# Patient Record
Sex: FEMALE | Race: WHITE | NOT HISPANIC OR LATINO | Employment: OTHER | URBAN - METROPOLITAN AREA
[De-identification: names, ages, dates, MRNs, and addresses within clinical notes are randomized per-mention and may not be internally consistent; named-entity substitution may affect disease eponyms.]

---

## 2017-01-03 ENCOUNTER — ALLSCRIPTS OFFICE VISIT (OUTPATIENT)
Dept: OTHER | Facility: OTHER | Age: 62
End: 2017-01-03

## 2017-01-27 ENCOUNTER — ALLSCRIPTS OFFICE VISIT (OUTPATIENT)
Dept: OTHER | Facility: OTHER | Age: 62
End: 2017-01-27

## 2017-02-01 ENCOUNTER — ALLSCRIPTS OFFICE VISIT (OUTPATIENT)
Dept: OTHER | Facility: OTHER | Age: 62
End: 2017-02-01

## 2017-02-01 DIAGNOSIS — Z12.4 ENCOUNTER FOR SCREENING FOR MALIGNANT NEOPLASM OF CERVIX: ICD-10-CM

## 2017-02-01 LAB
BILIRUB UR QL STRIP: NORMAL
CLARITY UR: NORMAL
COLOR UR: YELLOW
GLUCOSE (HISTORICAL): NORMAL
HGB UR QL STRIP.AUTO: NORMAL
KETONES UR STRIP-MCNC: NORMAL MG/DL
LEUKOCYTE ESTERASE UR QL STRIP: NORMAL
NITRITE UR QL STRIP: NORMAL
PH UR STRIP.AUTO: 7 [PH]
PROT UR STRIP-MCNC: NORMAL MG/DL
SP GR UR STRIP.AUTO: 1
UROBILINOGEN UR QL STRIP.AUTO: NORMAL

## 2017-02-01 PROCEDURE — G0145 SCR C/V CYTO,THINLAYER,RESCR: HCPCS | Performed by: FAMILY MEDICINE

## 2017-02-03 ENCOUNTER — LAB REQUISITION (OUTPATIENT)
Dept: LAB | Facility: HOSPITAL | Age: 62
End: 2017-02-03
Payer: MEDICARE

## 2017-02-03 DIAGNOSIS — Z12.4 ENCOUNTER FOR SCREENING FOR MALIGNANT NEOPLASM OF CERVIX: ICD-10-CM

## 2017-02-08 LAB
LAB AP GYN PRIMARY INTERPRETATION: NORMAL
Lab: NORMAL

## 2017-02-09 ENCOUNTER — GENERIC CONVERSION - ENCOUNTER (OUTPATIENT)
Dept: OTHER | Facility: OTHER | Age: 62
End: 2017-02-09

## 2017-04-03 ENCOUNTER — ALLSCRIPTS OFFICE VISIT (OUTPATIENT)
Dept: OTHER | Facility: OTHER | Age: 62
End: 2017-04-03

## 2017-05-12 ENCOUNTER — ALLSCRIPTS OFFICE VISIT (OUTPATIENT)
Dept: OTHER | Facility: OTHER | Age: 62
End: 2017-05-12

## 2017-05-12 ENCOUNTER — TRANSCRIBE ORDERS (OUTPATIENT)
Dept: ADMINISTRATIVE | Facility: HOSPITAL | Age: 62
End: 2017-05-12

## 2017-05-12 DIAGNOSIS — M25.561 PAIN IN RIGHT KNEE: ICD-10-CM

## 2017-05-12 DIAGNOSIS — S83.241A OTHER TEAR OF MEDIAL MENISCUS, CURRENT INJURY, RIGHT KNEE, INITIAL ENCOUNTER: ICD-10-CM

## 2017-05-12 DIAGNOSIS — M25.561 RIGHT KNEE PAIN, UNSPECIFIED CHRONICITY: Primary | ICD-10-CM

## 2017-05-24 ENCOUNTER — ALLSCRIPTS OFFICE VISIT (OUTPATIENT)
Dept: OTHER | Facility: OTHER | Age: 62
End: 2017-05-24

## 2017-05-25 ENCOUNTER — HOSPITAL ENCOUNTER (OUTPATIENT)
Dept: RADIOLOGY | Facility: HOSPITAL | Age: 62
Discharge: HOME | End: 2017-05-25
Attending: ORTHOPAEDIC SURGERY
Payer: MEDICARE

## 2017-05-25 DIAGNOSIS — M25.561 PAIN IN RIGHT KNEE: ICD-10-CM

## 2017-05-25 PROCEDURE — 73721 MRI JNT OF LWR EXTRE W/O DYE: CPT

## 2017-05-26 ENCOUNTER — ALLSCRIPTS OFFICE VISIT (OUTPATIENT)
Dept: OTHER | Facility: OTHER | Age: 62
End: 2017-05-26

## 2017-06-01 ENCOUNTER — APPOINTMENT (OUTPATIENT)
Dept: LAB | Facility: CLINIC | Age: 62
End: 2017-06-01
Payer: MEDICARE

## 2017-06-01 ENCOUNTER — TRANSCRIBE ORDERS (OUTPATIENT)
Dept: LAB | Facility: CLINIC | Age: 62
End: 2017-06-01

## 2017-06-01 DIAGNOSIS — S83.241A ACUTE MEDIAL MENISCUS TEAR OF RIGHT KNEE, INITIAL ENCOUNTER: Primary | ICD-10-CM

## 2017-06-01 DIAGNOSIS — S83.241A ACUTE MEDIAL MENISCUS TEAR OF RIGHT KNEE, INITIAL ENCOUNTER: ICD-10-CM

## 2017-06-01 LAB
ANION GAP SERPL CALCULATED.3IONS-SCNC: 5 MMOL/L (ref 4–13)
APTT PPP: 29 SECONDS (ref 23–35)
BASOPHILS # BLD AUTO: 0.05 THOUSANDS/ΜL (ref 0–0.1)
BASOPHILS NFR BLD AUTO: 1 % (ref 0–1)
BUN SERPL-MCNC: 17 MG/DL (ref 5–25)
CALCIUM SERPL-MCNC: 9 MG/DL (ref 8.3–10.1)
CHLORIDE SERPL-SCNC: 104 MMOL/L (ref 100–108)
CO2 SERPL-SCNC: 30 MMOL/L (ref 21–32)
CREAT SERPL-MCNC: 0.88 MG/DL (ref 0.6–1.3)
EOSINOPHIL # BLD AUTO: 0.6 THOUSAND/ΜL (ref 0–0.61)
EOSINOPHIL NFR BLD AUTO: 7 % (ref 0–6)
ERYTHROCYTE [DISTWIDTH] IN BLOOD BY AUTOMATED COUNT: 15.3 % (ref 11.6–15.1)
GFR SERPL CREATININE-BSD FRML MDRD: >60 ML/MIN/1.73SQ M
GLUCOSE SERPL-MCNC: 71 MG/DL (ref 65–140)
HCT VFR BLD AUTO: 37 % (ref 34.8–46.1)
HGB BLD-MCNC: 12 G/DL (ref 11.5–15.4)
INR PPP: 0.93 (ref 0.86–1.16)
LYMPHOCYTES # BLD AUTO: 3.57 THOUSANDS/ΜL (ref 0.6–4.47)
LYMPHOCYTES NFR BLD AUTO: 40 % (ref 14–44)
MCH RBC QN AUTO: 29 PG (ref 26.8–34.3)
MCHC RBC AUTO-ENTMCNC: 32.4 G/DL (ref 31.4–37.4)
MCV RBC AUTO: 89 FL (ref 82–98)
MONOCYTES # BLD AUTO: 0.78 THOUSAND/ΜL (ref 0.17–1.22)
MONOCYTES NFR BLD AUTO: 9 % (ref 4–12)
NEUTROPHILS # BLD AUTO: 3.89 THOUSANDS/ΜL (ref 1.85–7.62)
NEUTS SEG NFR BLD AUTO: 43 % (ref 43–75)
NRBC BLD AUTO-RTO: 0 /100 WBCS
PLATELET # BLD AUTO: 378 THOUSANDS/UL (ref 149–390)
PMV BLD AUTO: 10.2 FL (ref 8.9–12.7)
POTASSIUM SERPL-SCNC: 4.5 MMOL/L (ref 3.5–5.3)
PROTHROMBIN TIME: 12.5 SECONDS (ref 12.1–14.4)
RBC # BLD AUTO: 4.14 MILLION/UL (ref 3.81–5.12)
SODIUM SERPL-SCNC: 139 MMOL/L (ref 136–145)
WBC # BLD AUTO: 8.91 THOUSAND/UL (ref 4.31–10.16)

## 2017-06-01 PROCEDURE — 85610 PROTHROMBIN TIME: CPT | Performed by: ORTHOPAEDIC SURGERY

## 2017-06-01 PROCEDURE — 85025 COMPLETE CBC W/AUTO DIFF WBC: CPT | Performed by: ORTHOPAEDIC SURGERY

## 2017-06-01 PROCEDURE — 80048 BASIC METABOLIC PNL TOTAL CA: CPT | Performed by: ORTHOPAEDIC SURGERY

## 2017-06-01 PROCEDURE — 85730 THROMBOPLASTIN TIME PARTIAL: CPT

## 2017-06-01 PROCEDURE — 36415 COLL VENOUS BLD VENIPUNCTURE: CPT | Performed by: ORTHOPAEDIC SURGERY

## 2017-06-05 RX ORDER — FAMOTIDINE 20 MG
TABLET ORAL
COMMUNITY
End: 2018-01-24

## 2017-06-05 RX ORDER — SUMATRIPTAN 100 MG/1
100 TABLET, FILM COATED ORAL ONCE AS NEEDED
COMMUNITY
End: 2021-08-16 | Stop reason: SDUPTHER

## 2017-06-05 RX ORDER — BUPRENORPHINE HYDROCHLORIDE 8 MG/1
8 TABLET SUBLINGUAL 3 TIMES DAILY
COMMUNITY

## 2017-06-05 RX ORDER — TURMERIC ROOT EXTRACT 500 MG
TABLET ORAL
COMMUNITY
End: 2021-04-28

## 2017-06-05 RX ORDER — DIPHENOXYLATE HYDROCHLORIDE AND ATROPINE SULFATE 2.5; .025 MG/1; MG/1
1 TABLET ORAL
COMMUNITY

## 2017-06-05 RX ORDER — CHLORAL HYDRATE 500 MG
1000 CAPSULE ORAL
COMMUNITY
End: 2019-06-11

## 2017-06-05 RX ORDER — ALBUTEROL SULFATE 90 UG/1
2 AEROSOL, METERED RESPIRATORY (INHALATION) EVERY 6 HOURS PRN
COMMUNITY
End: 2018-12-31 | Stop reason: SDUPTHER

## 2017-06-05 RX ORDER — DULOXETIN HYDROCHLORIDE 30 MG/1
30 CAPSULE, DELAYED RELEASE ORAL
COMMUNITY
End: 2018-03-06 | Stop reason: SDUPTHER

## 2017-06-06 ENCOUNTER — GENERIC CONVERSION - ENCOUNTER (OUTPATIENT)
Dept: OTHER | Facility: OTHER | Age: 62
End: 2017-06-06

## 2017-06-06 ENCOUNTER — ALLSCRIPTS OFFICE VISIT (OUTPATIENT)
Dept: OTHER | Facility: OTHER | Age: 62
End: 2017-06-06

## 2017-06-07 ENCOUNTER — ANESTHESIA EVENT (OUTPATIENT)
Dept: PERIOP | Facility: AMBULARY SURGERY CENTER | Age: 62
End: 2017-06-07
Payer: MEDICARE

## 2017-06-08 ENCOUNTER — HOSPITAL ENCOUNTER (OUTPATIENT)
Facility: AMBULARY SURGERY CENTER | Age: 62
Setting detail: OUTPATIENT SURGERY
Discharge: HOME | End: 2017-06-08
Attending: ORTHOPAEDIC SURGERY | Admitting: ORTHOPAEDIC SURGERY
Payer: MEDICARE

## 2017-06-08 ENCOUNTER — ANESTHESIA (OUTPATIENT)
Dept: PERIOP | Facility: AMBULARY SURGERY CENTER | Age: 62
End: 2017-06-08
Payer: MEDICARE

## 2017-06-08 ENCOUNTER — GENERIC CONVERSION - ENCOUNTER (OUTPATIENT)
Dept: PERIOP | Facility: HOSPITAL | Age: 62
End: 2017-06-08

## 2017-06-08 VITALS
DIASTOLIC BLOOD PRESSURE: 66 MMHG | RESPIRATION RATE: 16 BRPM | SYSTOLIC BLOOD PRESSURE: 152 MMHG | HEART RATE: 57 BPM | TEMPERATURE: 98.2 F | OXYGEN SATURATION: 99 % | WEIGHT: 143 LBS | HEIGHT: 66 IN | BODY MASS INDEX: 22.98 KG/M2

## 2017-06-08 RX ORDER — FENTANYL CITRATE/PF 50 MCG/ML
25 SYRINGE (ML) INJECTION
Status: DISCONTINUED | OUTPATIENT
Start: 2017-06-08 | End: 2017-06-08 | Stop reason: HOSPADM

## 2017-06-08 RX ORDER — PROPOFOL 10 MG/ML
INJECTION, EMULSION INTRAVENOUS AS NEEDED
Status: DISCONTINUED | OUTPATIENT
Start: 2017-06-08 | End: 2017-06-08 | Stop reason: SURG

## 2017-06-08 RX ORDER — OXYCODONE HYDROCHLORIDE AND ACETAMINOPHEN 5; 325 MG/1; MG/1
1 TABLET ORAL EVERY 4 HOURS PRN
Status: DISCONTINUED | OUTPATIENT
Start: 2017-06-08 | End: 2017-06-08 | Stop reason: HOSPADM

## 2017-06-08 RX ORDER — BUPIVACAINE HYDROCHLORIDE AND EPINEPHRINE 2.5; 5 MG/ML; UG/ML
INJECTION, SOLUTION INFILTRATION; PERINEURAL AS NEEDED
Status: DISCONTINUED | OUTPATIENT
Start: 2017-06-08 | End: 2017-06-08 | Stop reason: HOSPADM

## 2017-06-08 RX ORDER — SODIUM CHLORIDE, SODIUM LACTATE, POTASSIUM CHLORIDE, CALCIUM CHLORIDE 600; 310; 30; 20 MG/100ML; MG/100ML; MG/100ML; MG/100ML
100 INJECTION, SOLUTION INTRAVENOUS CONTINUOUS
Status: DISCONTINUED | OUTPATIENT
Start: 2017-06-08 | End: 2017-06-08 | Stop reason: HOSPADM

## 2017-06-08 RX ORDER — MAGNESIUM HYDROXIDE 1200 MG/15ML
LIQUID ORAL AS NEEDED
Status: DISCONTINUED | OUTPATIENT
Start: 2017-06-08 | End: 2017-06-08 | Stop reason: HOSPADM

## 2017-06-08 RX ORDER — ONDANSETRON 2 MG/ML
INJECTION INTRAMUSCULAR; INTRAVENOUS AS NEEDED
Status: DISCONTINUED | OUTPATIENT
Start: 2017-06-08 | End: 2017-06-08 | Stop reason: SURG

## 2017-06-08 RX ORDER — MIDAZOLAM HYDROCHLORIDE 1 MG/ML
INJECTION INTRAMUSCULAR; INTRAVENOUS AS NEEDED
Status: DISCONTINUED | OUTPATIENT
Start: 2017-06-08 | End: 2017-06-08 | Stop reason: SURG

## 2017-06-08 RX ORDER — FENTANYL CITRATE 50 UG/ML
INJECTION, SOLUTION INTRAMUSCULAR; INTRAVENOUS AS NEEDED
Status: DISCONTINUED | OUTPATIENT
Start: 2017-06-08 | End: 2017-06-08 | Stop reason: SURG

## 2017-06-08 RX ORDER — LIDOCAINE HYDROCHLORIDE 10 MG/ML
INJECTION, SOLUTION INFILTRATION; PERINEURAL AS NEEDED
Status: DISCONTINUED | OUTPATIENT
Start: 2017-06-08 | End: 2017-06-08 | Stop reason: SURG

## 2017-06-08 RX ADMIN — DEXAMETHASONE SODIUM PHOSPHATE 8 MG: 10 INJECTION INTRAMUSCULAR; INTRAVENOUS at 09:42

## 2017-06-08 RX ADMIN — FENTANYL CITRATE 100 MCG: 50 INJECTION, SOLUTION INTRAMUSCULAR; INTRAVENOUS at 09:33

## 2017-06-08 RX ADMIN — PROPOFOL 200 MG: 10 INJECTION, EMULSION INTRAVENOUS at 09:33

## 2017-06-08 RX ADMIN — CEFAZOLIN SODIUM 2000 MG: 2 SOLUTION INTRAVENOUS at 09:30

## 2017-06-08 RX ADMIN — MIDAZOLAM HYDROCHLORIDE 2 MG: 1 INJECTION, SOLUTION INTRAMUSCULAR; INTRAVENOUS at 09:30

## 2017-06-08 RX ADMIN — ONDANSETRON 4 MG: 2 INJECTION INTRAMUSCULAR; INTRAVENOUS at 09:42

## 2017-06-08 RX ADMIN — SODIUM CHLORIDE, SODIUM LACTATE, POTASSIUM CHLORIDE, AND CALCIUM CHLORIDE: .6; .31; .03; .02 INJECTION, SOLUTION INTRAVENOUS at 09:25

## 2017-06-08 RX ADMIN — LIDOCAINE HYDROCHLORIDE 50 MG: 10 INJECTION, SOLUTION INFILTRATION; PERINEURAL at 09:33

## 2017-06-08 RX ADMIN — SODIUM CHLORIDE, SODIUM LACTATE, POTASSIUM CHLORIDE, AND CALCIUM CHLORIDE 100 ML/HR: .6; .31; .03; .02 INJECTION, SOLUTION INTRAVENOUS at 08:33

## 2017-06-08 RX ADMIN — OXYCODONE HYDROCHLORIDE AND ACETAMINOPHEN 1 TABLET: 5; 325 TABLET ORAL at 10:43

## 2017-06-09 ENCOUNTER — APPOINTMENT (OUTPATIENT)
Dept: PHYSICAL THERAPY | Facility: CLINIC | Age: 62
End: 2017-06-09
Payer: MEDICARE

## 2017-06-09 ENCOUNTER — GENERIC CONVERSION - ENCOUNTER (OUTPATIENT)
Dept: OTHER | Facility: OTHER | Age: 62
End: 2017-06-09

## 2017-06-09 DIAGNOSIS — S83.241A OTHER TEAR OF MEDIAL MENISCUS, CURRENT INJURY, RIGHT KNEE, INITIAL ENCOUNTER: ICD-10-CM

## 2017-06-09 PROCEDURE — 97110 THERAPEUTIC EXERCISES: CPT

## 2017-06-09 PROCEDURE — 97116 GAIT TRAINING THERAPY: CPT

## 2017-06-09 PROCEDURE — 97161 PT EVAL LOW COMPLEX 20 MIN: CPT

## 2017-06-09 PROCEDURE — G8979 MOBILITY GOAL STATUS: HCPCS

## 2017-06-09 PROCEDURE — G8978 MOBILITY CURRENT STATUS: HCPCS

## 2017-06-12 ENCOUNTER — APPOINTMENT (OUTPATIENT)
Dept: PHYSICAL THERAPY | Facility: CLINIC | Age: 62
End: 2017-06-12
Payer: MEDICARE

## 2017-06-12 PROCEDURE — 97116 GAIT TRAINING THERAPY: CPT

## 2017-06-12 PROCEDURE — 97110 THERAPEUTIC EXERCISES: CPT

## 2017-06-14 ENCOUNTER — APPOINTMENT (OUTPATIENT)
Dept: PHYSICAL THERAPY | Facility: CLINIC | Age: 62
End: 2017-06-14
Payer: MEDICARE

## 2017-06-14 PROCEDURE — 97110 THERAPEUTIC EXERCISES: CPT

## 2017-06-16 ENCOUNTER — APPOINTMENT (OUTPATIENT)
Dept: PHYSICAL THERAPY | Facility: CLINIC | Age: 62
End: 2017-06-16
Payer: MEDICARE

## 2017-06-16 ENCOUNTER — ALLSCRIPTS OFFICE VISIT (OUTPATIENT)
Dept: OTHER | Facility: OTHER | Age: 62
End: 2017-06-16

## 2017-06-16 PROCEDURE — 97110 THERAPEUTIC EXERCISES: CPT

## 2017-06-19 ENCOUNTER — APPOINTMENT (OUTPATIENT)
Dept: PHYSICAL THERAPY | Facility: CLINIC | Age: 62
End: 2017-06-19
Payer: MEDICARE

## 2017-06-19 PROCEDURE — 97112 NEUROMUSCULAR REEDUCATION: CPT

## 2017-06-19 PROCEDURE — 97110 THERAPEUTIC EXERCISES: CPT

## 2017-06-21 ENCOUNTER — APPOINTMENT (OUTPATIENT)
Dept: PHYSICAL THERAPY | Facility: CLINIC | Age: 62
End: 2017-06-21
Payer: MEDICARE

## 2017-06-21 PROCEDURE — 97110 THERAPEUTIC EXERCISES: CPT

## 2017-06-21 PROCEDURE — 97112 NEUROMUSCULAR REEDUCATION: CPT

## 2017-06-22 ENCOUNTER — ALLSCRIPTS OFFICE VISIT (OUTPATIENT)
Dept: OTHER | Facility: OTHER | Age: 62
End: 2017-06-22

## 2017-06-23 ENCOUNTER — APPOINTMENT (OUTPATIENT)
Dept: PHYSICAL THERAPY | Facility: CLINIC | Age: 62
End: 2017-06-23
Payer: MEDICARE

## 2017-06-23 PROCEDURE — 97110 THERAPEUTIC EXERCISES: CPT

## 2017-06-26 ENCOUNTER — TRANSCRIBE ORDERS (OUTPATIENT)
Dept: ADMINISTRATIVE | Facility: HOSPITAL | Age: 62
End: 2017-06-26

## 2017-06-26 ENCOUNTER — APPOINTMENT (OUTPATIENT)
Dept: PHYSICAL THERAPY | Facility: CLINIC | Age: 62
End: 2017-06-26
Payer: MEDICARE

## 2017-06-26 ENCOUNTER — GENERIC CONVERSION - ENCOUNTER (OUTPATIENT)
Dept: OTHER | Facility: OTHER | Age: 62
End: 2017-06-26

## 2017-06-26 DIAGNOSIS — R52 PAIN: Primary | ICD-10-CM

## 2017-06-26 DIAGNOSIS — R20.0 NUMBNESS AND TINGLING: ICD-10-CM

## 2017-06-26 DIAGNOSIS — R20.2 NUMBNESS AND TINGLING: ICD-10-CM

## 2017-06-26 PROCEDURE — 97110 THERAPEUTIC EXERCISES: CPT

## 2017-06-28 ENCOUNTER — APPOINTMENT (OUTPATIENT)
Dept: PHYSICAL THERAPY | Facility: CLINIC | Age: 62
End: 2017-06-28
Payer: MEDICARE

## 2017-06-30 ENCOUNTER — APPOINTMENT (OUTPATIENT)
Dept: PHYSICAL THERAPY | Facility: CLINIC | Age: 62
End: 2017-06-30
Payer: MEDICARE

## 2017-07-13 ENCOUNTER — HOSPITAL ENCOUNTER (OUTPATIENT)
Dept: RADIOLOGY | Facility: HOSPITAL | Age: 62
Discharge: HOME | End: 2017-07-13
Attending: ANESTHESIOLOGY
Payer: MEDICARE

## 2017-07-13 DIAGNOSIS — R52 PAIN: ICD-10-CM

## 2017-07-13 DIAGNOSIS — R20.0 NUMBNESS AND TINGLING: ICD-10-CM

## 2017-07-13 DIAGNOSIS — R20.2 NUMBNESS AND TINGLING: ICD-10-CM

## 2017-07-13 PROCEDURE — 72141 MRI NECK SPINE W/O DYE: CPT

## 2017-07-13 PROCEDURE — 72148 MRI LUMBAR SPINE W/O DYE: CPT

## 2017-07-15 ENCOUNTER — ALLSCRIPTS OFFICE VISIT (OUTPATIENT)
Dept: OTHER | Facility: OTHER | Age: 62
End: 2017-07-15

## 2017-07-28 ENCOUNTER — ALLSCRIPTS OFFICE VISIT (OUTPATIENT)
Dept: OTHER | Facility: OTHER | Age: 62
End: 2017-07-28

## 2017-08-09 ENCOUNTER — GENERIC CONVERSION - ENCOUNTER (OUTPATIENT)
Dept: OTHER | Facility: OTHER | Age: 62
End: 2017-08-09

## 2017-09-13 ENCOUNTER — GENERIC CONVERSION - ENCOUNTER (OUTPATIENT)
Dept: OTHER | Facility: OTHER | Age: 62
End: 2017-09-13

## 2017-10-02 ENCOUNTER — GENERIC CONVERSION - ENCOUNTER (OUTPATIENT)
Dept: OTHER | Facility: OTHER | Age: 62
End: 2017-10-02

## 2017-10-16 ENCOUNTER — GENERIC CONVERSION - ENCOUNTER (OUTPATIENT)
Dept: OTHER | Facility: OTHER | Age: 62
End: 2017-10-16

## 2017-10-27 ENCOUNTER — TRANSCRIBE ORDERS (OUTPATIENT)
Dept: ADMINISTRATIVE | Facility: HOSPITAL | Age: 62
End: 2017-10-27

## 2017-10-27 DIAGNOSIS — Z12.39 SCREENING BREAST EXAMINATION: Primary | ICD-10-CM

## 2017-11-06 ENCOUNTER — HOSPITAL ENCOUNTER (OUTPATIENT)
Dept: RADIOLOGY | Facility: CLINIC | Age: 62
Discharge: HOME | End: 2017-11-06
Payer: MEDICARE

## 2017-11-06 DIAGNOSIS — Z12.39 SCREENING BREAST EXAMINATION: ICD-10-CM

## 2017-11-06 DIAGNOSIS — Z00.00 ENCOUNTER FOR GENERAL ADULT MEDICAL EXAMINATION WITHOUT ABNORMAL FINDINGS: ICD-10-CM

## 2017-11-06 PROCEDURE — G0202 SCR MAMMO BI INCL CAD: HCPCS

## 2017-11-17 ENCOUNTER — HOSPITAL ENCOUNTER (OUTPATIENT)
Dept: RADIOLOGY | Facility: HOSPITAL | Age: 62
Discharge: HOME | End: 2017-11-17
Attending: FAMILY MEDICINE
Payer: MEDICARE

## 2017-11-17 DIAGNOSIS — R92.8 ABNORMAL SCREENING MAMMOGRAM: ICD-10-CM

## 2017-11-29 ENCOUNTER — ALLSCRIPTS OFFICE VISIT (OUTPATIENT)
Dept: OTHER | Facility: OTHER | Age: 62
End: 2017-11-29

## 2017-11-29 ENCOUNTER — GENERIC CONVERSION - ENCOUNTER (OUTPATIENT)
Dept: OTHER | Facility: OTHER | Age: 62
End: 2017-11-29

## 2017-11-30 NOTE — PROGRESS NOTES
Assessment    1  Knee osteoarthritis (015 58) (M17 10)    Plan  Minesh continues to struggle with pain about her knee which is likely due to her arthritis  She has not tried joint lubricant injections and we discussed this at length today  She would like to try these  We will order Euflexxa for the right knee and will start these in about a week when sterile thrust to her insurance  We also advised ice and over-the-counter medications as needed for pain  She may require knee replacement in the future, however we will exhaust conservative treatment first    Discussion/Summary  The patient was counseled regarding instructions for management,-- prognosis,-- patient and family education,-- impressions,-- importance of compliance with treatment  Chief Complaint    1  Knee Pain    History of Present Illness    Minesh presents today for follow-up of her right knee  She had a knee arthroscopy with medial meniscectomy back in June for this  At that point grade 3 arthritic changes were noted about the medial and patellofemoral compartments of the knee  She states she has continued to struggle with anterior medial knee pain which can radiate down the leg some at times  This seems to be getting progressively worse  It can be sharp and severe at times  This seems to be worse with more activity, especially stairs  Rest does help some  She has had steroid injections in the past though these have not provided her significant relief  Review of Systems   Constitutional: feeling poorly-- and-- feeling tired, but-- no fever,-- no recent weight gain,-- no chills-- and-- no recent weight loss  Eyes: eyesight problems, but-- eyes not red  ENT: no loss of hearing, no nosebleeds, no sore throat  Cardiovascular: No complaints of chest pain, no palpitations, no leg claudication or lower extremity edema  Respiratory: wheezing, but-- no shortness of breath-- and-- no cough    Gastrointestinal: constipation, but-- no abdominal pain,-- no nausea,-- no vomiting,-- no diarrhea-- and-- no blood in stools  Genitourinary: no complaints of dysuria, no incontinence  Musculoskeletal: as noted in HPI  Integumentary: no complaints of skin rash or lesion, no itching or dry skin, no skin wounds  Neurological: no complaints of headache, no confusion, no numbness or tingling, no dizziness  Endocrine: No complaints of muscle weakness, no feelings of weakness, no frequent urination, no excessive thirst   Psychiatric: No suicidal thoughts, no anxiety, no feelings of depression  ROS reviewed  Active Problems    1  Acute exacerbation of COPD with asthma (493 22) (J44 1,J45 901)   2  Basal cell carcinoma of skin (173 91) (C44 91)   3  BMI 24 0-24 9, adult (V85 1) (Z68 24)   4  Cervical radiculopathy (723 4) (M54 12)   5  Chronic obstructive pulmonary disease (496) (J44 9)   6  Chronic pain syndrome (338 4) (G89 4)   7  Cigarette smoker (305 1) (F17 210)   8  Classic migraine with aura (346 00) (G43 109)   9  Complex tear of medial meniscus, current injury, right knee, subsequent encounter (V58 89) (S83 231D)   10  Constipation, chronic (564 00) (K59 09)   11  Degeneration of intervertebral disc of mid-cervical region (722 4) (M50 320)   12  Degenerative arthritis of knee (715 36) (M17 10)   13  Depression with anxiety (300 4) (F41 8)   14  Diverticulosis (562 10) (K57 90)   15  Esophagitis, reflux (530 11) (K21 0)   16  Fatigue (780 79) (R53 83)   17  Knee osteoarthritis (715 36) (M17 10)   18  Localized osteoarthritis of knees, bilateral (715 36) (M17 0)   19  Lumbar disc herniation with radiculopathy (722 10) (M51 16)   20  Lumbar radiculopathy (724 4) (M54 16)   21  Medial meniscus tear (836 0) (S83 249A)   22  Migraine headache (346 90) (G43 909)   23  Murmur (785 2) (R01 1)   24  Primary osteoarthritis of first carpometacarpal joint of left hand (715 14) (M18 12)   25   Primary osteoarthritis of first carpometacarpal joint of right hand (715 14) (M18 11)   26  Right knee pain (719 46) (M25 561)   27  Sciatica (724 3) (M54 30)   28  Screening for breast cancer (V76 10) (Z12 31)   29  Spinal stenosis (724 00) (M48 00)   30  Status post medial meniscectomy of right knee (V45 89) (Z98 890)   31  Tear of medial meniscus of right knee, current, unspecified tear type, initial encounter  (836 0) (S83 241A)   32  Vitamin D insufficiency (268 9) (E55 9)    Past Medical History     · History of Acute bacterial bronchitis (466 0,041 9) (J20 8,B96 89)   · History of Acute bacterial bronchitis (466 0,041 9) (J20 8,B96 89)   · History of Amenorrhea (626 0) (N91 2)   · History of Arthritis (V13 4)   · History of Atypical chest pain (786 59) (R07 89)   · History of Cellulitis of hand, left (682 4) (L03 114)   · History of Dandruff (690 18) (L21 0)   · History of acute bronchitis (V12 69) (Z87 09)   · History of acute pharyngitis (V12 69) (Z87 09)   · History of acute sinusitis (V12 69) (Z87 09)   · History of acute sinusitis (V12 69) (Z87 09)   · History of alcoholism (V11 3) (F10 21)   · History of backache (V13 59) (Z87 39)   · History of bronchitis (V12 69) (Z87 09)   · History of conjunctivitis (V12 49) (Z86 69)   · History of eczema (V13 3) (Z87 2)   · History of malignant melanoma of skin (V10 82) (Z85 820)   · History of methicillin resistant Staphylococcus aureus infection (V12 04) (Z86 14)   · History of Lumbar radiculopathy (724 4) (M54 16)   · History of Sorethroat (462) (J02 9)   · History of Spinal stenosis (724 00) (M48 00)   · History of Superficial incisional infection of surgical site, initial encounter (998 59)(T81 4XXA)   · History of Superficial incisional infection of surgical site, subsequent encounter(V58 89,998 59) (T81 4XXD)   · History of URTI (acute upper respiratory infection) (465 9) (J06 9)    The active problems and past medical history were reviewed and updated today        Surgical History   · History of  Section   · History of Hand Surgery   · History of Hernia Repair    The surgical history was reviewed and updated today  Family History  Mother    · Family history of Atherosclerosis (V17 49)   · Family history of alcoholism (V17 0) (Z81 1)   · Family history of cerebrovascular accident (CVA) (V17 1) (Z82 3)   · Family history of Transient Ischemic Attack  Father    · Family history of cerebrovascular accident (CVA) (V17 1) (Z82 3)   · Family history of Reported Family History Of Heart Disease  Sister    · Family history of alcoholism (V17 0) (Z81 1)    The family history was reviewed and updated today  Social History     · Active smoker (305 1) (Z72 0)   · Alcoholism in recovery (303 90) (F10 20)   · Cigarette smoker (305 1) (F17 210)   · Daily caffeinated coffee consumption   · Drinks caffeinated tea   · No alcohol use  The social history was reviewed and updated today  Current Meds   1  Advair Diskus 250-50 MCG/DOSE Inhalation Aerosol Powder Breath Activated; INHALE 1 PUFF EVERY 12 HOURS; Therapy: 12CYP7735 to (Last Rx:02Oct2017)  Requested for: 21NHU4804 Ordered   2  DULoxetine HCl - 30 MG Oral Capsule Delayed Release Particles; take 1 capsule by mouth twice a day; Therapy: 21OKY1687 to (Evaluate:20Onr6288)  Requested for: 59Wlv5626; Last QN:20EEA4583 Ordered   3  Krill Oil 1000 MG Oral Capsule; Therapy: (Recorded:66Ied7502) to Recorded   4  Multi-Vitamin TABS; Therapy: (Recorded:17Xet2654) to Recorded   5  ProAir  (90 Base) MCG/ACT Inhalation Aerosol Solution; INHALE 1 TO 2 PUFFS EVERY 6 HOURS AS NEEDED; Therapy: 04CAF7003 to (Last Rx:40Ckb2835)  Requested for: 18OJA3771 Ordered   6  Subutex 8 MG SUBL; Therapy: (Recorded:26Lsh6623) to Recorded   7  SUMAtriptan Succinate 100 MG Oral Tablet; take one for migraine; repeat in 2 hours if needed; not to take more than 2 in 24 hours;  Therapy: 41Tfm7139 to (Last Rx:20Cug3772)  Requested for: 84Neq1624 Ordered    The medication list was reviewed and updated today  Allergies    1  NSAIDs   2  Antihistamine TABS    Physical Exam    Right Knee: Appearance: an effusion grade Trace  Tenderness: medial joint line-- and-- undersurface of the patella  Palpatory findings include crepitus  ROM: Full  Flexion: painful  Motor: Normal  Special Test: no laxity on Valgus Stress-- and-- no laxity on Varus Stress  Constitutional - General appearance: Normal   Musculoskeletal - Gait and station: Normal -- Gait and station: Abnormal  Gait evaluation demonstrated antalgia on the right  -- Digits and nails: Normal -- Muscle strength/tone: Normal -- Lower extremity compartments: Normal   Cardiovascular - Pulses: Normal -- Examination of extremities for edema and/or varicosities: Normal   Lymphatic - Palpation of lymph nodes in other areas: Normal  no right femoral node enlargement  Skin - Skin and subcutaneous tissue: Normal   Neurologic - Sensation: Normal -- Lower extremity peripheral neuro exam: Normal   Psychiatric - Orientation to person, place, and time: Normal -- Mood and affect: Normal   Eyes  Conjunctiva and lids: Normal    Pupils and irises: Normal        Attending Note  Collaborating Physician Note: Collaborating Note: I interviewed and examined the patient,-- I supervised the Advanced Practitioner-- and-- I agree with the Advanced Practitioner note  I discussed the case with the Advanced Practitioner and reviewed the AP note      Future Appointments    Date/Time Provider Specialty Site   12/20/2017 09:40 AM RAISA Granados  Gastroenterology Adult [facility] [address] [place] [address]   Electronically signed by :  Karl Faustin, [facility]; Nov 29 2017 10:20AM EST                       (Author)    Electronically signed by : RAISA Mazariegos ; Nov 29 2017 11:19AM EST                       (Author)

## 2017-12-08 ENCOUNTER — HOSPITAL ENCOUNTER (OUTPATIENT)
Facility: AMBULARY SURGERY CENTER | Age: 62
Setting detail: OUTPATIENT SURGERY
Discharge: HOME | End: 2017-12-08
Attending: ANESTHESIOLOGY | Admitting: ANESTHESIOLOGY
Payer: MEDICARE

## 2017-12-08 ENCOUNTER — GENERIC CONVERSION - ENCOUNTER (OUTPATIENT)
Dept: OTHER | Facility: OTHER | Age: 62
End: 2017-12-08

## 2017-12-08 ENCOUNTER — HOSPITAL ENCOUNTER (OUTPATIENT)
Dept: RADIOLOGY | Facility: HOSPITAL | Age: 62
Setting detail: OUTPATIENT SURGERY
Discharge: HOME | End: 2017-12-08
Payer: MEDICARE

## 2017-12-08 VITALS
OXYGEN SATURATION: 99 % | RESPIRATION RATE: 18 BRPM | TEMPERATURE: 97.5 F | SYSTOLIC BLOOD PRESSURE: 130 MMHG | HEART RATE: 75 BPM | DIASTOLIC BLOOD PRESSURE: 67 MMHG

## 2017-12-08 PROBLEM — M54.2 CERVICALGIA: Status: ACTIVE | Noted: 2017-12-08

## 2017-12-08 PROBLEM — M50.320 OTHER CERVICAL DISC DEGENERATION, MID-CERVICAL REGION, UNSPECIFIED LEVEL: Status: ACTIVE | Noted: 2017-12-08

## 2017-12-08 PROBLEM — G89.4 CHRONIC PAIN SYNDROME: Status: ACTIVE | Noted: 2017-12-08

## 2017-12-08 PROBLEM — M54.12 RADICULOPATHY OF CERVICAL REGION: Status: ACTIVE | Noted: 2017-12-08

## 2017-12-08 PROCEDURE — 72020 X-RAY EXAM OF SPINE 1 VIEW: CPT

## 2017-12-08 RX ORDER — METHYLPREDNISOLONE ACETATE 80 MG/ML
INJECTION, SUSPENSION INTRA-ARTICULAR; INTRALESIONAL; INTRAMUSCULAR; SOFT TISSUE AS NEEDED
Status: DISCONTINUED | OUTPATIENT
Start: 2017-12-08 | End: 2017-12-08 | Stop reason: HOSPADM

## 2017-12-08 RX ORDER — LIDOCAINE HYDROCHLORIDE 10 MG/ML
INJECTION, SOLUTION EPIDURAL; INFILTRATION; INTRACAUDAL; PERINEURAL AS NEEDED
Status: DISCONTINUED | OUTPATIENT
Start: 2017-12-08 | End: 2017-12-08 | Stop reason: HOSPADM

## 2017-12-08 NOTE — OP NOTE
ATTENDING PHYSICIAN:  Yazmin Munoz MD     PROCEDURE:  Cervical epidural steroid injection with steroid and local anesthetic under fluoroscopy at the C6-C7 level  PREPROCEDURE DIAGNOSIS:  Neck pain and upper extremity radicular symptoms  POSTPROCEDURE DIAGNOSIS:  Neck pain and upper extremity radicular symptoms  ANESTHESIA:  Local     ESTIMATED BLOOD LOSS:  Minimal     COMPLICATIONS:  None  LOCATION:  [address]  CONSENT:  Today's procedure, its potential benefits as well as its risks and potential side effects were reviewed  Discussed risks of the procedure including bleeding, infection, nerve irritation or damage, reactions to the medications, headache, failure of the pain to improve, and potential worsening of the pain were explained to the patient who verbalized understanding and who wished to proceed  Written informed consent is thereby obtained  DESCRIPTION OF THE PROCEDURE:  After written informed consent was obtained, the patient was taken to the fluoroscopy suite and placed in the prone position  Anatomical landmarks were identified by way of fluoroscopy in multiple views  The skin of the cervical region was prepped and draped in the usual sterile fashion  Strict aseptic technique was utilized  The skin and subcutaneous tissues at the needle entry site were infiltrated with 3 mL of 1% preservative-free lidocaine using a 25-gauge 1-1/2-inch needle  A 20-gauge Tuohy needle was then incrementally advanced under fluoroscopy using a loss of resistance technique  Upon entering into the epidural space, a positive loss of resistance to air was noted and a characteristic "pop" was felt  Proper placement into the epidural space was confirmed with a hanging column technique where preservative-free normal saline was noted to flow freely to gravity in to the epidural space as well as by the administration of contrast to delineate the epidural space   There were no paresthesias reported  After negative aspiration for CSF or heme, a 6 mL injectate consisting of 1 mL of Depo-Medrol 80 mg/mL and 1 mL of Depo-Medrol 40 mg/mL mixed with 4 mL of preservative-free normal saline was slowly injected  The patient tolerated the procedure well and all needles were removed with the tips intact  Hemostasis was maintained  There were no apparent paresthesias or complications  The skin was wiped clean and a Band-Aid was placed as appropriate  The patient was monitored for an appropriate period of time following the procedure and remained hemodynamically stable and neurovascularly intact following the procedure  The patient was ultimately discharged to home with supervision in good condition and instructed to call the office in a few days for an update or sooner as warranted  I was present for and participated in all key and critical portions of this procedure      Skye Alegre MD  12/8/2017  10:03 AM

## 2017-12-09 ENCOUNTER — GENERIC CONVERSION - ENCOUNTER (OUTPATIENT)
Dept: OTHER | Facility: OTHER | Age: 62
End: 2017-12-09

## 2017-12-09 ENCOUNTER — APPOINTMENT (OUTPATIENT)
Dept: RADIOLOGY | Facility: CLINIC | Age: 62
End: 2017-12-09
Attending: FAMILY MEDICINE
Payer: MEDICARE

## 2017-12-09 ENCOUNTER — TRANSCRIBE ORDERS (OUTPATIENT)
Dept: URGENT CARE | Facility: CLINIC | Age: 62
End: 2017-12-09

## 2017-12-09 DIAGNOSIS — R09.89 CHEST CONGESTION: Primary | ICD-10-CM

## 2017-12-09 PROCEDURE — 71020 HB CHEST X-RAY 2VW FRONTAL&LATL: CPT

## 2017-12-11 ENCOUNTER — GENERIC CONVERSION - ENCOUNTER (OUTPATIENT)
Dept: OTHER | Facility: OTHER | Age: 62
End: 2017-12-11

## 2017-12-11 DIAGNOSIS — K21.00 GASTRO-ESOPHAGEAL REFLUX DISEASE WITH ESOPHAGITIS: ICD-10-CM

## 2017-12-11 DIAGNOSIS — K59.09 OTHER CONSTIPATION: ICD-10-CM

## 2017-12-11 DIAGNOSIS — R91.1 SOLITARY PULMONARY NODULE: ICD-10-CM

## 2017-12-13 ENCOUNTER — TRANSCRIBE ORDERS (OUTPATIENT)
Dept: ADMINISTRATIVE | Facility: HOSPITAL | Age: 62
End: 2017-12-13

## 2017-12-13 DIAGNOSIS — R91.1 COIN LESION: Primary | ICD-10-CM

## 2017-12-15 ENCOUNTER — HOSPITAL ENCOUNTER (OUTPATIENT)
Dept: RADIOLOGY | Facility: HOSPITAL | Age: 62
Discharge: HOME | End: 2017-12-15
Attending: FAMILY MEDICINE
Payer: MEDICARE

## 2017-12-15 ENCOUNTER — GENERIC CONVERSION - ENCOUNTER (OUTPATIENT)
Dept: OTHER | Facility: OTHER | Age: 62
End: 2017-12-15

## 2017-12-15 ENCOUNTER — GENERIC CONVERSION - ENCOUNTER (OUTPATIENT)
Dept: FAMILY MEDICINE CLINIC | Facility: CLINIC | Age: 62
End: 2017-12-15

## 2017-12-15 DIAGNOSIS — R91.1 COIN LESION: ICD-10-CM

## 2017-12-15 PROCEDURE — 71250 CT THORAX DX C-: CPT

## 2017-12-20 ENCOUNTER — GENERIC CONVERSION - ENCOUNTER (OUTPATIENT)
Dept: OTHER | Facility: OTHER | Age: 62
End: 2017-12-20

## 2017-12-20 ENCOUNTER — ALLSCRIPTS OFFICE VISIT (OUTPATIENT)
Dept: OTHER | Facility: OTHER | Age: 62
End: 2017-12-20

## 2017-12-21 ENCOUNTER — GENERIC CONVERSION - ENCOUNTER (OUTPATIENT)
Dept: OTHER | Facility: OTHER | Age: 62
End: 2017-12-21

## 2017-12-21 NOTE — CONSULTS
Assessment   1  Abdominal pain (789 00) (R10 9)   2  Constipation, chronic (564 00) (K59 09)   3  Esophagitis, reflux (530 11) (K21 0)    Plan   Abdominal pain, Constipation, chronic    · Suprep Bowel Prep Kit 17 5-3 13-1 6 GM/180ML Oral Solution; USE AS DIRECTED   Rx By: Brii Sanabria; Dispense: 0 Days ; #:1 X 177 ML Bottle (2 Bottles); Refill: 0;For: Abdominal pain, Constipation, chronic; PREMA = N; Verified Transmission to [address] [place] [address]; Last Updated By: SystemJobyal; 12/20/2017 10:17:29 AM   · COLONOSCOPY (GI, SURG); Status:Hold For - Scheduling; Requested for:19Dqk3770;    Perform:[facility]; Due:01Nym3679; Ordered; For:Abdominal pain, Constipation, chronic; Ordered By:Russ Vasquez;  Constipation, chronic, Esophagitis, reflux    · (1) TSH; Status:Active; Requested for:31Snp4953;    Perform:[facility]; Due:57Yfw8928; Ordered; For:Constipation, chronic, Esophagitis, reflux; Ordered By:Russ Vasquez;  Esophagitis, reflux    · EGD; Status:Hold For - Scheduling; Requested for:87Aji7392;    Perform:[facility]; Due:75Vkm5994;Ordered;For:Esophagitis, reflux; Ordered By:Russ Vasquez;    Discussion/Summary   Discussion Summary:    Very pleasant 12year-old female with new onset constipation over the past 6 months to one year with abdominal distention and bloating as well as some reflux symptoms  To constipation with abdominal bloating and distention colon differential including medication side effects secondary to Subutex, IBS-C, more significant intestinal pathology that we start her on Dara lax 2-3 times daily to see if this helps  fluids and fiber in diet schedule her for colonoscopy to exclude significant underlying pathology will check a TSH given her constipation or weight gain    abdominal bloating and distention with reflux:  Will proceed with upper endoscopy the same time as her colonoscopy to evaluate for peptic ulcer disease and reflux esophagitis   with her the risks of the procedures including bleeding, surgery, perforation, missed polyp detection rates  Chief Complaint   Chief Complaint Free Text Note Form: Abdominal pain constipation      History of Present Illness   HPI: As you know this is a pleasant 45-year-old female with a history of asthma, depression, fibromyalgia, chronic pain, history of alcohol abuse is been sober for 2 years, previously on opioids has been on Subutex for the last year and a half who notes that over the past 6 months to one year she's had increasing constipation  She has a bowel movement once every 3 or 4 days, she reports her stools are very hard with significant straining  Associated with this she has increased abdominal bloating and distention, oftentimes worse postprandially with some occasional retrosternal burning and belching over the past several months  She's gained about 20 weight pounds over this 6 months to one year  She fecally has to sensation of incomplete evacuation  She has tried probiotics, she does take a detox T which sometimes will lead to incontinence  a history of internal hemorrhoids, last colonoscopy was about 4 years ago at [facility], noted to have diverticulosis at that time  has no significant abdominal surgical history  She's had 2 C-sections and a hernia repair  She smokes about a half pack per day  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  Review of Systems   Complete-Female GI Adult: Other Symptoms: The remainder of the ten ROS was negative  Active Problems   1  Acute bronchitis with COPD (491 22) (J44 0,J20 9)   2  Basal cell carcinoma of skin (173 91) (C44 91)   3  BMI 24 0-24 9, adult (V85 1) (Z68 24)   4  Cervical radiculopathy (723 4) (M54 12)   5  Chronic obstructive pulmonary disease (496) (J44 9)   6  Chronic pain syndrome (338 4) (G89 4)   7  Cigarette smoker (305 1) (F17 210)   8  Classic migraine with aura (346 00) (G43 109)   9   Complex tear of medial meniscus, current injury, right knee, subsequent encounter     (V58 89) (S83 231D)   10  Constipation, chronic (564 00) (K59 09)   11  Cough (786 2) (R05)   12  Degeneration of intervertebral disc of mid-cervical region (722 4) (M50 320)   13  Degenerative arthritis of knee (715 36) (M17 10)   14  Depression with anxiety (300 4) (F41 8)   15  Diverticulosis (562 10) (K57 90)   16  Esophagitis, reflux (530 11) (K21 0)   17  Fatigue (780 79) (R53 83)   18  Knee osteoarthritis (715 36) (M17 10)   19  Localized osteoarthritis of knees, bilateral (715 36) (M17 0)   20  Lumbar disc herniation with radiculopathy (722 10) (M51 16)   21  Lumbar radiculopathy (724 4) (M54 16)   22  Medial meniscus tear (836 0) (S83 249A)   23  Migraine headache (346 90) (G43 909)   24  Murmur (785 2) (R01 1)   25  Nodule of right lung (793 11) (R91 1)   26  Primary osteoarthritis of first carpometacarpal joint of left hand (715 14) (M18 12)   27  Primary osteoarthritis of first carpometacarpal joint of right hand (715 14) (M18 11)   28  Right knee pain (719 46) (M25 561)   29  Sciatica (724 3) (M54 30)   30  Screening for breast cancer (V76 10) (Z12 31)   31  Spinal stenosis (724 00) (M48 00)   32  Status post medial meniscectomy of right knee (V45 89) (Z98 890)   33  Tear of medial meniscus of right knee, current, unspecified tear type, initial encounter      (836 0) (S83 241A)   34  Vitamin D insufficiency (268 9) (E55 9)    Past Medical History   1  History of Acute bacterial bronchitis (466 0,041 9) (J20 8,B96 89)   2  History of Acute bacterial bronchitis (466 0,041 9) (J20 8,B96 89)   3  History of Amenorrhea (626 0) (N91 2)   4  History of Arthritis (V13 4)   5  History of Atypical chest pain (786 59) (R07 89)   6  History of Cellulitis of hand, left (682 4) (L03 114)   7  History of Dandruff (690 18) (L21 0)   8  History of acute bronchitis (V12 69) (Z87 09)   9  History of acute pharyngitis (V12 69) (Z87 09)   10   History of acute sinusitis (V12 69) (Z87 09)   11  History of acute sinusitis (V12 69) (Z87 09)   12  History of alcoholism (V11 3) (F10 21)   13  History of backache (V13 59) (Z87 39)   14  History of bronchitis (V12 69) (Z87 09)   15  History of conjunctivitis (V12 49) (Z86 69)   16  History of eczema (V13 3) (Z87 2)   17  History of malignant melanoma of skin (V10 82) (Z85 820)   18  History of methicillin resistant Staphylococcus aureus infection (V12 04) (Z86 14)   19  History of Lumbar radiculopathy (724 4) (M54 16)   20  History of Sorethroat (462) (J02 9)   21  History of Spinal stenosis (724 00) (M48 00)   22  History of Superficial incisional infection of surgical site, initial encounter (998 59)      (T81 4XXA)   23  History of Superficial incisional infection of surgical site, subsequent encounter      (V58 89,998 59) (T81 4XXD)   24  History of URTI (acute upper respiratory infection) (465 9) (J06 9)  Active Problems And Past Medical History Reviewed: The active problems and past medical history were reviewed and updated today  Surgical History   1  History of  Section   2  History of Hand Surgery   3  History of Hernia Repair  Surgical History Reviewed: The surgical history was reviewed and updated today  Family History   Mother    1  Family history of Atherosclerosis (V17 49)   2  Family history of alcoholism (V17 0) (Z81 1)   3  Family history of cerebrovascular accident (CVA) (V17 1) (Z82 3)   4  Family history of Transient Ischemic Attack  Father    5  Family history of cerebrovascular accident (CVA) (V17 1) (Z82 3)   6  Family history of Reported Family History Of Heart Disease  Sister    7  Family history of alcoholism (V17 0) (Z81 1)  Family History Reviewed: The family history was reviewed and updated today         Social History    · Active smoker (305 1) (Z72 0)   · Alcoholism in recovery (303 90) (F10 20)   · Cigarette smoker (305 1) (F17 210)   · Daily caffeinated coffee consumption   · Drinks caffeinated tea   · No alcohol use  Social History Reviewed: The social history was reviewed and updated today  Current Meds    1  Advair Diskus 250-50 MCG/DOSE Inhalation Aerosol Powder Breath Activated; INHALE 1     PUFF EVERY 12 HOURS; Therapy: 75DWC5113 to (Last Rx:02Oct2017)  Requested for: 75APG8199 Ordered   2  Amoxicillin-Pot Clavulanate 875-125 MG Oral Tablet; take 1 tab PO BID x 10 days; Therapy: 17QHW5026 to (Last Rx:72Ajb6996)  Requested for: 00YAB3167 Ordered   3  Benzonatate 200 MG Oral Capsule; TAKE 1 CAPSULE 3 TIMES DAILY AS NEEDED FOR     COUGH  NOT TO BE TAKEN PRIOR TO DRIVING OR OPERATING MACHINERY AS IT     CAN CAUSE DROWSINESS; Therapy: 59ZBI5329 to (Evaluate:80Cgk4057)  Requested for: 16UPM3477; Last     Rx:49Kdv3756 Ordered   4  Cymbalta 30 MG Oral Capsule Delayed Release Particles; Therapy: (Recorded:49Hax0022) to Recorded   5  Krill Oil 1000 MG Oral Capsule; Therapy: (Recorded:80Lmp4239) to Recorded   6  MethylPREDNISolone Sodium Succ 125 MG Injection Solution Reconstituted; 125mg IM  given L buttock; To Be Done: 57MML4081; Status: HOLD FOR - Administration     Ordered   7  Multi-Vitamin TABS; Therapy: (Green Crape) to Recorded   8  PredniSONE 10 MG Oral Tablet; 6 tabs po starting tomorrow, then 5 tabs next day, 4 tabs     next day, 3 tabs next day, 2 tabs next day, 1 tab last day; Therapy: 89EDD1351 to (Last Rx:95Mvw2010)  Requested for: 77KBJ5033 Ordered   9  ProAir  (90 Base) MCG/ACT Inhalation Aerosol Solution; INHALE 1 TO 2 PUFFS     EVERY 6 HOURS AS NEEDED; Therapy: 11IWA9339 to (Last Rx:30Nov2017)  Requested for: 02TXQ9892 Ordered   10  Subutex 8 MG SUBL; Therapy: (Recorded:06Jun2017) to Recorded   11  SUMAtriptan Succinate 100 MG Oral Tablet; take one for migraine; repeat in 2 hours if      needed; not to take more than 2 in 24 hours;       Therapy: 60Lcd4622 to (Last Rx:56Pul4376)  Requested for: 61RRW4176; Status: ACTIVE      - Renewal Voided Ordered  Medication List Reviewed: The medication list was reviewed and updated today  Allergies   1  NSAIDs   2  Antihistamine TABS    Vitals   Vital Signs    Recorded: 31Vgo5029 09:57AM   Temperature 96 7 F   Heart Rate 78   Systolic 722   Diastolic 76   Weight 949 lb    BMI Calculated 25 13   BSA Calculated 1 76   O2 Saturation 96     Physical Exam   Gen  : Well-nourished well-developed, no acute distress     HEENT: Anicteric, moist mucous membranes, no cervical or supraclavicular lymphadenopathy     Cardiovascular: Regular rate and rhythm, no murmurs rubs or gallops     Pulmonary: Clear to auscultation bilaterally     Abdomen: Soft, nontender, nondistended  No hepatosplenomegaly  Bowel sounds present and regular  Extremities: No cyanosis, clubbing, or edema,     Skin: No rashes     Neuro: Alert, awake, oriented x3             Future Appointments      Date/Time Provider Specialty Site   12/20/2017 11:40 AM RAISA Noland   Orthopedic Surgery [address]   12/27/2017 09:45 AM Susannah Fisher DO Orthopedic Surgery [facility]     Signatures    Electronically signed by : RAISA Shah ; Dec 20 2017 10:18AM EST                       (Author)

## 2017-12-22 ENCOUNTER — TRANSCRIBE ORDERS (OUTPATIENT)
Dept: LAB | Facility: CLINIC | Age: 62
End: 2017-12-22

## 2017-12-22 ENCOUNTER — GENERIC CONVERSION - ENCOUNTER (OUTPATIENT)
Dept: OTHER | Facility: OTHER | Age: 62
End: 2017-12-22

## 2017-12-22 ENCOUNTER — APPOINTMENT (OUTPATIENT)
Dept: LAB | Facility: CLINIC | Age: 62
End: 2017-12-22
Payer: MEDICARE

## 2017-12-22 DIAGNOSIS — K59.09 OTHER CONSTIPATION: ICD-10-CM

## 2017-12-22 DIAGNOSIS — K21.00 GASTRO-ESOPHAGEAL REFLUX DISEASE WITH ESOPHAGITIS: ICD-10-CM

## 2017-12-22 LAB — TSH SERPL DL<=0.05 MIU/L-ACNC: 1.13 UIU/ML (ref 0.36–3.74)

## 2017-12-22 PROCEDURE — 84443 ASSAY THYROID STIM HORMONE: CPT

## 2017-12-22 PROCEDURE — 36415 COLL VENOUS BLD VENIPUNCTURE: CPT

## 2017-12-26 ENCOUNTER — GENERIC CONVERSION - ENCOUNTER (OUTPATIENT)
Dept: OTHER | Facility: OTHER | Age: 62
End: 2017-12-26

## 2017-12-27 ENCOUNTER — ALLSCRIPTS OFFICE VISIT (OUTPATIENT)
Dept: OTHER | Facility: OTHER | Age: 62
End: 2017-12-27

## 2017-12-28 NOTE — PROGRESS NOTES
Assessment   1  Localized osteoarthritis of right knee (506 25) (M17 11)    Plan   Localized osteoarthritis of right knee    · Administered: Euflexxa 20 MG/2ML Intra-articular Solution Prefilled Syringe    Discussion/Summary      Patient is here for her 2nd Euflexxa injection for her right knee  She has been tolerating the series of injections well  She tolerated today's injection well  Post-injection instructions were provided  She will follow up with Dr Cecil Barcenas after his return from vacation next week for her 3rd injection in the series  The patient may call the office at anytime if any questions or concerns should arise  The patient has the current Goals: Decrease knee pain  Possible side effects of new medications were reviewed with the patient/guardian today  The treatment plan was reviewed with the patient/guardian  The patient/guardian understands and agrees with the treatment plan      Chief Complaint   Right knee Euflexxa injection 2  History of Present Illness   HPI: Patient is here for Euflexxa injection 2  For her right knee  She has been tolerating the series of injections well  Review of Systems        Constitutional:1  No fever, no chills, feels well, no tiredness, no recent weight gain or loss1   Eyes: eyesight problems1 , but-- eyes not red1   ENT:1  no loss of hearing, no nosebleeds, no sore throat1   Cardiovascular: 1  No complaints of chest pain, no palpitations, no leg claudication or lower extremity edema1   Respiratory:1  no compliants of shortness of breath, no wheezing, no cough1   Gastrointestinal:1  no complaints of abdominal pain, no constipation, no nausea or diarrhea, no vomiting, no bloody stools1   Genitourinary:1  no complaints of dysuria, no incontinence1   Musculoskeletal:1  as noted in [facility] Pimentel   Integumentary:1  no complaints of skin rash or lesion, no itching or dry skin, no skin wounds1         Neurological:1  no complaints of headache, no confusion, no numbness or tingling, no dizziness1   Endocrine: muscle weakness1 , but-- no frequent urination1 -- and-- no excessive thirst1   no feelings of weakness 1       Psychiatric: anxiety1 -- and-- depression1 , but-- not suicidal1   ROS reviewed  1 Amended By: Laila Krishnan; Dec 27 2017 10:11 AM EST      Active Problems   1  Abdominal pain (789 00) (R10 9)  2  Acute bronchitis with COPD (491 22) (J44 0,J20 9)  3  Basal cell carcinoma of skin (173 91) (C44 91)  4  BMI 24 0-24 9, adult (V85 1) (Z68 24)  5  Cervical radiculopathy (723 4) (M54 12)  6  Chronic obstructive pulmonary disease (496) (J44 9)  7  Chronic pain syndrome (338 4) (G89 4)  8  Cigarette smoker (305 1) (F17 210)  9  Classic migraine with aura (346 00) (G43 109)  10  Complex tear of medial meniscus, current injury, right knee, subsequent encounter      (V58 89) (S83 231D)  11  Constipation, chronic (564 00) (K59 09)  12  Cough (786 2) (R05)  13  Degeneration of intervertebral disc of mid-cervical region (722 4) (M50 320)  14  Degenerative arthritis of knee (715 36) (M17 10)  15  Depression with anxiety (300 4) (F41 8)  16  Diverticulosis (562 10) (K57 90)  17  Esophagitis, reflux (530 11) (K21 0)  18  Fatigue (780 79) (R53 83)  19  Knee osteoarthritis (715 36) (M17 10)  20  Localized osteoarthritis of knees, bilateral (715 36) (M17 0)  21  Localized osteoarthritis of right knee (715 36) (M17 11)  22  Lumbar disc herniation with radiculopathy (722 10) (M51 16)  23  Lumbar radiculopathy (724 4) (M54 16)  24  Medial meniscus tear (836 0) (S83 249A)  25  Migraine headache (346 90) (G43 909)  26  Murmur (785 2) (R01 1)  27  Nodule of right lung (793 11) (R91 1)  28  Primary osteoarthritis of first carpometacarpal joint of left hand (715 14) (M18 12)  29  Primary osteoarthritis of first carpometacarpal joint of right hand (715 14) (M18 11)  30  Right knee pain (719 46) (M25 561)  31   Sciatica (724 3) (M54 30)  32  Screening for breast cancer (V76 10) (Z12 31)  33  Spinal stenosis (724 00) (M48 00)  34  Status post medial meniscectomy of right knee (V45 89) (Z98 890)  35  Tear of medial meniscus of right knee, current, unspecified tear type, initial encounter      (836 0) (S83 241A)  36  Vitamin D insufficiency (268 9) (E55 9)    Past Medical History    · History of Acute bacterial bronchitis (466 0,041 9) (J20 8,B96 89)   · History of Acute bacterial bronchitis (466 0,041 9) (J20 8,B96 89)   · History of Amenorrhea (626 0) (N91 2)   · History of Arthritis (V13 4)   · History of Atypical chest pain (786 59) (R07 89)   · History of Cellulitis of hand, left (682 4) (L03 114)   · History of Dandruff (690 18) (L21 0)   · History of acute bronchitis (V12 69) (Z87 09)   · History of acute pharyngitis (V12 69) (Z87 09)   · History of acute sinusitis (V12 69) (Z87 09)   · History of acute sinusitis (V12 69) (Z87 09)   · History of alcoholism (V11 3) (F10 21)   · History of backache (V13 59) (Z87 39)   · History of bronchitis (V12 69) (Z87 09)   · History of conjunctivitis (V12 49) (Z86 69)   · History of eczema (V13 3) (Z87 2)   · History of malignant melanoma of skin (V10 82) (Z85 820)   · History of methicillin resistant Staphylococcus aureus infection (V12 04) (Z86 14)   · History of Lumbar radiculopathy (724 4) (M54 16)   · History of Sorethroat (462) (J02 9)   · History of Spinal stenosis (724 00) (M48 00)   · History of Superficial incisional infection of surgical site, initial encounter (998 59)    (T81 4XXA)   · History of Superficial incisional infection of surgical site, subsequent encounter    (V58 89,998 59) (T81 4XXD)   · History of URTI (acute upper respiratory infection) (465 9) (J06 9)     The active problems and past medical history were reviewed and updated today        Surgical History    · History of  Section   · History of Hand Surgery   · History of Hernia Repair     The surgical history was reviewed and updated today  Family History   Mother    · Family history of Atherosclerosis (V17 49)   · Family history of alcoholism (V17 0) (Z81 1)   · Family history of cerebrovascular accident (CVA) (V17 1) (Z82 3)   · Denied: Family history of colon cancer   · Denied: Family history of liver disease   · Family history of Transient Ischemic Attack  Father    · Family history of cerebrovascular accident (CVA) (V17 1) (Z82 3)   · Denied: Family history of colon cancer   · Denied: Family history of liver disease   · Family history of Reported Family History Of Heart Disease  Sister    · Family history of alcoholism (V17 0) (Z81 1)     The family history was reviewed and updated today  Social History    · Active smoker (305 1) (Z72 0)   · Alcoholism in recovery (303 90) (F10 20)   · Cigarette smoker (305 1) (F17 210)   · Daily caffeinated coffee consumption   · Drinks caffeinated tea   · No alcohol use  The social history was reviewed and updated today  Current Meds   1  Advair Diskus 250-50 MCG/DOSE Inhalation Aerosol Powder Breath Activated; INHALE     1 PUFF EVERY 12 HOURS; Therapy: 01VJV5025 to (Last Rx:02Oct2017)  Requested for: 09OMX4803 Ordered  2  Amoxicillin-Pot Clavulanate 875-125 MG Oral Tablet (Augmentin); take 1 tab PO BID x     10 days; Therapy: 05YDJ0458 to (Last Rx:09Dec2017)  Requested for: 81UCP2938 Ordered  3  Benzonatate 200 MG Oral Capsule; TAKE 1 CAPSULE 3 TIMES DAILY AS NEEDED     FOR COUGH  NOT TO BE TAKEN PRIOR TO DRIVING OR OPERATING MACHINERY     AS IT CAN CAUSE DROWSINESS; Therapy: 65ACB4182 to (Evaluate:22Uqf1227)  Requested for: 46OFH4955; Last     Rx:09Dec2017 Ordered  4  Cymbalta 30 MG Oral Capsule Delayed Release Particles (DULoxetine HCl); Therapy: (Recorded:37Puc7195) to Recorded  5  Krill Oil 1000 MG Oral Capsule; Therapy: (Recorded:84Aoj9317) to Recorded  6   MethylPREDNISolone Sodium Succ 125 MG Injection Solution Reconstituted (SOLU-medrol); 125mg IM  given L buttock; To Be Done:     35KGR6735; Status: HOLD FOR - Administration Ordered  7  Multi-Vitamin TABS; Therapy: (Lai Boyd) to Recorded  8  PredniSONE 10 MG Oral Tablet; 6 tabs po starting tomorrow, then 5 tabs next day, 4 tabs     next day, 3 tabs next day, 2 tabs next day, 1 tab last day; Therapy: 52IEF8589 to (Last Rx:15Aey7506)  Requested for: 95ULL7745 Ordered  9  ProAir  (90 Base) MCG/ACT Inhalation Aerosol Solution; INHALE 1 TO 2 PUFFS     EVERY 6 HOURS AS NEEDED; Therapy: 18VOD2927 to (Last Rx:36Dhb6956)  Requested for: 33ULH0935 Ordered  10  Subutex 8 MG SUBL (Buprenorphine HCl); Therapy: (Recorded:06Jun2017) to Recorded  11  SUMAtriptan Succinate 100 MG Oral Tablet (Imitrex); take one for migraine; repeat in 2      hours if needed; not to take more than 2 in 24 hours; Therapy: 30Mqz8338 to (Last Rx:37Nct8423)  Requested for: 54Oft1501; Status:      ACTIVE - Renewal Voided Ordered  12  Suprep Bowel Prep Kit 17 5-3 13-1 6 GM/180ML Oral Solution; USE AS DIRECTED; Therapy: 47Umj1339 to (Last Rx:85Xkw5387)  Requested for: 01Jkw2013 Ordered     The medication list was reviewed and updated today  Allergies   1  NSAIDs  2  Antihistamine TABS    Physical Exam      Right Knee: no deformity, erythema, ecchymosis, edema, or tenderness-- and-- strength within normal limits in all planes  Constitutional - General appearance: Normal       Musculoskeletal - Gait and station: Normal -- Muscle strength/tone: Normal -- Lower extremity compartments: Normal       Cardiovascular - Pulses: Normal -- Examination of extremities for edema and/or varicosities: Normal       Skin - Skin and subcutaneous tissue: Normal       Neurologic - Sensation: Normal -- Lower extremity peripheral neuro exam: Normal       Eyes      Conjunctiva and lids: Normal        Pupils and irises: Normal        Procedure      Procedure: Injection of the right knee joint  Indication:  Joint pain-- and-- Osteoarthritis  Potential complications include bleeding,-- infection-- and-- allergic reaction  Risk, benefits and alternatives were discussed with the patient  Verbal consent was obtained prior to the procedure  Alcohol and Betadine was used to prep the area  ethyl chloride spray was used as a topical anesthetic  Using sterile technique, the aspiration/injection needle was then directed from a Anterolateral aspect  A 20-gauge was used to inject 2mL Euflexxa   A bandage was applied  the patient tolerated the procedure well  Complications: none  Patient instructed to avoid strenuous activity for 2 day(s)  Future Appointments      Date/Time Provider Specialty Site   01/03/2018 08:30 AM Thornell Brunner, M D  Orthopedic Surgery [address]   01/25/2018 09:15 AM RAISA Velasquez  Gastroenterology Adult [facility]     Signatures    Electronically signed by :  Jairo Bronson DO; Dec 27 2017 10:21AM EST                       (Author)

## 2018-01-11 NOTE — MISCELLANEOUS
Message   Recorded as Task   Date: 10/02/2017 11:02 AM, Created By: Elmer Cleary   Task Name: Care Coordination   Assigned To: [address]   Regarding Patient: Yonatan Ribeiro, Status: Active   Comment:    Violeta Quiñonez - 02 Oct 2017 11:02 AM     TASK CREATED  received answering service message: Reason: ROUTINE/OFFICE   Pt's Dr: Aishwarya Lara       For: OFFICE     2nd Call: NO        From: Paulino Luis     Phone: [phone]   Ext:     Pt Name: Paulino Fairchild    Pt : 1955     Message: HAVE APPT FOR WED OCT 4TH AT 1245PM -AND HAVING AND            EPIDURAL SHOT IS IT OK TO TAKE PREDNISONE NOT FEELING GOOD  -----------------------------------------------------------------     CALLER ID: [phone]      CSN: [id number]      Taken By: PAS 10/02/2017 10:02 AM  Delivered By: Mohamud Young 10/02/2017 10:30 AM   Osman Parson - 02 Oct 2017 11:37 AM     TASK EDITED  S/W pt  Pt stated that "I have asthma, have problems breathing, I was seen by my PCP this morning and on inhaler Pro Air, Benzonatate for coughing, and Predisone  Pt stated, I do not have a fever and the doctor did not put me on antibiotics  Pt is concerned being on Prednisone whether AS would still want to do the injection on  at 1245 pm  Advised pt will c/b with AS recommendations  Please advise  Thanks  Natasha Love - 02 Oct 2017 11:39 AM     TASK REPLIED TO: Previously Assigned To Natasha Love  If she is being prescribed prednisone for her asthma exacerbation, I would not do the epidural  I would re-schedule for when she is better off of the prednisone  Osman Parson - 58 Oct 2017 12:30 PM     TASK EDITED  S/W pt  Advised of the same  Pt verbalized understanding  Pt stated will see her PCP on 10/16 and will reschedule appt with AS after then  Cancelled appt on 10/4 with AS  Active Problems    1  Acute exacerbation of COPD with asthma (493 22) (J44 1,J64 457)   2   Basal cell carcinoma of skin (173 91) (C44 91)   3  Blurred vision (368 8) (H53 8)   4  BMI 24 0-24 9, adult (V85 1) (Z68 24)   5  Cervical radiculopathy (723 4) (M54 12)   6  Chronic obstructive pulmonary disease (496) (J44 9)   7  Chronic pain syndrome (338 4) (G89 4)   8  Cigarette smoker (305 1) (F17 210)   9  Classic migraine with aura (346 00) (G43 109)   10  Complex tear of medial meniscus, current injury, right knee, subsequent encounter    (V58 89) (S83 231D)   11  Degeneration of intervertebral disc of mid-cervical region (722 4) (M50 320)   12  Degenerative arthritis of knee (715 36) (M17 10)   13  Depression with anxiety (300 4) (F41 8)   14  Diverticulosis (562 10) (K57 90)   15  Esophagitis, reflux (530 11) (K21 0)   16  Fatigue (780 79) (R53 83)   17  Knee osteoarthritis (715 36) (M17 10)   18  Localized osteoarthritis of knees, bilateral (715 36) (M17 0)   19  Lumbar disc herniation with radiculopathy (722 10) (M51 16)   20  Lumbar radiculopathy (724 4) (M54 16)   21  Medial meniscus tear (836 0) (S83 249A)   22  Migraine headache (346 90) (G43 909)   23  Murmur (785 2) (R01 1)   24  Primary osteoarthritis of first carpometacarpal joint of left hand (715 14) (M18 12)   25  Primary osteoarthritis of first carpometacarpal joint of right hand (715 14) (M18 11)   26  Right knee pain (719 46) (M25 561)   27  Sciatica (724 3) (M54 30)   28  Screening for breast cancer (V76 10) (Z12 31)   29  Spinal stenosis (724 00) (M48 00)   30  Status post medial meniscectomy of right knee (V45 89) (Z98 890)   31  Tear of medial meniscus of right knee, current, unspecified tear type, initial encounter    (836 0) (S83 241A)   32  Vitamin D insufficiency (268 9) (E55 9)    Current Meds   1  Advair Diskus 250-50 MCG/DOSE Inhalation Aerosol Powder Breath Activated; INHALE   1 PUFF EVERY 12 HOURS; Therapy: 73CYR0620 to (Last Rx:02Oct2017)  Requested for: 08FRD7002 Ordered   2  Benzonatate 200 MG Oral Capsule; TAKE 1 CAPSULE 3 TIMES DAILY AS NEEDED;    Therapy: 13SNS4897 to (Evaluate:16Oct2017)  Requested for: 44SFW1517; Last   Rx:02Oct2017 Ordered   3  DULoxetine HCl - 30 MG Oral Capsule Delayed Release Particles; take 1 capsule by   mouth twice a day; Therapy: 21RJZ5503 to (Evaluate:31Jst5450)  Requested for: 79Bgy5444; Last   LH:76FHZ5422 Ordered   4  Krill Oil 1000 MG Oral Capsule; Therapy: (Recorded:02Znt8055) to Recorded   5  Multi-Vitamin TABS; Therapy: (Recorded:15Rxf3083) to Recorded   6  PredniSONE 20 MG Oral Tablet; take 4 tab x 3 days, then 3 tabs x 3 days, then 2 tabs   x 3 days, then 1 tab x 3 days, then 1/2 tab x 3 days then stop; Therapy: 95BEY0211 to (Evaluate:17Oct2017)  Requested for: 68OYF6038; Last   Rx:02Oct2017 Ordered   7  ProAir  (90 Base) MCG/ACT Inhalation Aerosol Solution; INHALE 1 TO 2 PUFFS   EVERY 6 HOURS AS NEEDED; Therapy: 64PSW6936 to (Last Rx:02Oct2017)  Requested for: 23DJV3826 Ordered   8  Subutex 8 MG SUBL (Buprenorphine HCl); Therapy: (Recorded:06Jun2017) to Recorded   9  SUMAtriptan Succinate 100 MG Oral Tablet (Imitrex); take one for migraine; repeat in 2   hours if needed; not to take more than 2 in 24 hours; Therapy: 05Jwf3442 to (Last Rx:20Mno8477)  Requested for: 25Mae7862 Ordered    Allergies    1  NSAIDs   2   Antihistamine TABS    Signatures   Electronically signed by : Katherine Medina RN; Oct  2 2017 12:30PM EST                       (Author)

## 2018-01-11 NOTE — MISCELLANEOUS
Message   Recorded as Task   Date: 11/29/2017 10:31 AM, Created By: Oddis Romberg   Task Name: Miscellaneous   Assigned To: [facility] surgery sched,Team   Regarding Patient: Moris Reaves, Status: Active   Comment:    Oddis Romberg - 29 Nov 2017 10:31 AM     TASK CREATED  Patient states is no taking antibiotics and would like to re schedule procedure C6-C7 DINH  Please cb at   Mel Hernandez - 29 Nov 2017 11:24 AM     TASK EDITED   [facility] - 29 Nov 2017 12:07 PM     TASK EDITED  Called pt, rescheduled C6-C7 DINH with at [place] on 12/8/17  Pt has cancelled/rescheduled three times- twice for transportation issues- asked pt if she was sure she could get a ride for 12/8 and she said yes  Went over pre procedure instructions, NPO 1 hr prior, if sick or on abx needs to call to rs, wear loose, comf clothing, needs   Pt verbalized understanding  Active Problems    1  Acute exacerbation of COPD with asthma (493 22) (J44 1,J45 901)   2  Basal cell carcinoma of skin (173 91) (C44 91)   3  BMI 24 0-24 9, adult (V85 1) (Z68 24)   4  Cervical radiculopathy (723 4) (M54 12)   5  Chronic obstructive pulmonary disease (496) (J44 9)   6  Chronic pain syndrome (338 4) (G89 4)   7  Cigarette smoker (305 1) (F17 210)   8  Classic migraine with aura (346 00) (G43 109)   9  Complex tear of medial meniscus, current injury, right knee, subsequent encounter   (V58 89) (S83 231D)   10  Constipation, chronic (564 00) (K59 09)   11  Degeneration of intervertebral disc of mid-cervical region (722 4) (M50 320)   12  Degenerative arthritis of knee (715 36) (M17 10)   13  Depression with anxiety (300 4) (F41 8)   14  Diverticulosis (562 10) (K57 90)   15  Esophagitis, reflux (530 11) (K21 0)   16  Fatigue (780 79) (R53 83)   17  Knee osteoarthritis (715 36) (M17 10)   18  Localized osteoarthritis of knees, bilateral (715 36) (M17 0)   19   Lumbar disc herniation with radiculopathy (722 10) (M51 16) 20  Lumbar radiculopathy (724 4) (M54 16)   21  Medial meniscus tear (836 0) (S83 249A)   22  Migraine headache (346 90) (G43 909)   23  Murmur (785 2) (R01 1)   24  Primary osteoarthritis of first carpometacarpal joint of left hand (715 14) (M18 12)   25  Primary osteoarthritis of first carpometacarpal joint of right hand (715 14) (M18 11)   26  Right knee pain (719 46) (M25 561)   27  Sciatica (724 3) (M54 30)   28  Screening for breast cancer (V76 10) (Z12 31)   29  Spinal stenosis (724 00) (M48 00)   30  Status post medial meniscectomy of right knee (V45 89) (Z98 890)   31  Tear of medial meniscus of right knee, current, unspecified tear type, initial encounter    (836 0) (S83 241A)   32  Vitamin D insufficiency (268 9) (E55 9)    Current Meds   1  Advair Diskus 250-50 MCG/DOSE Inhalation Aerosol Powder Breath Activated; INHALE   1 PUFF EVERY 12 HOURS; Therapy: 90FNG1703 to (Last Rx:02Oct2017)  Requested for: 70NKU3525 Ordered   2  DULoxetine HCl - 30 MG Oral Capsule Delayed Release Particles; take 1 capsule by   mouth twice a day; Therapy: 57QEO0667 to (Evaluate:61Pgg7832)  Requested for: 66Doq9188; Last   CZ:42YNV6574 Ordered   3  Krill Oil 1000 MG Oral Capsule; Therapy: (Recorded:20Qlr3502) to Recorded   4  Multi-Vitamin TABS; Therapy: (Recorded:78Tci1540) to Recorded   5  ProAir  (90 Base) MCG/ACT Inhalation Aerosol Solution; INHALE 1 TO 2 PUFFS   EVERY 6 HOURS AS NEEDED; Therapy: 86EEK0644 to (Last Rx:02Oct2017)  Requested for: 49MAO4306 Ordered   6  Subutex 8 MG SUBL (Buprenorphine HCl); Therapy: (Recorded:80Lxn4760) to Recorded   7  SUMAtriptan Succinate 100 MG Oral Tablet (Imitrex); take one for migraine; repeat in 2   hours if needed; not to take more than 2 in 24 hours; Therapy: 17Efo8646 to (Last Rx:52Asr1517)  Requested for: 21Ach3727 Ordered    Allergies    1  NSAIDs   2   Antihistamine TABS    Signatures   Electronically signed by : May Alexander, ; Nov 29 2017 12:08PM EST (Author)

## 2018-01-13 VITALS
TEMPERATURE: 97.7 F | DIASTOLIC BLOOD PRESSURE: 70 MMHG | OXYGEN SATURATION: 98 % | HEIGHT: 65 IN | HEART RATE: 82 BPM | RESPIRATION RATE: 16 BRPM | BODY MASS INDEX: 24.26 KG/M2 | SYSTOLIC BLOOD PRESSURE: 142 MMHG | WEIGHT: 145.6 LBS

## 2018-01-13 VITALS
HEIGHT: 65 IN | RESPIRATION RATE: 16 BRPM | TEMPERATURE: 98.1 F | DIASTOLIC BLOOD PRESSURE: 88 MMHG | HEART RATE: 80 BPM | OXYGEN SATURATION: 96 % | BODY MASS INDEX: 23.99 KG/M2 | WEIGHT: 144 LBS | SYSTOLIC BLOOD PRESSURE: 142 MMHG

## 2018-01-13 VITALS
HEART RATE: 70 BPM | HEIGHT: 65 IN | SYSTOLIC BLOOD PRESSURE: 142 MMHG | BODY MASS INDEX: 24.99 KG/M2 | RESPIRATION RATE: 16 BRPM | DIASTOLIC BLOOD PRESSURE: 82 MMHG | TEMPERATURE: 98.3 F | WEIGHT: 150 LBS

## 2018-01-13 VITALS
HEIGHT: 65 IN | TEMPERATURE: 98.2 F | HEART RATE: 76 BPM | WEIGHT: 146 LBS | DIASTOLIC BLOOD PRESSURE: 70 MMHG | SYSTOLIC BLOOD PRESSURE: 110 MMHG | BODY MASS INDEX: 24.32 KG/M2

## 2018-01-13 VITALS
SYSTOLIC BLOOD PRESSURE: 119 MMHG | HEART RATE: 69 BPM | DIASTOLIC BLOOD PRESSURE: 76 MMHG | WEIGHT: 145.38 LBS | BODY MASS INDEX: 24.22 KG/M2 | HEIGHT: 65 IN

## 2018-01-13 NOTE — PROGRESS NOTES
Assessment    1  Encounter for preventive health examination (V70 0) (Z00 00)   2  Encounter for Papanicolaou smear for cervical cancer screening (V76 2) (Z12 4)   3  Altered bowel function (787 99) (R19 4)   4  Atypical chest pain (786 59) (R07 89)   5  Depression with anxiety (300 4) (F41 8)    Plan  Altered bowel function, Diverticulosis    · 3 - Angelica HERRERA, Isak LANGLEY (Gastroenterology) Physician Referral  Consult Only: the  expectation is that the referring provider will communicate back to the patient on  treatment options  Evaluation and Treatment: the expectation is that the referred to  provider will communicate back to the patient on treatment options  Status: Hold For  - Scheduling  Requested for: 65CQT7630  : pt request  are Referring to a non-SL Preferred Provider : Insurance Coverage  Care Summary provided  : Yes  Atypical chest pain    · 1 - Tiffany Yu DO (Cardiology) Physician Referral  Consult Only: the expectation is that  the referring provider will communicate back to the patient on treatment options  Evaluation and Treatment: the expectation is that the referred to provider will  communicate back to the patient on treatment options  Status: Active  Requested for:  80GAG9587  Care Summary provided  : Yes  Blurred vision    · Lorena Casillas MD, Felicitas Baker (Ophthalmology) Physician Referral  Consult Only: the expectation  is that the referring provider will communicate back to the patient on treatment options  Evaluation and Treatment: the expectation is that the referred to provider will  communicate back to the patient on treatment options    Status: Hold For - Scheduling   Requested for: 47AXE7057  Care Summary provided  : Yes  Degenerative arthritis of knee, Depression with anxiety, Lumbar radiculopathy    · DULoxetine HCl - 30 MG Oral Capsule Delayed Release Particles; take 1 capsule  by mouth twice a day  Encounter for Papanicolaou smear for cervical cancer screening    · (1) THIN PREP PAP WITH IMAGING; Status:Active; Requested for:23Elj7284; Maturation index required? : No  HPV? : if ASCUS  Health Maintenance    · Urine Dip Non-Automated- POC; Status:Complete;   Done: 21KVX9222 04:13PM    Discussion/Summary  health maintenance visit Currently, she eats an adequate diet and has an adequate exercise regimen  Pap test with reflex HPV testing was done today Breast cancer screening: the risks and benefits of breast cancer screening were discussed  Colorectal cancer screening: the risks and benefits of colorectal cancer screening were discussed and colonoscopy has been ordered  Osteoporosis screening: the risks and benefits of osteoporosis screening were discussed  Screening lab work includes hemoglobin, glucose, lipid profile, thyroid function testing, 25-hydroxyvitamin D and urinalysis  The risks and benefits of immunizations were discussed  Advice and education were given regarding nutrition, weight bearing exercise, calcium supplements, vitamin D supplements, cardiovascular risk reduction, tobacco cessation, alcohol use, self skin examination, seat belt use and advanced directive planning  Patient discussion: discussed with the patient  Chief Complaint  pt here for PE, pt experiencing pressure in her chest that radiates down her arms  pt not having good bowel movements for awhile  tc/cma      History of Present Illness  HM, Adult Female: The patient is being seen for a health maintenance evaluation  General Health: The patient's health since the last visit is described as fair  She does not have regular dental visits  She complains of vision problems  She denies hearing loss  Immunizations status: up to date  Lifestyle:  She does not have a healthy diet  She does not have any weight concerns  She does not exercise regularly  She uses tobacco  She consumes alcohol  She reports being in recovery from alcohol abuse  She denies drug use  Reproductive health:  pregnancy history:     Screening: cancer screening reviewed and updated  metabolic screening reviewed and updated  risk screening reviewed and updated  Review of Systems    Constitutional: feeling tired  Eyes: eyesight problems  ENT: no complaints of earache, no loss of hearing, no nose bleeds, no nasal discharge, no sore throat, no hoarseness  Cardiovascular: chest pain  Respiratory: no shortness of breath during exertion  Gastrointestinal: gerd  Musculoskeletal: arthralgias and myalgias  Integumentary: skin lesion  Neurological: No complaints of headache, no confusion, no convulsions, no numbness, no dizziness or fainting, no tingling, no limb weakness, no difficulty walking  Psychiatric: anxiety, sleep disturbances and depression, but not suicidal       Active Problems    1  Altered bowel function (787 99) (R19 4)   2  Atypical chest pain (786 59) (R07 89)   3  Basal cell carcinoma of skin (173 91) (C44 91)   4  Blurred vision (368 8) (H53 8)   5  Cervical pain (723 1) (M54 2)   6  Chronic obstructive pulmonary disease (496) (J44 9)   7  Cigarette smoker (305 1) (F17 210)   8  Classic migraine with aura (346 00) (G43 109)   9  Conjunctivitis (372 30) (H10 9)   10  Dandruff (690 18) (L21 0)   11  Degenerative arthritis of knee (715 36) (M17 9)   12  Depression with anxiety (300 4) (F41 8)   13  Diverticulosis (562 10) (K57 90)   14  Eczema (692 9) (L30 9)   15  Encounter for medication review (V58 69) (Z79 899)   16  Encounter for Papanicolaou smear for cervical cancer screening (V76 2) (Z12 4)   17  Encounter for screening mammogram for malignant neoplasm of breast (V76 12)    (Z12 31)   18  Esophagitis, reflux (530 11) (K21 0)   19  Fatigue (780 79) (R53 83)   20  Leg swelling (729 81) (M79 89)   21  Localized osteoarthritis of knees, bilateral (715 36) (M17 0)   22  Lumbar radiculopathy (724 4) (M54 16)   23  Lumbar radiculopathy (724 4) (M54 16)   24  Migraine headache (346 90) (G43 909)   25   Need for prophylactic vaccination and inoculation against influenza (V04 81) (Z23)   26  Need for prophylactic vaccination and inoculation against influenza (V04 81) (Z23)   27  Obstructive chronic bronchitis with acute exacerbation (491 21) (J44 1)   28  Primary osteoarthritis of first carpometacarpal joint of left hand (715 14) (M18 12)   29  Primary osteoarthritis of first carpometacarpal joint of right hand (715 14) (M18 11)   30  Sciatica (724 3) (M54 30)   31  Screening for breast cancer (V76 10) (Z12 39)   32  Sorethroat (462) (J02 9)   33  Spinal stenosis (724 00) (M48 00)   34  Superficial incisional infection of surgical site, initial encounter (998 59) (T81 4XXA)   35  Superficial incisional infection of surgical site, subsequent encounter (V58 89,998 59)    (T81 4XXD)   36  URTI (acute upper respiratory infection) (465 9) (J06 9)   37   Vitamin D insufficiency (268 9) (E55 9)    Past Medical History    · History of Acute bacterial bronchitis (466 0,041 9) (J20 8,B96 89)   · History of Amenorrhea (626 0) (N91 2)   · History of Arthritis (V13 4)   · History of Cellulitis of hand, left (682 4) (L03 114)   · History of acute bronchitis (V12 69) (Z87 09)   · History of acute pharyngitis (V12 69) (Z87 09)   · History of acute sinusitis (V12 69) (Z87 09)   · History of alcoholism (V11 3) (F10 21)   · History of backache (V13 59) (Z87 39)   · History of bronchitis (V12 69) (Z87 09)   · History of malignant melanoma of skin (V10 82) (Z85 820)   · History of methicillin resistant Staphylococcus aureus infection (V12 04) (Z86 14)   · Need for prophylactic vaccination and inoculation against influenza (V04 81) (Z23)   · Need for prophylactic vaccination and inoculation against influenza (V04 81) (Z23)   · History of Spinal stenosis (724 00) (M48 00)    Surgical History    · History of  Section    Family History  Mother    · Family history of Atherosclerosis (V17 49)   · Family history of alcoholism (V17 0) (Z81 1)   · Family history of cerebrovascular accident (CVA) (V17 1) (Z82 3)   · Family history of Transient Ischemic Attack  Father    · Family history of cerebrovascular accident (CVA) (V17 1) (Z82 3)   · Family history of Reported Family History Of Heart Disease  Sister    · Family history of alcoholism (V17 0) (Z81 1)    Social History    · Active smoker (305 1) (Z72 0)   · Alcoholism in recovery (303 90) (F10 20)   · Cigarette smoker (305 1) (F17 210)   · Daily caffeinated coffee consumption   · Drinks caffeinated tea   · No alcohol use    Current Meds   1  Advair Diskus 250-50 MCG/DOSE Inhalation Aerosol Powder Breath Activated; INHALE   1 PUFF EVERY 12 HOURS; Therapy: 74FAU5565 to Recorded   2  Krill Oil 1000 MG Oral Capsule; Therapy: (Recorded:49Izg0392) to Recorded   3  Lyrica 75 MG Oral Capsule; TAKE 1 CAPSULE  one in a m  and 1 in p m; Therapy: (Recorded:79Zeo9878) to Recorded   4  Multi-Vitamin TABS; Therapy: (Recorded:35Wdo9328) to Recorded   5  Omeprazole 40 MG Oral Capsule Delayed Release; take 1 capsule by mouth once daily; Therapy: 61NWP1712 to (Evaluate:26Jun2017)  Requested for: 59EAV5526; Last   Rx:28Nov2016 Ordered   6  ProAir  (90 Base) MCG/ACT Inhalation Aerosol Solution; INHALE 1 TO 2 PUFFS   EVERY 6 HOURS AS NEEDED; Therapy: 94UEH6255 to Recorded   7  Suboxone 8-2 MG SUBL; PLACE 1 TABLET UNDER THE TONGUE TWICE DAILY; Therapy: (Recorded:07Apr2016) to Recorded   8  SUMAtriptan Succinate 100 MG Oral Tablet; take one for migraine; repeat in 2 hours if   needed; not to take more than 2 in 24 hours; Therapy: 22Apr2015 to (Last Rx:42Jez0043)  Requested for: 08Sep2016 Ordered    Allergies    1  NSAIDs   2   Antihistamine TABS    Immunizations   1 2 3    Influenza  04-Dec-2013  (58y) 08-Oct-2014  (59y) 01-Feb-2017  (61y)    PPSV  08-Oct-2014  (59y)       Vitals   Recorded: 45BLH9725 03:58PM   Temperature 98 29 F, Temporal   Heart Rate 70, R Radial   Pulse Quality Normal, R Radial   Respiration Quality Normal   Respiration 16   Systolic 570, RUE, Sitting   Diastolic 82, RUE, Sitting   Height 5 ft 5 in   Weight 150 lb    BMI Calculated 24 96   BSA Calculated 1 75     Physical Exam    Constitutional   General appearance: No acute distress, well appearing and well nourished  chronically ill  Eyes   Conjunctiva and lids: No swelling, erythema or discharge  Pupils and irises: Equal, round, reactive to light  Ears, Nose, Mouth, and Throat   External inspection of ears and nose: Normal     Otoscopic examination: Tympanic membranes translucent with normal light reflex  Canals patent without erythema  Hearing: Normal     Nasal mucosa, septum, and turbinates: Normal without edema or erythema  Lips, teeth, and gums: Normal, good dentition  Oropharynx: Normal with no erythema, edema, exudate or lesions  Neck   Neck: Supple, symmetric, trachea midline, no masses  Thyroid: Normal, no thyromegaly  Pulmonary   Respiratory effort: No increased work of breathing or signs of respiratory distress  Auscultation of lungs: Clear to auscultation  Cardiovascular   Auscultation of heart: Normal rate and rhythm, normal S1 and S2, no murmurs  Pedal pulses: 2+ bilaterally  Examination of extremities for edema and/or varicosities: Normal     Chest   Breasts: Normal, no dimpling or skin changes appreciated  Palpation of breasts and axillae: Normal, no masses palpated  Abdomen   Abdomen: Non-tender, no masses  Liver and spleen: No hepatomegaly or splenomegaly  Examination for hernias: No hernia appreciated  Genitourinary   External genitalia and vagina: Normal, no lesions appreciated  Urethra: Normal, no discharge  Bladder: Not distended, no tenderness  Cervix: Normal, no lesions  Uterus: Normal size, no tenderness, no masses  Adnexa/Parametria: Normal, no masses or tenderness  Lymphatic   Palpation of lymph nodes in neck: No lymphadenopathy      Palpation of lymph nodes in axillae: No lymphadenopathy  Palpation of lymph nodes in groin: No lymphadenopathy  Musculoskeletal   Gait and station: Normal     Digits and nails: Normal without clubbing or cyanosis  Joints, bones, and muscles: Normal     Range of motion: Normal     Stability: Normal     Muscle strength/tone: Normal     Skin   Skin and subcutaneous tissue: Normal without rashes or lesions  Neurologic   Cranial nerves: Cranial nerves II-XII intact  Reflexes: 2+ and symmetric  Sensation: No sensory loss  Psychiatric   Orientation to person, place, and time: Normal     Mood and affect: Normal        Results/Data  Urine Dip Non-Automated- POC [id number] 04:13PM Sarah Chu     Test Name Result Flag Reference   Color Yellow     Clarity Transparent     Leukocytes neg     Nitrite neg     Blood neg     Bilirubin ng     Urobilinogen norm     Protein neg     Ph 7     Specific Gravity 1 000     Ketone neg     Glucose norm         Procedure    Procedure: Visual Acuity Test    Indication: routine screening  Inforrmation supplied by a Snellen chart     Results: 20/40 in both eyes without corrective device, 20/40 in the right eye without corrective device, 20/50 in the left eye without corrective device      Signatures   Electronically signed by : RAISA Castro ; Feb 9 2017  2:45PM EST                       (Author)

## 2018-01-13 NOTE — RESULT NOTES
Verified Results  (1) CBC/ PLT (NO DIFF) 08Apr2016 08:39AM Leticia Pollard     Test Name Result Flag Reference   HEMATOCRIT 35 5 %  34 8-46 1   HEMOGLOBIN 11 8 g/dL  11 5-15 4   MCHC 33 2 g/dL  31 4-37 4   MCH 29 8 pg  26 8-34 3   MCV 90 fL  82-98   PLATELET COUNT 328 Thousands/uL  149-390   RBC COUNT 3 96 Million/uL  3 81-5 12   RDW 13 6 %  11 6-15 1   WBC COUNT 5 17 Thousand/uL  4 31-10 16   MPV 10 2 fL  8 9-12 7     (1) VITAMIN B12 08Apr2016 08:39AM Leticia Pollard     Test Name Result Flag Reference   VITAMIN B12 316 pg/mL  100-900     (1) COMPREHENSIVE METABOLIC PANEL [id number] 44:74HN Leticia Pollard   National Kidney Disease Education Program recommendations are as follows:  GFR calculation is accurate only with a steady state creatinine  Chronic Kidney disease less than 60 ml/min/1 73 sq  meters  Kidney failure less than 15 ml/min/1 73 sq  meters  Test Name Result Flag Reference   GLUCOSE,RANDM 85 mg/dL     If the patient is fasting, the ADA then defines impaired fasting glucose as > 100 mg/dL and diabetes as > or equal to 123 mg/dL     SODIUM 139 mmol/L  136-145   POTASSIUM 4 5 mmol/L  3 5-5 3   CHLORIDE 104 mmol/L  100-108   CARBON DIOXIDE 29 mmol/L  21-32   ANION GAP (CALC) 6 mmol/L  4-13   BLOOD UREA NITROGEN 16 mg/dL  5-25   CREATININE 0 94 mg/dL  0 60-1 30   Standardized to IDMS reference method   CALCIUM 8 4 mg/dL  8 3-10 1   BILI, TOTAL 0 46 mg/dL  0 20-1 00   ALK PHOSPHATAS 59 U/L     ALT (SGPT) 26 U/L  12-78   AST(SGOT) 25 U/L  5-45   ALBUMIN 3 7 g/dL  3 5-5 0   TOTAL PROTEIN 6 7 g/dL  6 4-8 2   eGFR Non-African American      >60 0 ml/min/1 73sq m     (1) LIPID PANEL, FASTING 08Apr2016 08:39AM Leticia Pollard   Triglyceride:         Normal              <150 mg/dl       Borderline High    150-199 mg/dl       High               200-499 mg/dl       Very High          >499 mg/dl  Cholesterol:         Desirable        <200 mg/dl      Borderline High  200-239 mg/dl      High             >239 mg/dl  HDL Cholesterol:        High    >59 mg/dL      Low     <41 mg/dL     Test Name Result Flag Reference   CHOLESTEROL 164 mg/dL     HDL,DIRECT 75 mg/dL H 40-60   LDL CHOLESTEROL CALCULATED 79 mg/dL  0-100   TRIGLYCERIDES 52 mg/dL  <=150   Specimen collection should occur prior to N-Acetylcysteine or Metamizole administration due to the potential for falsely depressed results  (1) TSH 08Apr2016 08:39AM Ancelmo No   Patients undergoing fluorescein dye angiography may retain small amounts of fluorescein in the body for 48-72 hours post procedure  Samples containing fluorescein can produce falsely depressed TSH values  If the patient had this procedure,a specimen should be resubmitted post fluorescein clearance          The recommended reference ranges for TSH during pregnancy are as follows:  First trimester 0 1 to 2 5 uIU/mL  Second trimester  0 2 to 3 0 uIU/mL  Third trimester 0 3 to 3 0 uIU/m     Test Name Result Flag Reference   TSH 1 820 uIU/mL  0 358-3 740     (1) VITAMIN D 25-HYDROXY 08Apr2016 08:39AM Nacelmo No     Test Name Result Flag Reference   VIT D 25-HYDROX 34 6 ng/mL  30 0-100 0

## 2018-01-14 VITALS
HEART RATE: 72 BPM | BODY MASS INDEX: 23.49 KG/M2 | SYSTOLIC BLOOD PRESSURE: 120 MMHG | DIASTOLIC BLOOD PRESSURE: 80 MMHG | WEIGHT: 141 LBS | TEMPERATURE: 98 F | RESPIRATION RATE: 16 BRPM | HEIGHT: 65 IN

## 2018-01-14 VITALS
OXYGEN SATURATION: 92 % | BODY MASS INDEX: 23.32 KG/M2 | SYSTOLIC BLOOD PRESSURE: 100 MMHG | HEIGHT: 65 IN | DIASTOLIC BLOOD PRESSURE: 60 MMHG | WEIGHT: 140 LBS | HEART RATE: 74 BPM | RESPIRATION RATE: 20 BRPM | TEMPERATURE: 97.9 F

## 2018-01-14 VITALS
HEART RATE: 76 BPM | HEIGHT: 65 IN | BODY MASS INDEX: 24.68 KG/M2 | WEIGHT: 148.13 LBS | DIASTOLIC BLOOD PRESSURE: 73 MMHG | SYSTOLIC BLOOD PRESSURE: 117 MMHG

## 2018-01-15 NOTE — RESULT NOTES
Message   please inform pap normal-ty     Verified Results  (1) THIN PREP PAP WITH IMAGING [id number] 12:00AM Umair Elder     Test Name Result Flag Reference   LAB AP CASE REPORT (Report)     Gynecologic Cytology Report            Case: [id number]                  Authorizing Provider: Tim Calabrese MD    Collected:      02/01/2017           First Screen:     SARAH Armendariz    Received:      02/06/2017 3769        Specimen:  LIQUID-BASED PAP, SCREENING, Cervix   LAB AP GYN PRIMARY INTERPRETATION      Negative for intraepithelial lesion or malignancy  Electronically signed by SARAH Armendariz on 2/8/2017 at 2:33 PM   LAB AP GYN SPECIMEN ADEQUACY      Satisfactory for evaluation  Endocervical/transformation zone component present  LAB AP GYN ADDITIONAL INFORMATION (Report)     [employer]'s FDA approved ,  and ThinPrep Imaging System are   utilized with strict adherence to the 's instruction manual to   prepare gynecologic and non-gynecologic cytology specimens for the   production of ThinPrep slides as well as for gynecologic ThinPrep imaging  These processes have been validated by our laboratory and/or by the     The Pap test is not a diagnostic procedure and should not be used as the   sole means to detect cervical cancer  It is only a screening procedure to   aid in the detection of cervical cancer and its precursors  Both   false-negative and false-positive results have been experienced  Your   patient's test result should be interpreted in this context together with   the history and clinical findings  LAB AP LMP          Plan  Encounter for Papanicolaou smear for cervical cancer screening    · (1) THIN PREP PAP WITH IMAGING ; every 1 year;  Last 32RUE3231; Status:Active

## 2018-01-18 NOTE — MISCELLANEOUS
Message   Recorded as Task   Date: 08/09/2017 12:42 PM, Created By: Madi Dumont   Task Name: Miscellaneous   Assigned To: [facility] surgery sched,Team   Regarding Patient: Laz Diaz, Status: In Progress   CommentAdilene Black - 09 Aug 2017 12:42 PM     TASK CREATED  Pt LM w/service to cx inj appt for tomorrow  Please c/b to r/s   Oliveros Radhames - 09 Aug 2017 1:36 PM     TASK IN PROGRESS   Oliveros Radhames - 09 Aug 2017 1:36 PM     TASK EDITED  Called pt- has no one to work for her tomorrow so had to cancel her procedure  She is rescheduled to Wed 9/[facility] at [facility]  Greg Pettit - 09 Aug 2017 2:40 PM     TASK REPLIED TO: Previously Assigned To [place]  Thanks  Active Problems    1  Acute bacterial bronchitis (466 0,041 9) (J20 8,B96 89)   2  Altered bowel function (787 99) (R19 4)   3  Basal cell carcinoma of skin (173 91) (C44 91)   4  Blurred vision (368 8) (H53 8)   5  Cervical pain (723 1) (M54 2)   6  Cervical radiculopathy (723 4) (M54 12)   7  Chronic obstructive pulmonary disease (496) (J44 9)   8  Chronic pain syndrome (338 4) (G89 4)   9  Cigarette smoker (305 1) (F17 210)   10  Classic migraine with aura (346 00) (G43 109)   11  Complex tear of medial meniscus, current injury, right knee, subsequent encounter    (V58 89) (S83 231D)   12  Degeneration of intervertebral disc of mid-cervical region (722 4) (M50 320)   13  Degenerative arthritis of knee (715 36) (M17 10)   14  Depression with anxiety (300 4) (F41 8)   15  Diverticulosis (562 10) (K57 90)   16  Eczema (692 9) (L30 9)   17  Encounter for medication review (V58 69) (Z79 899)   18  Encounter for Papanicolaou smear for cervical cancer screening (V76 2) (Z12 4)   19  Encounter for screening mammogram for malignant neoplasm of breast (V76 12)    (Z12 31)   20  Esophagitis, reflux (530 11) (K21 0)   21  Fatigue (780 79) (R53 83)   22  Knee osteoarthritis (715 36) (M17 10)   23  Leg swelling (119 81) (M79 89)   24   Localized osteoarthritis of knees, bilateral (715 36) (M17 0)   25  Low back pain (724 2) (M54 5)   26  Lumbar disc herniation with radiculopathy (722 10) (M51 16)   27  Lumbar radiculopathy (724 4) (M54 16)   28  Medial meniscus tear (836 0) (S83 249A)   29  Migraine headache (346 90) (G43 909)   30  Murmur (785 2) (R01 1)   31  Obstructive chronic bronchitis with acute exacerbation (491 21) (J44 1)   32  Preoperative examination (V72 84) (Z01 818)   33  Primary osteoarthritis of first carpometacarpal joint of left hand (715 14) (M18 12)   34  Primary osteoarthritis of first carpometacarpal joint of right hand (715 14) (M18 11)   35  Right knee pain (719 46) (M25 561)   36  Sciatica (724 3) (M54 30)   37  Screening for breast cancer (V76 10) (Z12 31)   38  Spinal stenosis (724 00) (M48 00)   39  Status post medial meniscectomy of right knee (V45 89) (Z98 890)   40  Tear of medial meniscus of right knee, current, unspecified tear type, initial encounter    (836 0) (S83 241A)   41  Vitamin D insufficiency (268 9) (E55 9)    Current Meds   1  Advair Diskus 250-50 MCG/DOSE Inhalation Aerosol Powder Breath Activated; INHALE   1 PUFF EVERY 12 HOURS; Therapy: 73PDE8343 to Recorded   2  Doxycycline Hyclate 100 MG Oral Capsule; TAKE 1 CAPSULE TWICE DAILY; Therapy: 04EGW5633 to (Ronnie Escamilla)  Requested for: 33QQV7491; Last   Rx:91Gfa4696 Ordered   3  Krill Oil 1000 MG Oral Capsule; Therapy: (Recorded:73Jax4611) to Recorded   4  Multi-Vitamin TABS; Therapy: (Recorded:27Eia9884) to Recorded   5  PredniSONE 10 MG Oral Tablet; 3 tablet for 2 days, then 2 tablets for 2 days, then 1   tablet for 2 days; Therapy: 07MKL7457 to (Last Rx:33Yzf4775)  Requested for: 82FOV7141 Ordered   6  ProAir  (90 Base) MCG/ACT Inhalation Aerosol Solution; INHALE 1 TO 2 PUFFS   EVERY 6 HOURS AS NEEDED; Therapy: 15DJA2226 to Recorded   7  Subutex 8 MG SUBL (Buprenorphine HCl); Therapy: (Recorded:06Jun2017) to Recorded   8  SUMAtriptan Succinate 100 MG Oral Tablet (Imitrex); take one for migraine; repeat in 2   hours if needed; not to take more than 2 in 24 hours; Therapy: 22Apr2015 to (Last Rx:08Sep2016)  Requested for: 08Sep2016 Ordered    Allergies    1  NSAIDs   2   Antihistamine TABS    Signatures   Electronically signed by : Hai Keenan, ; Aug  9 2017  3:32PM EST                       (Author)

## 2018-01-18 NOTE — MISCELLANEOUS
Message   Recorded as Task   Date: 09/13/2017 11:38 AM, Created By: Kimberly Rivera   Task Name: Miscellaneous   Assigned To: Jaya Goodman   Regarding Patient: Savi De Leon, Status: Active   CommentJematthew Cage - 13 Sep 2017 11:38 AM     TASK CREATED  Received message dated yesterday from answering service- pt needs to cancel this afternoons procedure because her ride cancelled on her  She is rescheduled for 10/04 (she didn't have availability until then)  Procedure is C6-C7 DINH  Esaw Yuliya - 13 Sep 2017 12:13 PM     TASK REPLIED TO: Previously Assigned To [place]  Thanks  Active Problems    1  Acute bacterial bronchitis (466 0,041 9) (J20 8,B96 89)   2  Altered bowel function (787 99) (R19 4)   3  Basal cell carcinoma of skin (173 91) (C44 91)   4  Blurred vision (368 8) (H53 8)   5  Cervical pain (723 1) (M54 2)   6  Cervical radiculopathy (723 4) (M54 12)   7  Chronic obstructive pulmonary disease (496) (J44 9)   8  Chronic pain syndrome (338 4) (G89 4)   9  Cigarette smoker (305 1) (F17 210)   10  Classic migraine with aura (346 00) (G43 109)   11  Complex tear of medial meniscus, current injury, right knee, subsequent encounter    (V58 89) (S83 231D)   12  Degeneration of intervertebral disc of mid-cervical region (722 4) (M50 320)   13  Degenerative arthritis of knee (715 36) (M17 10)   14  Depression with anxiety (300 4) (F41 8)   15  Diverticulosis (562 10) (K57 90)   16  Eczema (692 9) (L30 9)   17  Encounter for medication review (V58 69) (Z79 899)   18  Encounter for Papanicolaou smear for cervical cancer screening (V76 2) (Z12 4)   19  Encounter for screening mammogram for malignant neoplasm of breast (V76 12)    (Z12 31)   20  Esophagitis, reflux (530 11) (K21 0)   21  Fatigue (780 79) (R53 83)   22  Knee osteoarthritis (715 36) (M17 10)   23  Leg swelling (729 81) (M79 89)   24  Localized osteoarthritis of knees, bilateral (715 36) (M17 0)   25   Low back pain (724 2) (M54 5)   26  Lumbar disc herniation with radiculopathy (722 10) (M51 16)   27  Lumbar radiculopathy (724 4) (M54 16)   28  Medial meniscus tear (836 0) (S83 249A)   29  Migraine headache (346 90) (G43 909)   30  Murmur (785 2) (R01 1)   31  Obstructive chronic bronchitis with acute exacerbation (491 21) (J44 1)   32  Preoperative examination (V72 84) (Z01 818)   33  Primary osteoarthritis of first carpometacarpal joint of left hand (715 14) (M18 12)   34  Primary osteoarthritis of first carpometacarpal joint of right hand (715 14) (M18 11)   35  Right knee pain (719 46) (M25 561)   36  Sciatica (724 3) (M54 30)   37  Screening for breast cancer (V76 10) (Z12 31)   38  Spinal stenosis (724 00) (M48 00)   39  Status post medial meniscectomy of right knee (V45 89) (Z98 890)   40  Tear of medial meniscus of right knee, current, unspecified tear type, initial encounter    (836 0) (S83 241A)   41  Vitamin D insufficiency (268 9) (E55 9)    Current Meds   1  Advair Diskus 250-50 MCG/DOSE Inhalation Aerosol Powder Breath Activated; INHALE   1 PUFF EVERY 12 HOURS; Therapy: 03KLI5170 to Recorded   2  Doxycycline Hyclate 100 MG Oral Capsule; TAKE 1 CAPSULE TWICE DAILY; Therapy: 31LAZ1210 to (Jr Terry)  Requested for: 18RTT5410; Last   Rx:18Evp5610 Ordered   3  DULoxetine HCl - 30 MG Oral Capsule Delayed Release Particles; take 1 capsule by   mouth twice a day; Therapy: 96KLM7320 to (Evaluate:59Sjs2450)  Requested for: 19Vpz6370; Last   IN:64VYD0199 Ordered   4  Krill Oil 1000 MG Oral Capsule; Therapy: (Recorded:26Lec3830) to Recorded   5  Multi-Vitamin TABS; Therapy: (Recorded:11Ccl5756) to Recorded   6  PredniSONE 10 MG Oral Tablet; 3 tablet for 2 days, then 2 tablets for 2 days, then 1   tablet for 2 days; Therapy: 13XUC5235 to (Last Rx:19Nbh2910)  Requested for: 38GVF6773 Ordered   7   ProAir  (90 Base) MCG/ACT Inhalation Aerosol Solution; INHALE 1 TO 2 PUFFS   EVERY 6 HOURS AS NEEDED; Therapy: 15QHS3487 to Recorded   8  Subutex 8 MG SUBL (Buprenorphine HCl); Therapy: (Recorded:06Jun2017) to Recorded   9  SUMAtriptan Succinate 100 MG Oral Tablet (Imitrex); take one for migraine; repeat in 2   hours if needed; not to take more than 2 in 24 hours; Therapy: 66Aml7504 to (Last Rx:36Ylu4426)  Requested for: 16Gwd1819 Ordered    Allergies    1  NSAIDs   2   Antihistamine TABS    Signatures   Electronically signed by : Charline Penn, ; Sep 13 2017  1:22PM EST                       (Author)

## 2018-01-22 VITALS
SYSTOLIC BLOOD PRESSURE: 120 MMHG | HEART RATE: 72 BPM | BODY MASS INDEX: 24.99 KG/M2 | WEIGHT: 150 LBS | HEIGHT: 65 IN | DIASTOLIC BLOOD PRESSURE: 78 MMHG | RESPIRATION RATE: 16 BRPM | TEMPERATURE: 97.8 F

## 2018-01-22 VITALS
DIASTOLIC BLOOD PRESSURE: 70 MMHG | HEIGHT: 65 IN | OXYGEN SATURATION: 96 % | RESPIRATION RATE: 20 BRPM | BODY MASS INDEX: 24.16 KG/M2 | WEIGHT: 145 LBS | SYSTOLIC BLOOD PRESSURE: 110 MMHG | HEART RATE: 72 BPM | TEMPERATURE: 98.3 F

## 2018-01-22 VITALS
HEART RATE: 78 BPM | TEMPERATURE: 96.7 F | WEIGHT: 151 LBS | OXYGEN SATURATION: 96 % | SYSTOLIC BLOOD PRESSURE: 118 MMHG | DIASTOLIC BLOOD PRESSURE: 76 MMHG | BODY MASS INDEX: 25.13 KG/M2

## 2018-01-23 NOTE — CONSULTS
Chief Complaint  Abdominal pain constipation      History of Present Illness  As you know this is a pleasant 29-year-old female with a history of asthma, depression, fibromyalgia, chronic pain, history of alcohol abuse is been sober for 2 years, previously on opioids has been on Subutex for the last year and a half who notes that over the past 6 months to one year she's had increasing constipation  She has a bowel movement once every 3 or 4 days, she reports her stools are very hard with significant straining  Associated with this she has increased abdominal bloating and distention, oftentimes worse postprandially with some occasional retrosternal burning and belching over the past several months  She's gained about 20 weight pounds over this 6 months to one year  She fecally has to sensation of incomplete evacuation  She has tried probiotics, she does take a detox T which sometimes will lead to incontinence  She's a history of internal hemorrhoids, last colonoscopy was about 4 years ago at [facility], noted to have diverticulosis at that time  She has no significant abdominal surgical history  She's had 2 C-sections and a hernia repair  She smokes about a half pack per day  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  Review of Systems    Other Symptoms: The remainder of the ten ROS was negative  Active Problems    1  Acute bronchitis with COPD (491 22) (J44 0,J20 9)   2  Basal cell carcinoma of skin (173 91) (C44 91)   3  BMI 24 0-24 9, adult (V85 1) (Z68 24)   4  Cervical radiculopathy (723 4) (M54 12)   5  Chronic obstructive pulmonary disease (496) (J44 9)   6  Chronic pain syndrome (338 4) (G89 4)   7  Cigarette smoker (305 1) (F17 210)   8  Classic migraine with aura (346 00) (G43 109)   9  Complex tear of medial meniscus, current injury, right knee, subsequent encounter   (V58 89) (S83 231D)   10  Constipation, chronic (564 00) (K59 09)   11  Cough (786 2) (R05)   12  Degeneration of intervertebral disc of mid-cervical region (722 4) (M50 320)   13  Degenerative arthritis of knee (715 36) (M17 10)   14  Depression with anxiety (300 4) (F41 8)   15  Diverticulosis (562 10) (K57 90)   16  Esophagitis, reflux (530 11) (K21 0)   17  Fatigue (780 79) (R53 83)   18  Knee osteoarthritis (715 36) (M17 10)   19  Localized osteoarthritis of knees, bilateral (715 36) (M17 0)   20  Lumbar disc herniation with radiculopathy (722 10) (M51 16)   21  Lumbar radiculopathy (724 4) (M54 16)   22  Medial meniscus tear (836 0) (S83 249A)   23  Migraine headache (346 90) (G43 909)   24  Murmur (785 2) (R01 1)   25  Nodule of right lung (793 11) (R91 1)   26  Primary osteoarthritis of first carpometacarpal joint of left hand (715 14) (M18 12)   27  Primary osteoarthritis of first carpometacarpal joint of right hand (715 14) (M18 11)   28  Right knee pain (719 46) (M25 561)   29  Sciatica (724 3) (M54 30)   30  Screening for breast cancer (V76 10) (Z12 31)   31  Spinal stenosis (724 00) (M48 00)   32  Status post medial meniscectomy of right knee (V45 89) (Z98 890)   33  Tear of medial meniscus of right knee, current, unspecified tear type, initial encounter    (836 0) (S83 241A)   34   Vitamin D insufficiency (268 9) (E55 9)    Past Medical History    · History of Acute bacterial bronchitis (466 0,041 9) (J20 8,B96 89)   · History of Acute bacterial bronchitis (466 0,041 9) (J20 8,B96 89)   · History of Amenorrhea (626 0) (N91 2)   · History of Arthritis (V13 4)   · History of Atypical chest pain (786 59) (R07 89)   · History of Cellulitis of hand, left (682 4) (L03 114)   · History of Dandruff (690 18) (L21 0)   · History of acute bronchitis (V12 69) (Z87 09)   · History of acute pharyngitis (V12 69) (Z87 09)   · History of acute sinusitis (V12 69) (Z87 09)   · History of acute sinusitis (V12 69) (Z87 09)   · History of alcoholism (V11 3) (F10 21)   · History of backache (V13 59) (Z87 39)   · History of bronchitis (V12 69) (Z87 09)   · History of conjunctivitis (V12 49) (Z86 69)   · History of eczema (V13 3) (Z87 2)   · History of malignant melanoma of skin (V10 82) (Z85 820)   · History of methicillin resistant Staphylococcus aureus infection (V12 04) (Z86 14)   · History of Lumbar radiculopathy (724 4) (M54 16)   · History of Sorethroat (462) (J02 9)   · History of Spinal stenosis (724 00) (M48 00)   · History of Superficial incisional infection of surgical site, initial encounter (998 59)  (T81 4XXA)   · History of Superficial incisional infection of surgical site, subsequent encounter  (V58 89,998 59) (T81 4XXD)   · History of URTI (acute upper respiratory infection) (465 9) (J06 9)    The active problems and past medical history were reviewed and updated today  Surgical History    · History of  Section   · History of Hand Surgery   · History of Hernia Repair    The surgical history was reviewed and updated today  Family History    · Family history of Atherosclerosis (V17 49)   · Family history of alcoholism (V17 0) (Z81 1)   · Family history of cerebrovascular accident (CVA) (V17 1) (Z82 3)   · Family history of Transient Ischemic Attack    · Family history of cerebrovascular accident (CVA) (V17 1) (Z82 3)   · Family history of Reported Family History Of Heart Disease    · Family history of alcoholism (V17 0) (Z81 1)    The family history was reviewed and updated today  Social History    · Active smoker (305 1) (Z72 0)   · Alcoholism in recovery (303 90) (F10 20)   · Cigarette smoker (305 1) (F17 210)   · Daily caffeinated coffee consumption   · Drinks caffeinated tea   · No alcohol use  The social history was reviewed and updated today  Current Meds   1  Advair Diskus 250-50 MCG/DOSE Inhalation Aerosol Powder Breath Activated; INHALE 1   PUFF EVERY 12 HOURS; Therapy: 78PCV2289 to (Last Rx:2017)  Requested for: 85UIM1368 Ordered   2   Amoxicillin-Pot Clavulanate 870-125 MG Oral Tablet; take 1 tab PO BID x 10 days; Therapy: 78RTH7211 to (Last Rx:39Hyl8155)  Requested for: 24OBK4950 Ordered   3  Benzonatate 200 MG Oral Capsule; TAKE 1 CAPSULE 3 TIMES DAILY AS NEEDED FOR   COUGH  NOT TO BE TAKEN PRIOR TO DRIVING OR OPERATING MACHINERY AS IT   CAN CAUSE DROWSINESS; Therapy: 27DKR6362 to (Evaluate:79Knp4779)  Requested for: 69ZRX5326; Last   Rx:18Akf3490 Ordered   4  Cymbalta 30 MG Oral Capsule Delayed Release Particles; Therapy: (Recorded:96Rox8115) to Recorded   5  Krill Oil 1000 MG Oral Capsule; Therapy: (Recorded:34Ozf4679) to Recorded   6  MethylPREDNISolone Sodium Succ 125 MG Injection Solution Reconstituted; 125mg IM  given L buttock; To Be Done: 93FJY9324; Status: HOLD FOR - Administration   Ordered   7  Multi-Vitamin TABS; Therapy: (Aden Rodarte) to Recorded   8  PredniSONE 10 MG Oral Tablet; 6 tabs po starting tomorrow, then 5 tabs next day, 4 tabs   next day, 3 tabs next day, 2 tabs next day, 1 tab last day; Therapy: 12JCK9619 to (Last Rx:26Xsj9492)  Requested for: 23YBP3894 Ordered   9  ProAir  (90 Base) MCG/ACT Inhalation Aerosol Solution; INHALE 1 TO 2 PUFFS   EVERY 6 HOURS AS NEEDED; Therapy: 20QQK5106 to (Last Rx:30Nov2017)  Requested for: 97FZA3033 Ordered   10  Subutex 8 MG SUBL; Therapy: (Recorded:06Jun2017) to Recorded   11  SUMAtriptan Succinate 100 MG Oral Tablet; take one for migraine; repeat in 2 hours if    needed; not to take more than 2 in 24 hours; Therapy: 81Lga9255 to (Last Rx:55Ilo0326)  Requested for: 32Ngt9399; Status: ACTIVE    - Renewal Voided Ordered    The medication list was reviewed and updated today  Allergies    1  NSAIDs   2  Antihistamine TABS    Vitals   Recorded: 20Dec2017 09:57AM   Temperature 96 7 F   Heart Rate 78   Systolic 672   Diastolic 76   Weight 021 lb    BMI Calculated 25 13   BSA Calculated 1 76   O2 Saturation 96     Physical Exam  Gen  : Well-nourished well-developed, no acute distress  HEENT: Anicteric, moist mucous membranes, no cervical or supraclavicular lymphadenopathy  Cardiovascular: Regular rate and rhythm, no murmurs rubs or gallops  Pulmonary: Clear to auscultation bilaterally  Abdomen: Soft, nontender, nondistended  No hepatosplenomegaly  Bowel sounds present and regular  Extremities: No cyanosis, clubbing, or edema,  Skin: No rashes  Neuro: Alert, awake, oriented x3         Assessment    1  Abdominal pain (789 00) (R10 9)   2  Constipation, chronic (564 00) (K59 09)   3  Esophagitis, reflux (530 11) (K21 0)    Plan  Abdominal pain, Constipation, chronic    · Suprep Bowel Prep Kit 17 5-3 13-1 6 GM/180ML Oral Solution; USE AS DIRECTED   Rx By: Alexandra Sanchez; Dispense: 0 Days ; #:1 X 177 ML Bottle (2 Bottles); Refill: 0; For: Abdominal pain, Constipation, chronic; PREMA = N; Verified Transmission to [address] [place] [address] [facility]; Last Updated By: SystemUshahidi; 12/20/2017 10:17:29 AM   · COLONOSCOPY (GI, SURG); Status:Hold For - Scheduling; Requested for:91Mjm4117;    Perform:[facility]; Due:90Wdu7637; Ordered; For:Abdominal pain, Constipation, chronic; Ordered By:Russ Vasquez;  Constipation, chronic, Esophagitis, reflux    · (1) TSH; Status:Active; Requested for:05Wyw5650;    Perform:[facility]; Due:52Iar2831; Ordered; For:Constipation, chronic, Esophagitis, reflux; Ordered By:Russ Vasquez;  Esophagitis, reflux    · EGD; Status:Hold For - Scheduling; Requested for:48Aaf1402;    Perform:[facility]; Due:27Xjy1935;Ordered;  For:Esophagitis, reflux; Ordered By:Russ Vasquez;    Discussion/Summary    Very pleasant 12year-old female with new onset constipation over the past 6 months to one year with abdominal distention and bloating as well as some reflux symptoms      #1 To constipation with abdominal bloating and distention colon differential including medication side effects secondary to Subutex, IBS-C, more significant intestinal pathology  -Recommend that we start her on Dara lax 2-3 times daily to see if this helps   -increase fluids and fiber in diet  -We'll schedule her for colonoscopy to exclude significant underlying pathology  -We will check a TSH given her constipation or weight gain     #2 abdominal bloating and distention with reflux: Will proceed with upper endoscopy the same time as her colonoscopy to evaluate for peptic ulcer disease and reflux esophagitis    Discussed with her the risks of the procedures including bleeding, surgery, perforation, missed polyp detection rates        Future Appointments    Signatures   Electronically signed by : RAISA Cantor ; Dec 20 2017 10:18AM EST                       (Author)

## 2018-01-23 NOTE — RESULT NOTES
Verified Results  MAMMO FOLLOW UP (NO CHARGE) 71BUI5039 11:15AM Jono Golden     Test Name Result Flag Reference   MAMMO FOLLOW UP (NO CHARGE) (Report)     Patient History:   Patient is postmenopausal and has history of other cancer at age    63  Family history of prostate cancer at age 66 in father  Took hormonal contraceptives beginning at age 41  Patient is an every day smoker  Patient's BMI is 23 9  Reason for exam: addl eval requested from prior study  Mammo Follow Up (No Charge): Left Breast - November 17, 2017 -    Check In #: [de-identified]   CC view(s) were taken of the left breast      Prior study comparison: November 6, 2017, mammo screening    bilateral W CAD, performed at [address] [place] [address]  July 6, 2016, mammo screening bilateral W CAD performed    at [place] [address]  The breast tissue is heterogeneously dense, potentially limiting    the sensitivity of mammography  Patient risk, included in this    report, assists in determining the appropriate screening regimen    (such as 3-D mammography or the inclusion of automated breast    ultrasound or MRI)  3-D mammography may also remain indicated as    screening  No suspicious mass lesions, areas of architectural    torsion, or pleomorphic aspiration seen throughout the breast    No evidence for malignancy  ACR BI-RADSï¾® Assessments: BiRad:1 - Negative     Recommendation:   Routine screening mammogram of the left breast in 1 year  The patient is scheduled in a reminder system for screening    mammography  8-10% of cancers will be missed on mammography  Management of a    palpable abnormality must be based on clinical grounds  Patients   will be notified of their results via letter from our facility  Accredited by [employer] of [employer] and FDA       Transcription Location: [facility]: [id number]     Risk Value(s):   Jenn 10 Year: 4 200%, Jenn Lifetime: 10 400%,    Myriad Table: 1 5%, BEBO 5 Year: 2 0%, NCI Lifetime: 10 0%   Signed by:   Missael Richter MD   11/17/17     * MAMMO SCREENING BILATERAL W CAD 18IWQ7823 09:57AM Isidro Harrison     Test Name Result Flag Reference   MAMMO SCREENING BILATERAL W CAD (Report)     Patient History:   Patient is postmenopausal and has history of other cancer at age    63  Family history of prostate cancer at age 66 in father  Took hormonal contraceptives beginning at age 41  Patient is an every day smoker  Patient's BMI is 23 9  Reason for exam: screening, asymptomatic  Screening     Mammo Screening Bilateral W CAD: November 6, 2017 - Check In #:    [de-identified]   Bilateral MLO and CC view(s) were taken  XCCL view(s) were taken   of the right breast      Technologist: RT Erendira(R)(M)   Prior study comparison: July 6, 2016, mammo screening bilateral W   CAD, performed at [place] [address]  June 29, 2015, bilateral screening mammogram, performed at [place] [address]  The breast tissue is heterogeneously dense, potentially limiting    the sensitivity of mammography  Patient risk, included in this    report, assists in determining the appropriate screening regimen    (such as 3-D mammography or the inclusion of automated breast    ultrasound or MRI)  3-D mammography may also remain indicated as    screening  The left CC view is degraded by motion artifact and    must be repeated  No dominant soft tissue mass, architectural    distortion or suspicious calcifications are noted in either    breast  The skin and nipple contours are within normal limits  Technical recall-- left CC view       ACR BI-RADSï¾® Assessments: BiRad:0 - Incomplete: needs additional    imaging evaluation     Recommendation:   Repeat procedure of the left breast    A breast health care nurse from our facility will be contacting    the patient regarding the need for additional imaging  Analyzed by CAD     The patient is scheduled in a reminder system for screening    mammography  8-10% of cancers will be missed on mammography  Management of a    palpable abnormality must be based on clinical grounds  Patients   will be notified of their results via letter from our facility  Accredited by [employer] of [employer] and FDA       Transcription Location: [address] [place] [address]: [id number]     Risk Value(s):   Tyrer-Cuzick 10 Year: 4 200%, Tyrer-Cuzick Lifetime: 10 400%,    Myriad Table: 1 5%, BEBO 5 Year: 2 0%, NCI Lifetime: 10 0%   Signed by:   Shana Lopez MD   11/7/17

## 2018-01-23 NOTE — RESULT NOTES
Message   Recorded as Task   Date: 12/14/2017 02:47 PM, Created By: Daniela Morgan   Task Name: Follow Up   Ass[facility] To: [facility] [address] [facility]   Regarding Patient: Jimmy Herndon, Status: Active   Comment:    Winsome Brown - 14 Dec 2017 2:47 PM     TASK CREATED  S/P C6-C7 DINH ON 12/08 W/AS  NO F/U SCHEDULED   Winsome Brown - 15 Dec 2017 9:40 AM     TASK EDITED  spoke with pt, 75% relief and current pain  level is 3/10 following her procedure  No f/u scheduled  [facility] - 15 Dec 2017 9:53 AM     TASK REPLIED TO: Previously Assigned To [place]  Thanks          Signatures   Electronically signed by : Pedro Dominique, ; Dec 15 2017  9:54AM EST                       (Author)

## 2018-01-23 NOTE — RESULT NOTES
Discussion/Summary   Please inform patient that thyroid function is normal   Please find out if symptoms have improved with MiraLax, we will see the patient at upcoming colonoscopy  Call with any questions or concerns  Verified Results  (1) TSH 27Mns2389 09:49AM Chichi VARGAS Order Number: [id number]     Test Name Result Flag Reference   TSH 1 130 uIU/mL  0 358-3 740   Patients undergoing fluorescein dye angiography may retain small amounts of fluorescein in the body for 48-72 hours post procedure  Samples containing fluorescein can produce falsely depressed TSH values  If the patient had this procedure,a specimen should be resubmitted post fluorescein clearance            The recommended reference ranges for TSH during pregnancy are as follows:  First trimester 0 1 to 2 5 uIU/mL  Second trimester  0 2 to 3 0 uIU/mL  Third trimester 0 3 to 3 0 uIU/m

## 2018-01-23 NOTE — RESULT NOTES
Message   Recorded as Task   Date: 12/19/2017 07:51 AM, Created By: Jeanine Jackson   Task Name: Follow Up   Assigned To: [address] [facility] [address] Procedure,Team   Regarding Patient: Yudelka Rodriguez, Status: Active   Comment:    Mere Walls - 19 Dec 2017 7:51 AM     TASK CREATED  pt had s/p C6-C7 DINH on 12/8 by Dr Judie Cowden   no f/u scheduled   Gupta,Brijesh - 19 Dec 2017 9:45 AM     TASK REASSIGNED: Previously Assigned To Hugh Edwards - 21 Dec 4705 5:41 PM     TASK EDITED  1st Attempt     Lm on VM to Erendira Wong - 21 Dec 2017 4:30 PM     TASK EDITED  Pt returned call stating she did have some relief -- about 60%  Pt can be reached at [phone]  [facility] 3133 09:82 AM     TASK EDITED   [address] - 22 Dec 2017 10:49 AM     TASK REPLIED TO: Previously Assigned To [place]  Thanks          Signatures   Electronically signed by : Stephanie Rodriguez, ; Dec 22 2017 10:55AM EST                       (Author)

## 2018-01-23 NOTE — RESULT NOTES
Discussion/Summary   Your CT scan is stable  Follow up as needed  Happy Holidays!!!   Dr Mandeep Guillen      Verified Results  Ivana Heredia [id number] 11:07AM Jeff Rodas     Test Name Result Flag Reference   CT LUNG NODULE FOLLOW-UP (Report)     CT CHEST WITHOUT IV CONTRAST     INDICATION: Pulmonary nodule  Patient is a current smoker  COMPARISON: Chest CT 11/3/2016, 12/13/2015  TECHNIQUE: Unenhanced CT examination of the chest was performed utilizing a low radiation dose protocol  Reformatted images were created in axial, sagittal, and coronal planes  Radiation dose length product (DLP) for this visit: 94 7 mGy-cm   This examination, like all CT scans performed in the [facility], was performed utilizing techniques to minimize radiation dose exposure, including the use of iterative    reconstruction and automated exposure control  FINDINGS:     LUNGS: Biapical pleural parenchymal scarring stable  A 5 mm groundglass nodule in the right middle lobe is stable retrospectively since 2015  Based on current Fleischner Society 2017 Guidelines on incidental pulmonary nodule, no further workup is    necessary  No further surveillance required  No suspicious tracheal or endobronchial lesions  PLEURA: No pleural effusions or pneumothorax  HEART/GREAT VESSELS: Unremarkable for patient's age  MEDIASTINUM AND HARINI: No pathologic intrathoracic lymphadenopathy  CHEST WALL AND LOWER NECK:    Normal      VISUALIZED STRUCTURES IN THE UPPER ABDOMEN: Unremarkable  OSSEOUS STRUCTURES: No acute fracture  No destructive osseous lesion  IMPRESSION:     Stable 5 mm right middle lobe groundglass nodule  With no change since 2015, no further surveillance required  Stable biapical pleural-parenchymal scarring         Workstation performed: [id number]     Signed by:   Greta Hernandes MD   12/20/17

## 2018-01-24 ENCOUNTER — ANESTHESIA EVENT (OUTPATIENT)
Dept: GASTROENTEROLOGY | Facility: AMBULARY SURGERY CENTER | Age: 63
End: 2018-01-24
Payer: MEDICARE

## 2018-01-24 VITALS
WEIGHT: 151.4 LBS | DIASTOLIC BLOOD PRESSURE: 76 MMHG | HEART RATE: 81 BPM | RESPIRATION RATE: 20 BRPM | SYSTOLIC BLOOD PRESSURE: 142 MMHG | BODY MASS INDEX: 25.19 KG/M2 | TEMPERATURE: 98.3 F | OXYGEN SATURATION: 95 %

## 2018-01-24 VITALS
SYSTOLIC BLOOD PRESSURE: 122 MMHG | DIASTOLIC BLOOD PRESSURE: 80 MMHG | HEIGHT: 65 IN | TEMPERATURE: 97.1 F | HEART RATE: 80 BPM | WEIGHT: 152.8 LBS | RESPIRATION RATE: 16 BRPM | BODY MASS INDEX: 25.46 KG/M2

## 2018-01-24 VITALS — OXYGEN SATURATION: 96 %

## 2018-01-24 NOTE — PRE-PROCEDURE INSTRUCTIONS
Pre-Surgery Instructions:   Medication Instructions    albuterol (PROVENTIL HFA,VENTOLIN HFA) 90 mcg/act inhaler Patient was instructed by Physician and understands   buprenorphine (SUBUTEX) 8 mg Patient was instructed by Physician and understands   Calcium Carbonate-Vit D-Min (CALCIUM 1200 PO) Patient was instructed by Physician and understands   COD LIVER OIL PO Patient was instructed by Physician and understands   DULoxetine (CYMBALTA) 30 mg delayed release capsule Patient was instructed by Physician and understands   fluticasone-salmeterol (ADVAIR) 250-50 mcg/dose inhaler Patient was instructed by Physician and understands   multivitamin [facility]) TABS Patient was instructed by Physician and understands   Omega-3 Fatty Acids (FISH OIL) 1,000 mg Patient was instructed by Physician and understands   SUMAtriptan (IMITREX) 100 mg tablet Patient was instructed by Physician and understands   Turmeric 500 MG TABS Patient was instructed by Physician and understands  Pt to follow Dr Kana Malin instructions    alexi Rodas Rodney [phone]

## 2018-01-24 NOTE — ANESTHESIA PREPROCEDURE EVALUATION
Review of Systems/Medical History  Patient summary reviewed  Chart reviewed  No history of anesthetic complications     Cardiovascular   Pulmonary  COPD moderate- medication dependent ,        GI/Hepatic            Endo/Other     GYN       Hematology   Musculoskeletal  Back pain , cervical pain,   Comment: fibromyalgia Arthritis     Neurology    Headaches,    Psychology   Anxiety, Depression ,              Physical Exam    Airway    Mallampati score: II  TM Distance: >3 FB  Neck ROM: full     Dental   No notable dental hx     Cardiovascular  Cardiovascular exam normal    Pulmonary  Pulmonary exam normal     Other Findings        Anesthesia Plan  ASA Score- 3     Anesthesia Type- IV sedation with anesthesia with ASA Monitors  Additional Monitors:   Airway Plan:         Plan Factors-    Induction-     Postoperative Plan-     Informed Consent- Anesthetic plan and risks discussed with patient

## 2018-01-25 ENCOUNTER — HOSPITAL ENCOUNTER (OUTPATIENT)
Facility: AMBULARY SURGERY CENTER | Age: 63
Setting detail: OUTPATIENT SURGERY
Discharge: HOME | End: 2018-01-25
Attending: INTERNAL MEDICINE | Admitting: INTERNAL MEDICINE
Payer: MEDICARE

## 2018-01-25 ENCOUNTER — ANESTHESIA (OUTPATIENT)
Dept: GASTROENTEROLOGY | Facility: AMBULARY SURGERY CENTER | Age: 63
End: 2018-01-25
Payer: MEDICARE

## 2018-01-25 VITALS
TEMPERATURE: 97.5 F | HEART RATE: 66 BPM | RESPIRATION RATE: 18 BRPM | OXYGEN SATURATION: 100 % | SYSTOLIC BLOOD PRESSURE: 139 MMHG | DIASTOLIC BLOOD PRESSURE: 68 MMHG

## 2018-01-25 DIAGNOSIS — K59.09 OTHER CONSTIPATION: ICD-10-CM

## 2018-01-25 DIAGNOSIS — R10.9 ABDOMINAL PAIN: ICD-10-CM

## 2018-01-25 DIAGNOSIS — K21.00 GASTRO-ESOPHAGEAL REFLUX DISEASE WITH ESOPHAGITIS: ICD-10-CM

## 2018-01-25 PROCEDURE — 88342 IMHCHEM/IMCYTCHM 1ST ANTB: CPT | Performed by: PATHOLOGY

## 2018-01-25 PROCEDURE — 43239 EGD BIOPSY SINGLE/MULTIPLE: CPT | Performed by: INTERNAL MEDICINE

## 2018-01-25 PROCEDURE — 45380 COLONOSCOPY AND BIOPSY: CPT | Performed by: INTERNAL MEDICINE

## 2018-01-25 PROCEDURE — 88313 SPECIAL STAINS GROUP 2: CPT | Performed by: PATHOLOGY

## 2018-01-25 PROCEDURE — 88341 IMHCHEM/IMCYTCHM EA ADD ANTB: CPT | Performed by: INTERNAL MEDICINE

## 2018-01-25 PROCEDURE — 88313 SPECIAL STAINS GROUP 2: CPT | Performed by: INTERNAL MEDICINE

## 2018-01-25 PROCEDURE — 88305 TISSUE EXAM BY PATHOLOGIST: CPT | Performed by: PATHOLOGY

## 2018-01-25 PROCEDURE — 88305 TISSUE EXAM BY PATHOLOGIST: CPT | Performed by: INTERNAL MEDICINE

## 2018-01-25 PROCEDURE — 88341 IMHCHEM/IMCYTCHM EA ADD ANTB: CPT | Performed by: PATHOLOGY

## 2018-01-25 PROCEDURE — 88342 IMHCHEM/IMCYTCHM 1ST ANTB: CPT | Performed by: INTERNAL MEDICINE

## 2018-01-25 RX ORDER — PROPOFOL 10 MG/ML
INJECTION, EMULSION INTRAVENOUS AS NEEDED
Status: DISCONTINUED | OUTPATIENT
Start: 2018-01-25 | End: 2018-01-25 | Stop reason: SURG

## 2018-01-25 RX ORDER — PANTOPRAZOLE SODIUM 40 MG/1
40 TABLET, DELAYED RELEASE ORAL 2 TIMES DAILY
Qty: 60 TABLET | Refills: 3 | Status: SHIPPED | OUTPATIENT
Start: 2018-01-25 | End: 2021-05-28 | Stop reason: SDUPTHER

## 2018-01-25 RX ORDER — SODIUM CHLORIDE, SODIUM LACTATE, POTASSIUM CHLORIDE, CALCIUM CHLORIDE 600; 310; 30; 20 MG/100ML; MG/100ML; MG/100ML; MG/100ML
INJECTION, SOLUTION INTRAVENOUS CONTINUOUS PRN
Status: DISCONTINUED | OUTPATIENT
Start: 2018-01-25 | End: 2018-01-25 | Stop reason: SURG

## 2018-01-25 RX ORDER — FENTANYL CITRATE 50 UG/ML
INJECTION, SOLUTION INTRAMUSCULAR; INTRAVENOUS AS NEEDED
Status: DISCONTINUED | OUTPATIENT
Start: 2018-01-25 | End: 2018-01-25

## 2018-01-25 RX ORDER — PROPOFOL 10 MG/ML
INJECTION, EMULSION INTRAVENOUS CONTINUOUS PRN
Status: DISCONTINUED | OUTPATIENT
Start: 2018-01-25 | End: 2018-01-25 | Stop reason: SURG

## 2018-01-25 RX ADMIN — PROPOFOL 20 MG: 10 INJECTION, EMULSION INTRAVENOUS at 08:38

## 2018-01-25 RX ADMIN — PROPOFOL 100 MG: 10 INJECTION, EMULSION INTRAVENOUS at 08:36

## 2018-01-25 RX ADMIN — SODIUM CHLORIDE, SODIUM LACTATE, POTASSIUM CHLORIDE, AND CALCIUM CHLORIDE: .6; .31; .03; .02 INJECTION, SOLUTION INTRAVENOUS at 08:20

## 2018-01-25 RX ADMIN — PROPOFOL 20 MG: 10 INJECTION, EMULSION INTRAVENOUS at 08:46

## 2018-01-25 RX ADMIN — PROPOFOL 20 MG: 10 INJECTION, EMULSION INTRAVENOUS at 08:42

## 2018-01-25 RX ADMIN — PROPOFOL 20 MG: 10 INJECTION, EMULSION INTRAVENOUS at 08:44

## 2018-01-25 RX ADMIN — PROPOFOL 100 MCG/KG/MIN: 10 INJECTION, EMULSION INTRAVENOUS at 08:41

## 2018-01-25 RX ADMIN — PROPOFOL 20 MG: 10 INJECTION, EMULSION INTRAVENOUS at 08:40

## 2018-01-25 NOTE — OP NOTE
ESOPHAGOGASTRODUODENOSCOPY    PROCEDURE: EGD/ Biopsy    INDICATIONS: Abdominal pain, Epigastric    POST-OP DIAGNOSIS: See the impression below    SEDATION: Monitored anesthesia care, check anesthesia records    PHYSICAL EXAM:    Vitals:    01/25/18 0759   BP: 121/50   Pulse: 65   Resp: 18   Temp: 97 5 °F (36 4 °C)   SpO2: 97%    There is no height or weight on file to calculate BMI  General: NAD  Heart: S1 & S2 normal, RRR  Lungs: CTA, No rales or rhonchi  Abdomen: Soft, nontender, nondistended, good bowel sounds    CONSENT:  Informed consent was obtained for the procedure, including sedation after explaining the risks and benefits of the procedure  Risks including but not limited to bleeding, perforation, infection, aspiration were discussed in detail  Also explained about less than 100% sensitivity with the exam and other alternatives  PREPARATION:   EKG tracing, pulse oximetry, blood pressure were monitored throughout the procedure  Patient was identified by myself both verbally and by visual inspection of ID band  DESCRIPTION:   Patient was placed in the left lateral decubitus position and was sedated with the above medication  The gastroscope was introduced in to the oropharynx and the esophagus was intubated under direct visualization  Scope was passed down the esophagus up to 2nd part of the duodenum  A careful inspection was made as the gastroscope was withdrawn, including a retroflexed view of the stomach; findings and interventions are described below  FINDINGS:    #1  Esophagus and GEJ-  grade B reflux esophagitis    #2  Stomach- gastritis with several ulcers, largest was a linear gastric ulceration in the greater curvature  Biopsies taken for H pylori  Normal retroflexion    #3   Duodenum- normal duodenum         IMPRESSIONS:      Gastritis with ulcerations, biopsies taken for H pylori  Grade B esophagitis    RECOMMENDATIONS:     Discharge home  Resume regular diet  Resume home medications  Pantoprazole 40 mg twice daily  Follow up biopsy results  Call with any abdominal pain, bleeding, fevers    COMPLICATIONS:  None; patient tolerated the procedure well            DISPOSITION: PACU           CONDITION: Stable

## 2018-01-25 NOTE — DISCHARGE INSTRUCTIONS
Discharge home  Resume regular diet  Start pantoprazole 40 mg twice daily, prescription sent to pharmacy  Follow up biopsy results, call the office in 3 weeks  Repeat colonoscopy in 2 to 3 years due to poor prep  Call with any abdominal pain, bleeding, fevers  Continue MiraLax daily

## 2018-01-25 NOTE — OP NOTE
COLONOSCOPY    PROCEDURE: Colonoscopy/ Polypectomny (Cold Biopsy)    INDICATIONS: Abdominal Pain, Chronic, Constipation    POST-OP DIAGNOSIS: See the impression below    SEDATION: Monitored anesthesia care, check anesthesia records    PHYSICAL EXAM:    /50   Pulse 65   Temp 97 5 °F (36 4 °C) (Tympanic)   Resp 18   SpO2 97%   There is no height or weight on file to calculate BMI  General: NAD  Heart: S1 & S2 normal, RRR  Lungs: CTA, No rales or rhonchi  Abdomen: Soft, nontender, nondistended, good bowel sounds    CONSENT:  Informed consent was obtained for the procedure, including sedation after explaining the risks and benefits of the procedure  Risks including but not limited to bleeding, perforation, infection, aspiration were discussed in detail  Also explained about less than 100%$ sensitivity with the exam and other alternatives  PREPARATION:   EKG tracing, pulse oximetry, blood pressure were monitored throughout the procedure  Patient was identified by myself both verbally and by visual inspection of ID band  DESCRIPTION:   Patient was placed in the left lateral decubitus position and was sedated with the above medication  Digital rectal examination was performed  The colonoscope was introduced in to the anal canal and advanced up to cecum, which was identified by the appendiceal orifice and IC valve  A careful inspection was made as the colonoscope was withdrawn, including a retroflexed view of the rectum; findings and interventions are described below  Appropriate photodocumentation was obtained  The quality of the colonic preparation was poor      FINDINGS:    Decreased visibility throughout the colon due to poor prep  4 mm descending colon polyp removed by cold biopsy forceps  Sigmoid diverticulosis  Mild internal hemorrhoids on retroflexion  No large masses or lesions, small and flat polyps could have been missed         IMPRESSIONS:      For descending colon polyp removed by cold biopsy forceps  Sigmoid diverticulosis  Internal hemorrhoids  Poor prep limiting visualization    RECOMMENDATIONS:    Discharge home  Resume regular diet  Resume home medications  Continue MiraLax  Follow up biopsy results, call the office in 3 weeks for results  Repeat colonoscopy in 2 to 3 years due to poor prep  Call with any abdominal pain, bleeding, fevers    COMPLICATIONS:  None; patient tolerated the procedure well      DISPOSITION: PACU           CONDITION: Stable

## 2018-01-25 NOTE — H&P
History and Physical -  [facility]  Anthony Galo 63 y o  female MRN: [id number]    HPI: Anthony Galo is a 63y o  year old female who presents with abdominal pain and constipation         Review of Systems    Historical Information   Past Medical History:   Diagnosis Date    Anesthesia complication     hx of cocaine use-none in 5 yrs    Anxiety     Arthritis     bilateral knees-DJD, arthritis neck and back-cervical injection 2017    Bronchitis, chronic (HCC)     Cancer (HCC)     squamous-removed from the right foot    Chronic pain disorder     neck and back    COPD [facility]chronic obstructive pulmonary disease) ([facility] [address] )     Depression     Fibromyalgia, primary     H/O ETOH abuse     None in the past 10 months since 8/1/17    Migraine     Raynauds syndrome      Past Surgical History:   Procedure Laterality Date    ARTHROSCOPY KNEE Right 6/8/2017    Procedure: RIGHT KNEE ARTHROSCOPY MEDIAL MENISCECTOMY;  Surgeon: Jacoby Caro MD;  Location: [facility] MAIN OR;  Service:    [address] [place] [address]    COLONOSCOPY      DILATION AND CURETTAGE OF UTERUS      x 2 miscarriage    EPIDURAL BLOCK INJECTION N/A 12/8/2017    Procedure: INJECTION EPIDURAL STEROID CERVICAL C6-C7;  Surgeon: Ml Ramos MD;  Location: [facility] MAIN OR;  Service: Pain Management     FOOT SURGERY      squamous removed from the right    HAND SURGERY Bilateral     arthroplasty -thumb joint    HERNIA REPAIR      inguinal    WISDOM TOOTH EXTRACTION       Social History   History   Alcohol Use No     Comment: hx of ETOH abuse-2016     History   Drug Use No     Comment: quit 2007     History   Smoking Status    Current Every Day Smoker    Packs/day: 0 50    Years: 10 00   Smokeless Tobacco    Never Used     Comment: on and off for 40 yrs     Family History   Problem Relation Age of Onset    Peripheral vascular disease Mother     Arthritis Mother      osteo arthritis    Heart disease Father    Surekha Smith Peripheral vascular disease Father     Stroke Father     Arthritis Sister      rheumatoid       Meds/Allergies     Prescriptions Prior to Admission   Medication    albuterol (PROVENTIL HFA,VENTOLIN HFA) 90 mcg/act inhaler    buprenorphine (SUBUTEX) 8 mg    Calcium Carbonate-Vit D-Min (CALCIUM 1200 PO)    COD LIVER OIL PO    DULoxetine (CYMBALTA) 30 mg delayed release capsule    fluticasone-salmeterol (ADVAIR) 250-50 mcg/dose inhaler    multivitamin (THERAGRAN) TABS    Omega-3 Fatty Acids (FISH OIL) 1,000 mg    Turmeric 500 MG TABS    SUMAtriptan (IMITREX) 100 mg tablet       Allergies   Allergen Reactions    Bee Venom Anaphylaxis    Ibuprofen Swelling     And any anti-inflammatories, NSAIDS-causes severe diarrhea  Facial swelling    Antihistamines, Chlorpheniramine-Type      "jumpy"       Objective     Blood pressure 121/50, pulse 65, temperature 97 5 °F (36 4 °C), temperature source Tympanic, resp  rate 18, SpO2 97 %  PHYSICAL EXAM    Gen: NAD  CV: RRR  CHEST: Clear  ABD: soft, NT/ND  EXT: no edema  Neuro: AAO      ASSESSMENT/PLAN:  This is a 63y o  year old female here for  abdominal pain and constipation         PLAN:   Procedure: egd/colonoscopy

## 2018-02-02 ENCOUNTER — TELEPHONE (OUTPATIENT)
Dept: GASTROENTEROLOGY | Facility: MEDICAL CENTER | Age: 63
End: 2018-02-02

## 2018-03-06 DIAGNOSIS — F34.1 DYSTHYMIA: Primary | ICD-10-CM

## 2018-03-06 RX ORDER — DULOXETIN HYDROCHLORIDE 30 MG/1
CAPSULE, DELAYED RELEASE ORAL
Qty: 60 CAPSULE | Refills: 2 | Status: SHIPPED | OUTPATIENT
Start: 2018-03-06 | End: 2018-07-06 | Stop reason: SDUPTHER

## 2018-03-13 ENCOUNTER — OFFICE VISIT (OUTPATIENT)
Dept: OBGYN CLINIC | Facility: CLINIC | Age: 63
End: 2018-03-13
Payer: MEDICARE

## 2018-03-13 ENCOUNTER — TELEPHONE (OUTPATIENT)
Dept: GASTROENTEROLOGY | Facility: AMBULARY SURGERY CENTER | Age: 63
End: 2018-03-13

## 2018-03-13 VITALS — WEIGHT: 153 LBS | HEIGHT: 66 IN | BODY MASS INDEX: 24.59 KG/M2

## 2018-03-13 DIAGNOSIS — M17.0 BILATERAL PRIMARY OSTEOARTHRITIS OF KNEE: Primary | ICD-10-CM

## 2018-03-13 PROCEDURE — 99214 OFFICE O/P EST MOD 30 MIN: CPT | Performed by: ORTHOPAEDIC SURGERY

## 2018-03-13 NOTE — PROGRESS NOTES
H&P Exam - Orthopedics   Calderon Tate 63 y o  female MRN: [id number]  Unit/Bed#:  Encounter: [id number]    Assessment/Plan     Assessment:  Bilateral knee osteoarthritis  Plan:  Ewelina Woodruff  has bilateral knee osteoarthritis, the right more so than the left  She has been treated conservatively and unfortunately has failed to find relief  I did perform a arthroscopic meniscectomy on the right knee in 2017 and my intraoperative evaluation found grade 3 arthritic changes in the patellofemoral and medial compartments  I have referred her to see my colleague ,Dr Aden Dang, for evaluation questioning a total joint replacement for the right knee as we have exhausted all other treatment options  Scribe Attestation    I,:   Anthony Parson MA am acting as a scribe while in the presence of the attending physician :        I,:   Breana Soto MD personally performed the services described in this documentation    as scribed in my presence :            History of Present Illness   HPI:  Calderon Tate is a 63 y o  female who presents with bilateral knee pain right more so than the left  Minesh has a history of a meniscectomy performed by myself in June of 2017 as well as a history of osteoarthritis in both the left and right knees  She has attended physical therapy, received cortisone injection and also hyaluronic acid injections for both her knees in unfortunately she has found little to no relief  Minesh's right knee complaints are of the intermittent severe and sharp pain about the medial aspect of the right knee  She also experiences painful crepitus  Bearing weight on the lower extremity will increase her pain  Prolonged walking, standing, stair climbing, stair descending all increase her pain  She is having difficulty bending to  her grandchildren  At rest she will have the persistent moderate ache that is global about the right knee     The left knee is not as painful as the right but they have similar symptoms  She complains of the intermittent moderate sharp pain that is also in the medial aspect and will radiate anteriorly  Bearing weight on the right lower extremity will increase her pain  Prolonged walking, standing and squatting will increase her pain is well  She denies radiating pain from the right knee and feels better at rest       Review of Systems   Constitutional: Negative  HENT: Negative  Eyes: Negative  Respiratory: Negative  Cardiovascular: Negative  Gastrointestinal: Negative  Endocrine: Negative  Genitourinary: Negative  Musculoskeletal:        As noted in HPI   Skin: Negative  Allergic/Immunologic: Negative  Neurological: Negative  Psychiatric/Behavioral: Positive for decreased concentration  Historical Information   Past Medical History:   Diagnosis Date    Anesthesia complication     hx of cocaine use-none in 5 yrs    Anxiety     Arthritis     bilateral knees-DJD, arthritis neck and back-cervical injection 2017    Bronchitis, chronic (HCC)     Cancer (HCC)     squamous-removed from the right foot    Chronic pain disorder     neck and back    COPD (chronic obstructive pulmonary disease) (HCC)     Depression     Fibromyalgia, primary     H/O ETOH abuse     None in the past 10 months since 8/1/17    Migraine     Raynauds syndrome      Past Surgical History:   Procedure Laterality Date    ARTHROSCOPY KNEE Right 6/8/2017    Procedure: RIGHT KNEE ARTHROSCOPY MEDIAL MENISCECTOMY;  Surgeon: Lilli Spaulding MD;  Location: [facility] MAIN OR;  Service:    [address] [place] [address]    COLONOSCOPY      COLONOSCOPY N/A 1/25/2018    Procedure: COLONOSCOPY;  Surgeon: Ema Andersen MD;  Location: [facility] GI LAB;   Service: Gastroenterology    DILATION AND CURETTAGE OF UTERUS      x 2 miscarriage    EPIDURAL BLOCK INJECTION N/A 12/8/2017    Procedure: INJECTION EPIDURAL STEROID CERVICAL C6-C7;  Surgeon: Ava Renae MD; Location: [place] [address] MAIN OR;  Service: Pain Management     ESOPHAGOGASTRODUODENOSCOPY N/A 1/25/2018    Procedure: ESOPHAGOGASTRODUODENOSCOPY (EGD); Surgeon: Joshua Benavides MD;  Location: [facility] GI LAB; Service: Gastroenterology    FOOT SURGERY      squamous removed from the right    HAND SURGERY Bilateral     arthroplasty -thumb joint    HERNIA REPAIR      inguinal    WISDOM TOOTH EXTRACTION       Social History   History   Alcohol Use No     Comment: hx of ETOH abuse-2016     History   Drug Use No     Comment: quit 2007     History   Smoking Status    Current Every Day Smoker    Packs/day: 0 50    Years: 10 00   Smokeless Tobacco    Never Used     Comment: on and off for 40 yrs     Family History:   Family History   Problem Relation Age of Onset    Peripheral vascular disease Mother     Arthritis Mother      osteo arthritis    Heart disease Father     Peripheral vascular disease Father     Stroke Father     Arthritis Sister      rheumatoid       Meds/Allergies   all medications and allergies reviewed  Allergies   Allergen Reactions    Bee Venom Anaphylaxis    Ibuprofen Swelling     And any anti-inflammatories, NSAIDS-causes severe diarrhea  Facial swelling    Antihistamines, Chlorpheniramine-Type      "jumpy"       Objective   Vitals: Height 5' 6" (1 676 m), weight 69 4 kg (153 lb)  ,Body mass index is 24 69 kg/m²  [unfilled]    [unfilled]    Invasive Devices     Airway            Supraglottic Airway LMA 4 278 days                Physical Exam   Constitutional: She is oriented to person, place, and time  She appears well-developed and well-nourished  HENT:   Head: Normocephalic and atraumatic  Eyes: Conjunctivae and EOM are normal    Neck: Normal range of motion  Cardiovascular: Normal rate  Pulmonary/Chest: Effort normal    Musculoskeletal:   As noted in HPI   Neurological: She is alert and oriented to person, place, and time  Skin: Skin is warm and dry     Psychiatric: She has a normal mood and affect  Her behavior is normal  Judgment and thought content normal      Right Knee Exam     Tenderness   The patient is experiencing tenderness in the medial joint line  Range of Motion   Extension: -5   Flexion: 120     Tests   Drawer:       Anterior - negative      Varus: negative  Valgus: negative    Other   Sensation: normal  Pulse: present  Swelling: mild      Left Knee Exam     Tenderness   The patient is experiencing tenderness in the medial joint line      Range of Motion   Extension: 0   Flexion: 130     Tests   Varus: negative  Valgus: negative    Other   Erythema: absent  Scars: absent  Sensation: normal  Pulse: present  Swelling: mild            Lab Results:  None  Imaging: No images reviewed today  EKG, Pathology, and Other Studies: None    Code Status: [unfilled]  Advance Directive and Living Will:      Power of :    POLST:

## 2018-03-13 NOTE — TELEPHONE ENCOUNTER
----- Message from Ventura Davenport MA sent at 3/13/2018 10:01 AM EDT -----  Regarding: FW: 3yr colon recall      ----- Message -----  From: Jamarcus Price  Sent: 1/30/2018   3:56 PM  To: Gastroenterology Surgery Coordinator  Subject: 3yr colon recall                                 Pt needs repeat colon wit Jorge Sanabria

## 2018-03-15 ENCOUNTER — APPOINTMENT (OUTPATIENT)
Dept: RADIOLOGY | Facility: CLINIC | Age: 63
End: 2018-03-15
Payer: MEDICARE

## 2018-03-15 ENCOUNTER — OFFICE VISIT (OUTPATIENT)
Dept: OBGYN CLINIC | Facility: CLINIC | Age: 63
End: 2018-03-15
Payer: MEDICARE

## 2018-03-15 VITALS
WEIGHT: 155.8 LBS | DIASTOLIC BLOOD PRESSURE: 66 MMHG | BODY MASS INDEX: 25.04 KG/M2 | HEIGHT: 66 IN | SYSTOLIC BLOOD PRESSURE: 125 MMHG | HEART RATE: 80 BPM

## 2018-03-15 DIAGNOSIS — M25.561 RIGHT KNEE PAIN, UNSPECIFIED CHRONICITY: ICD-10-CM

## 2018-03-15 DIAGNOSIS — M17.0 BILATERAL PRIMARY OSTEOARTHRITIS OF KNEE: Primary | ICD-10-CM

## 2018-03-15 DIAGNOSIS — Z72.0 TOBACCO USE: ICD-10-CM

## 2018-03-15 DIAGNOSIS — M17.0 BILATERAL PRIMARY OSTEOARTHRITIS OF KNEE: ICD-10-CM

## 2018-03-15 DIAGNOSIS — G89.4 CHRONIC PAIN SYNDROME: ICD-10-CM

## 2018-03-15 PROBLEM — M17.11 LOCALIZED OSTEOARTHRITIS OF RIGHT KNEE: Status: ACTIVE | Noted: 2017-12-20

## 2018-03-15 PROCEDURE — 73562 X-RAY EXAM OF KNEE 3: CPT

## 2018-03-15 PROCEDURE — 99214 OFFICE O/P EST MOD 30 MIN: CPT | Performed by: ORTHOPAEDIC SURGERY

## 2018-03-15 NOTE — PROGRESS NOTES
Assessment/Plan:  1  Bilateral primary osteoarthritis of knee  Ambulatory referral to Orthopedic Surgery    Case request operating room: ARTHROPLASTY KNEE TOTAL    Type and screen    Comprehensive metabolic panel    CBC and differential    APTT    Protime-INR    HEMOGLOBIN A1C W/ EAG ESTIMATION    MRSA culture    EKG 12 lead    XR chest pa & lateral    Case request operating room: ARTHROPLASTY KNEE TOTAL    UA w Reflex to Microscopic w Reflex to Culture   2  Right knee pain, unspecified chronicity  XR knee 3 vw right non injury    Case request operating room: ARTHROPLASTY KNEE TOTAL    Type and screen    Comprehensive metabolic panel    CBC and differential    APTT    Protime-INR    HEMOGLOBIN A1C W/ EAG ESTIMATION    MRSA culture    EKG 12 lead    XR chest pa & lateral    Case request operating room: ARTHROPLASTY KNEE TOTAL    UA w Reflex to Microscopic w Reflex to Culture   3  Chronic pain syndrome     4  Tobacco use       The patient is a very pleasant 63-year-old female that presents today as a consultation request my partner Dr Sarmad Ovalles for my subspecialty of joint replacement surgery  She is here for evaluation of ongoing activity-related right knee pain secondary to her end-stage osteoarthritis  She has failed all forms of conservative measures of treatment that included analgesia, physical therapy, brace wear, maintaining appropriate weight, steroid and viscosupplementation injections  The patient cannot take anti-inflammatories due to history of GI bleeding  She is on chronic pain management for multiple pain complaints that include her neck her lower back and is currently using Subutex    She does have a history of treatment with a pain management specialist   She has a history of recent is squamous cell carcinoma removed from her hand within the last year, she has a history of a GI bleed peptic ulcer disease, she also has a history of MRSA infection at the site of biopsy and excision of the skin cancers  She is a current tobacco smoker smoking approximately half a pack a day however she does deny a history of DVT/PE  She has failed conservative measures of treatment at this point I recommend her for a right total knee arthroplasty procedure  She is a candidate for the subvastus right total knee arthroplasty procedure  We discussed this briefly here in the office today and it is associated benefits  The risks and benefits of undergoing knee replacement surgery were discussed at length here in the office today  She is aware that due to her chronic pain condition and chronic pain medication use her pain postoperatively would be more difficult to control and she is aware of this  She is also a current smoker and I have stressed to her that I will not operate on her until she decreases her tobacco use on a daily basis  I have given her approximately 6-8 weeks to work on and she may seek her PCPs help for smoking cessation aids  She is aware that continued tobacco use increases the chance for wound healing problems and periprosthetic joint infection  She knows that she is already at an increased risk for this also due to her history of MRSA  She also has a history of MRSA skin infection therefore she will need vancomycin preoperatively  She will require postoperative DVT prophylaxis with Lovenox  I have asked her to be cleared by her PCP and her pulmonologist due to her medical comorbidities  She does not need a cardiology consult unless her PCP deems it necessary  The risks and benefits of undergoing right total knee arthroplasty were discussed in the office today and consents were placed into the chart  See risk discussion below  Pre he and postoperative expectations were discussed   She will need repeat x-rays of her right knee with magnification marker in preparation for surgery that will be tentatively scheduled for 2 months from today's date as we await her smoking cessation efforts and her pre-admission testing  All of her questions were addressed here in the office today  She was introduced or surgical scheduler  Subjective: Activity-related right knee pain    Patient ID: Rin Speaker is a 63 y o  female  HPI  Patient is a 81-year-old female here for a consultation request my partner Dr Luis Gardner for my subspecialty of joint replacement surgery  She has been suffering from activity-related right knee pain for many years and months and has attempted multiple forms of conservative measures of treatment  Her pain is 8-10/10 on the pain scale on most days of the week  Pain is not relieved with rest and it is exacerbated with activity, walking, weight-bearing  The pain is starting medially on her knee and progressing diffusely throughout the entire knee  Pain is sharp and achy in nature  She denies any mechanical symptoms  She has attempted analgesia, physical therapy, maintaining appropriate weight, wearing a brace, use of assistive devices, steroid and viscosupplementation injections  She cannot take any anti-inflammatories as she has had a history of a GI bleed and peptic ulcer disease  She states her activity related right knee pain has not been having any pain relief from conservative measures of treatment and her pain continues to keep her from doing age-appropriate ADLs  She can no longer walk more than 5-10 blocks  She denies a history of DVT/PE, but she does have a history of a GI bleed and peptic ulcer disease  She recently had a squamous cell skin carcinoma removed from her right hand which ultimately had what she describes as a staph infection in that hand and thinks was MRSA  She smokes approximately half a pack a day  Review of Systems   Constitutional: Positive for activity change  HENT: Negative  Eyes: Negative  Respiratory: Negative  Cardiovascular: Negative  Gastrointestinal: Negative  Endocrine: Negative      Musculoskeletal: Positive for arthralgias and gait problem  Skin: Negative  Psychiatric/Behavioral: Negative  Past Medical History:   Diagnosis Date    Amenorrhea     Anesthesia complication     hx of cocaine use-none in 5 yrs    Anxiety     Arthritis     bilateral knees-DJD, arthritis neck and back-cervical injection 2017    Bronchitis, chronic (HCC)     Cancer (HCC)     squamous-removed from the right foot    Chronic pain disorder     neck and back    COPD [facility]chronic obstructive pulmonary disease) ([facility] [address] )     Depression     Eczema     last assessed 6/27/16, resolved 10/2/17    Fibromyalgia, primary     H/O ETOH abuse     None in the past 10 months since 8/1/17    Lumbar radiculopathy     resolved 6/6/17    Malignant melanoma of skin (HCC)     Migraine     MRSA (methicillin resistant Staphylococcus aureus) infection     last assessed 4/29/16    Raynauds syndrome     Spinal stenosis        Past Surgical History:   Procedure Laterality Date    ARTHROSCOPY KNEE Right 6/8/2017    Procedure: RIGHT KNEE ARTHROSCOPY MEDIAL MENISCECTOMY;  Surgeon: Leonela Thakkar MD;  Location: [facility] MAIN OR;  Service:    [address] [place] [address]    COLONOSCOPY      COLONOSCOPY N/A 1/25/2018    Procedure: COLONOSCOPY;  Surgeon: Demetrio Del Angel MD;  Location: [facility] GI LAB; Service: Gastroenterology    DILATION AND CURETTAGE OF UTERUS      x 2 miscarriage    EPIDURAL BLOCK INJECTION N/A 12/8/2017    Procedure: INJECTION EPIDURAL STEROID CERVICAL C6-C7;  Surgeon: Vincent Klinefelter, MD;  Location: [facility] MAIN OR;  Service: Pain Management     ESOPHAGOGASTRODUODENOSCOPY N/A 1/25/2018    Procedure: ESOPHAGOGASTRODUODENOSCOPY (EGD); Surgeon: Demetrio Del Angel MD;  Location: [facility] GI LAB;   Service: Gastroenterology    FOOT SURGERY      squamous removed from the right    HAND SURGERY Bilateral     arthroplasty -thumb joint    last assessed 6/6/17    HERNIA REPAIR      inguinal    WISDOM TOOTH EXTRACTION         Family History   Problem Relation Age of Onset    Peripheral vascular disease Mother     Arthritis Mother      osteo arthritis    Coronary artery disease Mother      Atherosclerosis    Alcohol abuse Mother     Stroke Mother      CVA    Transient ischemic attack Mother     Heart disease Father     Peripheral vascular disease Father     Stroke Father      CVA    Arthritis Sister      rheumatoid    Alcohol abuse Sister        Social History     Occupational History    Not on file       Social History Main Topics    Smoking status: Current Every Day Smoker     Packs/day: 0 50     Years: 10 00     Types: Cigarettes    Smokeless tobacco: Never Used      Comment: on and off for 40 yrs    Alcohol use No      Comment: hx of ETOH abuse-2016 / Alcoholism in recovery     Drug use: No      Comment: quit 2007    Sexual activity: Not on file         Current Outpatient Prescriptions:     albuterol (PROVENTIL HFA,VENTOLIN HFA) 90 mcg/act inhaler, Inhale 2 puffs every 6 (six) hours as needed for wheezing, Disp: , Rfl:     buprenorphine (SUBUTEX) 8 mg, 8 mg 2 (two) times a day Oral , Disp: , Rfl:     Calcium Carbonate-Vit D-Min (CALCIUM 1200 PO), Take 2 tablets by mouth daily at bedtime, Disp: , Rfl:     COD LIVER OIL PO, Take by mouth daily at bedtime, Disp: , Rfl:     DULoxetine (CYMBALTA) 30 mg delayed release capsule, TAKE 1 CAPSULE BY MOUTH TWICE A DAY, Disp: 60 capsule, Rfl: 2    fluticasone-salmeterol (ADVAIR) 250-50 mcg/dose inhaler, Inhale 1 puff daily at bedtime  , Disp: , Rfl:     multivitamin (THERAGRAN) TABS, Take 1 tablet by mouth daily at bedtime, Disp: , Rfl:     Omega-3 Fatty Acids (FISH OIL) 1,000 mg, Take 1,000 mg by mouth daily at bedtime, Disp: , Rfl:     pantoprazole (PROTONIX) 40 mg tablet, Take 1 tablet by mouth 2 (two) times a day, Disp: 60 tablet, Rfl: 3    SUMAtriptan (IMITREX) 100 mg tablet, Take 100 mg by mouth once as needed for migraine, Disp: , Rfl:     Turmeric 500 MG TABS, Take by mouth daily at bedtime, Disp: , Rfl:     Allergies   Allergen Reactions    Bee Venom Anaphylaxis    Ibuprofen Swelling     And any anti-inflammatories, NSAIDS-causes severe diarrhea  Facial swelling    Antihistamines, Chlorpheniramine-Type      "jumpy"       Objective:  Vitals:    03/15/18 1009   BP: 125/66   Pulse: 80       Body mass index is 25 15 kg/m²  Right Knee Exam     Tenderness   The patient is experiencing tenderness in the lateral joint line, medial joint line and patella  Range of Motion   Right knee extension: Two  Flexion: 120     Tests   Radu:  Medial - negative Lateral - negative  Drawer:       Anterior - negative    Posterior - negative  Varus: negative  Valgus: negative  Patellar Apprehension: negative    Other   Erythema: absent  Scars: present  Sensation: normal  Pulse: present  Swelling: none  Other tests: no effusion present    Comments:  Crepitance on AROM and PROM-parapatellar  +PFG  No increased warmth of knee  Knee is stable at 0, 30, 90 degrees  Prior arthroscopy scars well healed          Observations     Right Knee   Negative for effusion  Physical Exam   Constitutional: She is oriented to person, place, and time  She appears well-developed  HENT:   Head: Atraumatic  Eyes: EOM are normal    Neck: Neck supple  Cardiovascular: Normal rate  Pulmonary/Chest: Effort normal    Musculoskeletal:        Right knee: She exhibits no effusion  See orthopedic exam   Neurological: She is alert and oriented to person, place, and time  Skin: Skin is warm and dry  Psychiatric: She has a normal mood and affect  Nursing note and vitals reviewed  I have personally reviewed pertinent films in PACS  X-rays with a magnification marker demonstrate severe end-stage osteoarthritis of the right knee and varus pattern  With near bone-on-bone contact and osteophyte formation, sclerosis, and early subchondral cyst formation  No lytic or blastic lesions appreciated      The patient was counseled in detail regarding the diagnosis, the treatment options available, the prognosis of each treatment option, the potential risks and complications  These are, but are not limited to; deep vein thrombosis, pulmonary embolism, neurologic and vascular injury, infection, instability, leg length discrepancy, dislocation, hematoma, reflex sympathetic dystrophy, loss of range of motion, ankylosis of the knee, fracture, screw or prosthetic perforation, chronic pain, acute pain, chronic leg pain and edema, loosening, death, heart attack, and stroke  The patient's questions were answered in detail  The patient demonstrates understanding of these risks and wishes to proceed with surgery  Gagandeep LOVE    Division of Adult Reconstruction  Department of [facility]

## 2018-03-15 NOTE — PROGRESS NOTES
Assessment/Plan:  1  Right knee pain, unspecified chronicity  XR knee 3 vw right non injury   2  Bilateral primary osteoarthritis of knee  Ambulatory referral to Orthopedic Surgery     ***    Subjective: Bilateral knee pain right>left  Patient ID: Migdalia Salazar is a 63 y o  female  Nae Bar is a pleasant 63year old female referred to our practice for her right knee from our colleague, Dr Cesar Shook, for Dr Nick Herron subspecialty of joint replacement and reconstruction  She has a long history of progressively worsening knee pain  She has had a cortisone injection, subsequent arthroscopic partial menisectomy last year, then physical therapy, and most recently a series a viscosupplementation that concluded in January  Unfortunately, she has failed to see lasting relief from any of these treatments  She is ineligible for NSAIDs due to a history of gastric bleed  She has not tried any braces or had cortisone since her arthroscopy  She understands that she will likely need a total knee replacement and would like to pursue this as soon as possible  She has been taking Subutex and opioids for her chronic neck and back pain for years  These have failed to provide relief of her activity related knee pain, which bothers her with prolonged walking, standing from a seated position, and stairs  She has been under the care of Dr Miguel Ángel Bennett for her back, including recent epidural steroid injections  She denies parasthesias in the lower extremities  Review of Systems   Constitutional: Positive for unexpected weight change  HENT: Negative  Eyes: Negative  Respiratory: Negative  Cardiovascular: Positive for leg swelling  Gastrointestinal: Negative  Endocrine: Negative  Genitourinary: Negative  Musculoskeletal: Positive for arthralgias, joint swelling and myalgias  Skin: Negative  Allergic/Immunologic: Negative  Neurological: Positive for numbness  Hematological: Negative  Psychiatric/Behavioral: Positive for decreased concentration  The patient is nervous/anxious  Past Medical History:   Diagnosis Date    Amenorrhea     Anesthesia complication     hx of cocaine use-none in 5 yrs    Anxiety     Arthritis     bilateral knees-DJD, arthritis neck and back-cervical injection 2017    Bronchitis, chronic (HCC)     Cancer (HCC)     squamous-removed from the right foot    Chronic pain disorder     neck and back    COPD [facility]chronic obstructive [facility] disease) ([facility] [address] [facility]     Depression     Eczema     last assessed 6/27/16, resolved 10/2/17    Fibromyalgia, primary     H/O ETOH abuse     None in the past 10 months since 8/1/17    Lumbar radiculopathy     resolved 6/6/17    Malignant melanoma of skin (HCC)     Migraine     MRSA (methicillin resistant Staphylococcus aureus) infection     last assessed 4/29/16    Raynauds syndrome     Spinal stenosis        Past Surgical History:   Procedure Laterality Date    ARTHROSCOPY KNEE Right 6/8/2017    Procedure: RIGHT KNEE ARTHROSCOPY MEDIAL MENISCECTOMY;  Surgeon: Radha Choi MD;  Location: [facility] MAIN OR;  Service:    [address] [place] [address]    COLONOSCOPY      COLONOSCOPY N/A 1/25/2018    Procedure: COLONOSCOPY;  Surgeon: Crystal Grover MD;  Location: [facility] GI LAB; Service: Gastroenterology    DILATION AND CURETTAGE OF UTERUS      x 2 miscarriage    EPIDURAL BLOCK INJECTION N/A 12/8/2017    Procedure: INJECTION EPIDURAL STEROID CERVICAL C6-C7;  Surgeon: Graham Almaguer MD;  Location: [facility] MAIN OR;  Service: Pain Management     ESOPHAGOGASTRODUODENOSCOPY N/A 1/25/2018    Procedure: ESOPHAGOGASTRODUODENOSCOPY (EGD); Surgeon: Crystal Grover MD;  Location: [facility] GI LAB;   Service: Gastroenterology    FOOT SURGERY      squamous removed from the right    HAND SURGERY Bilateral     arthroplasty -thumb joint    last assessed 6/6/17    HERNIA REPAIR      inguinal    WISDOM TOOTH EXTRACTION         Family History Problem Relation Age of Onset    Peripheral vascular disease Mother     Arthritis Mother      osteo arthritis    Coronary artery disease Mother      Atherosclerosis    Alcohol abuse Mother     Stroke Mother      CVA    Transient ischemic attack Mother     Heart disease Father     Peripheral vascular disease Father     Stroke Father      CVA    Arthritis Sister      rheumatoid    Alcohol abuse Sister        Social History     Occupational History    Not on file       Social History Main Topics    Smoking status: Current Every Day Smoker     Packs/day: 0 50     Years: 10 00     Types: Cigarettes    Smokeless tobacco: Never Used      Comment: on and off for 40 yrs    Alcohol use No      Comment: hx of ETOH abuse-2016 / Alcoholism in recovery     Drug use: No      Comment: quit 2007    Sexual activity: Not on file         Current Outpatient Prescriptions:     albuterol (PROVENTIL HFA,VENTOLIN HFA) 90 mcg/act inhaler, Inhale 2 puffs every 6 (six) hours as needed for wheezing, Disp: , Rfl:     buprenorphine (SUBUTEX) 8 mg, 8 mg 2 (two) times a day Oral , Disp: , Rfl:     Calcium Carbonate-Vit D-Min (CALCIUM 1200 PO), Take 2 tablets by mouth daily at bedtime, Disp: , Rfl:     COD LIVER OIL PO, Take by mouth daily at bedtime, Disp: , Rfl:     DULoxetine (CYMBALTA) 30 mg delayed release capsule, TAKE 1 CAPSULE BY MOUTH TWICE A DAY, Disp: 60 capsule, Rfl: 2    fluticasone-salmeterol (ADVAIR) 250-50 mcg/dose inhaler, Inhale 1 puff daily at bedtime  , Disp: , Rfl:     multivitamin (THERAGRAN) TABS, Take 1 tablet by mouth daily at bedtime, Disp: , Rfl:     Omega-3 Fatty Acids (FISH OIL) 1,000 mg, Take 1,000 mg by mouth daily at bedtime, Disp: , Rfl:     pantoprazole (PROTONIX) 40 mg tablet, Take 1 tablet by mouth 2 (two) times a day, Disp: 60 tablet, Rfl: 3    SUMAtriptan (IMITREX) 100 mg tablet, Take 100 mg by mouth once as needed for migraine, Disp: , Rfl:     Turmeric 500 MG TABS, Take by mouth daily at bedtime, Disp: , Rfl:     Allergies   Allergen Reactions    Bee Venom Anaphylaxis    Ibuprofen Swelling     And any anti-inflammatories, NSAIDS-causes severe diarrhea  Facial swelling    Antihistamines, Chlorpheniramine-Type      "jumpy"       Objective:  Vitals:    03/15/18 1009   BP: 125/66   Pulse: 80       Body mass index is 25 15 kg/m²  Right Knee Exam     Tenderness   The patient is experiencing tenderness in the medial joint line, medial retinaculum, patella, patellar tendon, lateral retinaculum and MCL  Range of Motion   Extension: 0   Flexion: 110 (severe patellofemoral crepitus and positive Patellar Grind)     Tests   Radu:  Medial - negative Lateral - negative  Lachman:  Anterior - negative      Drawer:       Anterior - negative    Posterior - negative  Varus: negative  Valgus: negative  Patellar Apprehension: negative    Other   Erythema: absent  Scars: present  Sensation: normal  Pulse: present  Swelling: none  Other tests: effusion present          Observations     Right Knee   Positive for effusion  Physical Exam   Constitutional: She is oriented to person, place, and time  She appears well-developed and well-nourished  Body mass index is 25 15 kg/m²  HENT:   Head: Normocephalic and atraumatic  Eyes: EOM are normal    Neck: Normal range of motion  Cardiovascular: Intact distal pulses  Pulmonary/Chest: Effort normal    Musculoskeletal:        Right knee: She exhibits effusion  See ortho exam   Neurological: She is alert and oriented to person, place, and time  Skin: Skin is warm and dry  Psychiatric: She has a normal mood and affect  Her behavior is normal  Judgment and thought content normal      I have personally reviewed pertinent films in PACS of her right knee which shows severe medial joint space narrowing with a small marginal osteophyte  There is narrowing of the patellofemoral compartment with small osteophytes   The lateral compartment maintains reasonable space

## 2018-03-30 ENCOUNTER — OFFICE VISIT (OUTPATIENT)
Dept: FAMILY MEDICINE CLINIC | Facility: CLINIC | Age: 63
End: 2018-03-30
Payer: MEDICARE

## 2018-03-30 VITALS
HEIGHT: 66 IN | RESPIRATION RATE: 14 BRPM | HEART RATE: 88 BPM | DIASTOLIC BLOOD PRESSURE: 70 MMHG | WEIGHT: 155.6 LBS | BODY MASS INDEX: 25.01 KG/M2 | TEMPERATURE: 98.4 F | SYSTOLIC BLOOD PRESSURE: 112 MMHG

## 2018-03-30 DIAGNOSIS — R06.00 DYSPNEA, UNSPECIFIED TYPE: ICD-10-CM

## 2018-03-30 DIAGNOSIS — Z82.49 FAMILY HISTORY OF HEART DISEASE: ICD-10-CM

## 2018-03-30 DIAGNOSIS — Z72.0 TOBACCO USE: ICD-10-CM

## 2018-03-30 DIAGNOSIS — R05.9 COUGH: Primary | ICD-10-CM

## 2018-03-30 DIAGNOSIS — R01.1 HEART MURMUR: ICD-10-CM

## 2018-03-30 PROBLEM — F41.8 DEPRESSION WITH ANXIETY: Status: ACTIVE | Noted: 2017-02-01

## 2018-03-30 PROBLEM — R91.1 NODULE OF RIGHT LUNG: Status: ACTIVE | Noted: 2017-12-11

## 2018-03-30 PROBLEM — K59.09 CONSTIPATION, CHRONIC: Status: ACTIVE | Noted: 2017-10-16

## 2018-03-30 PROBLEM — R10.9 ABDOMINAL PAIN: Status: ACTIVE | Noted: 2017-12-20

## 2018-03-30 PROBLEM — S83.241A TEAR OF MEDIAL MENISCUS OF RIGHT KNEE, CURRENT: Status: ACTIVE | Noted: 2017-05-26

## 2018-03-30 PROBLEM — M54.16 LUMBAR RADICULOPATHY: Status: ACTIVE | Noted: 2017-01-27

## 2018-03-30 PROCEDURE — 99214 OFFICE O/P EST MOD 30 MIN: CPT | Performed by: FAMILY MEDICINE

## 2018-03-30 RX ORDER — AZITHROMYCIN 250 MG/1
TABLET, FILM COATED ORAL
Qty: 6 TABLET | Refills: 0 | Status: SHIPPED | OUTPATIENT
Start: 2018-03-30 | End: 2018-04-04

## 2018-03-30 NOTE — PATIENT INSTRUCTIONS
Acute Cough   AMBULATORY CARE:   An acute cough  can last up to 3 weeks  Common causes of an acute cough include a cold, allergies, or a lung infection  Seek care immediately if:   · You have trouble breathing or feel short of breath  · You cough up blood, or you see blood in your mucus  · You faint or feel weak or dizzy  · You have chest pain when you cough or take a deep breath  · You have new wheezing  Contact your healthcare provider if:   · You have a fever  · Your cough lasts longer than 4 weeks  · Your symptoms do not improve with treatment  · You have questions or concerns about your condition or care  Treatment:  An acute cough usually goes away on its own  Ask your healthcare provider about medicines you can take to decrease your cough  You may need medicine to stop the cough, decrease swelling in your airways, or help open your airways  Medicine may also be given to help you cough up mucus  If you have an infection caused by bacteria, you may need antibiotics  Manage your symptoms:   · Do not smoke and stay away from others who smoke  Nicotine and other chemicals in cigarettes and cigars can cause lung damage and make your cough worse  Ask your healthcare provider for information if you currently smoke and need help to quit  E-cigarettes or smokeless tobacco still contain nicotine  Talk to your healthcare provider before you use these products  · Drink extra liquids as directed  Liquids will help thin and loosen mucus so you can cough it up  Liquids will also help prevent dehydration  Examples of good liquids to drink include water, fruit juice, and broth  Do not drink liquids that contain caffeine  Caffeine can increase your risk for dehydration  Ask your healthcare provider how much liquid to drink each day  · Rest as directed  Do not do activities that make your cough worse, such as exercise  · Use a humidifier or vaporizer    Use a cool mist humidifier or a vaporizer to increase air moisture in your home  This may make it easier for you to breathe and help decrease your cough  · Eat 2 to 5 mL of honey 2 times each day  Honey can help thin mucus and decrease your cough  · Use cough drops or lozenges  These can help decrease throat irritation and your cough  Follow up with your healthcare provider as directed:  Write down your questions so you remember to ask them during your visits  © 2017 [employer] Information is for End User's use only and may not be sold, redistributed or otherwise used for commercial purposes  All illustrations and images included in CareNotes® are the copyrighted property of [employer]  or [employer]  The above information is an  only  It is not intended as medical advice for individual conditions or treatments  Talk to your doctor, nurse or pharmacist before following any medical regimen to see if it is safe and effective for you  Cigarette Smoking and Your Health   AMBULATORY CARE:   Risks to your health if you smoke:  Nicotine and other chemicals found in tobacco damage every cell in your body  Even if you are a light smoker, you have an increased risk for cancer, heart disease, and lung disease  If you are pregnant or have diabetes, smoking increases your risk for complications  Benefits to your health if you stop smoking:   · You decrease respiratory symptoms such as coughing, wheezing, and shortness of breath  · You reduce your risk for cancers of the lung, mouth, throat, kidney, bladder, pancreas, stomach, and cervix  If you already have cancer, you increase the benefits of chemotherapy  You also reduce your risk for cancer returning or a second cancer from developing  · You reduce your risk for heart disease, blood clots, heart attack, and stroke       · You reduce your risk for lung infections, and diseases such as pneumonia, asthma, chronic bronchitis, and emphysema  · Your circulation improves  More oxygen can be delivered to your body  If you have diabetes, you lower your risk for complications, such as kidney, artery, and eye diseases  You also lower your risk for nerve damage  Nerve damage can lead to amputations, poor vision, and blindness  · You improve your body's ability to heal and to fight infections  Benefits to the health of others if you stop smoking:  Tobacco is harmful to nonsmokers who breathe in your secondhand smoke  The following are ways the health of others around you may improve when you stop smoking:  · You lower the risks for lung cancer and heart disease in nonsmoking adults  · If you are pregnant, you lower the risk for miscarriage, early delivery, low birth weight, and stillbirth  You also lower your baby's risk for SIDS, obesity, developmental delay, and neurobehavioral problems, such as ADHD  · If you have children, you lower their risk for ear infections, colds, pneumonia, bronchitis, and asthma  For more information and support to stop smoking:   · [employer]  Phone: 5- 000 - 771-1787  Web Address: www [employer]  Follow up with your healthcare provider as directed:  Write down your questions so you remember to ask them during your visits  © 2017 [employer] [employer] Information is for End User's use only and may not be sold, redistributed or otherwise used for commercial purposes  All illustrations and images included in CareNotes® are the copyrighted property of [employer]  or Armando Mcpherson  The above information is an  only  It is not intended as medical advice for individual conditions or treatments  Talk to your doctor, nurse or pharmacist before following any medical regimen to see if it is safe and effective for you

## 2018-04-01 NOTE — PROGRESS NOTES
Assessment/Plan:       Diagnoses and all orders for this visit:    Cough  Comments:  rx zpk  Orders:  -     azithromycin (ZITHROMAX) 250 mg tablet; Take 2 tablets today then 1 tablet daily x 4 days    Dyspnea, unspecified type  Comments:  ref to cardio  may need echo/stress test for cardio clearance  Orders:  -     Cancel: Ambulatory referral to Cardiology; Future    Heart murmur  Comments:  ref to cardio  Orders:  -     Ambulatory referral to Cardiology; Future    Family history of heart disease  -     Ambulatory referral to Cardiology; Future    Tobacco use  Comments:  encouraged smoking cessation  pt ed infor given  options for tx d/w pt  Subjective:      Patient ID: Caden Grace is a 66 y o  female  Chief Complaint   Patient presents with    Fatigue    Neck Pain    Foot Pain     and ankle hurt       65 yo pt in for follow-up  Cont problems w knee, foot and neck pain  Seeing ortho-Dr Alexandra Theodore reviewed in chart  Pt in need of knee surgery but ortho will not consider until pt has stopped smoking  Pt has been decreasing  Currently w cough-+/- productive sputum-no hemoptysis, no inc leg swelling or shortness of breath  occ atypical cp, has heart murmur and reports sig fhx heart disease        Fatigue   Associated symptoms include arthralgias, congestion, coughing, myalgias and neck pain  Pertinent negatives include no chest pain or fever  Neck Pain    Pertinent negatives include no chest pain or fever  The following portions of the patient's history were reviewed and updated as appropriate: allergies, current medications, past family history, past medical history, past social history, past surgical history and problem list      Review of Systems   Constitutional: Negative for fever  HENT: Positive for congestion  Respiratory: Positive for cough  Cardiovascular: Negative for chest pain and leg swelling  Gastrointestinal: Positive for blood in stool     Genitourinary: Negative for dysuria  Musculoskeletal: Positive for arthralgias, myalgias and neck pain  Psychiatric/Behavioral: Positive for sleep disturbance  Objective:    /70 (BP Location: Left arm, Patient Position: Sitting, Cuff Size: Standard)   Pulse 88   Temp 98 4 °F (36 9 °C)   Resp 14   Ht 5' 6" (1 676 m)   Wt 70 6 kg (155 lb 9 6 oz)   BMI 25 11 kg/m²        Physical Exam   Constitutional: She is oriented to person, place, and time  She appears well-developed and well-nourished  No distress  HENT:   Mouth/Throat: No oropharyngeal exudate  Nasal bogginess, pngtt     Eyes: Conjunctivae are normal    Neck: Neck supple  Cardiovascular: Normal rate and regular rhythm  Pulmonary/Chest: Effort normal  She has wheezes  Abdominal: Soft  Bowel sounds are normal  There is no tenderness  Neurological: She is alert and oriented to person, place, and time  Skin: Skin is warm and dry  No rash noted  Psychiatric: She has a normal mood and affect  Nursing note and vitals reviewed              Herbert Brice MD

## 2018-04-04 ENCOUNTER — OFFICE VISIT (OUTPATIENT)
Dept: PAIN MEDICINE | Facility: CLINIC | Age: 63
End: 2018-04-04
Payer: MEDICARE

## 2018-04-04 VITALS
WEIGHT: 152.6 LBS | HEART RATE: 72 BPM | SYSTOLIC BLOOD PRESSURE: 126 MMHG | TEMPERATURE: 98.6 F | BODY MASS INDEX: 24.53 KG/M2 | DIASTOLIC BLOOD PRESSURE: 82 MMHG | RESPIRATION RATE: 14 BRPM | HEIGHT: 66 IN

## 2018-04-04 DIAGNOSIS — M47.816 LUMBAR SPONDYLOSIS: ICD-10-CM

## 2018-04-04 DIAGNOSIS — M47.812 SPONDYLOSIS OF CERVICAL REGION WITHOUT MYELOPATHY OR RADICULOPATHY: ICD-10-CM

## 2018-04-04 DIAGNOSIS — G89.4 CHRONIC PAIN SYNDROME: Primary | ICD-10-CM

## 2018-04-04 DIAGNOSIS — G89.29 CHRONIC BILATERAL LOW BACK PAIN WITHOUT SCIATICA: ICD-10-CM

## 2018-04-04 DIAGNOSIS — M54.50 CHRONIC BILATERAL LOW BACK PAIN WITHOUT SCIATICA: ICD-10-CM

## 2018-04-04 DIAGNOSIS — M54.2 NECK PAIN: ICD-10-CM

## 2018-04-04 PROCEDURE — 99213 OFFICE O/P EST LOW 20 MIN: CPT | Performed by: ANESTHESIOLOGY

## 2018-04-04 NOTE — LETTER
April 4, 2018     Wily Carrillo, [id number]    Patient: Timi Green   YOB: 1955   Date of Visit: 4/4/2018       Dear Dr Marguarite Mohs: Thank you for referring Timi Green to me for evaluation  Below are my notes for this consultation  If you have questions, please do not hesitate to call me  I look forward to following your patient along with you  Sincerely,        Charles Shin MD        CC: No Recipients  Charles Shin MD  4/4/2018 10:10 AM  Sign at close encounter  Pain Medicine Follow-Up Note    Assessment:  1  Chronic pain syndrome    2  Neck pain    3  Spondylosis of cervical region without myelopathy or radiculopathy    4  Chronic bilateral low back pain without sciatica    5  Lumbar spondylosis        Plan:  My impressions and treatment recommendations were discussed in detail with the patient who verbalized understanding and had no further questions  The patient presents today to ask that I take over her opioid prescriptions  She is currently prescribed buprenorphine 8 mg twice daily as needed for pain  She is currently maintained on the buprenorphine in to help wean her off of opioids entirely  She is currently managed by Dr Larry Harrison  The patient does have a history of alcohol abuse and cocaine abuse in the past   I mentioned to the patient that due to her current history of being maintained on buprenorphine as well as her history of alcohol and cocaine abuse, I was not willing to prescribe her opioid medications  The patient verbalized understanding  I did offer the patient bilateral C3-C6 medial branch blocks since the C6-C7 cervical epidural steroid injection on December 8, 2017 did not give her relief of symptoms  The patient was not willing to undergo any further interventional pain procedures    She states that she is working on trying to quit smoking so that she can undergo a total knee arthroplasty with Dr Kayla Alas  She does not wish to undergo any more procedures until she can undergo this total knee arthroplasty  Follow-up is planned with the patient on an as-needed basis  Discharge instructions were provided  I personally saw and examined the patient and I agree with the above discussed plan of care  History of Present Illness:    Thiago Pena is a 66 y o  female who presents to [facility] for interval re-evaluation of the above stated pain complaints  The patient has a past medical and chronic pain history as outlined in the assessment section  She was last seen on December 8, 2017 at which time she underwent a C6-C7 cervical epidural steroid injection  At today's office visit, the patient's pain score is 6 5/10 on the verbal numerical pain rating scale  The patient reports pain in her neck with radiation into the bilateral shoulders, low back pain, bilateral knee pain, and left ankle pain  She reports that her pain is worse in the morning, evening, and night  Her pain is constant in nature and she describes the quality of her pain as dull/aching, sharp, throbbing, pressure-like, and shooting    She reports little to no relief following the C6-C7 cervical epidural steroid injection on December 8, 2017  She is also planning on undergoing a total knee arthroplasty with Dr Kayla Alas in the future  Other than as stated above, the patient denies any interval changes in medications, medical condition, mental condition, symptoms, or allergies since the last office visit  Review of Systems:    Review of Systems   Respiratory: Negative for shortness of breath  Cardiovascular: Negative for chest pain  Gastrointestinal: Negative for constipation, diarrhea, nausea and vomiting  Musculoskeletal: Positive for gait problem (difficulty walking/ decreased range of motion) and joint swelling (joint stiffness)  Negative for arthralgias and myalgias     Skin: Negative for rash  Neurological: Positive for weakness (muscle)  Negative for dizziness and seizures  All other systems reviewed and are negative          Patient Active Problem List   Diagnosis    Chronic pain syndrome    Neck pain    Cervical radiculopathy    Degeneration of intervertebral disc of mid-cervical region    Degenerative arthritis of knee    Localized osteoarthritis of right knee    Right knee pain    Bilateral primary osteoarthritis of knee    Tobacco use    Abdominal pain    Basal cell carcinoma of skin    Chronic obstructive pulmonary disease (HCC)    Classic migraine with aura    Constipation, chronic    Depression with anxiety    Esophagitis, reflux    Fatigue    Lumbar radiculopathy    Nodule of right lung    Tear of medial meniscus of right knee, current    Spinal stenosis    Vitamin D insufficiency    Spondylosis of cervical region without myelopathy or radiculopathy    Chronic bilateral low back pain without sciatica    Lumbar spondylosis       Past Medical History:   Diagnosis Date    Amenorrhea     Anesthesia complication     hx of cocaine use-none in 5 yrs    Anxiety     Arthritis     bilateral knees-DJD, arthritis neck and back-cervical injection 2017    Bronchitis, chronic (HCC)     Cancer (HCC)     squamous-removed from the right foot    Chronic pain disorder     neck and back    COPD (chronic obstructive pulmonary disease) (HCC)     Depression     Eczema     last assessed 6/27/16, resolved 10/2/17    Fibromyalgia, primary     H/O ETOH abuse     None in the past 10 months since 8/1/17    Lumbar radiculopathy     resolved 6/6/17    Malignant melanoma of skin (HCC)     Migraine     MRSA (methicillin resistant Staphylococcus aureus) infection     last assessed 4/29/16    Raynauds syndrome     Spinal stenosis        Past Surgical History:   Procedure Laterality Date    ARTHROSCOPY KNEE Right 6/8/2017    Procedure: RIGHT KNEE ARTHROSCOPY MEDIAL MENISCECTOMY;  Surgeon: Radha Fong MD;  Location: [facility] MAIN OR;  Service:    [address]    COLONOSCOPY      COLONOSCOPY N/A 1/25/2018    Procedure: COLONOSCOPY;  Surgeon: Cr Joy MD;  Location: [facility] GI LAB; Service: Gastroenterology    DILATION AND CURETTAGE OF UTERUS      x 2 miscarriage    EPIDURAL BLOCK INJECTION N/A 12/8/2017    Procedure: INJECTION EPIDURAL STEROID CERVICAL C6-C7;  Surgeon: Abilio Sams MD;  Location: [facility] MAIN OR;  Service: Pain Management     ESOPHAGOGASTRODUODENOSCOPY N/A 1/25/2018    Procedure: ESOPHAGOGASTRODUODENOSCOPY (EGD); Surgeon: Cr Joy MD;  Location: [facility] GI LAB; Service: Gastroenterology    FOOT SURGERY      squamous removed from the right    HAND SURGERY Bilateral     arthroplasty -thumb joint    last assessed 6/6/17    HERNIA REPAIR      inguinal    WISDOM TOOTH EXTRACTION         Family History   Problem Relation Age of Onset    Peripheral vascular disease Mother     Arthritis Mother      osteo arthritis    Coronary artery disease Mother      Atherosclerosis    Alcohol abuse Mother     Stroke Mother      CVA    Transient ischemic attack Mother     Heart disease Father     Peripheral vascular disease Father     Stroke Father      CVA    Arthritis Sister      rheumatoid    Alcohol abuse Sister        Social History     Occupational History    Not on file       Social History Main Topics    Smoking status: Current Every Day Smoker     Packs/day: 0 50     Years: 10 00     Types: Cigarettes    Smokeless tobacco: Never Used      Comment: on and off for 40 yrs    Alcohol use No      Comment: hx of ETOH abuse-2016 / Alcoholism in recovery     Drug use: No      Comment: quit 2007    Sexual activity: No         Current Outpatient Prescriptions:     albuterol (PROVENTIL HFA,VENTOLIN HFA) 90 mcg/act inhaler, Inhale 2 puffs every 6 (six) hours as needed for wheezing, Disp: , Rfl:     azithromycin (ZITHROMAX) 250 mg tablet, Take 2 tablets today then 1 tablet daily x 4 days, Disp: 6 tablet, Rfl: 0    buprenorphine (SUBUTEX) 8 mg, 8 mg 2 (two) times a day Oral , Disp: , Rfl:     COD LIVER OIL PO, Take by mouth daily at bedtime, Disp: , Rfl:     DULoxetine (CYMBALTA) 30 mg delayed release capsule, TAKE 1 CAPSULE BY MOUTH TWICE A DAY, Disp: 60 capsule, Rfl: 2    fluticasone-salmeterol (ADVAIR) 250-50 mcg/dose inhaler, Inhale 1 puff daily at bedtime  , Disp: , Rfl:     multivitamin (THERAGRAN) TABS, Take 1 tablet by mouth daily at bedtime, Disp: , Rfl:     Omega-3 Fatty Acids (FISH OIL) 1,000 mg, Take 1,000 mg by mouth daily at bedtime, Disp: , Rfl:     pantoprazole (PROTONIX) 40 mg tablet, Take 1 tablet by mouth 2 (two) times a day, Disp: 60 tablet, Rfl: 3    SUMAtriptan (IMITREX) 100 mg tablet, Take 100 mg by mouth once as needed for migraine, Disp: , Rfl:     Turmeric 500 MG TABS, Take by mouth daily at bedtime, Disp: , Rfl:     Calcium Carbonate-Vit D-Min (CALCIUM 1200 PO), Take 2 tablets by mouth daily at bedtime, Disp: , Rfl:     Allergies   Allergen Reactions    Bee Venom Anaphylaxis    Ibuprofen Swelling     And any anti-inflammatories, NSAIDS-causes severe diarrhea  Facial swelling    Antihistamines, Chlorpheniramine-Type      "jumpy"       Physical Exam:    /82 (BP Location: Left arm, Patient Position: Sitting, Cuff Size: Standard)   Pulse 72   Temp 98 6 °F (37 °C) (Oral)   Resp 14   Ht 5' 6" (1 676 m)   Wt 69 2 kg (152 lb 9 6 oz)   BMI 24 63 kg/m²      Constitutional:normal, well developed, well nourished, alert, in no distress and non-toxic and no overt pain behavior    Eyes:anicteric  HEENT:grossly intact  Neck:supple, symmetric, trachea midline and no masses   Pulmonary:even and unlabored  Cardiovascular:No edema or pitting edema present  Skin:Normal without rashes or lesions and well hydrated  Psychiatric:Mood and affect appropriate  Neurologic:Cranial Nerves II-XII grossly intact  Musculoskeletal:normal

## 2018-04-04 NOTE — PROGRESS NOTES
Pain Medicine Follow-Up Note    Assessment:  1  Chronic pain syndrome    2  Neck pain    3  Spondylosis of cervical region without myelopathy or radiculopathy    4  Chronic bilateral low back pain without sciatica    5  Lumbar spondylosis        Plan:  My impressions and treatment recommendations were discussed in detail with the patient who verbalized understanding and had no further questions  The patient presents today to ask that I take over her opioid prescriptions  She is currently prescribed buprenorphine 8 mg twice daily as needed for pain  She is currently maintained on the buprenorphine in to help wean her off of opioids entirely  She is currently managed by Dr Larry Harrison  The patient does have a history of alcohol abuse and cocaine abuse in the past   I mentioned to the patient that due to her current history of being maintained on buprenorphine as well as her history of alcohol and cocaine abuse, I was not willing to prescribe her opioid medications  The patient verbalized understanding  I did offer the patient bilateral C3-C6 medial branch blocks since the C6-C7 cervical epidural steroid injection on December 8, 2017 did not give her relief of symptoms  The patient was not willing to undergo any further interventional pain procedures  She states that she is working on trying to quit smoking so that she can undergo a total knee arthroplasty with Dr Fidencio Swartz  She does not wish to undergo any more procedures until she can undergo this total knee arthroplasty  Follow-up is planned with the patient on an as-needed basis  Discharge instructions were provided  I personally saw and examined the patient and I agree with the above discussed plan of care  History of Present Illness:    Timi Green is a 66 y o  female who presents to [facility] for interval re-evaluation of the above stated pain complaints   The patient has a past medical and chronic pain history as outlined in the assessment section  She was last seen on December 8, 2017 at which time she underwent a C6-C7 cervical epidural steroid injection  At today's office visit, the patient's pain score is 6 5/10 on the verbal numerical pain rating scale  The patient reports pain in her neck with radiation into the bilateral shoulders, low back pain, bilateral knee pain, and left ankle pain  She reports that her pain is worse in the morning, evening, and night  Her pain is constant in nature and she describes the quality of her pain as dull/aching, sharp, throbbing, pressure-like, and shooting    She reports little to no relief following the C6-C7 cervical epidural steroid injection on December 8, 2017  She is also planning on undergoing a total knee arthroplasty with Dr Eva Salinas in the future  Other than as stated above, the patient denies any interval changes in medications, medical condition, mental condition, symptoms, or allergies since the last office visit  Review of Systems:    Review of Systems   Respiratory: Negative for shortness of breath  Cardiovascular: Negative for chest pain  Gastrointestinal: Negative for constipation, diarrhea, nausea and vomiting  Musculoskeletal: Positive for gait problem (difficulty walking/ decreased range of motion) and joint swelling (joint stiffness)  Negative for arthralgias and myalgias  Skin: Negative for rash  Neurological: Positive for weakness (muscle)  Negative for dizziness and seizures  All other systems reviewed and are negative          Patient Active Problem List   Diagnosis    Chronic pain syndrome    Neck pain    Cervical radiculopathy    Degeneration of intervertebral disc of mid-cervical region    Degenerative arthritis of knee    Localized osteoarthritis of right knee    Right knee pain    Bilateral primary osteoarthritis of knee    Tobacco use    Abdominal pain    Basal cell carcinoma of skin    Chronic obstructive pulmonary disease (HCC)    Classic migraine with aura    Constipation, chronic    Depression with anxiety    Esophagitis, reflux    Fatigue    Lumbar radiculopathy    Nodule of right lung    Tear of medial meniscus of right knee, current    Spinal stenosis    Vitamin D insufficiency    Spondylosis of cervical region without myelopathy or radiculopathy    Chronic bilateral low back pain without sciatica    Lumbar spondylosis       Past Medical History:   Diagnosis Date    Amenorrhea     Anesthesia complication     hx of cocaine use-none in 5 yrs    Anxiety     Arthritis     bilateral knees-DJD, arthritis neck and back-cervical injection 2017    Bronchitis, chronic (HCC)     Cancer (HCC)     squamous-removed from the right foot    Chronic pain disorder     neck and back    COPD [facility]chronic obstructive pulmonary disease) ([facility] [address] )     Depression     Eczema     last assessed 6/27/16, resolved 10/2/17    Fibromyalgia, primary     H/O ETOH abuse     None in the past 10 months since 8/1/17    Lumbar radiculopathy     resolved 6/6/17    Malignant melanoma of skin (HCC)     Migraine     MRSA (methicillin resistant Staphylococcus aureus) infection     last assessed 4/29/16    Raynauds syndrome     Spinal stenosis        Past Surgical History:   Procedure Laterality Date    ARTHROSCOPY KNEE Right 6/8/2017    Procedure: RIGHT KNEE ARTHROSCOPY MEDIAL MENISCECTOMY;  Surgeon: Taty Ellis MD;  Location: [facility] MAIN OR;  Service:    [address] [place] [address]    COLONOSCOPY      COLONOSCOPY N/A 1/25/2018    Procedure: COLONOSCOPY;  Surgeon: Wendy Christianson MD;  Location: [facility] GI LAB;   Service: Gastroenterology    DILATION AND CURETTAGE OF UTERUS      x 2 miscarriage    EPIDURAL BLOCK INJECTION N/A 12/8/2017    Procedure: INJECTION EPIDURAL STEROID CERVICAL C6-C7;  Surgeon: Charles Shin MD;  Location: [facility] MAIN OR;  Service: Pain Management     ESOPHAGOGASTRODUODENOSCOPY N/A 1/25/2018 Procedure: ESOPHAGOGASTRODUODENOSCOPY (EGD); Surgeon: Pablo Enriquez MD;  Location[facility]; Service: Gastroenterology    FOOT SURGERY      squamous removed from the right    HAND SURGERY Bilateral     arthroplasty -thumb joint    last assessed 6/6/17    HERNIA REPAIR      inguinal    WISDOM TOOTH EXTRACTION         Family History   Problem Relation Age of Onset    Peripheral vascular disease Mother     Arthritis Mother      osteo arthritis    Coronary artery disease Mother      Atherosclerosis    Alcohol abuse Mother     Stroke Mother      CVA    Transient ischemic attack Mother     Heart disease Father     Peripheral vascular disease Father     Stroke Father      CVA    Arthritis Sister      rheumatoid    Alcohol abuse Sister        Social History     Occupational History    Not on file       Social History Main Topics    Smoking status: Current Every Day Smoker     Packs/day: 0 50     Years: 10 00     Types: Cigarettes    Smokeless tobacco: Never Used      Comment: on and off for 40 yrs    Alcohol use No      Comment: hx of ETOH abuse-2016 / Alcoholism in recovery     Drug use: No      Comment: quit 2007    Sexual activity: No         Current Outpatient Prescriptions:     albuterol (PROVENTIL HFA,VENTOLIN HFA) 90 mcg/act inhaler, Inhale 2 puffs every 6 (six) hours as needed for wheezing, Disp: , Rfl:     azithromycin (ZITHROMAX) 250 mg tablet, Take 2 tablets today then 1 tablet daily x 4 days, Disp: 6 tablet, Rfl: 0    buprenorphine (SUBUTEX) 8 mg, 8 mg 2 (two) times a day Oral , Disp: , Rfl:     COD LIVER OIL PO, Take by mouth daily at bedtime, Disp: , Rfl:     DULoxetine (CYMBALTA) 30 mg delayed release capsule, TAKE 1 CAPSULE BY MOUTH TWICE A DAY, Disp: 60 capsule, Rfl: 2    fluticasone-salmeterol (ADVAIR) 250-50 mcg/dose inhaler, Inhale 1 puff daily at bedtime  , Disp: , Rfl:     multivitamin (THERAGRAN) TABS, Take 1 tablet by mouth daily at bedtime, Disp: , Rfl:    Omega-3 Fatty Acids (FISH OIL) 1,000 mg, Take 1,000 mg by mouth daily at bedtime, Disp: , Rfl:     pantoprazole (PROTONIX) 40 mg tablet, Take 1 tablet by mouth 2 (two) times a day, Disp: 60 tablet, Rfl: 3    SUMAtriptan (IMITREX) 100 mg tablet, Take 100 mg by mouth once as needed for migraine, Disp: , Rfl:     Turmeric 500 MG TABS, Take by mouth daily at bedtime, Disp: , Rfl:     Calcium Carbonate-Vit D-Min (CALCIUM 1200 PO), Take 2 tablets by mouth daily at bedtime, Disp: , Rfl:     Allergies   Allergen Reactions    Bee Venom Anaphylaxis    Ibuprofen Swelling     And any anti-inflammatories, NSAIDS-causes severe diarrhea  Facial swelling    Antihistamines, Chlorpheniramine-Type      "jumpy"       Physical Exam:    /82 (BP Location: Left arm, Patient Position: Sitting, Cuff Size: Standard)   Pulse 72   Temp 98 6 °F (37 °C) (Oral)   Resp 14   Ht 5' 6" (1 676 m)   Wt 69 2 kg (152 lb 9 6 oz)   BMI 24 63 kg/m²     Constitutional:normal, well developed, well nourished, alert, in no distress and non-toxic and no overt pain behavior    Eyes:anicteric  HEENT:grossly intact  Neck:supple, symmetric, trachea midline and no masses   Pulmonary:even and unlabored  Cardiovascular:No edema or pitting edema present  Skin:Normal without rashes or lesions and well hydrated  Psychiatric:Mood and affect appropriate  Neurologic:Cranial Nerves II-XII grossly intact  Musculoskeletal:normal

## 2018-04-17 ENCOUNTER — TELEPHONE (OUTPATIENT)
Dept: CARDIOLOGY CLINIC | Facility: CLINIC | Age: 63
End: 2018-04-17

## 2018-04-17 ENCOUNTER — OFFICE VISIT (OUTPATIENT)
Dept: CARDIOLOGY CLINIC | Facility: CLINIC | Age: 63
End: 2018-04-17
Payer: MEDICARE

## 2018-04-17 VITALS
HEIGHT: 66 IN | DIASTOLIC BLOOD PRESSURE: 78 MMHG | BODY MASS INDEX: 24.75 KG/M2 | HEART RATE: 73 BPM | OXYGEN SATURATION: 98 % | WEIGHT: 154 LBS | SYSTOLIC BLOOD PRESSURE: 116 MMHG

## 2018-04-17 DIAGNOSIS — Z82.49 FAMILY HISTORY OF HEART DISEASE: ICD-10-CM

## 2018-04-17 DIAGNOSIS — R01.1 HEART MURMUR: ICD-10-CM

## 2018-04-17 DIAGNOSIS — M17.11 LOCALIZED OSTEOARTHRITIS OF RIGHT KNEE: ICD-10-CM

## 2018-04-17 DIAGNOSIS — R06.02 SHORTNESS OF BREATH: ICD-10-CM

## 2018-04-17 DIAGNOSIS — J41.8 MIXED SIMPLE AND MUCOPURULENT CHRONIC BRONCHITIS (HCC): ICD-10-CM

## 2018-04-17 DIAGNOSIS — R07.9 CHEST PAIN WITH LOW RISK OF ACUTE CORONARY SYNDROME: ICD-10-CM

## 2018-04-17 DIAGNOSIS — M25.561 CHRONIC PAIN OF RIGHT KNEE: ICD-10-CM

## 2018-04-17 DIAGNOSIS — F17.219 CIGARETTE NICOTINE DEPENDENCE WITH NICOTINE-INDUCED DISORDER: ICD-10-CM

## 2018-04-17 DIAGNOSIS — G89.4 CHRONIC PAIN SYNDROME: ICD-10-CM

## 2018-04-17 DIAGNOSIS — Z01.818 PRE-OP EXAM: Primary | ICD-10-CM

## 2018-04-17 DIAGNOSIS — R06.00 DYSPNEA ON EXERTION: ICD-10-CM

## 2018-04-17 DIAGNOSIS — M54.2 NECK PAIN: ICD-10-CM

## 2018-04-17 DIAGNOSIS — G89.29 CHRONIC PAIN OF RIGHT KNEE: ICD-10-CM

## 2018-04-17 PROCEDURE — 93000 ELECTROCARDIOGRAM COMPLETE: CPT | Performed by: INTERNAL MEDICINE

## 2018-04-17 PROCEDURE — 99406 BEHAV CHNG SMOKING 3-10 MIN: CPT | Performed by: INTERNAL MEDICINE

## 2018-04-17 PROCEDURE — 99204 OFFICE O/P NEW MOD 45 MIN: CPT | Performed by: INTERNAL MEDICINE

## 2018-04-17 RX ORDER — METHYLPHENIDATE HYDROCHLORIDE 10 MG/1
1 CAPSULE, EXTENDED RELEASE ORAL DAILY
COMMUNITY
Start: 2018-04-06 | End: 2018-07-25 | Stop reason: ALTCHOICE

## 2018-04-17 NOTE — TELEPHONE ENCOUNTER
I received a referral but it had the wrong address on it  Please see comments for all the information  We are in the need of a Insurance referral for this patient to see:   Dr Phong Fleming NPI# [id number]   TODAY'S VISIT 4/17/18   Reg: Jayy Hunt  1(Heart Murmur)  Z82 [id number] ( Hx of heart disease)   [address] [place] [address] do not have access to [address] [place] [address]

## 2018-04-17 NOTE — PROGRESS NOTES
Consultation - Cardiology   Roland Ferrer 66 y o  female MRN: [id number]  Unit/Bed#:  Encounter: [id number]        Assessment/Plan     Assessment:    1  History of progressive easy fatigue, exertional dyspnea, atypical chest pain with CAD to be excluded  2   Basal systolic ejection murmur with aortic valve disease to be excluded  3   Chronic cigarette abuse, currently at 1/2 pack per day  4   History of alcoholism, suppressed for about 2 years  5   History of opioid dependence, currently suppressed withbuprenorphine  6  Chronic pain syndrome including generalized osteoarthritis and fibromyalgia  Also has spinal stenosis  7   Symptomatic osteoarthritis of right more than left knee with pending right total knee arthroplasty on 05/14/2018  8  History of chronic bronchitis/COPD  9  Family history of CAD involving father and paternal grandfather  Plan:    1  In view of upcoming right total knee arthroplasty on 05/14/2018, heavy smoking history, atypical chest pain, dyspnea on exertion, and easy fatigue, have requested Lexiscan Myoview stress test for exclusion of CAD and LV dysfunction  2   In view of above symptoms and finding of heart murmur, have requested echocardiogram to assess cause of heart murmur and to assist with evaluation of LV function  3   Follow-up will depend on results of above studies  4   Smoking cessation was counseled for 4 minutes  History of Present Illness      Physician Requesting Consult:  Raquel Lamas MD    Reason for Consult / Principal Problem:Dyspnea on exertion, easy fatigue, atypical chest pain, heart murmur     HPI: Roland Ferrer is a 66y o  year old female who presents with above symptoms which have been gradually worsening for several months  She is currently scheduled for a right total knee arthroplasty with Dr Nic Arroyo on 05/14/2018  She has reduced her smoking to 1/2 pack per day recently      She has nonexertional chest discomfort, history of heart murmur, worsening fatigue, and dyspnea on walking long distances, up steps, or up hill  There has been no exertional chest pain, PND, orthopnea, palpitation, syncope, pre-syncope, edema, new cough, wheeze, sputum, fever, chills  She has intermittent cough, only rarely productive, and occasional wheezing  Historical Information   Past Medical History:   Diagnosis Date    Amenorrhea     Anesthesia complication     hx of cocaine use-none in 5 yrs    Anxiety     Arthritis     bilateral knees-DJD, arthritis neck and back-cervical injection 2017    Bronchitis, chronic (HCC)     Cancer (HCC)     squamous-removed from the right foot    Chronic pain disorder     neck and back    COPD [facility] disease) ([facility] [address] [facility]     Depression     Eczema     last assessed 6/27/16, resolved 10/2/17    Fibromyalgia, primary     H/O ETOH abuse     None in the past 10 months since 8/1/17    Lumbar radiculopathy     resolved 6/6/17    Malignant melanoma [facility] skin ([facility])     Migraine     MRSA (methicillin resistant Staphylococcus aureus) infection     last assessed 4/29/16    Raynauds syndrome     Spinal stenosis      Past Surgical History:   Procedure Laterality Date    ARTHROSCOPY KNEE Right 6/8/2017    Procedure: RIGHT KNEE ARTHROSCOPY MEDIAL MENISCECTOMY;  Surgeon: Marie Stafford MD;  Location: [facility] MAIN OR;  Service:    [address] [place] [address]    COLONOSCOPY      COLONOSCOPY N/A 1/25/2018    Procedure: COLONOSCOPY;  Surgeon: Daniel Pacheco MD;  Location: [facility] GI LAB; Service: Gastroenterology    DILATION AND CURETTAGE OF UTERUS      x 2 miscarriage    EPIDURAL BLOCK INJECTION N/A 12/8/2017    Procedure: INJECTION EPIDURAL STEROID CERVICAL C6-C7;  Surgeon: Juan Antonio Dennis MD;  Location: [facility] MAIN OR;  Service: Pain Management     ESOPHAGOGASTRODUODENOSCOPY N/A 1/25/2018    Procedure: ESOPHAGOGASTRODUODENOSCOPY (EGD);   Surgeon: Daniel Pacheco MD;  Location: [facility] GI LAB; Service: Gastroenterology    FOOT SURGERY      squamous removed from the right    HAND SURGERY Bilateral     arthroplasty -thumb joint    last assessed 6/6/17    HERNIA REPAIR      inguinal    WISDOM TOOTH EXTRACTION       History   Alcohol Use No     Comment: hx of ETOH abuse-2016 / Alcoholism in recovery      History   Drug Use No     Comment: quit 2007     History   Smoking Status    Current Every Day Smoker    Packs/day: 0 50    Years: 10 00    Types: Cigarettes   Smokeless Tobacco    Never Used     Comment: on and off for 36 yrs     Family History   Problem Relation Age of Onset    Peripheral vascular disease Mother     Arthritis Mother      osteo arthritis    Coronary artery disease Mother      Atherosclerosis    Alcohol abuse Mother     Stroke Mother      CVA    Transient ischemic attack Mother     Heart disease Father     Peripheral vascular disease Father     Stroke Father      CVA    Arthritis Sister      rheumatoid    Alcohol abuse Sister        Meds/Allergies   Prior to Admission medications    Medication Sig Start Date End Date Taking?  Authorizing Provider   albuterol (PROVENTIL HFA,VENTOLIN HFA) 90 mcg/act inhaler Inhale 2 puffs every 6 (six) hours as needed for wheezing   Yes Historical Provider, MD   buprenorphine (SUBUTEX) 8 mg 8 mg 2 (two) times a day Oral    Yes Historical Provider, MD   Calcium Carbonate-Vit D-Min (CALCIUM 1200 PO) Take 2 tablets by mouth daily at bedtime   Yes Historical Provider, MD   DULoxetine (CYMBALTA) 30 mg delayed release capsule TAKE 1 CAPSULE BY MOUTH TWICE A DAY 3/6/18  Yes Bennett Villalta MD   fluticasone-salmeterol (ADVAIR) 250-50 mcg/dose inhaler Inhale 1 puff daily at bedtime     Yes Historical Provider, MD   multivitamin (THERAGRAN) TABS Take 1 tablet by mouth daily at bedtime   Yes Historical Provider, MD   Omega-3 Fatty Acids (FISH OIL) 1,000 mg Take 1,000 mg by mouth daily at bedtime   Yes Historical Provider, MD   pantoprazole (PROTONIX) 40 mg tablet Take 1 tablet by mouth 2 (two) times a day 1/25/18  Yes Fabienne Oleary MD   RITALIN LA 10 MG 24 hr capsule Take 1 tablet by mouth daily 4/6/18  Yes Historical Provider, MD   SUMAtriptan (IMITREX) 100 mg tablet Take 100 mg by mouth once as needed for migraine   Yes Historical Provider, MD   Turmeric 500 MG TABS Take by mouth daily at bedtime   Yes Historical Provider, MD   COD LIVER OIL PO Take by mouth daily at bedtime    Historical Provider, MD     Current Outpatient Prescriptions   Medication Sig Dispense Refill    albuterol (PROVENTIL HFA,VENTOLIN HFA) 90 mcg/act inhaler Inhale 2 puffs every 6 (six) hours as needed for wheezing      buprenorphine (SUBUTEX) 8 mg 8 mg 2 (two) times a day Oral       Calcium Carbonate-Vit D-Min (CALCIUM 1200 PO) Take 2 tablets by mouth daily at bedtime      DULoxetine (CYMBALTA) 30 mg delayed release capsule TAKE 1 CAPSULE BY MOUTH TWICE A DAY 60 capsule 2    fluticasone-salmeterol (ADVAIR) 250-50 mcg/dose inhaler Inhale 1 puff daily at bedtime        multivitamin (THERAGRAN) TABS Take 1 tablet by mouth daily at bedtime      Omega-3 Fatty Acids (FISH OIL) 1,000 mg Take 1,000 mg by mouth daily at bedtime      pantoprazole (PROTONIX) 40 mg tablet Take 1 tablet by mouth 2 (two) times a day 60 tablet 3    RITALIN LA 10 MG 24 hr capsule Take 1 tablet by mouth daily      SUMAtriptan (IMITREX) 100 mg tablet Take 100 mg by mouth once as needed for migraine      Turmeric 500 MG TABS Take by mouth daily at bedtime      COD LIVER OIL PO Take by mouth daily at bedtime       No current facility-administered medications for this visit        Allergies   Allergen Reactions    Bee Venom Anaphylaxis    Ibuprofen Swelling     And any anti-inflammatories, NSAIDS-causes severe diarrhea  Facial swelling    Antihistamines, Chlorpheniramine-Type      "jumpy"       Cardiovascular ROS:       More than 10 systems reviewed and all systems negative, except as noted in history of present illness    Objective   Vitals: Blood pressure 116/78, pulse 73, height 5' 6" (1 676 m), weight 69 9 kg (154 lb), SpO2 98 %  Body mass index is 24 86 kg/m²  Physical Exam  General -well-developed well-nourished   female  in no acute distress  Patient is alert, oriented X3 and cooperative  Skin-warm and dry with no pallor, rashes, ecchymoses, lesions  HEENT-normocephalic  Pupils equally round and reactive to light and accommodation  Extraocular muscles intact  Mucous membranes pink and moist  Sclerae nonicteric  Optic fundi poorly seen  Neck-no jugular venous distention, AJR, masses, thyromegaly, adenopathy, bruits  Lungs-no rales, rhonchi, wheezes  Heart-no murmur, gallop, rub, click, heave, thrill  Abdomen-normal bowel sounds with no masses, organomegaly, guarding, tenderness, or rigidity  Extremities-no cyanosis, clubbing, edema, pulse decrease  Neurological-no focal neurological signs  Imaging: I have personally reviewed pertinent films in PACS    EKG 04/17/2018:     Normal EKG  Unchanged from 02/01/2017 with less T inversion in lead V2 compared to 06/06/2017    Imaging  Chest X-Ray:  Xr Chest Pa & Lateral    Result Date: 12/9/2017  Impression There is no acute consolidation or congestion  There is visualization of a small lung nodule in the right upper lobe near the anterior aspect of the right 3rd rib  This may be a parenchymal nodule or may represent a summation shadow from the rib end  This can be evaluated with the CT chest for definite characterization   Immediate report and recommendation created in the [employer] Workstation performed: [id number]     CT of lungs 12/15/2017:    5 millimeter right middle lobe nodule, unchanged since 2015  Stable biapical pleural-parenchymal scarring    Lab Review     Lab Results   Component Value Date     06/01/2017    K 4 5 06/01/2017     06/01/2017    CO2 30 06/01/2017    ANIONGAP 5 06/01/2017    BUN 17 06/01/2017    CREATININE 0 88 06/01/2017    GLUCOSE 71 06/01/2017    CALCIUM 9 0 06/01/2017    AST 29 10/06/2016    ALT 31 10/06/2016    ALKPHOS 52 10/06/2016    PROT 7 1 10/06/2016    BILITOT 0 46 10/06/2016    EGFR >60 0 06/01/2017       Lab Results   Component Value Date    CHOL 185 10/06/2016    CHOL 164 04/08/2016     Lab Results   Component Value Date    HDL 79 (H) 10/06/2016    HDL 75 (H) 04/08/2016     Lab Results   Component Value Date    LDLCALC 96 10/06/2016    LDLCALC 79 04/08/2016     Lab Results   Component Value Date    TRIG 49 10/06/2016    TRIG 52 04/08/2016     No components found for: CHOLHDL    Lab Results   Component Value Date    GLUCOSE 71 06/01/2017    CALCIUM 9 0 06/01/2017     06/01/2017    K 4 5 06/01/2017    CO2 30 06/01/2017     06/01/2017    BUN 17 06/01/2017    CREATININE 0 88 06/01/2017       Counseling / Coordination of Care  Total office time spent today 48 minutes       Yang Amezcua MD

## 2018-04-17 NOTE — LETTER
Cardiology Pre Operative Clearance      PRE OPERATIVE CARDIAC RISK ASSESSMENT    04/17/18    Roland Ferrer  1955  [id number]    Date of Surgery: 5/14/2018    Type of Surgery: Total Knee Arthroplasty - right knee    Surgeon: Shreya Craig DO    No cardiac contraindication to above surgery  Physician Comment:   Patient had unremarkable echocardiogram and nuclear stress test for preoperative workup  Anticoagulation: Fish oil         Laray Eisenmenger Jerolyn Gobble, MD

## 2018-04-17 NOTE — PATIENT INSTRUCTIONS
1   In view of upcoming right total knee arthroplasty on 05/14/2018, heavy smoking history, atypical chest pain, dyspnea on exertion, and easy fatigue, have requested Lexiscan Myoview stress test for exclusion of CAD and LV dysfunction  2   In view of above symptoms and finding of heart murmur, have requested echocardiogram to assess cause of heart murmur and to assist with evaluation of LV function  3   Follow-up will depend on results of above studies  4   Smoking cessation was counseled for 4 minutes

## 2018-04-24 ENCOUNTER — TRANSCRIBE ORDERS (OUTPATIENT)
Dept: ADMINISTRATIVE | Facility: HOSPITAL | Age: 63
End: 2018-04-24

## 2018-04-24 ENCOUNTER — APPOINTMENT (OUTPATIENT)
Dept: PREADMISSION TESTING | Facility: HOSPITAL | Age: 63
End: 2018-04-24
Payer: MEDICARE

## 2018-04-24 ENCOUNTER — APPOINTMENT (OUTPATIENT)
Dept: LAB | Facility: HOSPITAL | Age: 63
End: 2018-04-24
Attending: ORTHOPAEDIC SURGERY
Payer: MEDICARE

## 2018-04-24 ENCOUNTER — OFFICE VISIT (OUTPATIENT)
Dept: PULMONOLOGY | Facility: MEDICAL CENTER | Age: 63
End: 2018-04-24
Payer: MEDICARE

## 2018-04-24 ENCOUNTER — HOSPITAL ENCOUNTER (OUTPATIENT)
Dept: RADIOLOGY | Facility: HOSPITAL | Age: 63
Discharge: HOME | End: 2018-04-24
Attending: ORTHOPAEDIC SURGERY
Payer: MEDICARE

## 2018-04-24 VITALS
WEIGHT: 153 LBS | DIASTOLIC BLOOD PRESSURE: 82 MMHG | SYSTOLIC BLOOD PRESSURE: 118 MMHG | OXYGEN SATURATION: 99 % | HEART RATE: 84 BPM | HEIGHT: 65 IN | BODY MASS INDEX: 25.49 KG/M2 | RESPIRATION RATE: 18 BRPM | TEMPERATURE: 98.1 F

## 2018-04-24 DIAGNOSIS — R06.02 SOB (SHORTNESS OF BREATH): Primary | ICD-10-CM

## 2018-04-24 DIAGNOSIS — Z01.818 PREOP EXAMINATION: ICD-10-CM

## 2018-04-24 DIAGNOSIS — J41.8 MIXED SIMPLE AND MUCOPURULENT CHRONIC BRONCHITIS (HCC): ICD-10-CM

## 2018-04-24 DIAGNOSIS — M17.0 BILATERAL PRIMARY OSTEOARTHRITIS OF KNEE: ICD-10-CM

## 2018-04-24 DIAGNOSIS — Z72.0 TOBACCO USE: ICD-10-CM

## 2018-04-24 DIAGNOSIS — R91.1 NODULE OF RIGHT LUNG: ICD-10-CM

## 2018-04-24 DIAGNOSIS — J45.20 MILD INTERMITTENT ASTHMA WITHOUT COMPLICATION: ICD-10-CM

## 2018-04-24 DIAGNOSIS — Z01.818 PREOP EXAMINATION: Primary | ICD-10-CM

## 2018-04-24 DIAGNOSIS — M25.561 RIGHT KNEE PAIN, UNSPECIFIED CHRONICITY: ICD-10-CM

## 2018-04-24 LAB
ABO GROUP BLD: NORMAL
ALBUMIN SERPL BCP-MCNC: 3.8 G/DL (ref 3.5–5)
ALP SERPL-CCNC: 69 U/L (ref 46–116)
ALT SERPL W P-5'-P-CCNC: 29 U/L (ref 12–78)
ANION GAP SERPL CALCULATED.3IONS-SCNC: 6 MMOL/L (ref 4–13)
APTT PPP: 27 SECONDS (ref 23–35)
AST SERPL W P-5'-P-CCNC: 23 U/L (ref 5–45)
BACTERIA UR QL AUTO: NORMAL /HPF
BASOPHILS # BLD AUTO: 0 THOUSANDS/ΜL (ref 0–0.1)
BASOPHILS NFR BLD AUTO: 1 % (ref 0–1)
BILIRUB SERPL-MCNC: 0.3 MG/DL (ref 0.2–1)
BILIRUB UR QL STRIP: NEGATIVE
BLD GP AB SCN SERPL QL: NEGATIVE
BUN SERPL-MCNC: 14 MG/DL (ref 5–25)
CALCIUM SERPL-MCNC: 8.9 MG/DL (ref 8.3–10.1)
CHLORIDE SERPL-SCNC: 102 MMOL/L (ref 100–108)
CLARITY UR: CLEAR
CO2 SERPL-SCNC: 32 MMOL/L (ref 21–32)
COLOR UR: ABNORMAL
CREAT SERPL-MCNC: 0.91 MG/DL (ref 0.6–1.3)
EOSINOPHIL # BLD AUTO: 0.4 THOUSAND/ΜL (ref 0–0.61)
EOSINOPHIL NFR BLD AUTO: 6 % (ref 0–6)
ERYTHROCYTE [DISTWIDTH] IN BLOOD BY AUTOMATED COUNT: 13.3 % (ref 11.6–15.1)
EST. AVERAGE GLUCOSE BLD GHB EST-MCNC: 111 MG/DL
GFR SERPL CREATININE-BSD FRML MDRD: 68 ML/MIN/1.73SQ M
GLUCOSE P FAST SERPL-MCNC: 128 MG/DL (ref 65–99)
GLUCOSE UR STRIP-MCNC: NEGATIVE MG/DL
HBA1C MFR BLD: 5.5 % (ref 4.2–6.3)
HCT VFR BLD AUTO: 33.8 % (ref 37–47)
HGB BLD-MCNC: 11.1 G/DL (ref 12–16)
HGB UR QL STRIP.AUTO: ABNORMAL
INR PPP: 0.94 (ref 0.86–1.16)
KETONES UR STRIP-MCNC: NEGATIVE MG/DL
LEUKOCYTE ESTERASE UR QL STRIP: NEGATIVE
LYMPHOCYTES # BLD AUTO: 2.4 THOUSANDS/ΜL (ref 0.6–4.47)
LYMPHOCYTES NFR BLD AUTO: 41 % (ref 14–44)
MCH RBC QN AUTO: 29.5 PG (ref 27–31)
MCHC RBC AUTO-ENTMCNC: 32.7 G/DL (ref 31.4–37.4)
MCV RBC AUTO: 90 FL (ref 82–98)
MONOCYTES # BLD AUTO: 0.4 THOUSAND/ΜL (ref 0.17–1.22)
MONOCYTES NFR BLD AUTO: 7 % (ref 4–12)
NEUTROPHILS # BLD AUTO: 2.7 THOUSANDS/ΜL (ref 1.85–7.62)
NEUTS SEG NFR BLD AUTO: 46 % (ref 43–75)
NITRITE UR QL STRIP: NEGATIVE
NON-SQ EPI CELLS URNS QL MICRO: NORMAL /HPF
NRBC BLD AUTO-RTO: 0 /100 WBCS
PH UR STRIP.AUTO: 6.5 [PH] (ref 5–9)
PLATELET # BLD AUTO: 318 THOUSANDS/UL (ref 130–400)
PMV BLD AUTO: 8.2 FL (ref 8.9–12.7)
POTASSIUM SERPL-SCNC: 3.8 MMOL/L (ref 3.5–5.3)
PROT SERPL-MCNC: 7.3 G/DL (ref 6.4–8.2)
PROT UR STRIP-MCNC: NEGATIVE MG/DL
PROTHROMBIN TIME: 9.9 SECONDS (ref 9.4–11.7)
RBC # BLD AUTO: 3.75 MILLION/UL (ref 4.2–5.4)
RBC #/AREA URNS AUTO: NORMAL /HPF
RH BLD: POSITIVE
SODIUM SERPL-SCNC: 140 MMOL/L (ref 136–145)
SP GR UR STRIP.AUTO: <=1.005 (ref 1–1.03)
SPECIMEN EXPIRATION DATE: NORMAL
UROBILINOGEN UR QL STRIP.AUTO: 0.2 E.U./DL
WBC # BLD AUTO: 5.9 THOUSAND/UL (ref 4.8–10.8)
WBC #/AREA URNS AUTO: NORMAL /HPF

## 2018-04-24 PROCEDURE — 85025 COMPLETE CBC W/AUTO DIFF WBC: CPT

## 2018-04-24 PROCEDURE — 87081 CULTURE SCREEN ONLY: CPT

## 2018-04-24 PROCEDURE — 86900 BLOOD TYPING SEROLOGIC ABO: CPT

## 2018-04-24 PROCEDURE — 71046 X-RAY EXAM CHEST 2 VIEWS: CPT

## 2018-04-24 PROCEDURE — 80053 COMPREHEN METABOLIC PANEL: CPT

## 2018-04-24 PROCEDURE — 87086 URINE CULTURE/COLONY COUNT: CPT

## 2018-04-24 PROCEDURE — 81001 URINALYSIS AUTO W/SCOPE: CPT | Performed by: ORTHOPAEDIC SURGERY

## 2018-04-24 PROCEDURE — 83036 HEMOGLOBIN GLYCOSYLATED A1C: CPT

## 2018-04-24 PROCEDURE — 85730 THROMBOPLASTIN TIME PARTIAL: CPT

## 2018-04-24 PROCEDURE — 86901 BLOOD TYPING SEROLOGIC RH(D): CPT

## 2018-04-24 PROCEDURE — 94010 BREATHING CAPACITY TEST: CPT | Performed by: INTERNAL MEDICINE

## 2018-04-24 PROCEDURE — 36415 COLL VENOUS BLD VENIPUNCTURE: CPT

## 2018-04-24 PROCEDURE — 86850 RBC ANTIBODY SCREEN: CPT

## 2018-04-24 PROCEDURE — 99214 OFFICE O/P EST MOD 30 MIN: CPT | Performed by: INTERNAL MEDICINE

## 2018-04-24 PROCEDURE — 85610 PROTHROMBIN TIME: CPT

## 2018-04-24 NOTE — ASSESSMENT & PLAN NOTE
We did spend more than 3 minutes today about smoking cessation and she states that she is trying to quit and will need to quit before her surgery

## 2018-04-24 NOTE — ASSESSMENT & PLAN NOTE
Continue with Advair and rescue inhaler  Likely COPD given smoking history however obtain PFT to assess reversibility  Patient does have a diagnosis of Asthma

## 2018-04-24 NOTE — LETTER
April 24, 2018     [address], [address] [facility] [address]    Patient: Dandy Norris   YOB: 1955   Date of Visit: 4/24/2018       Dear Dr Ainsley Monteiro: Thank you for referring Dandy Norris to me for evaluation  Below are my notes for this consultation  If you have questions, please do not hesitate to call me  I look forward to following your patient along with you  Sincerely,        Ken Ramirez MD        CC: No Recipients  Ken Ramirez MD  4/24/2018  7:28 PM  Sign at close encounter  Assessment/Plan:       Problem List Items Addressed This Visit        Respiratory    Chronic obstructive pulmonary disease ([address] )     Continue with Advair and rescue inhaler  Likely COPD given smoking history however obtain PFT to assess reversibility  Patient does have a diagnosis of Asthma  Mild intermittent asthma without complication    Relevant Orders    Complete pulmonary function test       Other    Tobacco use     We did spend more than 3 minutes today about smoking cessation and she states that she is trying to quit and will need to quit before her surgery  Nodule of right lung     This has been stable on all her scans  No further surveillance per guidelines         SOB (shortness of breath) - Primary    Relevant Orders    POCT spirometry            Return in about 2 months (around 6/24/2018)  All questions are answered to the patient's satisfaction and understanding  She verbalizes understanding  She is encouraged to call with any further questions or concerns  Portions of the record may have been created with voice recognition software  Occasional wrong word or "sound a like" substitutions may have occurred due to the inherent limitations of voice recognition software    Read the chart carefully and recognize, using context, where substitutions have occurred  ______________________________________________________________________    Chief Complaint:   Chief Complaint   Patient presents with    Pre-op Exam     May 14,2018 Knee surgery        Patient ID: Kalli Sosa is a 66 y o  y o  female has a past medical history of Amenorrhea; Anesthesia complication; Anxiety; Arthritis; Bronchitis, chronic ([address] ); Cancer ([address] ); Chronic pain disorder; COPD (chronic obstructive pulmonary disease) ([facility] ); Depression; Eczema; Fibromyalgia, primary; H/O ETOH abuse; Lumbar radiculopathy; Malignant melanoma of skin ([facility] [address] [facility] ); Migraine; MRSA (methicillin resistant Staphylococcus aureus) infection; Raynauds syndrome; and Spinal stenosis  4/24/2018  Patient presents today for initial visit for pre-op pulmonary clearance  Shortness of breath- allergy related  Has Proair and uses it more in the winter time  Occasional wheezing  Mostly in the winter  Allergy induced- allergic to dogs  No cough  Does smoke- she smokes about 0 5 ppd for about 15 years  Not limited by shortness of breath  Uses Advair twice daily  Has had asthma for about 20 years now  Did not have it with her when she went to [facility]  She started using it again last night and notices the difference  Asthma   She complains of chest tightness, difficulty breathing, frequent throat clearing, shortness of breath and wheezing  There is no cough, hemoptysis, hoarse voice or sputum production  This is a chronic problem  The current episode started more than 1 year ago  The problem occurs intermittently  The problem has been gradually improving  Associated symptoms include postnasal drip (seasonal) and rhinorrhea  Pertinent negatives include no appetite change, chest pain or fever  Her symptoms are aggravated by animal exposure, pollen and URI  Her symptoms are alleviated by beta-agonist and steroid inhaler  She reports significant improvement on treatment   Risk factors for lung disease include animal exposure and smoking/tobacco exposure  Her past medical history is significant for asthma  Gained 25 pounds in 1 year    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  Travel history: no recent travel      Review of Systems   Constitutional: Positive for fatigue  Negative for activity change, appetite change, fever and unexpected weight change  HENT: Positive for postnasal drip (seasonal) and rhinorrhea  Negative for hoarse voice  Respiratory: Positive for shortness of breath and wheezing  Negative for cough, hemoptysis and sputum production  See HPI   Cardiovascular: Negative for chest pain and leg swelling  Gastrointestinal: Negative for abdominal distention, nausea and vomiting  Endocrine: Negative for polyuria  Genitourinary: Negative for difficulty urinating  Musculoskeletal: Positive for arthralgias and joint swelling  Skin: Negative for color change and pallor  Allergic/Immunologic: Positive for environmental allergies  Neurological: Negative for dizziness  Hematological: Negative for adenopathy  Psychiatric/Behavioral: Negative for agitation and behavioral problems  Social history: She reports that she has been smoking Cigarettes  She has a 7 50 pack-year smoking history  She has never used smokeless tobacco  She reports that she does not drink alcohol or use drugs  Past surgical history:   Past Surgical History:   Procedure Laterality Date    ARTHROSCOPY KNEE Right 6/8/2017    Procedure: RIGHT KNEE ARTHROSCOPY MEDIAL MENISCECTOMY;  Surgeon: Elizabeth Hare MD;  Location: [facility] MAIN OR;  Service:    [facility]    COLONOSCOPY      COLONOSCOPY N/A 1/25/2018    Procedure: COLONOSCOPY;  Surgeon: Laure Batista MD;  [facility];   Service: Gastroenterology    DILATION AND CURETTAGE OF UTERUS      x 2 miscarriage    EPIDURAL BLOCK INJECTION N/A 12/8/2017    Procedure: INJECTION EPIDURAL STEROID CERVICAL C6-C7;  Surgeon: Elisa Palacios MD;  Location: [facility] MAIN OR;  Service: Pain Management     ESOPHAGOGASTRODUODENOSCOPY N/A 1/25/2018    Procedure: ESOPHAGOGASTRODUODENOSCOPY (EGD); Surgeon: Khanh Cordova MD;  Location: [facility] GI LAB;   Service: Gastroenterology    FOOT SURGERY      squamous removed from the right    HAND SURGERY Bilateral     arthroplasty -thumb joint    last assessed 6/6/17    HERNIA REPAIR      inguinal    WISDOM TOOTH EXTRACTION       Family history:   Family History   Problem Relation Age of Onset    Peripheral vascular disease Mother     Arthritis Mother      osteo arthritis    Coronary artery disease Mother      Atherosclerosis    Alcohol abuse Mother     Stroke Mother      CVA    Transient ischemic attack Mother     Heart disease Father     Peripheral vascular disease Father     Stroke Father      CVA    Arthritis Sister      rheumatoid    Alcohol abuse Sister        Immunization History   Administered Date(s) Administered     Influenza (IM) Preservative Free 10/30/2017    Influenza TIV (IM) 12/04/2013, 10/08/2014, 08/02/2016, 02/01/2017    Pneumococcal Conjugate 13-Valent 10/30/2017    Pneumococcal Polysaccharide PPV23 10/08/2014, 08/03/2016     Current Outpatient Prescriptions   Medication Sig Dispense Refill    albuterol (PROVENTIL HFA,VENTOLIN HFA) 90 mcg/act inhaler Inhale 2 puffs every 6 (six) hours as needed for wheezing      buprenorphine (SUBUTEX) 8 mg 8 mg 2 (two) times a day Oral       DULoxetine (CYMBALTA) 30 mg delayed release capsule TAKE 1 CAPSULE BY MOUTH TWICE A DAY 60 capsule 2    fluticasone-salmeterol (ADVAIR) 250-50 mcg/dose inhaler Inhale 1 puff daily at bedtime        multivitamin (THERAGRAN) TABS Take 1 tablet by mouth daily at bedtime      Omega-3 Fatty Acids (FISH OIL) 1,000 mg Take 1,000 mg by mouth daily at bedtime      pantoprazole (PROTONIX) 40 mg tablet Take 1 tablet by mouth 2 (two) times a day 60 tablet 3    RITALIN LA 10 MG 24 hr capsule Take 1 tablet by mouth daily      SUMAtriptan (IMITREX) 100 mg tablet Take 100 mg by mouth once as needed for migraine      Turmeric 500 MG TABS Take by mouth daily at bedtime      Calcium Carbonate-Vit D-Min (CALCIUM 1200 PO) Take 2 tablets by mouth daily at bedtime      COD LIVER OIL PO Take by mouth daily at bedtime       No current facility-administered medications for this visit  Allergies: Bee venom; Ibuprofen; and Antihistamines, chlorpheniramine-type    Objective:  Vitals:    04/24/18 0924   BP: 118/82   BP Location: Right arm   Patient Position: Sitting   Cuff Size: Standard   Pulse: 84   Resp: 18   Temp: 98 1 °F (36 7 °C)   TempSrc: Oral   SpO2: 99%   Weight: 69 4 kg (153 lb)   Height: 5' 5" (1 651 m)   Oxygen Therapy  SpO2: 99 %    Wt Readings from Last 3 Encounters:   04/24/18 69 4 kg (153 lb)   04/17/18 69 9 kg (154 lb)   04/04/18 69 2 kg (152 lb 9 6 oz)     Body mass index is 25 46 kg/m²  Physical Exam   Constitutional: She is oriented to person, place, and time  She appears well-developed and well-nourished  No distress  HENT:   Head: Normocephalic and atraumatic  Mouth/Throat: Oropharynx is clear and moist  No oropharyngeal exudate  +erythema   Eyes: EOM are normal  Pupils are equal, round, and reactive to light  Neck: Normal range of motion  Neck supple  No tracheal deviation present  No thyromegaly present  Cardiovascular: Normal rate and regular rhythm  No murmur heard  Pulmonary/Chest: Effort normal and breath sounds normal  No respiratory distress  She has no wheezes  She has no rales  She exhibits no tenderness  Abdominal: Soft  Bowel sounds are normal  She exhibits no distension  There is no tenderness  Musculoskeletal: Normal range of motion  She exhibits no edema  Lymphadenopathy:     She has no cervical adenopathy  Neurological: She is alert and oriented to person, place, and time  No cranial nerve deficit  Skin: Skin is warm and dry  She is not diaphoretic  Psychiatric: She has a normal mood and affect  Her behavior is normal    Vitals reviewed  Lab Review:   Lab Results   Component Value Date     06/01/2017     12/17/2015    K 4 5 06/01/2017    K 4 8 12/17/2015     06/01/2017     12/17/2015    CO2 30 06/01/2017    CO2 32 (H) 12/17/2015    BUN 17 06/01/2017    BUN 15 12/17/2015    CREATININE 0 88 06/01/2017    CREATININE 0 9 12/17/2015    GLUCOSE 71 06/01/2017    GLUCOSE 83 12/17/2015    CALCIUM 9 0 06/01/2017    CALCIUM 9 0 12/17/2015     Lab Results   Component Value Date    WBC 8 91 06/01/2017    WBC 5 9 12/17/2015    HGB 12 0 06/01/2017    HGB 9 9 (L) 12/17/2015    HCT 37 0 06/01/2017    HCT 30 6 (L) 12/17/2015    MCV 89 06/01/2017    MCV 91 1 12/17/2015     06/01/2017     (H) 12/17/2015       Diagnostics:  I have personally reviewed pertinent reports  and I have personally reviewed pertinent films in PACS    CT chest done 12/15/17- stable lung nodule  Office Spirometry Results:   Moderate obstruction- FEV1 65%

## 2018-04-24 NOTE — PROGRESS NOTES
Assessment/Plan:       Problem List Items Addressed This Visit        Respiratory    Chronic obstructive pulmonary disease ([address] )     Continue with Advair and rescue inhaler  Likely COPD given smoking history however obtain PFT to assess reversibility  Patient does have a diagnosis of Asthma  Mild intermittent asthma without complication    Relevant Orders    Complete pulmonary function test       Other    Tobacco use     We did spend more than 3 minutes today about smoking cessation and she states that she is trying to quit and will need to quit before her surgery  Nodule of right lung     This has been stable on all her scans  No further surveillance per guidelines         SOB (shortness of breath) - Primary    Relevant Orders    POCT spirometry            Return in about 2 months (around 6/24/2018)  All questions are answered to the patient's satisfaction and understanding  She verbalizes understanding  She is encouraged to call with any further questions or concerns  Portions of the record may have been created with voice recognition software  Occasional wrong word or "sound a like" substitutions may have occurred due to the inherent limitations of voice recognition software  Read the chart carefully and recognize, using context, where substitutions have occurred  ______________________________________________________________________    Chief Complaint:   Chief Complaint   Patient presents with    Pre-op Exam     May 14,2018 Knee surgery        Patient ID: Niranjan Anderson is a 66 y o  y o  female has a past medical history of Amenorrhea; Anesthesia complication; Anxiety; Arthritis; Bronchitis, chronic ([address] ); Cancer ([address] ); Chronic pain disorder; COPD (chronic obstructive pulmonary disease) ([address] ); Depression; Eczema; Fibromyalgia, primary; H/O ETOH abuse; Lumbar radiculopathy; Malignant melanoma of skin ([address] );  Migraine; MRSA (methicillin resistant Staphylococcus aureus) infection; Raynauds syndrome; and Spinal stenosis  4/24/2018  Patient presents today for initial visit for pre-op pulmonary clearance  Shortness of breath- allergy related  Has Proair and uses it more in the winter time  Occasional wheezing  Mostly in the winter  Allergy induced- allergic to dogs  No cough  Does smoke- she smokes about 0 5 ppd for about 15 years  Not limited by shortness of breath  Uses Advair twice daily  Has had asthma for about 20 years now  Did not have it with her when she went to [facility]  She started using it again last night and notices the difference  Asthma   She complains of chest tightness, difficulty breathing, frequent throat clearing, shortness of breath and wheezing  There is no cough, hemoptysis, hoarse voice or sputum production  This is a chronic problem  The current episode started more than 1 year ago  The problem occurs intermittently  The problem has been gradually improving  Associated symptoms include postnasal drip (seasonal) and rhinorrhea  Pertinent negatives include no appetite change, chest pain or fever  Her symptoms are aggravated by animal exposure, pollen and URI  Her symptoms are alleviated by beta-agonist and steroid inhaler  She reports significant improvement on treatment  Risk factors for lung disease include animal exposure and smoking/tobacco exposure  Her past medical history is significant for asthma  Gained 25 pounds in 1 year    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  Travel history: no recent travel      Review of Systems   Constitutional: Positive for fatigue  Negative for activity change, appetite change, fever and unexpected weight change  HENT: Positive for postnasal drip (seasonal) and rhinorrhea  Negative for hoarse voice  Respiratory: Positive for shortness of breath and wheezing  Negative for cough, hemoptysis and sputum production           See HPI   Cardiovascular: Negative for chest pain and leg swelling  Gastrointestinal: Negative for abdominal distention, nausea and vomiting  Endocrine: Negative for polyuria  Genitourinary: Negative for difficulty urinating  Musculoskeletal: Positive for arthralgias and joint swelling  Skin: Negative for color change and pallor  Allergic/Immunologic: Positive for environmental allergies  Neurological: Negative for dizziness  Hematological: Negative for adenopathy  Psychiatric/Behavioral: Negative for agitation and behavioral problems  Social history: She reports that she has been smoking Cigarettes  She has a 7 50 pack-year smoking history  She has never used smokeless tobacco  She reports that she does not drink alcohol or use drugs  Past surgical history:   Past Surgical History:   Procedure Laterality Date    ARTHROSCOPY KNEE Right 6/8/2017    Procedure: RIGHT KNEE ARTHROSCOPY MEDIAL MENISCECTOMY;  Surgeon: Elizabeth Hare MD;  Location: [facility] MAIN OR;  Service:    [address] [place] [address]    COLONOSCOPY      COLONOSCOPY N/A 1/25/2018    Procedure: COLONOSCOPY;  Surgeon: Laure Batista MD;  Location: [place] [address] GI LAB; Service: Gastroenterology    DILATION AND CURETTAGE OF UTERUS      x 2 miscarriage    EPIDURAL BLOCK INJECTION N/A 12/8/2017    Procedure: INJECTION EPIDURAL STEROID CERVICAL C6-C7;  Surgeon: Porfirio Valdez MD;  Location: [place] [address] MAIN OR;  Service: Pain Management     ESOPHAGOGASTRODUODENOSCOPY N/A 1/25/2018    Procedure: ESOPHAGOGASTRODUODENOSCOPY (EGD); Surgeon: Laure Batista MD;  Location: [facility] GI LAB;   Service: Gastroenterology    FOOT SURGERY      squamous removed from the right    HAND SURGERY Bilateral     arthroplasty -thumb joint    last assessed 6/6/17    HERNIA REPAIR      inguinal    WISDOM TOOTH EXTRACTION       Family history:   Family History   Problem Relation Age of Onset    Peripheral vascular disease Mother     Arthritis Mother      osteo arthritis    Coronary artery disease Mother Atherosclerosis    Alcohol abuse Mother     Stroke Mother      CVA    Transient ischemic attack Mother     Heart disease Father     Peripheral vascular disease Father     Stroke Father      CVA    Arthritis Sister      rheumatoid    Alcohol abuse Sister        Immunization History   Administered Date(s) Administered     Influenza (IM) Preservative Free 10/30/2017    Influenza TIV (IM) 12/04/2013, 10/08/2014, 08/02/2016, 02/01/2017    Pneumococcal Conjugate 13-Valent 10/30/2017    Pneumococcal Polysaccharide PPV23 10/08/2014, 08/03/2016     Current Outpatient Prescriptions   Medication Sig Dispense Refill    albuterol (PROVENTIL HFA,VENTOLIN HFA) 90 mcg/act inhaler Inhale 2 puffs every 6 (six) hours as needed for wheezing      buprenorphine (SUBUTEX) 8 mg 8 mg 2 (two) times a day Oral       DULoxetine (CYMBALTA) 30 mg delayed release capsule TAKE 1 CAPSULE BY MOUTH TWICE A DAY 60 capsule 2    fluticasone-salmeterol (ADVAIR) 250-50 mcg/dose inhaler Inhale 1 puff daily at bedtime        multivitamin (THERAGRAN) TABS Take 1 tablet by mouth daily at bedtime      Omega-3 Fatty Acids (FISH OIL) 1,000 mg Take 1,000 mg by mouth daily at bedtime      pantoprazole (PROTONIX) 40 mg tablet Take 1 tablet by mouth 2 (two) times a day 60 tablet 3    RITALIN LA 10 MG 24 hr capsule Take 1 tablet by mouth daily      SUMAtriptan (IMITREX) 100 mg tablet Take 100 mg by mouth once as needed for migraine      Turmeric 500 MG TABS Take by mouth daily at bedtime      Calcium Carbonate-Vit D-Min (CALCIUM 1200 PO) Take 2 tablets by mouth daily at bedtime      COD LIVER OIL PO Take by mouth daily at bedtime       No current facility-administered medications for this visit  Allergies: Bee venom;  Ibuprofen; and Antihistamines, chlorpheniramine-type    Objective:  Vitals:    04/24/18 0924   BP: 118/82   BP Location: Right arm   Patient Position: Sitting   Cuff Size: Standard   Pulse: 84   Resp: 18   Temp: 98 1 °F (36 7 °C)   TempSrc: Oral   SpO2: 99%   Weight: 69 4 kg (153 lb)   Height: 5' 5" (1 651 m)   Oxygen Therapy  SpO2: 99 %    Wt Readings from Last 3 Encounters:   04/24/18 69 4 kg (153 lb)   04/17/18 69 9 kg (154 lb)   04/04/18 69 2 kg (152 lb 9 6 oz)     Body mass index is 25 46 kg/m²  Physical Exam   Constitutional: She is oriented to person, place, and time  She appears well-developed and well-nourished  No distress  HENT:   Head: Normocephalic and atraumatic  Mouth/Throat: Oropharynx is clear and moist  No oropharyngeal exudate  +erythema   Eyes: EOM are normal  Pupils are equal, round, and reactive to light  Neck: Normal range of motion  Neck supple  No tracheal deviation present  No thyromegaly present  Cardiovascular: Normal rate and regular rhythm  No murmur heard  Pulmonary/Chest: Effort normal and breath sounds normal  No respiratory distress  She has no wheezes  She has no rales  She exhibits no tenderness  Abdominal: Soft  Bowel sounds are normal  She exhibits no distension  There is no tenderness  Musculoskeletal: Normal range of motion  She exhibits no edema  Lymphadenopathy:     She has no cervical adenopathy  Neurological: She is alert and oriented to person, place, and time  No cranial nerve deficit  Skin: Skin is warm and dry  She is not diaphoretic  Psychiatric: She has a normal mood and affect  Her behavior is normal    Vitals reviewed        Lab Review:   Lab Results   Component Value Date     06/01/2017     12/17/2015    K 4 5 06/01/2017    K 4 8 12/17/2015     06/01/2017     12/17/2015    CO2 30 06/01/2017    CO2 32 (H) 12/17/2015    BUN 17 06/01/2017    BUN 15 12/17/2015    CREATININE 0 88 06/01/2017    CREATININE 0 9 12/17/2015    GLUCOSE 71 06/01/2017    GLUCOSE 83 12/17/2015    CALCIUM 9 0 06/01/2017    CALCIUM 9 0 12/17/2015     Lab Results   Component Value Date    WBC 8 91 06/01/2017    WBC 5 9 12/17/2015    HGB 12 0 06/01/2017    HGB 9 9 (L) 12/17/2015    HCT 37 0 06/01/2017    HCT 30 6 (L) 12/17/2015    MCV 89 06/01/2017    MCV 91 1 12/17/2015     06/01/2017     (H) 12/17/2015       Diagnostics:  I have personally reviewed pertinent reports  and I have personally reviewed pertinent films in PACS    CT chest done 12/15/17- stable lung nodule  Office Spirometry Results:   Moderate obstruction- FEV1 65%

## 2018-04-25 LAB
BACTERIA UR CULT: NORMAL
MRSA NOSE QL CULT: NORMAL

## 2018-05-03 ENCOUNTER — HOSPITAL ENCOUNTER (OUTPATIENT)
Dept: PULMONOLOGY | Facility: HOSPITAL | Age: 63
Discharge: HOME | End: 2018-05-03
Attending: INTERNAL MEDICINE
Payer: MEDICARE

## 2018-05-03 DIAGNOSIS — J45.20 MILD INTERMITTENT ASTHMA WITHOUT COMPLICATION: ICD-10-CM

## 2018-05-03 PROCEDURE — 94760 N-INVAS EAR/PLS OXIMETRY 1: CPT

## 2018-05-03 PROCEDURE — 94060 EVALUATION OF WHEEZING: CPT | Performed by: INTERNAL MEDICINE

## 2018-05-03 PROCEDURE — 94729 DIFFUSING CAPACITY: CPT

## 2018-05-03 PROCEDURE — 94726 PLETHYSMOGRAPHY LUNG VOLUMES: CPT

## 2018-05-03 PROCEDURE — 94726 PLETHYSMOGRAPHY LUNG VOLUMES: CPT | Performed by: INTERNAL MEDICINE

## 2018-05-03 PROCEDURE — 94060 EVALUATION OF WHEEZING: CPT

## 2018-05-03 PROCEDURE — 94729 DIFFUSING CAPACITY: CPT | Performed by: INTERNAL MEDICINE

## 2018-05-03 RX ORDER — ALBUTEROL SULFATE 2.5 MG/3ML
2.5 SOLUTION RESPIRATORY (INHALATION) ONCE
Status: DISCONTINUED | OUTPATIENT
Start: 2018-05-03 | End: 2018-05-07 | Stop reason: HOSPADM

## 2018-05-04 ENCOUNTER — TELEPHONE (OUTPATIENT)
Dept: OBGYN CLINIC | Facility: CLINIC | Age: 63
End: 2018-05-04

## 2018-05-04 ENCOUNTER — OFFICE VISIT (OUTPATIENT)
Dept: OBGYN CLINIC | Facility: CLINIC | Age: 63
End: 2018-05-04
Payer: MEDICARE

## 2018-05-04 VITALS
WEIGHT: 155 LBS | HEART RATE: 77 BPM | DIASTOLIC BLOOD PRESSURE: 68 MMHG | HEIGHT: 66 IN | BODY MASS INDEX: 24.91 KG/M2 | SYSTOLIC BLOOD PRESSURE: 146 MMHG

## 2018-05-04 DIAGNOSIS — M25.562 CHRONIC PAIN OF LEFT KNEE: ICD-10-CM

## 2018-05-04 DIAGNOSIS — G89.29 CHRONIC PAIN OF RIGHT KNEE: ICD-10-CM

## 2018-05-04 DIAGNOSIS — G89.29 CHRONIC PAIN OF LEFT KNEE: ICD-10-CM

## 2018-05-04 DIAGNOSIS — M25.561 CHRONIC PAIN OF RIGHT KNEE: ICD-10-CM

## 2018-05-04 DIAGNOSIS — M17.0 BILATERAL PRIMARY OSTEOARTHRITIS OF KNEE: Primary | ICD-10-CM

## 2018-05-04 DIAGNOSIS — Z72.0 TOBACCO USE: ICD-10-CM

## 2018-05-04 PROCEDURE — 99214 OFFICE O/P EST MOD 30 MIN: CPT | Performed by: ORTHOPAEDIC SURGERY

## 2018-05-04 PROCEDURE — 20610 DRAIN/INJ JOINT/BURSA W/O US: CPT | Performed by: ORTHOPAEDIC SURGERY

## 2018-05-04 RX ORDER — TRIAMCINOLONE ACETONIDE 40 MG/ML
40 INJECTION, SUSPENSION INTRA-ARTICULAR; INTRAMUSCULAR
Status: COMPLETED | OUTPATIENT
Start: 2018-05-04 | End: 2018-05-04

## 2018-05-04 RX ORDER — BUPIVACAINE HYDROCHLORIDE 5 MG/ML
6 INJECTION, SOLUTION EPIDURAL; INTRACAUDAL
Status: COMPLETED | OUTPATIENT
Start: 2018-05-04 | End: 2018-05-04

## 2018-05-04 RX ADMIN — BUPIVACAINE HYDROCHLORIDE 6 ML: 5 INJECTION, SOLUTION EPIDURAL; INTRACAUDAL at 11:54

## 2018-05-04 RX ADMIN — TRIAMCINOLONE ACETONIDE 40 MG: 40 INJECTION, SUSPENSION INTRA-ARTICULAR; INTRAMUSCULAR at 11:54

## 2018-05-04 NOTE — TELEPHONE ENCOUNTER
Spoke with patient regarding left knee pain after cortisone injection today  Her right knee also had an injection and is currently doing very well  She feels fluid and pain in the left knee throughout the knee  She denies any fevers, chills, drainage from the knee, or falls  Per her description it sounds as she is having a flare from the cortisone injection  I stressed that her to ice 20 minutes on 20 minutes in addition to taking Tylenol around the clock  She is ineligible for NSAIDs so she can relying Tylenol ice  Also encouraged her to take it easy for the next few days  This should be a transient increase in pain which should resolve by the end of the we can  She is welcome to call back to the office if she still having issues next week  All of her questions were answered and she was appreciative of call

## 2018-05-04 NOTE — PROGRESS NOTES
Assessment/Plan:  1  Bilateral primary osteoarthritis of knee  XR knee 3 vw right non injury   2  Chronic pain of right knee     3  Chronic pain of left knee     4  Tobacco use       Patient is here for follow-up regarding her preoperative testing for her upcoming right total knee arthroplasty  An appointment was made today to follow-up on her smoking cessation efforts as it was becoming apparent that she had not successfully stopped smoking at this point  On her initial visit with me she was smoking upwards of half a pack a day and until last week she was still smoking half a pack a day but states today that she is recently down to 3 cigarettes a day and supplementing that with E-cigarettes  We discussed that is the nicotine in both the traditional cigarette and electronic cigarette that is concerning for her upcoming surgery and its relation to increased wound healing problems and issues as well as increased risk of infection of which she is already at an increased risk  At this point she has been cleared by Cardiology and pulmonology and her lab work is reasonable on preoperative review however her incomplete smoking cessation is still a problem for surgery  She is fully aware of this as this was discussed on our initial evaluation together that we cannot perform her surgery until smoking cessation has occurred for least 2-3 months  At this point she states that she is committed to really discontinuing her smoking habits and will do so for surgery as she was to be in the best possible shape for surgery as possible  She also admits that she is currently living with a family member that is struggling with alcoholism and herself as a recovering alcoholic does not feel that this is a good environment for her at this time anyway  She was considering postponing her surgery because of this regardless of the smoking    At this point to help her manage her pain over the next 3 months that she continue smoking cessation we will undergo bilateral knee steroid injections here in the office today  She is aware that she cannot proceed with total knee replacement surgeries for least 3 months after receiving a steroid injection  She tolerated the injections well after the risks and benefits of doing so were discussed  Post injection instructions were provided  We will follow up in approximately 3 months to follow-up on her smoking cessation and further delineate her plan of care  If she is completely without tobacco and nicotine use at that point we will get magnification marker x-rays of her operative knee at that time  The patient may call the office at anytime if any questions or concerns should arise      Large joint arthrocentesis  Date/Time: 5/4/2018 11:54 AM  Consent given by: patient  Site marked: site marked  Timeout: Immediately prior to procedure a time out was called to verify the correct patient, procedure, equipment, support staff and site/side marked as required   Supporting Documentation  Indications: pain   Procedure Details  Location: knee - R knee  Preparation: Patient was prepped and draped in the usual sterile fashion  Needle size: 20 G  Ultrasound guidance: no  Approach: anterolateral  Medications administered: 6 mL bupivacaine (PF) 0 5 %; 40 mg triamcinolone acetonide 40 mg/mL    Patient tolerance: patient tolerated the procedure well with no immediate complications  Dressing:  Sterile dressing applied  Large joint arthrocentesis  Date/Time: 5/4/2018 11:54 AM  Consent given by: patient  Site marked: site marked  Timeout: Immediately prior to procedure a time out was called to verify the correct patient, procedure, equipment, support staff and site/side marked as required   Supporting Documentation  Indications: pain   Procedure Details  Location: knee - L knee  Preparation: Patient was prepped and draped in the usual sterile fashion  Needle size: 20 G  Ultrasound guidance: no  Approach: anterolateral  Medications administered: 6 mL bupivacaine (PF) 0 5 %; 40 mg triamcinolone acetonide 40 mg/mL    Patient tolerance: patient tolerated the procedure well with no immediate complications  Dressing:  Sterile dressing applied          Subjective:   Bilateral knee pain, preoperative follow-up     Patient ID: Nicholas Stallworth is a 66 y o  female  HPI  Patient is here for follow-up regarding her bilateral knee pain and her upcoming right total knee arthroplasty surgery  During preoperative testing conversations it was evident that the patient was still using tobacco and nicotine and had not successfully completed her smoking cessation requiring prior to her right total knee replacement surgery  She is here today to discuss her smoking cessation efforts  She states that initially when she saw me she was smoking half a pack a day and last week she was also half a pack a day but now she is down to 3 cigarettes a day and supplementing that with E cigarettes  She states smoking cessation is difficult and she is disappointed that she did not completely quit by this point yet  She also states that she has been attempting to find residence with a family member that is currently an alcoholic because this provided a 1 floor recovery environment for her  We with this she was even considering postponing her surgery to find a better living situation because she herself is a recovering alcoholic and did not want to be in this environment  Review of Systems   Constitutional: Positive for activity change  HENT: Negative  Eyes: Negative  Respiratory: Negative  Cardiovascular: Negative  Gastrointestinal: Negative  Endocrine: Negative  Musculoskeletal: Positive for arthralgias and gait problem  Skin: Negative  Psychiatric/Behavioral: Negative            Past Medical History:   Diagnosis Date    Amenorrhea     Anesthesia complication     hx of cocaine use-none in 5 yrs    Anxiety  Arthritis     bilateral knees-DJD, arthritis neck and back-cervical injection 2017    Bronchitis, chronic (HCC)     Cancer (HCC)     squamous-removed from the right foot    Chronic pain disorder     neck and back    COPD (chronic obstructive pulmonary disease) ([facility] [address] )     Depression     Eczema     last assessed 6/27/16, resolved 10/2/17    Fibromyalgia, primary     H/O ETOH abuse     None in the past 10 months since 8/1/17    Lumbar radiculopathy     resolved 6/6/17    Malignant melanoma of skin (HCC)     Migraine     MRSA (methicillin resistant Staphylococcus aureus) infection     last assessed 4/29/16    Raynauds syndrome     Spinal stenosis        Past Surgical History:   Procedure Laterality Date    ARTHROSCOPY KNEE Right 6/8/2017    Procedure: RIGHT KNEE ARTHROSCOPY MEDIAL MENISCECTOMY;  Surgeon: Sagar Tomlin MD;  Location: [facility] MAIN OR;  Service:    [address]    COLONOSCOPY      COLONOSCOPY N/A 1/25/2018    Procedure: COLONOSCOPY;  Surgeon: Laila Slade MD;  Location: [facility] GI LAB; Service: Gastroenterology    DILATION AND CURETTAGE OF UTERUS      x 2 miscarriage    EPIDURAL BLOCK INJECTION N/A 12/8/2017    Procedure: INJECTION EPIDURAL STEROID CERVICAL C6-C7;  Surgeon: Gonzalo Paez MD;  Location: [facility] MAIN OR;  Service: Pain Management     ESOPHAGOGASTRODUODENOSCOPY N/A 1/25/2018    Procedure: ESOPHAGOGASTRODUODENOSCOPY (EGD); Surgeon: Laila Slade MD;  Location: [facility] GI LAB;   Service: Gastroenterology    FOOT SURGERY      squamous removed from the right    HAND SURGERY Bilateral     arthroplasty -thumb joint    last assessed 6/6/17    HERNIA REPAIR      inguinal    WISDOM TOOTH EXTRACTION         Family History   Problem Relation Age of Onset    Peripheral vascular disease Mother     Arthritis Mother      osteo arthritis    Coronary artery disease Mother      Atherosclerosis    Alcohol abuse Mother     Stroke Mother      CVA    Transient ischemic attack Mother     Heart disease Father     Peripheral vascular disease Father     Stroke Father      CVA    Arthritis Sister      rheumatoid    Alcohol abuse Sister        Social History     Occupational History    Not on file       Social History Main Topics    Smoking status: Current Every Day Smoker     Packs/day: 0 50     Years: 15 00     Types: Cigarettes    Smokeless tobacco: Never Used      Comment: on and off for 40 yrs    Alcohol use No      Comment: hx of ETOH abuse-2016 / Alcoholism in recovery     Drug use: Yes     Types: Cocaine      Comment: quit 2007    Sexual activity: No         Current Outpatient Prescriptions:     albuterol (PROVENTIL HFA,VENTOLIN HFA) 90 mcg/act inhaler, Inhale 2 puffs every 6 (six) hours as needed for wheezing, Disp: , Rfl:     buprenorphine (SUBUTEX) 8 mg, 8 mg 2 (two) times a day Oral , Disp: , Rfl:     Calcium Carbonate-Vit D-Min (CALCIUM 1200 PO), Take 2 tablets by mouth daily at bedtime, Disp: , Rfl:     COD LIVER OIL PO, Take by mouth daily at bedtime, Disp: , Rfl:     DULoxetine (CYMBALTA) 30 mg delayed release capsule, TAKE 1 CAPSULE BY MOUTH TWICE A DAY, Disp: 60 capsule, Rfl: 2    fluticasone-salmeterol (ADVAIR) 250-50 mcg/dose inhaler, Inhale 1 puff 2 (two) times a day  , Disp: , Rfl:     multivitamin (THERAGRAN) TABS, Take 1 tablet by mouth daily at bedtime, Disp: , Rfl:     Omega-3 Fatty Acids (FISH OIL) 1,000 mg, Take 1,000 mg by mouth daily at bedtime, Disp: , Rfl:     pantoprazole (PROTONIX) 40 mg tablet, Take 1 tablet by mouth 2 (two) times a day, Disp: 60 tablet, Rfl: 3    RITALIN LA 10 MG 24 hr capsule, Take 1 tablet by mouth daily, Disp: , Rfl:     saccharomyces boulardii (FLORASTOR) 250 mg capsule, Take 250 mg by mouth daily at bedtime, Disp: , Rfl:     SUMAtriptan (IMITREX) 100 mg tablet, Take 100 mg by mouth once as needed for migraine, Disp: , Rfl:     Turmeric 500 MG TABS, Take by mouth daily at bedtime, Disp: , Rfl:   No current facility-administered medications for this visit  Facility-Administered Medications Ordered in Other Visits:     albuterol inhalation solution 2 5 mg, 2 5 mg, Nebulization, Once, Art Whiteside MD    Allergies   Allergen Reactions    Bee Venom Anaphylaxis    Ibuprofen Swelling     And any anti-inflammatories, NSAIDS-causes severe diarrhea  Facial swelling    Antihistamines, Chlorpheniramine-Type      "jumpy"       Objective:  Vitals:    05/04/18 1112   BP: 146/68   Pulse: 77       Body mass index is 25 02 kg/m²  Right Knee Exam     Tenderness   The patient is experiencing tenderness in the lateral joint line, medial joint line and patella  Range of Motion   Right knee extension: 2  Flexion: 120     Tests   Radu:  Medial - negative Lateral - negative  Drawer:       Anterior - negative    Posterior - negative  Varus: negative  Valgus: negative  Patellar Apprehension: negative    Other   Erythema: absent  Scars: present  Sensation: normal  Pulse: present  Swelling: none  Other tests: no effusion present    Comments:  Crepitance on AROM and PROM-peripatellar  +PFG  No increased warmth of knee  Knee is stable at 0, 30, 90 degrees  Arthroscopy scars well healed      Left Knee Exam     Tenderness   The patient is experiencing tenderness in the lateral joint line, patella and medial joint line  Range of Motion   Extension: 0   Flexion: 120     Tests   Radu:  Medial - negative Lateral - negative  Drawer:       Anterior - negative     Posterior - negative  Varus: negative  Valgus: negative  Patellar Apprehension: negative    Other   Erythema: absent  Scars: absent  Sensation: normal  Pulse: present  Swelling: none  Effusion: no effusion present    Comments:  Crepitance on AROM and PROM  +PFG  No increased warmth of knee  Knee is stable at 0, 30, 90 degrees          Observations   Left Knee   Negative for effusion  Right Knee   Negative for effusion         Physical Exam   Constitutional: She is oriented to person, place, and time  She appears well-developed  HENT:   Head: Atraumatic  Eyes: EOM are normal    Neck: Neck supple  Cardiovascular: Normal rate  Pulmonary/Chest: Effort normal    Musculoskeletal:        Right knee: She exhibits no effusion  Left knee: She exhibits no effusion  See orthopedic exam   Neurological: She is alert and oriented to person, place, and time  Skin: Skin is warm and dry  Psychiatric: She has a normal mood and affect  Nursing note and vitals reviewed  Manual Priscila D O    Division of Adult Reconstruction  Department of [facility]

## 2018-05-07 ENCOUNTER — HOSPITAL ENCOUNTER (OUTPATIENT)
Dept: NON INVASIVE DIAGNOSTICS | Facility: HOSPITAL | Age: 63
Discharge: HOME | End: 2018-05-07
Attending: INTERNAL MEDICINE
Payer: MEDICARE

## 2018-05-07 ENCOUNTER — HOSPITAL ENCOUNTER (OUTPATIENT)
Dept: RADIOLOGY | Facility: HOSPITAL | Age: 63
End: 2018-05-07
Attending: INTERNAL MEDICINE
Payer: MEDICARE

## 2018-05-07 DIAGNOSIS — R01.1 HEART MURMUR: ICD-10-CM

## 2018-05-07 DIAGNOSIS — R06.00 DYSPNEA ON EXERTION: ICD-10-CM

## 2018-05-07 PROCEDURE — 93306 TTE W/DOPPLER COMPLETE: CPT

## 2018-05-08 PROCEDURE — 93306 TTE W/DOPPLER COMPLETE: CPT | Performed by: INTERNAL MEDICINE

## 2018-05-09 ENCOUNTER — TELEPHONE (OUTPATIENT)
Dept: CARDIOLOGY CLINIC | Facility: CLINIC | Age: 63
End: 2018-05-09

## 2018-05-31 ENCOUNTER — HOSPITAL ENCOUNTER (OUTPATIENT)
Dept: RADIOLOGY | Facility: HOSPITAL | Age: 63
Discharge: HOME | End: 2018-05-31
Attending: INTERNAL MEDICINE
Payer: MEDICARE

## 2018-05-31 ENCOUNTER — TRANSCRIBE ORDERS (OUTPATIENT)
Dept: ADMINISTRATIVE | Facility: HOSPITAL | Age: 63
End: 2018-05-31

## 2018-05-31 ENCOUNTER — HOSPITAL ENCOUNTER (OUTPATIENT)
Dept: NON INVASIVE DIAGNOSTICS | Facility: HOSPITAL | Age: 63
Discharge: HOME | End: 2018-05-31
Attending: INTERNAL MEDICINE
Payer: MEDICARE

## 2018-05-31 DIAGNOSIS — F17.219 CIGARETTE NICOTINE DEPENDENCE WITH NICOTINE-INDUCED DISORDER: ICD-10-CM

## 2018-05-31 DIAGNOSIS — R06.00 DYSPNEA ON EXERTION: ICD-10-CM

## 2018-05-31 DIAGNOSIS — Z01.818 PRE-OP EXAM: ICD-10-CM

## 2018-05-31 DIAGNOSIS — Z82.49 FAMILY HISTORY OF HEART DISEASE: ICD-10-CM

## 2018-05-31 DIAGNOSIS — R06.02 SHORTNESS OF BREATH: ICD-10-CM

## 2018-05-31 PROCEDURE — A9502 TC99M TETROFOSMIN: HCPCS

## 2018-05-31 PROCEDURE — 78452 HT MUSCLE IMAGE SPECT MULT: CPT

## 2018-05-31 PROCEDURE — 93017 CV STRESS TEST TRACING ONLY: CPT

## 2018-06-01 LAB
CHEST PAIN STATEMENT: NORMAL
MAX DIASTOLIC BP: 80 MMHG
MAX HEART RATE: 142 BPM
MAX PREDICTED HEART RATE: 159 BPM
MAX. SYSTOLIC BP: 174 MMHG
PROTOCOL NAME: NORMAL
REASON FOR TERMINATION: NORMAL
TARGET HR FORMULA: NORMAL
TIME IN EXERCISE PHASE: NORMAL

## 2018-07-06 DIAGNOSIS — F34.1 DYSTHYMIA: ICD-10-CM

## 2018-07-06 RX ORDER — DULOXETIN HYDROCHLORIDE 30 MG/1
CAPSULE, DELAYED RELEASE ORAL
Qty: 60 CAPSULE | Refills: 0 | Status: SHIPPED | OUTPATIENT
Start: 2018-07-06 | End: 2018-09-03 | Stop reason: SDUPTHER

## 2018-07-25 ENCOUNTER — OFFICE VISIT (OUTPATIENT)
Dept: FAMILY MEDICINE CLINIC | Facility: CLINIC | Age: 63
End: 2018-07-25
Payer: MEDICARE

## 2018-07-25 VITALS
HEART RATE: 84 BPM | DIASTOLIC BLOOD PRESSURE: 80 MMHG | BODY MASS INDEX: 23.73 KG/M2 | WEIGHT: 147 LBS | TEMPERATURE: 97.5 F | RESPIRATION RATE: 14 BRPM | SYSTOLIC BLOOD PRESSURE: 130 MMHG

## 2018-07-25 DIAGNOSIS — J34.89 NASAL SORE: Primary | ICD-10-CM

## 2018-07-25 PROCEDURE — 99213 OFFICE O/P EST LOW 20 MIN: CPT | Performed by: NURSE PRACTITIONER

## 2018-07-25 NOTE — PROGRESS NOTES
Assessment/Plan:    1  Nasal sore  - mupirocin (BACTROBAN) 2 % ointment; Apply topically 3 (three) times a day  Dispense: 22 g; Refill: 0  - Wound culture and Gram stain; Future    Subjective:      Patient ID: Ana Cristina Cabrera is a 66 y o  female who presents for sore inside nose    Sore inside nose  2 days  Was recently hospitalized  Concerned with possible mrsa  No discharge  No fever  The following portions of the patient's history were reviewed and updated as appropriate: allergies, current medications, past family history, past medical history, past social history, past surgical history and problem list     Review of Systems   Constitutional: Negative for fever  HENT: Negative for nosebleeds, postnasal drip, rhinorrhea, sinus pain and sinus pressure  Sores inside nose   Skin: Positive for wound  Objective:      /80 (BP Location: Left arm, Patient Position: Sitting, Cuff Size: Standard)   Pulse 84   Temp 97 5 °F (36 4 °C) (Temporal)   Resp 14   Wt 66 7 kg (147 lb)   BMI 23 73 kg/m²          Physical Exam   Constitutional: She appears well-developed and well-nourished  No distress  HENT:   Head: Normocephalic  Mouth/Throat: Oropharynx is clear and moist  No oropharyngeal exudate  No visible sores in either nares  Wound culture obtained to swab for mrsa   Skin: Skin is warm and dry  No rash noted  No erythema  No pallor  Psychiatric: She has a normal mood and affect  Her behavior is normal  Judgment and thought content normal    Vitals reviewed

## 2018-07-31 ENCOUNTER — TELEPHONE (OUTPATIENT)
Dept: FAMILY MEDICINE CLINIC | Facility: CLINIC | Age: 63
End: 2018-07-31

## 2018-07-31 LAB
BACTERIA SPEC AEROBE CULT: NORMAL
GRAM STN SPEC: NORMAL
Lab: NORMAL

## 2018-07-31 NOTE — TELEPHONE ENCOUNTER
----- Message from Marquita Ashley, [address] sent at 7/31/2018  4:34 PM EDT -----  Please call pt and advise that nasal culture was negative, no MRSA

## 2018-08-10 NOTE — H&P
08/10/18 1230   Pain Assessment   Pain Assessment 0-10   Pain Score No Pain   Restrictions/Precautions   Precautions Bed/chair alarms; Fall Risk;Cognitive; Impulsive   Cognition   Overall Cognitive Status Impaired   Arousal/Participation Alert; Cooperative   Attention Attends with cues to redirect   Orientation Level Oriented to person;Oriented to place   Memory Decreased recall of precautions;Decreased recall of recent events;Decreased short term memory   Following Commands Follows one step commands without difficulty   Subjective   Subjective reports he knows when instructed about safety   QI: Sit to Stand   Assistance Needed Supervision   Sit to Stand CARE Score 4   QI: Chair/Bed-to-Chair Transfer   Assistance Needed Incidental touching   Chair/Bed-to-Chair Transfer CARE Score 4   Transfer Bed/Chair/Wheelchair   Limitations Noted In Balance; Coordination;Problem Solving; Sequencing   Adaptive Equipment Roller Walker   Stand Pivot Contact Guard   Sit to TXU Keith   Stand to [employer], Chair, Wheelchair Transfer (FIM) 4 - Patient requires steadying assist or light touching   QI: Car Transfer   Assistance Needed Incidental touching   Car Transfer CARE Score 4   QI: Walk 10 Feet   Assistance Needed Incidental touching   Walk 10 Feet CARE Score 4   QI: Walk 50 Feet with Two Turns   Assistance Needed Incidental touching   Walk 50 Feet with Two Turns CARE Score 4   QI: Walk 150 Feet   Assistance Needed Incidental touching   Walk 150 Feet CARE Score 4   QI: Walking 10 Feet on Uneven Surfaces   Assistance Needed Incidental touching   Walking 10 Feet on Uneven Surfaces CARE Score 4   Ambulation   Does the patient walk? 2  Yes   Primary Discharge Mode of Locomotion Walk   Walk Assist Level Contact Guard   Gait Pattern Forward Flexion;Narrow JOSE DAVID;Step through   Assist Device Roller Walker   Distance Walked (feet) 500 ft   Limitations Noted In Balance; Coordination;Device Assessment/Plan:  1  Bilateral primary osteoarthritis of knee  Ambulatory referral to Orthopedic Surgery     Case request operating room: ARTHROPLASTY KNEE TOTAL     Type and screen     Comprehensive metabolic panel     CBC and differential     APTT     Protime-INR     HEMOGLOBIN A1C W/ EAG ESTIMATION     MRSA culture     EKG 12 lead     XR chest pa & lateral     Case request operating room: ARTHROPLASTY KNEE TOTAL     UA w Reflex to Microscopic w Reflex to Culture   2  Right knee pain, unspecified chronicity  XR knee 3 vw right non injury     Case request operating room: ARTHROPLASTY KNEE TOTAL     Type and screen     Comprehensive metabolic panel     CBC and differential     APTT     Protime-INR     HEMOGLOBIN A1C W/ EAG ESTIMATION     MRSA culture     EKG 12 lead     XR chest pa & lateral     Case request operating room: ARTHROPLASTY KNEE TOTAL     UA w Reflex to Microscopic w Reflex to Culture   3  Chronic pain syndrome      4  Tobacco use         The patient is a very pleasant 30-year-old female that presents today as a consultation request my partner Dr Shelia Rivera for my subspecialty of joint replacement surgery  She is here for evaluation of ongoing activity-related right knee pain secondary to her end-stage osteoarthritis  She has failed all forms of conservative measures of treatment that included analgesia, physical therapy, brace wear, maintaining appropriate weight, steroid and viscosupplementation injections  The patient cannot take anti-inflammatories due to history of GI bleeding  She is on chronic pain management for multiple pain complaints that include her neck her lower back and is currently using Subutex    She does have a history of treatment with a pain management specialist   She has a history of recent is 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 0   No   QI: 1 Step (Curb)   Assistance Needed Incidental touching   1 Step (Curb) CARE Score 4   QI: 4 Steps   Assistance Needed Incidental touching   4 Steps CARE Score 4   QI: 12 Steps   Assistance Needed Incidental touching   12 Steps CARE Score 4   Stairs   Type Stairs;Curb;Ramp   # of Steps 12   Weight Bearing Precautions Fall Risk   Assist Devices Bilateral Rail   Stairs (FIM) 4 - Patient requires steadying assist or light touching AND patient goes up and down full flight (12- 14 stairs)   Therapeutic Interventions   Flexibility hamstring and gastroc 60"x2   Equipment Use   NuStep 10 mins level 1   Other Comments   Comments amb inside and out with wife providing CG, LOB x4 throughout session requiring min to mod A to correct; bumps into things on his R with RW and continues to move his feet and causes a tripping hazard; amb outside the RW catches on the concrete and he keeps walking causing a fall risk and needs VC to increase attention to the surface and to slow chan due to moving too fast and being impulsive; rolls RW off the sidewalk into mulch and tries to keep walking; wife provided constant VC for safety and pt follows 50% of the time; tried to put LLE into car first instead of sitting and swinging leg in; CP applied to lateral L knee for 5 mins on nustep; continue POC as per PT   Assessment   Treatment Assessment pt continues to have decreased safety awareness for xfers and mobility; needs onstant VC to use hands on chair for <> STS and to keep his hands on the RW during amb; removes his hands from the RW during amb and loses his balance x2 requiring mod A to recover; LOB when going up curb due to moving too fast and not having the walker stable; poor saftey awareness and balance; pt has L and R deviuation with excision of the skin cancers  She is a current tobacco smoker smoking approximately half a pack a day however she does deny a history of DVT/PE  She has failed conservative measures of treatment at this point I recommend her for a right total knee arthroplasty procedure  She is a candidate for the subvastus right total knee arthroplasty procedure  We discussed this briefly here in the office today and it is associated benefits  The risks and benefits of undergoing knee replacement surgery were discussed at length here in the office today  She is aware that due to her chronic pain condition and chronic pain medication use her pain postoperatively would be more difficult to control and she is aware of this  She is also a current smoker and I have stressed to her that I will not operate on her until she decreases her tobacco use on a daily basis  I have given her approximately 6-8 weeks to work on and she may seek her PCPs help for smoking cessation aids  She is aware that continued tobacco use increases the chance for wound healing problems and periprosthetic joint infection  She knows that she is already at an increased risk for this also due to her history of MRSA  She also has a history of MRSA skin infection therefore she will need vancomycin preoperatively  She will require postoperative DVT prophylaxis with Lovenox  I have asked her to be cleared by her PCP and her pulmonologist due to her medical comorbidities  She does not need a cardiology consult unless her PCP deems it necessary  The risks and benefits of undergoing right total knee arthroplasty were discussed in the office today and consents were placed into the chart  See risk discussion below  Pre he and postoperative expectations were discussed   She will need repeat x-rays of her right knee with magnification marker in preparation for surgery that will be tentatively scheduled for 2 months from today's date as we await her smoking cessation efforts and her pre-admission testing  All of her questions were addressed here in the office today  She was introduced or surgical scheduler         Subjective: Activity-related right knee pain     Patient ID: Emily Reza is a 63 y o  female      HPI  Patient is a 53-year-old female here for a consultation request my partner Dr Favio Brown for my subspecialty of joint replacement surgery  She has been suffering from activity-related right knee pain for many years and months and has attempted multiple forms of conservative measures of treatment  Her pain is 8-10/10 on the pain scale on most days of the week  Pain is not relieved with rest and it is exacerbated with activity, walking, weight-bearing  The pain is starting medially on her knee and progressing diffusely throughout the entire knee  Pain is sharp and achy in nature  She denies any mechanical symptoms  She has attempted analgesia, physical therapy, maintaining appropriate weight, wearing a brace, use of assistive devices, steroid and viscosupplementation injections  She cannot take any anti-inflammatories as she has had a history of a GI bleed and peptic ulcer disease  She states her activity related right knee pain has not been having any pain relief from conservative measures of treatment and her pain continues to keep her from doing age-appropriate ADLs  She can no longer walk more than 5-10 blocks  She denies a history of DVT/PE, but she does have a history of a GI bleed and peptic ulcer disease  She recently had a squamous cell skin carcinoma removed from her right hand which ultimately had what she describes as a staph infection in that hand and thinks was MRSA  She smokes approximately half a pack a day         Review of Systems   Constitutional: Positive for activity change  HENT: Negative  Eyes: Negative  Respiratory: Negative  Cardiovascular: Negative  Gastrointestinal: Negative  Endocrine: Negative  hands on hips but no LOB, impulsivity to look into rooms he thinks are his causes slight LOB but he is able to correct; continue skilled PT to increase safety awareness, balance; continue POC as per PT   Family/Caregiver Present wife   PT Barriers   Physical Impairment Impaired balance;Decreased mobility; Decreased coordination;Decreased cognition; Impaired judgement;Decreased safety awareness; Impaired vision;Obesity; Decreased skin integrity   Functional Limitation Walking;Transfers   Plan   Treatment/Interventions Functional transfer training;LE strengthening/ROM; Elevations; Therapeutic exercise; Endurance training;Cognitive reorientation;Patient/family training;Bed mobility;Gait training   Progress Progressing toward goals   Recommendation   Recommendation 24 hour supervision/assist;Outpatient PT; Home with family support   Equipment Recommended Walker   PT Therapy Minutes   PT Time In 1230   PT Time Out 1400   PT Total Time (minutes) 90   PT Mode of treatment - Individual (minutes) 90   PT Mode of treatment - Concurrent (minutes) 0   PT Mode of treatment - Group (minutes) 0   PT Mode of treatment - Co-treat (minutes) 0   PT Mode of Teatment - Total time(minutes) 90 minutes   Therapy Time missed   Time missed?  No Musculoskeletal: Positive for arthralgias and gait problem  Skin: Negative  Psychiatric/Behavioral: Negative              Medical History        Past Medical History:   Diagnosis Date    Amenorrhea      Anesthesia complication       hx of cocaine use-none in 5 yrs    Anxiety      Arthritis       bilateral knees-DJD, arthritis neck and back-cervical injection 2017    Bronchitis, chronic (HCC)      Cancer (HCC)       squamous-removed from the right foot    Chronic pain disorder       neck and back    COPD (chronic obstructive pulmonary disease) (HCC)      Depression      Eczema       last assessed 6/27/16, resolved 10/2/17    Fibromyalgia, primary      H/O ETOH abuse       None in the past 10 months since 8/1/17    Lumbar radiculopathy       resolved 6/6/17    Malignant melanoma of skin (HCC)      Migraine      MRSA (methicillin resistant Staphylococcus aureus) infection       last assessed 4/29/16    Raynauds syndrome      Spinal stenosis              Surgical History         Past Surgical History:   Procedure Laterality Date    ARTHROSCOPY KNEE Right 6/8/2017     Procedure: RIGHT KNEE ARTHROSCOPY MEDIAL MENISCECTOMY;  Surgeon: Phani Dewey MD;  Location: [facility] MAIN OR;  Service:    Darcus Infante SECTION   1987    COLONOSCOPY        COLONOSCOPY N/A 1/25/2018     Procedure: COLONOSCOPY;  Surgeon: Violette Griffith MD;  Location: [facility] GI LAB; Service: Gastroenterology    DILATION AND CURETTAGE OF UTERUS         x 2 miscarriage    EPIDURAL BLOCK INJECTION N/A 12/8/2017     Procedure: INJECTION EPIDURAL STEROID CERVICAL C6-C7;  Surgeon: Sarbjit Downs MD;  Location: [facility] MAIN OR;  Service: Pain Management     ESOPHAGOGASTRODUODENOSCOPY N/A 1/25/2018     Procedure: ESOPHAGOGASTRODUODENOSCOPY (EGD); Surgeon: Violette Griffith MD;  Location: [facility] GI LAB;   Service: Gastroenterology    FOOT SURGERY         squamous removed from the right    HAND SURGERY Bilateral       arthroplasty -thumb joint    last assessed 6/6/17    HERNIA REPAIR         inguinal    WISDOM TOOTH EXTRACTION                       Family History   Problem Relation Age of Onset    Peripheral vascular disease Mother      Arthritis Mother         osteo arthritis    Coronary artery disease Mother         Atherosclerosis    Alcohol abuse Mother      Stroke Mother         CVA    Transient ischemic attack Mother      Heart disease Father      Peripheral vascular disease Father      Stroke Father         CVA    Arthritis Sister         rheumatoid    Alcohol abuse Sister           Social History          Occupational History    Not on file              Social History Main Topics    Smoking status: Current Every Day Smoker       Packs/day: 0 50       Years: 10 00       Types: Cigarettes    Smokeless tobacco: Never Used         Comment: on and off for 40 yrs    Alcohol use No         Comment: hx of ETOH abuse-2016 / Alcoholism in recovery     Drug use:  No         Comment: quit 2007    Sexual activity: Not on file            Current Outpatient Prescriptions:     albuterol (PROVENTIL HFA,VENTOLIN HFA) 90 mcg/act inhaler, Inhale 2 puffs every 6 (six) hours as needed for wheezing, Disp: , Rfl:     buprenorphine (SUBUTEX) 8 mg, 8 mg 2 (two) times a day Oral , Disp: , Rfl:     Calcium Carbonate-Vit D-Min (CALCIUM 1200 PO), Take 2 tablets by mouth daily at bedtime, Disp: , Rfl:     COD LIVER OIL PO, Take by mouth daily at bedtime, Disp: , Rfl:     DULoxetine (CYMBALTA) 30 mg delayed release capsule, TAKE 1 CAPSULE BY MOUTH TWICE A DAY, Disp: 60 capsule, Rfl: 2    fluticasone-salmeterol (ADVAIR) 250-50 mcg/dose inhaler, Inhale 1 puff daily at bedtime  , Disp: , Rfl:     multivitamin (THERAGRAN) TABS, Take 1 tablet by mouth daily at bedtime, Disp: , Rfl:     Omega-3 Fatty Acids (FISH OIL) 1,000 mg, Take 1,000 mg by mouth daily at bedtime, Disp: , Rfl:     pantoprazole (PROTONIX) 40 mg tablet, Take 1 tablet by mouth 2 (two) times a day, Disp: 60 tablet, Rfl: 3    SUMAtriptan (IMITREX) 100 mg tablet, Take 100 mg by mouth once as needed for migraine, Disp: , Rfl:     Turmeric 500 MG TABS, Take by mouth daily at bedtime, Disp: , Rfl:            Allergies   Allergen Reactions    Bee Venom Anaphylaxis    Ibuprofen Swelling       And any anti-inflammatories, NSAIDS-causes severe diarrhea  Facial swelling    Antihistamines, Chlorpheniramine-Type         "jumpy"         Objective:      Vitals:     03/15/18 1009   BP: 125/66   Pulse: 80         Body mass index is 25 15 kg/m²      Right Knee Exam      Tenderness   The patient is experiencing tenderness in the lateral joint line, medial joint line and patella      Range of Motion   Right knee extension: Two  Flexion: 120      Tests   Radu:  Medial - negative Lateral - negative  Drawer:       Anterior - negative    Posterior - negative  Varus: negative  Valgus: negative  Patellar Apprehension: negative     Other   Erythema: absent  Scars: present  Sensation: normal  Pulse: present  Swelling: none  Other tests: no effusion present     Comments:  Crepitance on AROM and PROM-parapatellar  +PFG  No increased warmth of knee  Knee is stable at 0, 30, 90 degrees  Prior arthroscopy scars well healed           Observations      Right Knee   Negative for effusion          Physical Exam   Constitutional: She is oriented to person, place, and time  She appears well-developed  HENT:   Head: Atraumatic  Eyes: EOM are normal    Neck: Neck supple  Cardiovascular: Normal rate  Pulmonary/Chest: Effort normal    Musculoskeletal:        Right knee: She exhibits no effusion  See orthopedic exam   Neurological: She is alert and oriented to person, place, and time  Skin: Skin is warm and dry  Psychiatric: She has a normal mood and affect  Nursing note and vitals reviewed         I have personally reviewed pertinent films in PACS     X-rays with a magnification marker demonstrate severe end-stage osteoarthritis of the right knee and varus pattern  With near bone-on-bone contact and osteophyte formation, sclerosis, and early subchondral cyst formation  No lytic or blastic lesions appreciated      The patient was counseled in detail regarding the diagnosis, the treatment options available, the prognosis of each treatment option, the potential risks and complications  These are, but are not limited to; deep vein thrombosis, pulmonary embolism, neurologic and vascular injury, infection, instability, leg length discrepancy, dislocation, hematoma, reflex sympathetic dystrophy, loss of range of motion, ankylosis of the knee, fracture, screw or prosthetic perforation, chronic pain, acute pain, chronic leg pain and edema, loosening, death, heart attack, and stroke  The patient's questions were answered in detail  The patient demonstrates understanding of these risks and wishes to proceed with surgery      Lani LOVE    Division of Adult Reconstruction  Department of [facility]

## 2018-08-24 ENCOUNTER — OFFICE VISIT (OUTPATIENT)
Dept: FAMILY MEDICINE CLINIC | Facility: CLINIC | Age: 63
End: 2018-08-24
Payer: MEDICARE

## 2018-08-24 VITALS
BODY MASS INDEX: 23.63 KG/M2 | RESPIRATION RATE: 16 BRPM | HEART RATE: 78 BPM | WEIGHT: 147 LBS | HEIGHT: 66 IN | TEMPERATURE: 97.3 F | SYSTOLIC BLOOD PRESSURE: 96 MMHG | DIASTOLIC BLOOD PRESSURE: 62 MMHG

## 2018-08-24 DIAGNOSIS — M25.562 CHRONIC PAIN OF BOTH KNEES: ICD-10-CM

## 2018-08-24 DIAGNOSIS — G89.29 CHRONIC PAIN OF BOTH KNEES: ICD-10-CM

## 2018-08-24 DIAGNOSIS — M25.561 CHRONIC PAIN OF BOTH KNEES: ICD-10-CM

## 2018-08-24 DIAGNOSIS — E78.5 HYPERLIPIDEMIA, UNSPECIFIED HYPERLIPIDEMIA TYPE: ICD-10-CM

## 2018-08-24 DIAGNOSIS — R53.82 CHRONIC FATIGUE: ICD-10-CM

## 2018-08-24 DIAGNOSIS — R73.09 ABNORMAL GLUCOSE: ICD-10-CM

## 2018-08-24 DIAGNOSIS — F10.10 ALCOHOL ABUSE, EPISODIC: Primary | ICD-10-CM

## 2018-08-24 PROCEDURE — 99214 OFFICE O/P EST MOD 30 MIN: CPT | Performed by: FAMILY MEDICINE

## 2018-08-24 RX ORDER — MELATONIN
1000 DAILY
COMMUNITY
End: 2019-06-11

## 2018-08-28 ENCOUNTER — APPOINTMENT (OUTPATIENT)
Dept: LAB | Facility: CLINIC | Age: 63
End: 2018-08-28
Payer: MEDICARE

## 2018-08-28 DIAGNOSIS — R53.82 CHRONIC FATIGUE: ICD-10-CM

## 2018-08-28 DIAGNOSIS — F10.10 ALCOHOL ABUSE, EPISODIC: ICD-10-CM

## 2018-08-28 DIAGNOSIS — R73.09 ABNORMAL GLUCOSE: ICD-10-CM

## 2018-08-28 DIAGNOSIS — E78.5 HYPERLIPIDEMIA, UNSPECIFIED HYPERLIPIDEMIA TYPE: ICD-10-CM

## 2018-08-28 LAB
ALBUMIN SERPL BCP-MCNC: 3.9 G/DL (ref 3.5–5)
ALP SERPL-CCNC: 68 U/L (ref 46–116)
ALT SERPL W P-5'-P-CCNC: 20 U/L (ref 12–78)
AMYLASE SERPL-CCNC: 50 IU/L (ref 25–115)
ANION GAP SERPL CALCULATED.3IONS-SCNC: 7 MMOL/L (ref 4–13)
AST SERPL W P-5'-P-CCNC: 18 U/L (ref 5–45)
BILIRUB SERPL-MCNC: 0.63 MG/DL (ref 0.2–1)
BUN SERPL-MCNC: 14 MG/DL (ref 5–25)
CALCIUM SERPL-MCNC: 9.5 MG/DL (ref 8.3–10.1)
CHLORIDE SERPL-SCNC: 103 MMOL/L (ref 100–108)
CHOLEST SERPL-MCNC: 216 MG/DL (ref 50–200)
CO2 SERPL-SCNC: 30 MMOL/L (ref 21–32)
CREAT SERPL-MCNC: 0.86 MG/DL (ref 0.6–1.3)
ERYTHROCYTE [DISTWIDTH] IN BLOOD BY AUTOMATED COUNT: 13.1 % (ref 11.6–15.1)
EST. AVERAGE GLUCOSE BLD GHB EST-MCNC: 103 MG/DL
FOLATE SERPL-MCNC: >20 NG/ML (ref 3.1–17.5)
GFR SERPL CREATININE-BSD FRML MDRD: 73 ML/MIN/1.73SQ M
GLUCOSE P FAST SERPL-MCNC: 85 MG/DL (ref 65–99)
HBA1C MFR BLD: 5.2 % (ref 4.2–6.3)
HCT VFR BLD AUTO: 38.1 % (ref 34.8–46.1)
HDLC SERPL-MCNC: 92 MG/DL (ref 40–60)
HGB BLD-MCNC: 11.9 G/DL (ref 11.5–15.4)
LDLC SERPL CALC-MCNC: 112 MG/DL (ref 0–100)
LIPASE SERPL-CCNC: 96 U/L (ref 73–393)
MCH RBC QN AUTO: 30.3 PG (ref 26.8–34.3)
MCHC RBC AUTO-ENTMCNC: 31.2 G/DL (ref 31.4–37.4)
MCV RBC AUTO: 97 FL (ref 82–98)
NONHDLC SERPL-MCNC: 124 MG/DL
PLATELET # BLD AUTO: 361 THOUSANDS/UL (ref 149–390)
PMV BLD AUTO: 10.5 FL (ref 8.9–12.7)
POTASSIUM SERPL-SCNC: 4.2 MMOL/L (ref 3.5–5.3)
PROT SERPL-MCNC: 7.5 G/DL (ref 6.4–8.2)
RBC # BLD AUTO: 3.93 MILLION/UL (ref 3.81–5.12)
SODIUM SERPL-SCNC: 140 MMOL/L (ref 136–145)
TRIGL SERPL-MCNC: 61 MG/DL
TSH SERPL DL<=0.05 MIU/L-ACNC: 2.44 UIU/ML (ref 0.36–3.74)
VIT B12 SERPL-MCNC: 387 PG/ML (ref 100–900)
WBC # BLD AUTO: 7.09 THOUSAND/UL (ref 4.31–10.16)

## 2018-08-28 PROCEDURE — 36415 COLL VENOUS BLD VENIPUNCTURE: CPT

## 2018-08-28 PROCEDURE — 82150 ASSAY OF AMYLASE: CPT

## 2018-08-28 PROCEDURE — 84443 ASSAY THYROID STIM HORMONE: CPT

## 2018-08-28 PROCEDURE — 85027 COMPLETE CBC AUTOMATED: CPT

## 2018-08-28 PROCEDURE — 83036 HEMOGLOBIN GLYCOSYLATED A1C: CPT

## 2018-08-28 PROCEDURE — 83690 ASSAY OF LIPASE: CPT

## 2018-08-28 PROCEDURE — 80053 COMPREHEN METABOLIC PANEL: CPT

## 2018-08-28 PROCEDURE — 82607 VITAMIN B-12: CPT

## 2018-08-28 PROCEDURE — 80061 LIPID PANEL: CPT

## 2018-08-28 PROCEDURE — 82746 ASSAY OF FOLIC ACID SERUM: CPT

## 2018-08-28 NOTE — PROGRESS NOTES
Assessment/Plan:   Diagnoses and all orders for this visit:    Alcohol abuse, episodic  Comments:  cont to encourage alchohol abstinence and cont support from AA,sponsor, family/friends  Obtain hospital/detox reports  cont daily mvi/b12/folate supps  Orders:  -     Vitamin B12; Future  -     Folate; Future    Hyperlipidemia, unspecified hyperlipidemia type  Comments:  check lipid prof and thyroid fxn, maintain low chol diet, exercise as tolerated  Orders:  -     Comprehensive metabolic panel; Future  -     Amylase; Future  -     Lipase; Future  -     Lipid panel; Future  -     TSH, 3rd generation; Future    Chronic fatigue  Comments:  review labs, cont daily supplements  Orders:  -     Comprehensive metabolic panel; Future  -     CBC and Platelet; Future    Abnormal glucose  Comments:  check hga1c  Orders:  -     Comprehensive metabolic panel; Future  -     Hemoglobin A1C; Future    Chronic pain of both knees  Comments:  ref for ortho given--pt req Dr aSroj Fernandez  Orders:  -     Ambulatory referral to Orthopedic Surgery; Future    Other orders  -     cholecalciferol (VITAMIN D3) 1,000 units tablet; Take 1,000 Units by mouth daily            Subjective:      Patient ID: Victoriano Alford is a 66 y o  female  Chief Complaint   Patient presents with    Knee Pain     discuss    Follow-up     discuss 7/16 ED visit        67 yo pt in for follow-up  Relapse with alcohol use since last seen  No specific trigger  Checked herself in with her sponsor to inpatient detox program at [facility]  Doing well now  Attending AA mtgs,  Denies cp, abd pain  Denies any inc anxiety/depression sxs  C/o ongoing prob w knee pain--req referral to ortho --Dr Kyle Dears        The following portions of the patient's history were reviewed and updated as appropriate: allergies, current medications, past family history, past medical history, past social history, past surgical history and problem list      Review of Systems Constitutional: Positive for fatigue  Negative for fever  Respiratory: Negative  Cardiovascular: Negative  Gastrointestinal: Negative  Musculoskeletal: Positive for arthralgias and myalgias  Skin: Negative for rash  Neurological: Positive for headaches  Psychiatric/Behavioral: Positive for decreased concentration and sleep disturbance  The patient is nervous/anxious  Objective:    BP 96/62 (BP Location: Left arm, Patient Position: Sitting, Cuff Size: Standard)   Pulse 78   Temp (!) 97 3 °F (36 3 °C)   Resp 16   Ht 5' 6" (1 676 m)   Wt 66 7 kg (147 lb)   BMI 23 73 kg/m²        Physical Exam   Constitutional: She is oriented to person, place, and time  Chronically ill, NAD, AAOx3   HENT:   Head: Normocephalic and atraumatic  Mouth/Throat: No oropharyngeal exudate  Eyes: Conjunctivae are normal    Neck: Neck supple  Cardiovascular: Normal rate and regular rhythm  Pulmonary/Chest: Effort normal and breath sounds normal    Abdominal: Soft  Bowel sounds are normal  There is no tenderness  Musculoskeletal: She exhibits tenderness  Neurological: She is alert and oriented to person, place, and time  A cranial nerve deficit is present  Skin: Skin is warm and dry  No jaundice or acute lesion   Psychiatric: She has a normal mood and affect  Nursing note and vitals reviewed  Labs;  Labs in chart were reviewed        Gideon Adler MD

## 2018-09-03 DIAGNOSIS — F34.1 DYSTHYMIA: ICD-10-CM

## 2018-09-04 RX ORDER — DULOXETIN HYDROCHLORIDE 30 MG/1
CAPSULE, DELAYED RELEASE ORAL
Qty: 60 CAPSULE | Refills: 3 | Status: SHIPPED | OUTPATIENT
Start: 2018-09-04 | End: 2019-02-27 | Stop reason: SDUPTHER

## 2018-09-25 ENCOUNTER — APPOINTMENT (OUTPATIENT)
Dept: LAB | Facility: CLINIC | Age: 63
End: 2018-09-25
Payer: MEDICARE

## 2018-09-25 ENCOUNTER — TRANSCRIBE ORDERS (OUTPATIENT)
Dept: LAB | Facility: CLINIC | Age: 63
End: 2018-09-25

## 2018-09-25 ENCOUNTER — OFFICE VISIT (OUTPATIENT)
Dept: FAMILY MEDICINE CLINIC | Facility: CLINIC | Age: 63
End: 2018-09-25
Payer: MEDICARE

## 2018-09-25 VITALS
DIASTOLIC BLOOD PRESSURE: 60 MMHG | BODY MASS INDEX: 23.89 KG/M2 | TEMPERATURE: 97.2 F | WEIGHT: 148 LBS | RESPIRATION RATE: 14 BRPM | HEART RATE: 84 BPM | SYSTOLIC BLOOD PRESSURE: 92 MMHG

## 2018-09-25 DIAGNOSIS — R21 RASH: ICD-10-CM

## 2018-09-25 DIAGNOSIS — R23.3 PETECHIAE: Primary | ICD-10-CM

## 2018-09-25 DIAGNOSIS — F10.21 HISTORY OF ALCOHOL DEPENDENCE (HCC): ICD-10-CM

## 2018-09-25 DIAGNOSIS — K59.1 FUNCTIONAL DIARRHEA: ICD-10-CM

## 2018-09-25 DIAGNOSIS — R23.3 PETECHIAE: ICD-10-CM

## 2018-09-25 LAB
ALBUMIN SERPL BCP-MCNC: 3.7 G/DL (ref 3.5–5)
ALP SERPL-CCNC: 65 U/L (ref 46–116)
ALT SERPL W P-5'-P-CCNC: 21 U/L (ref 12–78)
APTT PPP: 30 SECONDS (ref 24–36)
AST SERPL W P-5'-P-CCNC: 25 U/L (ref 5–45)
BILIRUB DIRECT SERPL-MCNC: 0.17 MG/DL (ref 0–0.2)
BILIRUB SERPL-MCNC: 0.45 MG/DL (ref 0.2–1)
INR PPP: 0.92 (ref 0.86–1.17)
PROT SERPL-MCNC: 7.2 G/DL (ref 6.4–8.2)
PROTHROMBIN TIME: 12.5 SECONDS (ref 11.8–14.2)

## 2018-09-25 PROCEDURE — 85730 THROMBOPLASTIN TIME PARTIAL: CPT

## 2018-09-25 PROCEDURE — 80076 HEPATIC FUNCTION PANEL: CPT

## 2018-09-25 PROCEDURE — 85610 PROTHROMBIN TIME: CPT

## 2018-09-25 PROCEDURE — 86618 LYME DISEASE ANTIBODY: CPT

## 2018-09-25 PROCEDURE — 99213 OFFICE O/P EST LOW 20 MIN: CPT | Performed by: NURSE PRACTITIONER

## 2018-09-25 PROCEDURE — 36415 COLL VENOUS BLD VENIPUNCTURE: CPT

## 2018-09-25 RX ORDER — METHYLPHENIDATE HYDROCHLORIDE 10 MG/1
10 CAPSULE, EXTENDED RELEASE ORAL EVERY MORNING
COMMUNITY
Start: 2018-09-18 | End: 2019-04-18

## 2018-09-25 RX ORDER — BUTALBITAL, ACETAMINOPHEN AND CAFFEINE 50; 325; 40 MG/1; MG/1; MG/1
1 TABLET ORAL EVERY 4 HOURS PRN
COMMUNITY
End: 2019-03-28 | Stop reason: ALTCHOICE

## 2018-09-25 NOTE — PATIENT INSTRUCTIONS
Chronic Diarrhea   AMBULATORY CARE:   Chronic diarrhea  lasts more than 4 weeks  You may have 3 or more episodes of diarrhea each day  Signs and symptoms that may happen with chronic diarrhea:   · Abdominal tenderness     · Nausea and vomiting    · Urgent need to have a bowel movement, or loss of bowel control     · Weight loss    · Anal irritation and inflammation  Seek care immediately if:   · Your skin, mouth, and tongue are dry, and you feel very thirsty  · You have blood or pus in your bowel movement  · You have trouble eating, drinking, or keeping food down  · You have severe abdominal pain  · You feel lightheaded, weak, or you faint  · Your heart beats faster than normal or you have trouble breathing  · You are confused or cannot think clearly  Contact your healthcare provider if:   · You have a fever  · You have new symptoms  · Your symptoms do not improve, or they get worse  · You have questions or concerns about your condition or care  Treatment for chronic diarrhea  will depend on the condition causing your chronic diarrhea  Medicines may be given to treat an infection  Medicines may also be given to stop or slow the diarrhea  Medicines that are causing diarrhea may be stopped or changed  You may need to change your diet and avoid certain foods  Manage chronic diarrhea:   · Drink more liquids  to replace body fluids lost through diarrhea  You may also need to drink an oral rehydration solution (ORS)  An ORS has the right amounts of sugar, salt, and minerals in water to replace body fluids  ORS can be found at most grocery stores or pharmacies  Ask how much liquid to drink each day and which liquids are best for you  Do not drink liquids with caffeine or alcohol  These can increase your risk for dehydration  · Do not drink or eat foods that may make your symptoms worse    These include milk and dairy products, greasy and fatty foods, spicy foods, caffeine, and alcohol  Keep a food diary to see if your symptoms are caused by certain foods  Bring this to your follow-up visits  · Eat foods that are easy to digest   These include bananas, boiled potatoes, cooked carrots, cooked chicken, plain rice, and toast  You can also try yogurt and applesauce  · Wash your hands often  Germs on your hands can get into your mouth and cause diarrhea  Use soap and water  Use an alcohol-based hand rub if soap and water are not available  Wash your hands after you use the bathroom, change a child's diaper, or sneeze  Wash your hands before you prepare or eat food  Follow up with your healthcare provider as directed:  Write down your questions so you remember to ask them during your visits  © 2017 [employer] Information is for End User's use only and may not be sold, redistributed or otherwise used for commercial purposes  All illustrations and images included in CareNotes® are the copyrighted property of A [employer] M [employer]  or [employer]pherson  The above information is an  only  It is not intended as medical advice for individual conditions or treatments  Talk to your doctor, nurse or pharmacist before following any medical regimen to see if it is safe and effective for you

## 2018-09-25 NOTE — PROGRESS NOTES
Assessment/Plan:    Problem List Items Addressed This Visit     None      Visit Diagnoses     Petechiae    -  Primary    Relevant Orders    Protime-INR    APTT    Lyme Antibody Profile with reflex to WB    Functional diarrhea        Relevant Orders    Hepatic function panel    Rash        Relevant Orders    Protime-INR    APTT    Lyme Antibody Profile with reflex to WB    History of alcohol dependence (HCC)        Relevant Orders    Protime-INR    APTT    Hepatic function panel        Diarrhea likely r/t chronic laxative use  STOP laxatives  Supportive care for diarrhea  F/u if no better in 1 week  Must r/o liver d/o, abn coags as cause of petechiae  F/u after labs for recheck of legs  Call ASAP if rash changes or if new sxs develop such as bruising, bleeding, etc     I have reviewed the  instructions with the patient, answering all questions to her satisfaction  Patient Instructions   Chronic Diarrhea   AMBULATORY CARE:   Chronic diarrhea  lasts more than 4 weeks  You may have 3 or more episodes of diarrhea each day  Signs and symptoms that may happen with chronic diarrhea:   · Abdominal tenderness     · Nausea and vomiting    · Urgent need to have a bowel movement, or loss of bowel control     · Weight loss    · Anal irritation and inflammation  Seek care immediately if:   · Your skin, mouth, and tongue are dry, and you feel very thirsty  · You have blood or pus in your bowel movement  · You have trouble eating, drinking, or keeping food down  · You have severe abdominal pain  · You feel lightheaded, weak, or you faint  · Your heart beats faster than normal or you have trouble breathing  · You are confused or cannot think clearly  Contact your healthcare provider if:   · You have a fever  · You have new symptoms  · Your symptoms do not improve, or they get worse  · You have questions or concerns about your condition or care    Treatment for chronic diarrhea  will depend on the condition causing your chronic diarrhea  Medicines may be given to treat an infection  Medicines may also be given to stop or slow the diarrhea  Medicines that are causing diarrhea may be stopped or changed  You may need to change your diet and avoid certain foods  Manage chronic diarrhea:   · Drink more liquids  to replace body fluids lost through diarrhea  You may also need to drink an oral rehydration solution (ORS)  An ORS has the right amounts of sugar, salt, and minerals in water to replace body fluids  ORS can be found at most grocery stores or pharmacies  Ask how much liquid to drink each day and which liquids are best for you  Do not drink liquids with caffeine or alcohol  These can increase your risk for dehydration  · Do not drink or eat foods that may make your symptoms worse  These include milk and dairy products, greasy and fatty foods, spicy foods, caffeine, and alcohol  Keep a food diary to see if your symptoms are caused by certain foods  Bring this to your follow-up visits  · Eat foods that are easy to digest   These include bananas, boiled potatoes, cooked carrots, cooked chicken, plain rice, and toast  You can also try yogurt and applesauce  · Wash your hands often  Germs on your hands can get into your mouth and cause diarrhea  Use soap and water  Use an alcohol-based hand rub if soap and water are not available  Wash your hands after you use the bathroom, change a child's diaper, or sneeze  Wash your hands before you prepare or eat food  Follow up with your healthcare provider as directed:  Write down your questions so you remember to ask them during your visits  © 2017 [employer] [employer] Information is for End User's use only and may not be sold, redistributed or otherwise used for commercial purposes  All illustrations and images included in CareNotes® are the copyrighted property of [employer]  or [employer]pherson    The above information is an  only  It is not intended as medical advice for individual conditions or treatments  Talk to your doctor, nurse or pharmacist before following any medical regimen to see if it is safe and effective for you  No Follow-up on file  Subjective:      Patient ID: Alicia Mccormack is a 66 y o  female  Chief Complaint   Patient presents with    Rash     pt  c/o rash on lower legs not itching       67 yo female with h/o alcohol abuse presents with c/o diarrhea x4 days  Also c/o "rash" on legs that appeared after prolonged walking at a festival     Diarrhea started after several doses of laxatives for constipation  She reports stool is of normal color, smell "just loose"  She has not adjusted diet  She has not been on recent antibiotics  No recent travel, exposure to similar sxs, ingestion of suspicious foods  Denies blood in stool, fever, abd pain  Rash=small red spots on lower legs  Not raised, itchy, spreading  +h/o of similar in past  Last etoh use was approx 30 days ago  Denies h/o bleeding disorder  Takes no blood thinners        The following portions of the patient's history were reviewed and updated as appropriate: allergies, current medications, past family history, past medical history, past social history, past surgical history and problem list     Review of Systems   Constitutional: Negative  Gastrointestinal: Positive for diarrhea  Negative for abdominal pain, blood in stool, constipation, nausea and vomiting  Genitourinary: Negative for difficulty urinating and dysuria  Skin: Positive for rash  Hematological: Negative            Current Outpatient Prescriptions   Medication Sig Dispense Refill    albuterol (PROVENTIL HFA,VENTOLIN HFA) 90 mcg/act inhaler Inhale 2 puffs every 6 (six) hours as needed for wheezing      buprenorphine (SUBUTEX) 8 mg 8 mg 2 (two) times a day Oral       butalbital-acetaminophen-caffeine (FIORICET,ESGIC) -40 mg per tablet Take 1 tablet by mouth every 4 (four) hours as needed for headaches      cholecalciferol (VITAMIN D3) 1,000 units tablet Take 1,000 Units by mouth daily      DULoxetine (CYMBALTA) 30 mg delayed release capsule take 1 capsule by mouth twice a day 60 capsule 3    fluticasone-salmeterol (ADVAIR) 250-50 mcg/dose inhaler Inhale 1 puff 2 (two) times a day        methylphenidate (RITALIN LA) 10 MG 24 hr capsule Take 10 mg by mouth every morning        multivitamin (THERAGRAN) TABS Take 1 tablet by mouth daily at bedtime      Omega-3 Fatty Acids (FISH OIL) 1,000 mg Take 1,000 mg by mouth daily at bedtime      SUMAtriptan (IMITREX) 100 mg tablet Take 100 mg by mouth once as needed for migraine      Turmeric 500 MG TABS Take by mouth daily at bedtime      mupirocin (BACTROBAN) 2 % ointment Apply topically 3 (three) times a day (Patient not taking: Reported on 9/25/2018 ) 22 g 0    pantoprazole (PROTONIX) 40 mg tablet Take 1 tablet by mouth 2 (two) times a day (Patient not taking: Reported on 9/25/2018 ) 60 tablet 3    psyllium (METAMUCIL) 58 6 % powder Take 1 packet by mouth 2 (two) times a day       No current facility-administered medications for this visit  Objective:    BP 92/60 (BP Location: Left arm, Patient Position: Sitting, Cuff Size: Standard)   Pulse 84   Temp (!) 97 2 °F (36 2 °C) (Temporal)   Resp 14   Wt 67 1 kg (148 lb)   BMI 23 89 kg/m²        Physical Exam   Constitutional: She is oriented to person, place, and time  Vital signs are normal  She appears well-developed and well-nourished  No distress  Cardiovascular: Normal rate and regular rhythm  Pulmonary/Chest: Effort normal and breath sounds normal    Abdominal: Soft  Bowel sounds are normal  There is no hepatosplenomegaly  There is no tenderness  Musculoskeletal: She exhibits no edema or tenderness  Neurological: She is alert and oriented to person, place, and time     Skin:   Petechiae on lower legs   Nursing note and vitals reviewed               Tarik Holman, [address]

## 2018-09-26 ENCOUNTER — TELEPHONE (OUTPATIENT)
Dept: FAMILY MEDICINE CLINIC | Facility: CLINIC | Age: 63
End: 2018-09-26

## 2018-09-26 LAB
B BURGDOR IGG SER IA-ACNC: 0.12
B BURGDOR IGM SER IA-ACNC: 0.28

## 2018-09-26 NOTE — TELEPHONE ENCOUNTER
----- Message from [facility] Hitchcock [facility] sent at 9/26/2018  8:09 AM EDT -----  Blood work is acceptable  Follow up as scheduled

## 2018-12-31 ENCOUNTER — OFFICE VISIT (OUTPATIENT)
Dept: FAMILY MEDICINE CLINIC | Facility: CLINIC | Age: 63
End: 2018-12-31
Payer: MEDICARE

## 2018-12-31 VITALS
HEIGHT: 66 IN | BODY MASS INDEX: 22.6 KG/M2 | WEIGHT: 140.6 LBS | RESPIRATION RATE: 16 BRPM | HEART RATE: 78 BPM | DIASTOLIC BLOOD PRESSURE: 70 MMHG | TEMPERATURE: 97.4 F | OXYGEN SATURATION: 97 % | SYSTOLIC BLOOD PRESSURE: 120 MMHG

## 2018-12-31 DIAGNOSIS — J44.1 CHRONIC OBSTRUCTIVE PULMONARY DISEASE WITH ACUTE EXACERBATION (HCC): Primary | ICD-10-CM

## 2018-12-31 PROCEDURE — 99214 OFFICE O/P EST MOD 30 MIN: CPT | Performed by: FAMILY MEDICINE

## 2018-12-31 RX ORDER — GABAPENTIN 300 MG/1
300 CAPSULE ORAL 3 TIMES DAILY
Refills: 0 | COMMUNITY
Start: 2018-12-13 | End: 2019-02-27 | Stop reason: SDUPTHER

## 2018-12-31 RX ORDER — PREDNISONE 10 MG/1
TABLET ORAL
Qty: 30 TABLET | Refills: 0 | Status: SHIPPED | OUTPATIENT
Start: 2018-12-31 | End: 2019-03-28 | Stop reason: ALTCHOICE

## 2018-12-31 RX ORDER — METHYLPHENIDATE HYDROCHLORIDE 20 MG/1
CAPSULE, EXTENDED RELEASE ORAL
COMMUNITY
Start: 2018-12-20 | End: 2020-08-27

## 2018-12-31 RX ORDER — ALBUTEROL SULFATE 90 UG/1
2 AEROSOL, METERED RESPIRATORY (INHALATION) EVERY 6 HOURS PRN
Qty: 1 INHALER | Refills: 0 | Status: SHIPPED | OUTPATIENT
Start: 2018-12-31 | End: 2019-02-16 | Stop reason: SDUPTHER

## 2018-12-31 RX ORDER — AZITHROMYCIN 250 MG/1
TABLET, FILM COATED ORAL
Qty: 6 TABLET | Refills: 0 | Status: SHIPPED | OUTPATIENT
Start: 2018-12-31 | End: 2019-01-04

## 2018-12-31 NOTE — PROGRESS NOTES
Wood Huffman 1955 female MRN: [id number]    FAMILY PRACTICE ACUTE OFFICE VISIT  [facility] - [address]      ASSESSMENT/PLAN  Wood Huffman is a 66 y o  female presents to the office for    1  Chronic obstructive pulmonary disease with acute exacerbation ([address] )  -concerning given her diffuse wheezing on exam   Encouraged to use her home Advair  Use albuterol as needed for wheezing  Started on a prednisone taper with azithromycin  Likely was induced by a viral infection  If symptoms worsen would recommend the patient go to the emergency room    - fluticasone-salmeterol (ADVAIR) 250-50 mcg/dose inhaler; Inhale 1 puff 2 (two) times a day  Dispense: 1 Inhaler; Refill: 0  - albuterol (PROVENTIL HFA,VENTOLIN HFA) 90 mcg/act inhaler; Inhale 2 puffs every 6 (six) hours as needed for wheezing  Dispense: 1 Inhaler; Refill: 0  - predniSONE 10 mg tablet; 4 tablets x 3, 3 tablets x 3, 2 tablets x 3, then 1 tablets x 3  Dispense: 30 tablet; Refill: 0  - azithromycin (ZITHROMAX) 250 mg tablet; Take 2 tablets today then 1 tablet daily x 4 days  Dispense: 6 tablet; Refill: 0     Disposition:  Return to the office in 1 week for re-evaluation    No future appointments  SUBJECTIVE  CC: URI (congestion , sob, headache)      HPI:  Wood Huffman is a 66 y o  female who presents for an acute appointment  Patient comes in for an upper respiratory like symptoms  Complaining of shortness of breath, headaches, cough with green phlegm, congestion  Patient is an active smoker of a half a pack a day  Patient does have pulmonary disease which she takes Advair for an albuterol as needed  Patient states that the rescue inhaler has not been helping appropriately  Review of Systems   Constitutional: Negative for activity change, appetite change, chills, fatigue and fever  HENT: Positive for congestion  Eyes: Negative for visual disturbance     Respiratory: Positive for cough and shortness of breath  Negative for chest tightness  Cardiovascular: Negative for chest pain and leg swelling  Gastrointestinal: Negative for abdominal distention, abdominal pain, constipation, diarrhea, nausea and vomiting  Allergic/Immunologic: Negative for environmental allergies  Neurological: Positive for headaches  Negative for dizziness and light-headedness  All other systems reviewed and are negative  Historical Information   The patient history was reviewed as follows:  Past Medical History:   Diagnosis Date    Amenorrhea     Anesthesia complication     hx of cocaine use-none in 5 yrs    Anxiety     Arthritis     bilateral knees-DJD, arthritis neck and back-cervical injection 2017    Bronchitis, chronic (HCC)     Cancer ([facility] [address] )     squamous-removed from the right foot    Chronic pain disorder     neck and back    COPD (chronic obstructive pulmonary disease) ([facility] [address] )     Depression     Eczema     last assessed 6/27/16, resolved 10/2/17    Fibromyalgia, primary     H/O ETOH abuse     None in the past 10 months since 8/1/17    Lumbar radiculopathy     resolved 6/6/17    Malignant melanoma of skin ([facility])     Migraine     MRSA (methicillin resistant Staphylococcus aureus) infection     last assessed 4/29/16    Raynauds syndrome     Spinal stenosis          Past Surgical History:   Procedure Laterality Date    ARTHROSCOPY KNEE Right 6/8/2017    Procedure: RIGHT KNEE ARTHROSCOPY MEDIAL MENISCECTOMY;  Surgeon: Priscilla Ramon MD;  Location: [facility] MAIN OR;  Service:    [address] [place] [address]    COLONOSCOPY      COLONOSCOPY N/A 1/25/2018    Procedure: COLONOSCOPY;  Surgeon: Jacoby Garcia MD;  Location: [place] [address] GI LAB;   Service: Gastroenterology    DILATION AND CURETTAGE OF UTERUS      x 2 miscarriage    EPIDURAL BLOCK INJECTION N/A 12/8/2017    Procedure: INJECTION EPIDURAL STEROID CERVICAL C6-C7;  Surgeon: Alex Sutton MD;  Location: [place] [address] MAIN OR; Service: Pain Management     ESOPHAGOGASTRODUODENOSCOPY N/A 1/25/2018    Procedure: ESOPHAGOGASTRODUODENOSCOPY (EGD); Surgeon: Kati Hannon MD;  Location: [facility] GI LAB;   Service: Gastroenterology    FOOT SURGERY      squamous removed from the right    HAND SURGERY Bilateral     arthroplasty -thumb joint    last assessed 6/6/17    HERNIA REPAIR      inguinal    WISDOM TOOTH EXTRACTION       Family History   Problem Relation Age of Onset    Peripheral vascular disease Mother     Arthritis Mother         osteo arthritis    Coronary artery disease Mother         Atherosclerosis    Alcohol abuse Mother     Stroke Mother         CVA    Transient ischemic attack Mother     Heart disease Father     Peripheral vascular disease Father     Stroke Father         CVA    Arthritis Sister         rheumatoid    Alcohol abuse Sister       Social History   History   Alcohol Use No     Comment: hx of ETOH abuse-2016 / Alcoholism in recovery      History   Drug Use    Types: Cocaine     Comment: quit 2007     History   Smoking Status    Current Every Day Smoker    Packs/day: 0 50    Years: 15 00    Types: Cigarettes   Smokeless Tobacco    Never Used     Comment: on and off for 40 yrs       Medications:     Current Outpatient Prescriptions:     albuterol (PROVENTIL HFA,VENTOLIN HFA) 90 mcg/act inhaler, Inhale 2 puffs every 6 (six) hours as needed for wheezing, Disp: 1 Inhaler, Rfl: 0    buprenorphine (SUBUTEX) 8 mg, 8 mg 2 (two) times a day Oral , Disp: , Rfl:     butalbital-acetaminophen-caffeine (FIORICET,ESGIC) -40 mg per tablet, Take 1 tablet by mouth every 4 (four) hours as needed for headaches, Disp: , Rfl:     cholecalciferol (VITAMIN D3) 1,000 units tablet, Take 1,000 Units by mouth daily, Disp: , Rfl:     DULoxetine (CYMBALTA) 30 mg delayed release capsule, take 1 capsule by mouth twice a day, Disp: 60 capsule, Rfl: 3    fluticasone-salmeterol (ADVAIR) 250-50 mcg/dose inhaler, Inhale 1 puff 2 (two) times a day, Disp: 1 Inhaler, Rfl: 0    gabapentin (NEURONTIN) 300 mg capsule, Take 300 mg by mouth 3 (three) times a day, Disp: , Rfl: 0    methylphenidate (RITALIN LA) 20 MG 24 hr capsule, , Disp: , Rfl:     multivitamin (THERAGRAN) TABS, Take 1 tablet by mouth daily at bedtime, Disp: , Rfl:     mupirocin (BACTROBAN) 2 % ointment, Apply topically 3 (three) times a day, Disp: 22 g, Rfl: 0    Omega-3 Fatty Acids (FISH OIL) 1,000 mg, Take 1,000 mg by mouth daily at bedtime, Disp: , Rfl:     pantoprazole (PROTONIX) 40 mg tablet, Take 1 tablet by mouth 2 (two) times a day, Disp: 60 tablet, Rfl: 3    psyllium (METAMUCIL) 58 6 % powder, Take 1 packet by mouth 2 (two) times a day, Disp: , Rfl:     SUMAtriptan (IMITREX) 100 mg tablet, Take 100 mg by mouth once as needed for migraine, Disp: , Rfl:     Turmeric 500 MG TABS, Take by mouth daily at bedtime, Disp: , Rfl:     azithromycin (ZITHROMAX) 250 mg tablet, Take 2 tablets today then 1 tablet daily x 4 days, Disp: 6 tablet, Rfl: 0    methylphenidate (RITALIN LA) 10 MG 24 hr capsule, Take 10 mg by mouth every morning  , Disp: , Rfl:     predniSONE 10 mg tablet, 4 tablets x 3, 3 tablets x 3, 2 tablets x 3, then 1 tablets x 3 , Disp: 30 tablet, Rfl: 0    Allergies   Allergen Reactions    Bee Venom Anaphylaxis    Ibuprofen Swelling     And any anti-inflammatories, NSAIDS-causes severe diarrhea  Facial swelling    Antihistamines, Chlorpheniramine-Type      "jumpy"       OBJECTIVE  Vitals:   Vitals:    12/31/18 1049   BP: 120/70   BP Location: Left arm   Patient Position: Sitting   Cuff Size: Standard   Pulse: 78   Resp: 16   Temp: (!) 97 4 °F (36 3 °C)   SpO2: 97%   Weight: 63 8 kg (140 lb 9 6 oz)   Height: 5' 6" (1 676 m)         Physical Exam   Constitutional: She is oriented to person, place, and time  Vital signs are normal  She appears well-developed and well-nourished  HENT:   Head: Normocephalic and atraumatic     Right Ear: Hearing normal  A middle ear effusion is present  Left Ear: Hearing normal  A middle ear effusion is present  Mouth/Throat: Posterior oropharyngeal erythema present  Eyes: Pupils are equal, round, and reactive to light  Conjunctivae and EOM are normal    Neck: Normal range of motion  Neck supple  Cardiovascular: Normal rate, regular rhythm, S1 normal, S2 normal, normal heart sounds and intact distal pulses  No murmur heard  Pulmonary/Chest: Effort normal  No respiratory distress  She has decreased breath sounds in the right lower field and the left lower field  She has wheezes in the right upper field, the right middle field, the right lower field, the left upper field, the left middle field and the left lower field  Abdominal: Soft  Bowel sounds are normal  She exhibits no distension  There is no tenderness  Musculoskeletal: Normal range of motion  She exhibits no edema  Neurological: She is alert and oriented to person, place, and time  She has normal strength  Skin: Skin is warm  No rash noted  Psychiatric: She has a normal mood and affect  Her speech is normal and behavior is normal  Judgment and thought content normal    Vitals reviewed         Petra Parekh MD,   [facility]  12/31/2018

## 2019-01-05 ENCOUNTER — TELEPHONE (OUTPATIENT)
Dept: FAMILY MEDICINE CLINIC | Facility: CLINIC | Age: 64
End: 2019-01-05

## 2019-01-06 NOTE — TELEPHONE ENCOUNTER
ON CALL:  Spoke w pt earlier in evening  Seen 12/31/18 by Dr Shannon Camarena rxs for zpk and steroid taper to alexis along w advair inh for dx COPD exacerb  sxs w some initial improvement but now worsening again-  Called in adtl abx-Doxycycline t take alng w remaining steroids and inhaler use  Pt to follow-up in office Monday-  If sxs worsen prior advised ER  Pt verbalized understanding

## 2019-02-16 DIAGNOSIS — J44.1 CHRONIC OBSTRUCTIVE PULMONARY DISEASE WITH ACUTE EXACERBATION (HCC): ICD-10-CM

## 2019-02-27 DIAGNOSIS — G89.4 CHRONIC PAIN SYNDROME: Primary | ICD-10-CM

## 2019-02-27 DIAGNOSIS — F34.1 DYSTHYMIA: ICD-10-CM

## 2019-02-27 NOTE — TELEPHONE ENCOUNTER
Dr Dumont Sat    Patient was at [facility] rehab for 17 days due to alcoholism  She came home about 3 weeks ago  Patient is requesting refill neurotin 300 mg  3 x daily and cymbalta 30 mg 2 x daily sent to [facility]  I scheduled hospital follow up for 3/4

## 2019-02-28 RX ORDER — DULOXETIN HYDROCHLORIDE 30 MG/1
30 CAPSULE, DELAYED RELEASE ORAL 2 TIMES DAILY
Qty: 60 CAPSULE | Refills: 3 | Status: SHIPPED | OUTPATIENT
Start: 2019-02-28 | End: 2020-07-23

## 2019-02-28 RX ORDER — GABAPENTIN 300 MG/1
300 CAPSULE ORAL 3 TIMES DAILY
Qty: 90 CAPSULE | Refills: 1 | Status: SHIPPED | OUTPATIENT
Start: 2019-02-28 | End: 2020-08-27

## 2019-03-28 ENCOUNTER — OFFICE VISIT (OUTPATIENT)
Dept: URGENT CARE | Facility: CLINIC | Age: 64
End: 2019-03-28
Payer: MEDICARE

## 2019-03-28 VITALS
DIASTOLIC BLOOD PRESSURE: 76 MMHG | HEIGHT: 65 IN | RESPIRATION RATE: 18 BRPM | HEART RATE: 87 BPM | TEMPERATURE: 98.8 F | BODY MASS INDEX: 23.82 KG/M2 | OXYGEN SATURATION: 98 % | SYSTOLIC BLOOD PRESSURE: 165 MMHG | WEIGHT: 143 LBS

## 2019-03-28 DIAGNOSIS — J06.9 ACUTE URI: Primary | ICD-10-CM

## 2019-03-28 DIAGNOSIS — R09.82 POST-NASAL DRIP: ICD-10-CM

## 2019-03-28 PROCEDURE — 99213 OFFICE O/P EST LOW 20 MIN: CPT | Performed by: NURSE PRACTITIONER

## 2019-03-28 RX ORDER — FLUTICASONE PROPIONATE 50 MCG
1 SPRAY, SUSPENSION (ML) NASAL 2 TIMES DAILY
Qty: 1 BOTTLE | Refills: 0 | Status: SHIPPED | OUTPATIENT
Start: 2019-03-28 | End: 2020-08-27

## 2019-03-28 RX ORDER — DISULFIRAM 250 MG/1
250 TABLET ORAL DAILY
COMMUNITY
End: 2020-07-23

## 2019-03-28 NOTE — PROGRESS NOTES
[facility]        NAME: Wilmer Stark is a 66 y o  female  : 1955    MRN: [id number]  DATE: 2019  TIME: 2:23 PM    Assessment and Plan   Acute URI [J06 9]  1  Acute URI     2  Post-nasal drip  fluticasone (FLONASE) 50 mcg/act nasal spray         Patient Instructions     Take Flonase as prescribed  Begin taking a Claritan (pt able to tolerate)  Take tylenol for headache  Warm salt water gargles 3-4 times per day for dripping in back of throat  Continue to attend meetings  Refrain from drinking alcohol due to the interactions between the Antabuse  Drink lots of fluids, tea with honey, and soup broths  Follow up in ER if symptoms persist or worsen  Chief Complaint     Chief Complaint   Patient presents with    Dizziness     began a cpl days ago  sleeping over 12 hours  feeling tired  head congestion  History of Present Illness       67 y/o female presents for intermittent dizziness, head congestion, fatigue, head ache, and ear fullness  Her symptoms began a couple of days ago  She denies any sore throat, cough, CP, SOB, N/V, or diarrhea  She is a recovering alcoholic  She started Antabuse approximately 2 weeks ago  She did drink this past weekend, states she had "several shooters" on Friday and Saturday  She is current smoker  She take her inhalers daily as prescribed by her PCP  Review of Systems   Review of Systems   Constitutional: Positive for fatigue  Negative for chills and fever  HENT: Positive for congestion, ear pain, postnasal drip, rhinorrhea and sinus pressure  Negative for sinus pain and sore throat  Respiratory: Negative for cough, chest tightness, shortness of breath and wheezing  Cardiovascular: Negative for chest pain  Gastrointestinal: Negative for nausea and vomiting  Neurological: Positive for headaches           Current Medications       Current Outpatient Medications:     ADVAIR DISKUS 250-50 MCG/DOSE inhaler, inhale 1 dose by mouth twice a day, Disp: 1 Inhaler, Rfl: 0    buprenorphine (SUBUTEX) 8 mg, 8 mg 2 (two) times a day Oral , Disp: , Rfl:     disulfiram (ANTABUSE) 250 mg tablet, Take 250 mg by mouth daily, Disp: , Rfl:     DULoxetine (CYMBALTA) 30 mg delayed release capsule, Take 1 capsule (30 mg total) by mouth 2 (two) times a day, Disp: 60 capsule, Rfl: 3    gabapentin (NEURONTIN) 300 mg capsule, Take 1 capsule (300 mg total) by mouth 3 (three) times a day, Disp: 90 capsule, Rfl: 1    multivitamin (THERAGRAN) TABS, Take 1 tablet by mouth daily at bedtime, Disp: , Rfl:     mupirocin (BACTROBAN) 2 % ointment, Apply topically 3 (three) times a day, Disp: 22 g, Rfl: 0    Omega-3 Fatty Acids (FISH OIL) 1,000 mg, Take 1,000 mg by mouth daily at bedtime, Disp: , Rfl:     pantoprazole (PROTONIX) 40 mg tablet, Take 1 tablet by mouth 2 (two) times a day, Disp: 60 tablet, Rfl: 3    PROAIR  (90 Base) MCG/ACT inhaler, inhale 2 puffs by mouth every 6 hours if needed for wheezing, Disp: 8 5 g, Rfl: 0    psyllium (METAMUCIL) 58 6 % powder, Take 1 packet by mouth 2 (two) times a day, Disp: , Rfl:     SUMAtriptan (IMITREX) 100 mg tablet, Take 100 mg by mouth once as needed for migraine, Disp: , Rfl:     Turmeric 500 MG TABS, Take by mouth daily at bedtime, Disp: , Rfl:     cholecalciferol (VITAMIN D3) 1,000 units tablet, Take 1,000 Units by mouth daily, Disp: , Rfl:     fluticasone (FLONASE) 50 mcg/act nasal spray, 1 spray into each nostril 2 (two) times a day for 5 days, Disp: 1 Bottle, Rfl: 0    methylphenidate (RITALIN LA) 10 MG 24 hr capsule, Take 10 mg by mouth every morning  , Disp: , Rfl:     methylphenidate (RITALIN LA) 20 MG 24 hr capsule, , Disp: , Rfl:     Current Allergies     Allergies as of 03/28/2019 - Reviewed 03/28/2019   Allergen Reaction Noted    Bee venom Anaphylaxis 06/05/2017    Ibuprofen Swelling 06/05/2017    Antihistamines, chlorpheniramine-type  06/05/2017            The following portions of the patient's history were reviewed and updated as appropriate: allergies, current medications, past family history, past medical history, past social history, past surgical history and problem list      Past Medical History:   Diagnosis Date    Amenorrhea     Anesthesia complication     hx of cocaine use-none in 5 yrs    Anxiety     Arthritis     bilateral knees-DJD, arthritis neck and back-cervical injection 2017    Bronchitis, chronic ([address] )     Cancer [address] [address]     squamous-removed from the right foot    Chronic pain disorder     neck and back    COPD (chronic obstructive pulmonary disease) ([facility] [address]     Depression     Eczema     last assessed 6/27/16, resolved 10/2/17    Fibromyalgia, primary     H/O ETOH abuse     None in the past 10 months since 8/1/17    Lumbar radiculopathy     resolved 6/6/17    Malignant melanoma of skin (HCC)     Migraine     MRSA (methicillin resistant Staphylococcus aureus) infection     last assessed 4/29/16    Raynauds syndrome     Spinal stenosis        Past Surgical History:   Procedure Laterality Date    ARTHROSCOPY KNEE Right 6/8/2017    Procedure: RIGHT KNEE ARTHROSCOPY MEDIAL MENISCECTOMY;  Surgeon: Wanda Mckinnon MD;  [place] [facility] [place]  Service:    [address] [place] [address]    COLONOSCOPY      COLONOSCOPY N/A 1/25/2018    Procedure: COLONOSCOPY;  Surgeon: Flavio Bhardwaj MD;  Location: [place] [address] GI LAB; Service: Gastroenterology    DILATION AND CURETTAGE OF UTERUS      x 2 miscarriage    EPIDURAL BLOCK INJECTION N/A 12/8/2017    Procedure: INJECTION EPIDURAL STEROID CERVICAL C6-C7;  Surgeon: Margo Faith MD;  Location: [place] [address] MAIN OR;  Service: Pain Management     ESOPHAGOGASTRODUODENOSCOPY N/A 1/25/2018    Procedure: ESOPHAGOGASTRODUODENOSCOPY (EGD); Surgeon: Flavio Bhardwaj MD;  [place] [facility] [facility]   Service: Gastroenterology    FOOT SURGERY      squamous removed from the right    HAND SURGERY Bilateral     arthroplasty -thumb joint    last assessed 6/6/17    HERNIA REPAIR      inguinal    WISDOM TOOTH EXTRACTION         Family History   Problem Relation Age of Onset    Peripheral vascular disease Mother     Arthritis Mother         osteo arthritis    Coronary artery disease Mother         Atherosclerosis    Alcohol abuse Mother     Stroke Mother         CVA    Transient ischemic attack Mother     Heart disease Father     Peripheral vascular disease Father     Stroke Father         CVA    Arthritis Sister         rheumatoid    Alcohol abuse Sister          Medications have been verified  Objective   /76   Pulse 87   Temp 98 8 °F (37 1 °C)   Resp 18   Ht 5' 5" (1 651 m)   Wt 64 9 kg (143 lb)   SpO2 98%   BMI 23 80 kg/m²        Physical Exam     Physical Exam   Constitutional: She is oriented to person, place, and time  She appears well-developed and well-nourished  HENT:   Right Ear: Tympanic membrane, external ear and ear canal normal    Left Ear: Tympanic membrane, external ear and ear canal normal    Nose: Mucosal edema and rhinorrhea present  Mouth/Throat: Posterior oropharyngeal erythema present  No posterior oropharyngeal edema  Posterior pharynx is red and inflamed, cobblestone in appearance  Cardiovascular: Normal rate, regular rhythm and normal heart sounds  Pulmonary/Chest: Effort normal and breath sounds normal  No respiratory distress  She has no rales  Neurological: She is alert and oriented to person, place, and time  Skin: Skin is warm and dry  Nursing note and vitals reviewed

## 2019-03-28 NOTE — PATIENT INSTRUCTIONS
Take Flonase as prescribed  Begin taking a Claritan  Take tylenol for headache  Warm salt water gargles 3-4 times per day for dripping in back of throat  Continue to attend meetings  Drink lots of fluids, tea with honey, and soup broths  Follow up in ER if symptoms persist or worsen

## 2019-04-07 DIAGNOSIS — J44.1 CHRONIC OBSTRUCTIVE PULMONARY DISEASE WITH ACUTE EXACERBATION (HCC): ICD-10-CM

## 2019-04-07 DIAGNOSIS — E78.5 HYPERLIPIDEMIA, UNSPECIFIED HYPERLIPIDEMIA TYPE: Primary | ICD-10-CM

## 2019-04-07 RX ORDER — SIMVASTATIN 20 MG
TABLET ORAL
Qty: 90 TABLET | Refills: 0 | Status: SHIPPED | OUTPATIENT
Start: 2019-04-07 | End: 2019-08-30 | Stop reason: SDUPTHER

## 2019-04-17 DIAGNOSIS — J44.1 CHRONIC OBSTRUCTIVE PULMONARY DISEASE WITH ACUTE EXACERBATION (HCC): ICD-10-CM

## 2019-04-18 ENCOUNTER — OFFICE VISIT (OUTPATIENT)
Dept: URGENT CARE | Facility: CLINIC | Age: 64
End: 2019-04-18
Payer: MEDICARE

## 2019-04-18 VITALS
SYSTOLIC BLOOD PRESSURE: 149 MMHG | BODY MASS INDEX: 22.49 KG/M2 | DIASTOLIC BLOOD PRESSURE: 67 MMHG | HEIGHT: 65 IN | OXYGEN SATURATION: 97 % | HEART RATE: 82 BPM | TEMPERATURE: 97.1 F | RESPIRATION RATE: 18 BRPM | WEIGHT: 135 LBS

## 2019-04-18 DIAGNOSIS — R68.89 FEELING OFF-COLOR: Primary | ICD-10-CM

## 2019-04-18 PROCEDURE — 99213 OFFICE O/P EST LOW 20 MIN: CPT | Performed by: FAMILY MEDICINE

## 2019-04-22 ENCOUNTER — OFFICE VISIT (OUTPATIENT)
Dept: FAMILY MEDICINE CLINIC | Facility: CLINIC | Age: 64
End: 2019-04-22
Payer: MEDICARE

## 2019-04-22 VITALS
DIASTOLIC BLOOD PRESSURE: 86 MMHG | BODY MASS INDEX: 23.26 KG/M2 | WEIGHT: 139.6 LBS | RESPIRATION RATE: 16 BRPM | HEART RATE: 88 BPM | HEIGHT: 65 IN | TEMPERATURE: 97.6 F | SYSTOLIC BLOOD PRESSURE: 142 MMHG

## 2019-04-22 DIAGNOSIS — K21.00 GASTRO-ESOPHAGEAL REFLUX DISEASE WITH ESOPHAGITIS: ICD-10-CM

## 2019-04-22 DIAGNOSIS — R42 DIZZINESS: ICD-10-CM

## 2019-04-22 DIAGNOSIS — F10.21 HISTORY OF ALCOHOL DEPENDENCE (HCC): ICD-10-CM

## 2019-04-22 DIAGNOSIS — E55.9 VITAMIN D DEFICIENCY: ICD-10-CM

## 2019-04-22 DIAGNOSIS — Z00.00 MEDICARE ANNUAL WELLNESS VISIT, SUBSEQUENT: ICD-10-CM

## 2019-04-22 DIAGNOSIS — E78.5 HYPERLIPIDEMIA, UNSPECIFIED HYPERLIPIDEMIA TYPE: ICD-10-CM

## 2019-04-22 DIAGNOSIS — R53.82 CHRONIC FATIGUE: Primary | ICD-10-CM

## 2019-04-22 DIAGNOSIS — F41.8 DEPRESSION WITH ANXIETY: ICD-10-CM

## 2019-04-22 DIAGNOSIS — E53.8 VITAMIN B12 DEFICIENCY: ICD-10-CM

## 2019-04-22 DIAGNOSIS — Z12.39 BREAST SCREENING: ICD-10-CM

## 2019-04-22 LAB
SL AMB  POCT GLUCOSE, UA: NORMAL
SL AMB LEUKOCYTE ESTERASE,UA: NORMAL
SL AMB POCT BILIRUBIN,UA: NORMAL
SL AMB POCT BLOOD,UA: NORMAL
SL AMB POCT CLARITY,UA: CLEAR
SL AMB POCT COLOR,UA: YELLOW
SL AMB POCT KETONES,UA: NORMAL
SL AMB POCT NITRITE,UA: NORMAL
SL AMB POCT PH,UA: 8
SL AMB POCT SPECIFIC GRAVITY,UA: 1.01
SL AMB POCT URINE PROTEIN: NORMAL
SL AMB POCT UROBILINOGEN: NORMAL

## 2019-04-22 PROCEDURE — 81003 URINALYSIS AUTO W/O SCOPE: CPT | Performed by: FAMILY MEDICINE

## 2019-04-22 PROCEDURE — G0439 PPPS, SUBSEQ VISIT: HCPCS | Performed by: FAMILY MEDICINE

## 2019-04-22 PROCEDURE — 99214 OFFICE O/P EST MOD 30 MIN: CPT | Performed by: FAMILY MEDICINE

## 2019-04-22 RX ORDER — AMLODIPINE BESYLATE 5 MG/1
5 TABLET ORAL DAILY
COMMUNITY
End: 2019-05-06 | Stop reason: SDUPTHER

## 2019-04-22 NOTE — PROGRESS NOTES
Assessment and Plan:     Problem List Items Addressed This Visit     None         History of Present Illness:     Patient presents for Medicare Annual Wellness visit    Patient Care Team:  Mis Stephens MD as PCP - Rusty Renae, MD Karina Ramires MD     Problem List:     Patient Active Problem List   Diagnosis    Chronic pain syndrome    Neck pain    Cervical radiculopathy    Degeneration of intervertebral disc of mid-cervical region    Degenerative arthritis of knee    Localized osteoarthritis of right knee    Right knee pain    Bilateral primary osteoarthritis of knee    Tobacco use    Abdominal pain    Basal cell carcinoma of skin    Chronic obstructive pulmonary disease ([facility] [address]    Classic migraine with aura    Constipation, chronic    Depression with anxiety    Esophagitis, reflux    Fatigue    Lumbar radiculopathy    Nodule of right lung    Tear of medial meniscus of right knee, current    Spinal stenosis    Vitamin D insufficiency    Spondylosis of cervical region without myelopathy or radiculopathy    Chronic bilateral low back pain without sciatica    Lumbar spondylosis    Mild intermittent asthma without complication    SOB (shortness of breath)      Past Medical and Surgical History:     Past Medical History:   Diagnosis Date    Amenorrhea     Anesthesia complication     hx of cocaine use-none in 5 yrs    Anxiety     Arthritis     bilateral knees-DJD, arthritis neck and back-cervical injection 2017    Bronchitis, chronic ([address] )     Cancer ([facility] [address] )     squamous-removed from the right foot    Chronic pain disorder     neck and back    COPD (chronic obstructive pulmonary disease) ([facility] [address] )     Depression     Eczema     last assessed 6/27/16, resolved 10/2/17    Fibromyalgia, primary     H/O ETOH abuse     None in the past 10 months since 8/1/17    Lumbar radiculopathy     resolved 6/6/17    Malignant melanoma of skin (HCC)     Migraine     MRSA (methicillin resistant Staphylococcus aureus) infection     last assessed 4/29/16    Raynauds syndrome     Spinal stenosis      Past Surgical History:   Procedure Laterality Date    ARTHROSCOPY KNEE Right 6/8/2017    Procedure: RIGHT KNEE ARTHROSCOPY MEDIAL MENISCECTOMY;  Surgeon: Seamus Johnson MD;  Location: [facility] MAIN OR;  Service:    [address]    COLONOSCOPY      COLONOSCOPY N/A 1/25/2018    Procedure: COLONOSCOPY;  Surgeon: Pablo Enriquez MD;  Location: [facility] GI LAB; Service: Gastroenterology    DILATION AND CURETTAGE OF UTERUS      x 2 miscarriage    EPIDURAL BLOCK INJECTION N/A 12/8/2017    Procedure: INJECTION EPIDURAL STEROID CERVICAL C6-C7;  Surgeon: Attila Banuelos MD;  Location: [facility] MAIN OR;  Service: Pain Management     ESOPHAGOGASTRODUODENOSCOPY N/A 1/25/2018    Procedure: ESOPHAGOGASTRODUODENOSCOPY (EGD); Surgeon: Pablo Enriquez MD;  Location: [facility] GI LAB;   Service: Gastroenterology    FOOT SURGERY      squamous removed from the right    HAND SURGERY Bilateral     arthroplasty -thumb joint    last assessed 6/6/17    HERNIA REPAIR      inguinal    WISDOM TOOTH EXTRACTION        Family History:     Family History   Problem Relation Age of Onset    Peripheral vascular disease Mother     Arthritis Mother         osteo arthritis    Coronary artery disease Mother         Atherosclerosis    Alcohol abuse Mother    Colleen Balderas Stroke Mother         CVA    Transient ischemic attack Mother     Heart disease Father     Peripheral vascular disease Father     Stroke Father         CVA    Arthritis Sister         rheumatoid    Alcohol abuse Sister       Social History:     Social History     Tobacco Use   Smoking Status Current Every Day Smoker    Packs/day: 0 50    Years: 15 00    Pack years: 7 50    Types: Cigarettes   Smokeless Tobacco Never Used   Tobacco Comment    on and off for 40 yrs     Social History     Substance and Sexual Activity   Alcohol Use No    Comment: hx of ETOH abuse-2016 / Alcoholism in recovery      Social History     Substance and Sexual Activity   Drug Use Yes    Types: Cocaine    Comment: quit 2007      Medications and Allergies:     Current Outpatient Medications   Medication Sig Dispense Refill    buprenorphine (SUBUTEX) 8 mg 8 mg 2 (two) times a day Oral       disulfiram (ANTABUSE) 250 mg tablet Take 250 mg by mouth daily      DULoxetine (CYMBALTA) 30 mg delayed release capsule Take 1 capsule (30 mg total) by mouth 2 (two) times a day 60 capsule 3    fluticasone-salmeterol (ADVAIR DISKUS, WIXELA INHUB) 250-50 mcg/dose inhaler inhale 1 dose by mouth twice a day 1 Inhaler 3    gabapentin (NEURONTIN) 300 mg capsule Take 1 capsule (300 mg total) by mouth 3 (three) times a day 90 capsule 1    methylphenidate (RITALIN LA) 20 MG 24 hr capsule       milk thistle 175 MG tablet Take 175 mg by mouth daily      multivitamin (THERAGRAN) TABS Take 1 tablet by mouth daily at bedtime      mupirocin (BACTROBAN) 2 % ointment Apply topically 3 (three) times a day 22 g 0    pantoprazole (PROTONIX) 40 mg tablet Take 1 tablet by mouth 2 (two) times a day 60 tablet 3    PROAIR  (90 Base) MCG/ACT inhaler inhale 2 puffs by mouth every 6 hours if needed for wheezing 8 5 g 3    psyllium (METAMUCIL) 58 6 % powder Take 1 packet by mouth 2 (two) times a day      simvastatin (ZOCOR) 20 mg tablet take 1 tablet by mouth once daily 90 tablet 0    SUMAtriptan (IMITREX) 100 mg tablet Take 100 mg by mouth once as needed for migraine      Turmeric 500 MG TABS Take by mouth daily at bedtime      cholecalciferol (VITAMIN D3) 1,000 units tablet Take 1,000 Units by mouth daily      fluticasone (FLONASE) 50 mcg/act nasal spray 1 spray into each nostril 2 (two) times a day for 5 days 1 Bottle 0    Omega-3 Fatty Acids (FISH OIL) 1,000 mg Take 1,000 mg by mouth daily at bedtime       No current facility-administered medications for this visit        Allergies   Allergen Reactions    Bee Venom Anaphylaxis    Ibuprofen Swelling     And any anti-inflammatories, NSAIDS-causes severe diarrhea  Facial swelling    Antihistamines, Chlorpheniramine-Type      "jumpy"      Immunizations:     Immunization History   Administered Date(s) Administered     Influenza (IM) Preservative Free 10/30/2017, 10/31/2018    Influenza TIV (IM) 12/04/2013, 10/08/2014, 08/02/2016, 02/01/2017    Pneumococcal Conjugate 13-Valent 10/30/2017    Pneumococcal Polysaccharide PPV23 10/08/2014, 08/03/2016      Medicare Screening Tests and Risk Assessments:     Reese Keenan is here for her Subsequent Wellness visit  Last Medicare Wellness visit information reviewed, patient interviewed, no change since last AWV  Health Risk Assessment:  Patient rates overall health as good  Patient feels that their physical health rating is Same  Eyesight was rated as Same  Hearing was rated as Same  Patient feels that their emotional and mental health rating is Same  Pain experienced by patient in the last 7 days has been Some  Patient's pain rating has been 5/10  Patient states that she has experienced no weight loss or gain in last 6 months  Emotional/Mental Health:  Patient has not been feeling nervous/anxious  PHQ-9 Depression Screening:    Frequency of the following problems over the past two weeks:      1  Little interest or pleasure in doing things: 0 - not at all      2  Feeling down, depressed, or hopeless: 0 - not at all  PHQ-2 Score: 0          Broken Bones/Falls: Fall Risk Assessment:    In the past year, patient has experienced: No history of falling in past year          Bladder/Bowel:  Patient has leaked urine accidently in the last six months  Patient reports no loss of bowel control  Immunizations:  Patient has had a flu vaccination within the last year  Patient has received a pneumonia shot  Patient has not received a shingles shot  Patient has not received tetanus/diphtheria shot       Home Safety:  Patient has trouble with stairs inside or outside of their home  Patient currently reports that there are no safety hazards present in home, working smoke alarms, working carbon monoxide detectors  Preventative Screenings:   Breast cancer screening performed, 4/22/2017  colon cancer screen completed, 2/22/2018  cholesterol screen completed, 8/28/2018  glaucoma eye exam completed, 5/22/2018      Nutrition:  Current diet: Regular with servings of the following:    Medications:  Patient is currently taking over-the-counter supplements  List of OTC medications includes: vitamins  Patient is able to manage medications  Lifestyle Choices:  Patient reports current tobacco use  Patient reports no alcohol use  Patient drives a vehicle  Patient wears seat belt  Current level of exercise of physical activity described by patient as: walk -low  Activities of Daily Living:  Can get out of bed by his or her self, able to dress self, able to make own meals, able to do own shopping, able to bathe self, can do own laundry/housekeeping, can manage own money, pay bills and track expenses    Previous Hospitalizations:  Hospitalization or ED visit in past 12 months  Additional Comments: detox    Advanced Directives:  Patient has not decided on power of   Patient has not completed advanced directive          Preventative Screening/Counseling:      Cardiovascular:      General: Risks and Benefits Discussed      Counseling: Healthy Diet, Healthy Weight, Improve Cholesterol, Improve Blood Pressure and Improve Exercise Tolerance     Due for Labs/Analytes/Optional EKG: Lipid Panel          Diabetes:      General: Risks and Benefits Discussed and Screening Current      Counseling: Healthy Weight, Healthy Diet and Improve Physical Activity      Due for labs: Blood Glucose          Colorectal Cancer:      General: Risks and Benefits Discussed      Counseling: high fiber diet          Breast Cancer: General: Risks and Benefits Discussed          Cervical Cancer:      General: Risks and Benefits Discussed          Osteoporosis:      General: Risks and Benefits Discussed      Counseling: Calcium and Vitamin D Intake and Regular Weightbearing Exercise          AAA:      General: Risks and Benefits Discussed          HIV:      General: Risks and Benefits Discussed          Hepatitis C:      General: Risks and Benefits Discussed        Advanced Directives: Information on ACP and/or AD provided  5 wishes given       Immunizations:  Patient reviewed and up to date      Influenza: Risks & Benefits Discussed and Influenza UTD This Year      Pneumococcal: Risks & Benefits Discussed and Lifetime Vaccine Completed      Shingrix: Risks & Benefits Discussed      Hepatitis B (Medium to high risk patients): Risks & Benefits Discussed      Zostavax: Risks & Benefits Discussed      TD: Risks & Benefits Discussed and Td Vaccine UTD      TDAP: Risks & Benefits Discussed and Tdap Vaccine UTD      Other Preventative Counseling (Non-Medicare):  Alcohol Use, Nutrition Counseling, Car/seat belt/driving safety reviewed and Sunscreen use

## 2019-04-24 ENCOUNTER — APPOINTMENT (OUTPATIENT)
Dept: LAB | Facility: HOSPITAL | Age: 64
End: 2019-04-24
Payer: MEDICARE

## 2019-04-24 ENCOUNTER — TRANSCRIBE ORDERS (OUTPATIENT)
Dept: ADMINISTRATIVE | Facility: HOSPITAL | Age: 64
End: 2019-04-24

## 2019-04-24 DIAGNOSIS — E55.9 VITAMIN D DEFICIENCY: ICD-10-CM

## 2019-04-24 DIAGNOSIS — R53.82 CHRONIC FATIGUE: ICD-10-CM

## 2019-04-24 DIAGNOSIS — J34.89 NASAL SORE: ICD-10-CM

## 2019-04-24 DIAGNOSIS — E78.5 HYPERLIPIDEMIA, UNSPECIFIED HYPERLIPIDEMIA TYPE: ICD-10-CM

## 2019-04-24 DIAGNOSIS — F41.8 DEPRESSION WITH ANXIETY: ICD-10-CM

## 2019-04-24 DIAGNOSIS — R42 DIZZINESS: ICD-10-CM

## 2019-04-24 DIAGNOSIS — E53.8 VITAMIN B12 DEFICIENCY: ICD-10-CM

## 2019-04-24 LAB
25(OH)D3 SERPL-MCNC: 44.9 NG/ML (ref 30–100)
ALBUMIN SERPL BCP-MCNC: 3.7 G/DL (ref 3.5–5)
ALP SERPL-CCNC: 78 U/L (ref 46–116)
ALT SERPL W P-5'-P-CCNC: 29 U/L (ref 12–78)
AMYLASE SERPL-CCNC: 63 IU/L (ref 25–115)
ANION GAP SERPL CALCULATED.3IONS-SCNC: 6 MMOL/L (ref 4–13)
AST SERPL W P-5'-P-CCNC: 25 U/L (ref 5–45)
BILIRUB SERPL-MCNC: 0.3 MG/DL (ref 0.2–1)
BUN SERPL-MCNC: 20 MG/DL (ref 5–25)
CALCIUM SERPL-MCNC: 9.4 MG/DL (ref 8.3–10.1)
CHLORIDE SERPL-SCNC: 104 MMOL/L (ref 100–108)
CHOLEST SERPL-MCNC: 180 MG/DL (ref 50–200)
CO2 SERPL-SCNC: 32 MMOL/L (ref 21–32)
CREAT SERPL-MCNC: 0.95 MG/DL (ref 0.6–1.3)
ERYTHROCYTE [DISTWIDTH] IN BLOOD BY AUTOMATED COUNT: 13 % (ref 11.6–15.1)
GFR SERPL CREATININE-BSD FRML MDRD: 64 ML/MIN/1.73SQ M
GLUCOSE P FAST SERPL-MCNC: 96 MG/DL (ref 65–99)
HCT VFR BLD AUTO: 40.6 % (ref 34.8–46.1)
HDLC SERPL-MCNC: 84 MG/DL (ref 40–60)
HGB BLD-MCNC: 13.1 G/DL (ref 11.5–15.4)
LDLC SERPL CALC-MCNC: 90 MG/DL (ref 0–100)
LIPASE SERPL-CCNC: 114 U/L (ref 73–393)
MAGNESIUM SERPL-MCNC: 2.3 MG/DL (ref 1.6–2.6)
MCH RBC QN AUTO: 30 PG (ref 26.8–34.3)
MCHC RBC AUTO-ENTMCNC: 32.3 G/DL (ref 31.4–37.4)
MCV RBC AUTO: 93 FL (ref 82–98)
PLATELET # BLD AUTO: 357 THOUSANDS/UL (ref 149–390)
PMV BLD AUTO: 10.2 FL (ref 8.9–12.7)
POTASSIUM SERPL-SCNC: 4.3 MMOL/L (ref 3.5–5.3)
PROT SERPL-MCNC: 7.6 G/DL (ref 6.4–8.2)
RBC # BLD AUTO: 4.36 MILLION/UL (ref 3.81–5.12)
SODIUM SERPL-SCNC: 142 MMOL/L (ref 136–145)
TRIGL SERPL-MCNC: 32 MG/DL
TSH SERPL DL<=0.05 MIU/L-ACNC: 1.52 UIU/ML (ref 0.36–3.74)
VIT B12 SERPL-MCNC: 354 PG/ML (ref 100–900)
WBC # BLD AUTO: 9.74 THOUSAND/UL (ref 4.31–10.16)

## 2019-04-24 PROCEDURE — 80053 COMPREHEN METABOLIC PANEL: CPT

## 2019-04-24 PROCEDURE — 80061 LIPID PANEL: CPT

## 2019-04-24 PROCEDURE — 85027 COMPLETE CBC AUTOMATED: CPT

## 2019-04-24 PROCEDURE — 82306 VITAMIN D 25 HYDROXY: CPT

## 2019-04-24 PROCEDURE — 36415 COLL VENOUS BLD VENIPUNCTURE: CPT

## 2019-04-24 PROCEDURE — 82150 ASSAY OF AMYLASE: CPT

## 2019-04-24 PROCEDURE — 83735 ASSAY OF MAGNESIUM: CPT

## 2019-04-24 PROCEDURE — 82607 VITAMIN B-12: CPT

## 2019-04-24 PROCEDURE — 84443 ASSAY THYROID STIM HORMONE: CPT

## 2019-04-24 PROCEDURE — 83690 ASSAY OF LIPASE: CPT

## 2019-05-03 ENCOUNTER — TELEPHONE (OUTPATIENT)
Dept: FAMILY MEDICINE CLINIC | Facility: CLINIC | Age: 64
End: 2019-05-03

## 2019-05-06 ENCOUNTER — OFFICE VISIT (OUTPATIENT)
Dept: FAMILY MEDICINE CLINIC | Facility: CLINIC | Age: 64
End: 2019-05-06
Payer: MEDICARE

## 2019-05-06 VITALS
TEMPERATURE: 98.8 F | RESPIRATION RATE: 16 BRPM | WEIGHT: 140.4 LBS | SYSTOLIC BLOOD PRESSURE: 140 MMHG | DIASTOLIC BLOOD PRESSURE: 84 MMHG | HEIGHT: 65 IN | BODY MASS INDEX: 23.39 KG/M2 | HEART RATE: 80 BPM

## 2019-05-06 DIAGNOSIS — R30.0 DYSURIA: Primary | ICD-10-CM

## 2019-05-06 DIAGNOSIS — I10 ESSENTIAL HYPERTENSION: ICD-10-CM

## 2019-05-06 LAB
SL AMB  POCT GLUCOSE, UA: ABNORMAL
SL AMB LEUKOCYTE ESTERASE,UA: 500
SL AMB POCT BILIRUBIN,UA: 1
SL AMB POCT BLOOD,UA: 250
SL AMB POCT CLARITY,UA: ABNORMAL
SL AMB POCT COLOR,UA: ABNORMAL
SL AMB POCT KETONES,UA: ABNORMAL
SL AMB POCT NITRITE,UA: ABNORMAL
SL AMB POCT PH,UA: 5
SL AMB POCT SPECIFIC GRAVITY,UA: 1.02
SL AMB POCT URINE PROTEIN: ABNORMAL
SL AMB POCT UROBILINOGEN: 1

## 2019-05-06 PROCEDURE — 81003 URINALYSIS AUTO W/O SCOPE: CPT | Performed by: FAMILY MEDICINE

## 2019-05-06 PROCEDURE — 87086 URINE CULTURE/COLONY COUNT: CPT | Performed by: FAMILY MEDICINE

## 2019-05-06 PROCEDURE — 99213 OFFICE O/P EST LOW 20 MIN: CPT | Performed by: FAMILY MEDICINE

## 2019-05-06 PROCEDURE — 87186 SC STD MICRODIL/AGAR DIL: CPT | Performed by: FAMILY MEDICINE

## 2019-05-06 PROCEDURE — 87077 CULTURE AEROBIC IDENTIFY: CPT | Performed by: FAMILY MEDICINE

## 2019-05-06 RX ORDER — AMLODIPINE BESYLATE 10 MG/1
10 TABLET ORAL DAILY
Qty: 30 TABLET | Refills: 0 | Status: SHIPPED | OUTPATIENT
Start: 2019-05-06 | End: 2020-02-15

## 2019-05-06 RX ORDER — CIPROFLOXACIN 500 MG/1
500 TABLET, FILM COATED ORAL EVERY 12 HOURS SCHEDULED
Qty: 10 TABLET | Refills: 0 | Status: SHIPPED | OUTPATIENT
Start: 2019-05-06 | End: 2019-05-11

## 2019-05-08 LAB — BACTERIA UR CULT: ABNORMAL

## 2019-05-09 ENCOUNTER — HOSPITAL ENCOUNTER (OUTPATIENT)
Dept: RADIOLOGY | Facility: HOSPITAL | Age: 64
Discharge: HOME | End: 2019-05-09
Attending: FAMILY MEDICINE
Payer: MEDICARE

## 2019-05-09 ENCOUNTER — TELEPHONE (OUTPATIENT)
Dept: FAMILY MEDICINE CLINIC | Facility: CLINIC | Age: 64
End: 2019-05-09

## 2019-05-09 VITALS — BODY MASS INDEX: 23.16 KG/M2 | WEIGHT: 139 LBS | HEIGHT: 65 IN

## 2019-05-09 DIAGNOSIS — Z12.39 BREAST SCREENING: ICD-10-CM

## 2019-05-09 DIAGNOSIS — R30.0 DYSURIA: Primary | ICD-10-CM

## 2019-05-09 PROCEDURE — 77063 BREAST TOMOSYNTHESIS BI: CPT

## 2019-05-09 PROCEDURE — 77067 SCR MAMMO BI INCL CAD: CPT

## 2019-05-09 RX ORDER — NITROFURANTOIN 25; 75 MG/1; MG/1
100 CAPSULE ORAL 2 TIMES DAILY
Qty: 10 CAPSULE | Refills: 0 | Status: SHIPPED | OUTPATIENT
Start: 2019-05-09 | End: 2019-05-14

## 2019-05-13 ENCOUNTER — TELEPHONE (OUTPATIENT)
Dept: FAMILY MEDICINE CLINIC | Facility: CLINIC | Age: 64
End: 2019-05-13

## 2019-05-14 ENCOUNTER — TELEPHONE (OUTPATIENT)
Dept: FAMILY MEDICINE CLINIC | Facility: CLINIC | Age: 64
End: 2019-05-14

## 2019-06-11 ENCOUNTER — OFFICE VISIT (OUTPATIENT)
Dept: FAMILY MEDICINE CLINIC | Facility: CLINIC | Age: 64
End: 2019-06-11
Payer: MEDICARE

## 2019-06-11 VITALS
BODY MASS INDEX: 22.16 KG/M2 | WEIGHT: 133 LBS | HEART RATE: 92 BPM | TEMPERATURE: 98.1 F | HEIGHT: 65 IN | RESPIRATION RATE: 14 BRPM | SYSTOLIC BLOOD PRESSURE: 124 MMHG | DIASTOLIC BLOOD PRESSURE: 80 MMHG

## 2019-06-11 DIAGNOSIS — M25.471 BILATERAL SWELLING OF FEET AND ANKLES: ICD-10-CM

## 2019-06-11 DIAGNOSIS — K12.0 ORAL APHTHOUS ULCER: Primary | ICD-10-CM

## 2019-06-11 DIAGNOSIS — M25.474 BILATERAL SWELLING OF FEET AND ANKLES: ICD-10-CM

## 2019-06-11 DIAGNOSIS — M25.472 BILATERAL SWELLING OF FEET AND ANKLES: ICD-10-CM

## 2019-06-11 DIAGNOSIS — M25.475 BILATERAL SWELLING OF FEET AND ANKLES: ICD-10-CM

## 2019-06-11 PROCEDURE — 99213 OFFICE O/P EST LOW 20 MIN: CPT | Performed by: FAMILY MEDICINE

## 2019-07-16 NOTE — PROGRESS NOTES
Chief Complaint   Patient presents with    Medicare Wellness Visit    Dizziness     balance off    Fatigue    Depression    Anxiety        Patient ID: Asim Pritchett is a 60 y o  female  60year-old patient in for follow-up  In recovery for alcoholism  C/o increased fatigue and "fogginess"  Does not feel herself--less energy  In AA but has missed recent mtgs due to sxs  Past Medical History:   Diagnosis Date    Amenorrhea     Anesthesia complication     hx of cocaine use-none in 5 yrs    Anxiety     Arthritis     bilateral knees-DJD, arthritis neck and back-cervical injection 2017    Bronchitis, chronic (HCC)     Cancer (HCC)     squamous-removed from the right foot    Chronic pain disorder     neck and back    COPD (chronic obstructive pulmonary disease) ([facility] [address]     Depression     Eczema     last assessed 6/27/16, resolved 10/2/17    Fibromyalgia, primary     H/O ETOH abuse     None in the past 10 months since 8/1/17    Lumbar radiculopathy     resolved 6/6/17    Malignant melanoma of skin (HCC)     Migraine     MRSA (methicillin resistant Staphylococcus aureus) infection     last assessed 4/29/16    Raynauds syndrome     Spinal stenosis        Past Surgical History:   Procedure Laterality Date    ARTHROSCOPY KNEE Right 6/8/2017    Procedure: RIGHT KNEE ARTHROSCOPY MEDIAL MENISCECTOMY;  Surgeon: Moises Pham MD;  Location: [facility] MAIN OR;  Service:    [place] [address]    COLONOSCOPY      COLONOSCOPY N/A 1/25/2018    Procedure: COLONOSCOPY;  Surgeon: Walter Ramirez MD;  Location: [facility] GI LAB; Service: Gastroenterology    DILATION AND CURETTAGE OF UTERUS      x 2 miscarriage    EPIDURAL BLOCK INJECTION N/A 12/8/2017    Procedure: INJECTION EPIDURAL STEROID CERVICAL C6-C7;  Surgeon: Meg Dawn MD;  Location: [facility] MAIN OR;  Service: Pain Management     ESOPHAGOGASTRODUODENOSCOPY N/A 1/25/2018    Procedure: ESOPHAGOGASTRODUODENOSCOPY (EGD);   Surgeon: Laure Batista MD;  Location: [facility] GI LAB;   Service: Gastroenterology    FOOT SURGERY      squamous removed from the right    HAND SURGERY Bilateral     arthroplasty -thumb joint    last assessed 6/6/17    HERNIA REPAIR      inguinal    WISDOM TOOTH EXTRACTION         Patient Active Problem List   Diagnosis    Chronic pain syndrome    Neck pain    Cervical radiculopathy    Degeneration of intervertebral disc of mid-cervical region    Degenerative arthritis of knee    Localized osteoarthritis of right knee    Right knee pain    Bilateral primary osteoarthritis of knee    Tobacco use    Abdominal pain    Basal cell carcinoma of skin    Chronic obstructive pulmonary disease (HCC)    Classic migraine with aura    Constipation, chronic    Depression with anxiety    Esophagitis, reflux    Fatigue    Lumbar radiculopathy    Nodule of right lung    Tear of medial meniscus of right knee, current    Spinal stenosis    Vitamin D insufficiency    Spondylosis of cervical region without myelopathy or radiculopathy    Chronic bilateral low back pain without sciatica    Lumbar spondylosis    Mild intermittent asthma without complication    SOB (shortness of breath)    History of alcohol dependence ([facility] [address]       Family History   Problem Relation Age of Onset    Peripheral vascular disease Mother     Arthritis Mother         osteo arthritis    Coronary artery disease Mother         Atherosclerosis    Alcohol abuse Mother     Stroke Mother         CVA    Transient ischemic attack Mother     Heart disease Father     Peripheral vascular disease Father     Stroke Father         CVA    Leukemia Father     Arthritis Sister         rheumatoid    Alcohol abuse Sister     No Known Problems Sister     No Known Problems Sister     No Known Problems Sister        Immunization History   Administered Date(s) Administered     Influenza (IM) Preservative Free 10/30/2017, 10/31/2018    INFLUENZA 10/31/2018    Influenza TIV (IM) 12/04/2013, 10/08/2014, 08/02/2016, 02/01/2017    Pneumococcal Conjugate 13-Valent 10/30/2017    Pneumococcal Polysaccharide PPV23 10/08/2014, 08/03/2016       Allergies   Allergen Reactions    Bee Venom Anaphylaxis    Ibuprofen Swelling     And any anti-inflammatories, NSAIDS-causes severe diarrhea  Facial swelling    Antihistamines, Chlorpheniramine-Type      "jumpy"       Current Outpatient Medications   Medication Sig Dispense Refill    buprenorphine (SUBUTEX) 8 mg 8 mg 2 (two) times a day Oral       disulfiram (ANTABUSE) 250 mg tablet Take 250 mg by mouth daily      DULoxetine (CYMBALTA) 30 mg delayed release capsule Take 1 capsule (30 mg total) by mouth 2 (two) times a day 60 capsule 3    fluticasone-salmeterol (ADVAIR DISKUS, WIXELA INHUB) 250-50 mcg/dose inhaler inhale 1 dose by mouth twice a day 1 Inhaler 3    gabapentin (NEURONTIN) 300 mg capsule Take 1 capsule (300 mg total) by mouth 3 (three) times a day 90 capsule 1    methylphenidate (RITALIN LA) 20 MG 24 hr capsule       milk thistle 175 MG tablet Take 175 mg by mouth daily      multivitamin (THERAGRAN) TABS Take 1 tablet by mouth daily at bedtime      mupirocin (BACTROBAN) 2 % ointment Apply topically 3 (three) times a day 22 g 0    pantoprazole (PROTONIX) 40 mg tablet Take 1 tablet by mouth 2 (two) times a day 60 tablet 3    PROAIR  (90 Base) MCG/ACT inhaler inhale 2 puffs by mouth every 6 hours if needed for wheezing 8 5 g 3    psyllium (METAMUCIL) 58 6 % powder Take 1 packet by mouth 2 (two) times a day      simvastatin (ZOCOR) 20 mg tablet take 1 tablet by mouth once daily 90 tablet 0    SUMAtriptan (IMITREX) 100 mg tablet Take 100 mg by mouth once as needed for migraine      Turmeric 500 MG TABS Take by mouth daily at bedtime      amLODIPine (NORVASC) 10 mg tablet Take 1 tablet (10 mg total) by mouth daily 30 tablet 0    fluticasone (FLONASE) 50 mcg/act nasal spray 1 spray into each nostril 2 (two) times a day for 5 days 1 Bottle 0     No current facility-administered medications for this visit  Social History     Socioeconomic History    Marital status:      Spouse name: None    Number of children: None    Years of education: None    Highest education level: None   Occupational History    None   Social Needs    Financial resource strain: None    Food insecurity:     Worry: None     Inability: None    Transportation needs:     Medical: None     Non-medical: None   Tobacco Use    Smoking status: Current Every Day Smoker     Packs/day: 0 50     Years: 15 00     Pack years: 7 50     Types: Cigarettes    Smokeless tobacco: Never Used    Tobacco comment: on and off for 40 yrs   Substance and Sexual Activity    Alcohol use: No     Comment: hx of ETOH abuse-2016 / Alcoholism in recovery     Drug use: Yes     Types: Cocaine     Comment: quit 2007    Sexual activity: Never   Lifestyle    Physical activity:     Days per week: None     Minutes per session: None    Stress: None   Relationships    Social connections:     Talks on phone: None     Gets together: None     Attends Gnosticist service: None     Active member of club or organization: None     Attends meetings of clubs or organizations: None     Relationship status: None    Intimate partner violence:     Fear of current or ex partner: None     Emotionally abused: None     Physically abused: None     Forced sexual activity: None   Other Topics Concern    None   Social History Narrative    Daily caffeinated coffee consumption    Drinks caffeinated tea       Review of Systems   Constitutional: Positive for fatigue  Negative for fever  Respiratory: Negative  Cardiovascular: Negative  Gastrointestinal:        GERD   Genitourinary: Negative for dysuria  Musculoskeletal: Positive for arthralgias and myalgias  Skin: Negative for rash  Neurological: Positive for dizziness and weakness  Negative for syncope  Psychiatric/Behavioral: Positive for decreased concentration, dysphoric mood and sleep disturbance  Negative for hallucinations and suicidal ideas  The patient is nervous/anxious  Objective:    /86 (BP Location: Right arm, Patient Position: Sitting, Cuff Size: Standard)   Pulse 88   Temp 97 6 °F (36 4 °C)   Resp 16   Ht 5' 5" (1 651 m)   Wt 63 3 kg (139 lb 9 6 oz)   BMI 23 23 kg/m²        Physical Exam   Constitutional: She is oriented to person, place, and time  Chronically ill, looks tired   HENT:   Mouth/Throat: No oropharyngeal exudate  Eyes: Conjunctivae are normal  No scleral icterus  Neck: Neck supple  Cardiovascular: Normal rate, regular rhythm and intact distal pulses  Pulmonary/Chest: Effort normal and breath sounds normal  No respiratory distress  Abdominal: Soft  Bowel sounds are normal    Mild epigastric tenderness, no g/r   Musculoskeletal: She exhibits tenderness  Neurological: She is alert and oriented to person, place, and time  No cranial nerve deficit  Skin: Skin is warm and dry  No rash noted  Psychiatric:   Depressed, no a/v halluc , no SI   Nursing note and vitals reviewed  Assessment/Plan:   Diagnoses and all orders for this visit:    Chronic fatigue  Comments:  prob multi-factorial--vit def, med side effect, dep/anx  check addtl labs-follow-up post completion  Orders:  -     CBC; Future    Breast screening  Comments:  order for mammogram given  Orders:  -     Mammo screening bilateral w 3d & cad; Future    Gastro-esophageal reflux disease with esophagitis  Comments:  rx Pantoprazole, dietary modifications    Depression with anxiety  Comments:  on Cymbalta  Orders:  -     CBC; Future    Dizziness  -     POCT urine dip auto non-scope  -     CBC; Future  -     TSH, 3rd generation; Future  -     Magnesium; Future  -     Comprehensive metabolic panel;  Future    Vitamin D deficiency  Comments:  check vit d/pth and ca levels  Orders:  -     Vitamin D 25 hydroxy; Future    Vitamin B12 deficiency  Comments:  check B12 level, supplement prn, cont to abstain from etoh use  Orders:  -     Vitamin B12; Future    Hyperlipidemia, unspecified hyperlipidemia type  Comments:  cont  statin, low chol, heart healthy diet  Orders:  -     Amylase; Future  -     Lipase; Future  -     Lipid Panel with Direct LDL reflex; Future    History of alcohol dependence ([facility] [address] )  Comments:  continue to abstain from etoh use, cont AA attendance    Medicare annual wellness visit, subsequent    Other orders  -     Discontinue: amLODIPine (NORVASC) 5 mg tablet;  Take 5 mg by mouth daily       Jeimy Ford MD

## 2019-07-22 ENCOUNTER — OFFICE VISIT (OUTPATIENT)
Dept: FAMILY MEDICINE CLINIC | Facility: CLINIC | Age: 64
End: 2019-07-22
Payer: MEDICARE

## 2019-07-22 VITALS
WEIGHT: 128 LBS | HEART RATE: 77 BPM | TEMPERATURE: 98.2 F | BODY MASS INDEX: 21.33 KG/M2 | DIASTOLIC BLOOD PRESSURE: 70 MMHG | SYSTOLIC BLOOD PRESSURE: 140 MMHG | RESPIRATION RATE: 12 BRPM | HEIGHT: 65 IN | OXYGEN SATURATION: 96 %

## 2019-07-22 DIAGNOSIS — R07.89 CHEST TIGHTNESS: ICD-10-CM

## 2019-07-22 DIAGNOSIS — J45.20 MILD INTERMITTENT ASTHMA WITHOUT COMPLICATION: ICD-10-CM

## 2019-07-22 DIAGNOSIS — J06.9 VIRAL URI WITH COUGH: Primary | ICD-10-CM

## 2019-07-22 PROCEDURE — 99214 OFFICE O/P EST MOD 30 MIN: CPT | Performed by: NURSE PRACTITIONER

## 2019-07-22 RX ORDER — PREDNISONE 20 MG/1
20 TABLET ORAL DAILY
Qty: 5 TABLET | Refills: 0 | Status: SHIPPED | OUTPATIENT
Start: 2019-07-22 | End: 2019-07-27

## 2019-07-22 RX ORDER — IPRATROPIUM BROMIDE AND ALBUTEROL SULFATE 2.5; .5 MG/3ML; MG/3ML
3 SOLUTION RESPIRATORY (INHALATION) ONCE
Status: COMPLETED | OUTPATIENT
Start: 2019-07-22 | End: 2019-07-22

## 2019-07-22 RX ORDER — IPRATROPIUM BROMIDE AND ALBUTEROL SULFATE 2.5; .5 MG/3ML; MG/3ML
3 SOLUTION RESPIRATORY (INHALATION)
Status: DISCONTINUED | OUTPATIENT
Start: 2019-07-22 | End: 2019-07-22

## 2019-07-22 RX ORDER — METHYLPHENIDATE HYDROCHLORIDE 30 MG/1
CAPSULE, EXTENDED RELEASE ORAL
Refills: 0 | COMMUNITY
Start: 2019-07-03 | End: 2020-07-23

## 2019-07-22 RX ADMIN — IPRATROPIUM BROMIDE AND ALBUTEROL SULFATE 3 ML: 2.5; .5 SOLUTION RESPIRATORY (INHALATION) at 14:41

## 2019-07-22 NOTE — PATIENT INSTRUCTIONS
You were given a Duoneb treatment in the office today  Rescue inhaler 2 puffs every 4-6 hours as needed for shortness breath, chest tightness, bronchospasm, coughing fits  Prednisone 20mg daily with food for 5 days  Resume Advair as soon as able  Fluids  Rest  Nasal saline  Supportive care for symptom management  Tylenol as needed for fever or discomfort  Use a cool mist humidifier at bedtime  Antibiotics are not indicated at this time  Symptoms may persist for 7-14 days

## 2019-07-22 NOTE — PROGRESS NOTES
Assessment/Plan:    1  Viral URI with cough  Fluids  Rest  Nasal saline  Supportive care for symptom management  Tylenol or ibuprofen as needed for fever or discomfort  Use a cool mist humidifier at bedtime  Antibiotics are not indicated at this time  Symptoms may persist for 7-14 days  2  Chest tightness  - predniSONE 20 mg tablet; Take 1 tablet (20 mg total) by mouth daily for 5 days  Dispense: 5 tablet; Refill: 0  - ipratropium-albuterol (DUO-NEB) 0 5-2 5 mg/3 mL inhalation solution 3 mL  3  Mild intermittent asthma without complication  - predniSONE 20 mg tablet; Take 1 tablet (20 mg total) by mouth daily for 5 days  Dispense: 5 tablet; Refill: 0  - ipratropium-albuterol (DUO-NEB) 0 5-2 5 mg/3 mL inhalation solution 3 mL          Subjective:      Patient ID: Claudio Cunha is a 60 y o  female who presents for cold symptoms    Symptoms 4-5 days  Sob  Runny nose  Nasal congestion  History of asthma  Out of advair due to financial issues  Using rescue inhaler but not helping  No fever    (+) smoker  No significant seasonal allergies  Mild cough  No sore throat  The following portions of the patient's history were reviewed and updated as appropriate: allergies, current medications, past family history, past medical history, past social history, past surgical history and problem list     Review of Systems   Constitutional: Negative for chills, diaphoresis, fatigue and fever  HENT: Positive for congestion and rhinorrhea  Negative for sinus pressure, sinus pain and sore throat  Respiratory: Positive for cough, chest tightness, shortness of breath and wheezing  Cardiovascular: Negative for chest pain, palpitations and leg swelling  Gastrointestinal: Negative for abdominal pain, diarrhea, nausea and vomiting  Musculoskeletal: Negative for back pain and myalgias  Skin: Negative for color change and pallor  Neurological: Negative for dizziness, weakness and headaches  Hematological: Negative for adenopathy  Objective:      /70 (BP Location: Right arm, Patient Position: Sitting, Cuff Size: Adult)   Pulse 77   Temp 98 2 °F (36 8 °C) (Temporal)   Resp 12   Ht 5' 5" (1 651 m)   Wt 58 1 kg (128 lb)   SpO2 96%   BMI 21 30 kg/m²          Physical Exam   Constitutional: She is oriented to person, place, and time  She appears well-developed and well-nourished  HENT:   TMS WNL  Turbinates inflamed  Oropharynx with no erythema or exudate  No sinus tenderness to palpation    (+) PND     Cardiovascular: Normal rate and regular rhythm  Pulmonary/Chest: She has no wheezes  She has no rales  Mild dyspnea at rest  Mild conversational dyspnea  Slightly decreased breath sounds  Post neb tx improved aeration  No wheeze  Lymphadenopathy:     She has no cervical adenopathy  Neurological: She is alert and oriented to person, place, and time  Skin: Skin is warm and dry  No rash noted  No erythema  Psychiatric: She has a normal mood and affect  Her behavior is normal  Judgment and thought content normal    Vitals reviewed

## 2019-08-30 DIAGNOSIS — E78.5 HYPERLIPIDEMIA, UNSPECIFIED HYPERLIPIDEMIA TYPE: ICD-10-CM

## 2019-08-30 RX ORDER — SIMVASTATIN 20 MG
TABLET ORAL
Qty: 90 TABLET | Refills: 0 | Status: SHIPPED | OUTPATIENT
Start: 2019-08-30 | End: 2020-02-14

## 2019-10-02 RX ORDER — ATOMOXETINE 40 MG/1
40 CAPSULE ORAL DAILY
Refills: 0 | COMMUNITY
Start: 2019-08-01 | End: 2020-07-23

## 2019-10-04 ENCOUNTER — OFFICE VISIT (OUTPATIENT)
Dept: URGENT CARE | Facility: CLINIC | Age: 64
End: 2019-10-04
Payer: MEDICARE

## 2019-10-04 VITALS
WEIGHT: 126 LBS | RESPIRATION RATE: 16 BRPM | DIASTOLIC BLOOD PRESSURE: 73 MMHG | OXYGEN SATURATION: 100 % | HEART RATE: 87 BPM | HEIGHT: 64 IN | BODY MASS INDEX: 21.51 KG/M2 | SYSTOLIC BLOOD PRESSURE: 141 MMHG | TEMPERATURE: 98.5 F

## 2019-10-04 DIAGNOSIS — R39.15 URINARY URGENCY: ICD-10-CM

## 2019-10-04 DIAGNOSIS — S09.90XA CLOSED HEAD INJURY WITHOUT LOSS OF CONSCIOUSNESS, INITIAL ENCOUNTER: Primary | ICD-10-CM

## 2019-10-04 LAB
SL AMB  POCT GLUCOSE, UA: ABNORMAL
SL AMB LEUKOCYTE ESTERASE,UA: ABNORMAL
SL AMB POCT BILIRUBIN,UA: ABNORMAL
SL AMB POCT BLOOD,UA: ABNORMAL
SL AMB POCT CLARITY,UA: ABNORMAL
SL AMB POCT COLOR,UA: YELLOW
SL AMB POCT KETONES,UA: ABNORMAL
SL AMB POCT NITRITE,UA: ABNORMAL
SL AMB POCT PH,UA: 7
SL AMB POCT SPECIFIC GRAVITY,UA: 1025
SL AMB POCT URINE PROTEIN: ABNORMAL
SL AMB POCT UROBILINOGEN: ABNORMAL

## 2019-10-04 PROCEDURE — 99213 OFFICE O/P EST LOW 20 MIN: CPT | Performed by: PHYSICIAN ASSISTANT

## 2019-10-04 PROCEDURE — 87086 URINE CULTURE/COLONY COUNT: CPT | Performed by: PHYSICIAN ASSISTANT

## 2019-10-04 PROCEDURE — 81002 URINALYSIS NONAUTO W/O SCOPE: CPT | Performed by: PHYSICIAN ASSISTANT

## 2019-10-04 RX ORDER — RIBOFLAVIN (VITAMIN B2) 100 MG
100 TABLET ORAL DAILY
COMMUNITY
End: 2021-04-28

## 2019-10-04 NOTE — PROGRESS NOTES
[facility] [place] [facility]        NAME: Mary Balderas is a 60 y o  female  : 1955    MRN: [id number]  DATE: 2019  TIME: 6:23 PM    Assessment and Plan   Closed head injury without loss of consciousness, initial encounter [S09 [id number]]  1  Closed head injury without loss of consciousness, initial encounter     2  Urinary urgency  Urine culture    POCT urine dip         Patient Instructions     Patient has sustained recent head injury with minimal residual symptoms  She has closed head injury with no loss of consciousness  I believe that her difficulties with gait including tandem walk and positive Romberg are more related to her significant history of chronic arthritis throughout her spine and knees as well as alcohol dependence  I did recommend fluids, rest, Tylenol as needed for headache, avoiding over exertion, and neuro cognitive rest   She also has some mild urinary symptoms and her urine dip shows trace blood but is otherwise unremarkable  I will send out urine culture to further evaluate and will withhold antibiotics at this time  She is increase her hydration, and observe  Follow up with PCP in 3-5 days  Proceed to  ER if symptoms worsen  Chief Complaint     Chief Complaint   Patient presents with    Urinary Tract Infection     symptoms began Tuesday but has had symptoms x10 days  Hit head on bath tub         History of Present Illness       Pt presents with about a 10 day history of intermittent symptoms of urinary urgency, pressure  Denies frequency, dysuria  She is concerned because this has happened before and she was found to have an E  Coli UTI  She also reports 5 days ago around 2:30 AM on 10/29/2019, she fell asleep on the toilet, fell sideways, and hit her head on the bathtub but there was no LOC  She said she jumped back up right away because she did not want her kids to wake up  Felt dizzy, nausea, out of sorts, fatigued, weakness a few days ago   She is much better today  She denies any current symptoms today related to this  She denies previous history of concussion  Review of Systems   Review of Systems   Constitutional: Positive for fatigue  Eyes: Negative  Respiratory: Negative  Cardiovascular: Negative  Gastrointestinal: Positive for nausea  Genitourinary: Positive for pelvic pain (Pressure) and urgency  Negative for dysuria and frequency  Neurological: Positive for dizziness and weakness  Negative for syncope, numbness and headaches  Head injury   Psychiatric/Behavioral: Positive for confusion           Current Medications       Current Outpatient Medications:     amLODIPine (NORVASC) 10 mg tablet, Take 1 tablet (10 mg total) by mouth daily, Disp: 30 tablet, Rfl: 0    Ascorbic Acid (VITAMIN C) 100 MG tablet, Take 100 mg by mouth daily, Disp: , Rfl:     buprenorphine (SUBUTEX) 8 mg, 8 mg 2 (two) times a day Oral , Disp: , Rfl:     disulfiram (ANTABUSE) 250 mg tablet, Take 250 mg by mouth daily, Disp: , Rfl:     DULoxetine (CYMBALTA) 30 mg delayed release capsule, Take 1 capsule (30 mg total) by mouth 2 (two) times a day, Disp: 60 capsule, Rfl: 3    gabapentin (NEURONTIN) 300 mg capsule, Take 1 capsule (300 mg total) by mouth 3 (three) times a day, Disp: 90 capsule, Rfl: 1    methylphenidate (RITALIN LA) 20 MG 24 hr capsule, , Disp: , Rfl:     methylphenidate (RITALIN LA) 30 MG 24 hr capsule, , Disp: , Rfl: 0    pantoprazole (PROTONIX) 40 mg tablet, Take 1 tablet by mouth 2 (two) times a day, Disp: 60 tablet, Rfl: 3    PROAIR  (90 Base) MCG/ACT inhaler, inhale 2 puffs by mouth every 6 hours if needed for wheezing, Disp: 8 5 g, Rfl: 3    simvastatin (ZOCOR) 20 mg tablet, take 1 tablet by mouth once daily, Disp: 90 tablet, Rfl: 0    Turmeric 500 MG TABS, Take by mouth daily at bedtime, Disp: , Rfl:     atoMOXetine (STRATTERA) 40 mg capsule, Take 40 mg by mouth daily, Disp: , Rfl: 0    fluticasone (FLONASE) 50 mcg/act nasal spray, 1 spray into each nostril 2 (two) times a day for 5 days, Disp: 1 Bottle, Rfl: 0    fluticasone-salmeterol (ADVAIR DISKUS, WIXELA INHUB) 250-50 mcg/dose inhaler, inhale 1 dose by mouth twice a day, Disp: 1 Inhaler, Rfl: 3    milk thistle 175 MG tablet, Take 175 mg by mouth daily, Disp: , Rfl:     multivitamin (THERAGRAN) TABS, Take 1 tablet by mouth daily at bedtime, Disp: , Rfl:     mupirocin (BACTROBAN) 2 % ointment, Apply topically 3 (three) times a day (Patient not taking: Reported on 10/4/2019), Disp: 22 g, Rfl: 0    psyllium (METAMUCIL) 58 6 % powder, Take 1 packet by mouth 2 (two) times a day, Disp: , Rfl:     SUMAtriptan (IMITREX) 100 mg tablet, Take 100 mg by mouth once as needed for migraine, Disp: , Rfl:     Current Allergies     Allergies as of 10/04/2019 - Reviewed 10/04/2019   Allergen Reaction Noted    Bee venom Anaphylaxis 06/05/2017    Nsaids  12/07/2018    Ibuprofen Swelling 06/05/2017    Diphenhydramine  12/07/2018    Antihistamines, chlorpheniramine-type  06/05/2017            The following portions of the patient's history were reviewed and updated as appropriate: allergies, current medications, past family history, past medical history, past social history, past surgical history and problem list      Past Medical History:   Diagnosis Date    Amenorrhea     Anesthesia complication     hx of cocaine use-none in 5 yrs    Anxiety     Arthritis     bilateral knees-DJD, arthritis neck and back-cervical injection 2017    Bronchitis, chronic (HCC)     Cancer ([address] )     squamous-removed from the right foot    Chronic pain disorder     neck and back    COPD (chronic obstructive pulmonary disease) ([facility] [address] )     Depression     Eczema     last assessed 6/27/16, resolved 10/2/17    Fibromyalgia, primary     H/O ETOH abuse     None in the past 10 months since 8/1/17    Lumbar radiculopathy     resolved 6/6/17    Malignant melanoma of skin (HCC)     Migraine     MRSA (methicillin resistant Staphylococcus aureus) infection     last assessed 4/29/16    Raynauds syndrome     Spinal stenosis        Past Surgical History:   Procedure Laterality Date    ARTHROSCOPY KNEE Right 6/8/2017    Procedure: RIGHT KNEE ARTHROSCOPY MEDIAL MENISCECTOMY;  Surgeon: Miki Baca MD;  Location: [facility] MAIN OR;  Service:    [facility]    COLONOSCOPY      COLONOSCOPY N/A 1/25/2018    Procedure: COLONOSCOPY;  Surgeon: Karine Oneill MD;  Location: [facility] GI LAB; Service: Gastroenterology    DILATION AND CURETTAGE OF UTERUS      x 2 miscarriage    EPIDURAL BLOCK INJECTION N/A 12/8/2017    Procedure: INJECTION EPIDURAL STEROID CERVICAL C6-C7;  Surgeon: Vanita Suarez MD;  Location: [facility] MAIN OR;  Service: Pain Management     ESOPHAGOGASTRODUODENOSCOPY N/A 1/25/2018    Procedure: ESOPHAGOGASTRODUODENOSCOPY (EGD); Surgeon: Karine Oneill MD;  Location: [facility] GI LAB; Service: Gastroenterology    FOOT SURGERY      squamous removed from the right    HAND SURGERY Bilateral     arthroplasty -thumb joint    last assessed 6/6/17    HERNIA REPAIR      inguinal    WISDOM TOOTH EXTRACTION         Family History   Problem Relation Age of Onset    Peripheral vascular disease Mother     Arthritis Mother         osteo arthritis    Coronary artery disease Mother         Atherosclerosis    Alcohol abuse Mother     Stroke Mother         CVA    Transient ischemic attack Mother     Heart disease Father     Peripheral vascular disease Father     Stroke Father         CVA    Leukemia Father     Arthritis Sister         rheumatoid    Alcohol abuse Sister     No Known Problems Sister     No Known Problems Sister     No Known Problems Sister          Medications have been verified          Objective   /73   Pulse 87   Temp 98 5 °F (36 9 °C)   Resp 16   Ht 5' 4" (1 626 m)   Wt 57 2 kg (126 lb)   SpO2 100%   BMI 21 63 kg/m²        Physical Exam     Physical Exam   Constitutional: She is oriented to person, place, and time  She appears well-developed and well-nourished  No distress  HENT:   Head: Normocephalic  Mouth/Throat: Oropharynx is clear and moist and mucous membranes are normal    Faint patch of fading ecchymosis right temporal region   Eyes: Pupils are equal, round, and reactive to light  EOM are normal    No photophobia   Neck: Normal range of motion  Neck supple  Cardiovascular: Normal rate, regular rhythm and normal heart sounds  No murmur heard  Pulmonary/Chest: Effort normal and breath sounds normal    Abdominal: There is no CVA tenderness  Neurological: She is alert and oriented to person, place, and time  She has normal strength and normal reflexes  No cranial nerve deficit or sensory deficit  Coordination normal    Positive Romberg and mildly unsteady gait with some difficulty performing tandem walk   Psychiatric: She has a normal mood and affect  Vitals reviewed

## 2019-10-04 NOTE — PATIENT INSTRUCTIONS
Head Injury   WHAT YOU NEED TO KNOW:   A head injury is most often caused by a blow to the head  This may occur from a fall, bicycle injury, sports injury, being struck in the head, or a motor vehicle accident  DISCHARGE INSTRUCTIONS:   Call 911 or have someone else call for any of the following:   · You cannot be woken  · You have a seizure  · You stop responding to others or you faint  · You have blurry or double vision  · Your speech becomes slurred or confused  · You have arm or leg weakness, loss of feeling, or new problems with coordination  · Your pupils are larger than usual or one pupil is a different size than the other  · You have blood or clear fluid coming out of your ears or nose  Return to the emergency department if:   · You have repeated or forceful vomiting  · You feel confused  · Your headache gets worse or becomes severe  · You or someone caring for you notices that you are harder to wake than usual   Contact your healthcare provider if:   · Your symptoms last longer than 6 weeks after the injury  · You have questions or concerns about your condition or care  Medicines:   · Acetaminophen  decreases pain  Acetaminophen is available without a doctor's order  Ask how much to take and how often to take it  Follow directions  Acetaminophen can cause liver damage if not taken correctly  · Take your medicine as directed  Contact your healthcare provider if you think your medicine is not helping or if you have side effects  Tell him or her if you are allergic to any medicine  Keep a list of the medicines, vitamins, and herbs you take  Include the amounts, and when and why you take them  Bring the list or the pill bottles to follow-up visits  Carry your medicine list with you in case of an emergency  Self-care:   · Rest  or do quiet activities for 24 to 48 hours  Limit your time watching TV, using the computer, or doing tasks that require a lot of thinking  Slowly return to your normal activities as directed  Do not play sports or do activities that may cause you to get hit in the head  Ask your healthcare provider when you can return to sports  · Apply ice  on your head for 15 to 20 minutes every hour or as directed  Use an ice pack, or put crushed ice in a plastic bag  Cover it with a towel before you apply it to your skin  Ice helps prevent tissue damage and decreases swelling and pain  · Have someone stay with you for 24 hours  or as directed  This person can monitor you for complications and call [phone]  When you are awake the person should ask you a few questions to see if you are thinking clearly  An example would be to ask your name or your address  Prevent another head injury:   · Wear a helmet that fits properly  Do this when you play sports, or ride a bike, scooter, or skateboard  Helmets help decrease your risk of a serious head injury  Talk to your healthcare provider about other ways you can protect yourself if you play sports  · Wear your seat belt every time you are in a car  This helps to decrease your risk for a head injury if you are in a car accident  Follow up with your healthcare provider as directed:  Write down your questions so you remember to ask them during your visits  © 2017 [address] Information is for End User's use only and may not be sold, redistributed or otherwise used for commercial purposes  All illustrations and images included in CareNotes® are the copyrighted property of [employer]  or [employer]  The above information is an  only  It is not intended as medical advice for individual conditions or treatments  Talk to your doctor, nurse or pharmacist before following any medical regimen to see if it is safe and effective for you      Urinary Urgency and Frequency   AMBULATORY CARE:   Urinary urgency and frequency  is a condition that increases how strongly or how often you need to urinate  The condition may also be called urgency-frequency syndrome  Urinary urgency means you feel such a strong need to urinate that you have trouble waiting  You may also feel discomfort in your bladder  Urinary frequency means you need to urinate many times during the day  This may also be called increased daytime frequency  You may be woken from sleep by the need to urinate  Urgency and frequency often happen together, but you may only have one  Contact your healthcare provider if:   · Your urine is pink, or you notice blood in your urine  · You have pain with urination  · You continue to have symptoms even after you take your medicine  · You have new or worsening symptoms  · You have questions or concerns about your condition or care  Treatment  will depend on the type and cause of your urination problems  You may need any of the following:  · Medicines  may be given to relax your bladder and decrease urination  You may also need antibiotics if your symptoms are caused by a bacterial infection  · Sacral nerve stimulation  sends electrical signals to your sacral nerve through a small device implanted under your skin  Your sacral nerve controls your bladder, sphincter, and pelvic floor muscles  · Botox injections  into your bladder may help relax your bladder muscle to decrease urgency and frequency  · Surgery  may be done if all other treatments cannot help you control your bladder  Manage urinary urgency and frequency:   · Keep a record of your urination patterns for a few days  Write down the number of times you urinate over 24 hours, the amount, and if you have urine leakage  Record how strong the urge to urinate was each time  Your healthcare provider may also want you to record the type and amount of liquids you drink  · Train your bladder  Go to the bathroom at set times, such as every 2 hours, even if you do not feel the urge to go   You can also try to hold your urine when you feel the urge to go  For example, hold your urine for 5 minutes when you feel the urge to go  As that becomes easier, hold your urine for 10 minutes  Work up to every 3 or 4 hours to help control your bladder  · Limit liquids as directed  Limit liquids to decrease the amount you urinate  Ask how much liquid to drink each day and which liquids are best for you  You may need to avoid drinking liquids several hours before you go to sleep  Your healthcare provider may also recommend that you limit caffeine and alcohol  · Do Kegel exercises often  Kegel exercises help strengthen your pelvic muscles and improve bladder control  These muscles help you stop urinating  Squeeze these muscles tightly for 5 seconds like you are trying to stop the flow of urine  Then relax for 5 seconds  Gradually work up to squeezing for 10 seconds  Do 3 sets of 15 repetitions a day, or as directed  · Exercise regularly and maintain a healthy weight  Ask your healthcare provider how much you should weigh and about the best exercise plan for you  Extra weight puts pressure on your bladder and may make your symptoms worse  Ask your provider to help you create a safe weight loss plan if you are overweight  Follow up with your healthcare provider as directed: Your healthcare provider may refer you to a specialist to find the cause of your symptoms  Write down your questions so you remember to ask them during your visits  © 2017 [address]  Information is for End User's use only and may not be sold, redistributed or otherwise used for commercial purposes  All illustrations and images included in CareNotes® are the copyrighted property of [employer]  or [employer]pherson  The above information is an  only  It is not intended as medical advice for individual conditions or treatments   Talk to your doctor, nurse or pharmacist before following any medical regimen to see if it is safe and effective for you

## 2019-10-05 LAB — BACTERIA UR CULT: NORMAL

## 2019-10-17 ENCOUNTER — TRANSCRIBE ORDERS (OUTPATIENT)
Dept: URGENT CARE | Facility: CLINIC | Age: 64
End: 2019-10-17

## 2019-10-17 ENCOUNTER — OFFICE VISIT (OUTPATIENT)
Dept: URGENT CARE | Facility: CLINIC | Age: 64
End: 2019-10-17
Payer: MEDICARE

## 2019-10-17 VITALS
BODY MASS INDEX: 22.09 KG/M2 | TEMPERATURE: 98.3 F | DIASTOLIC BLOOD PRESSURE: 67 MMHG | HEIGHT: 64 IN | SYSTOLIC BLOOD PRESSURE: 145 MMHG | WEIGHT: 129.4 LBS | RESPIRATION RATE: 16 BRPM | OXYGEN SATURATION: 99 % | HEART RATE: 77 BPM

## 2019-10-17 DIAGNOSIS — R82.90 CLOUDY URINE: Primary | ICD-10-CM

## 2019-10-17 DIAGNOSIS — J20.9 ACUTE BRONCHITIS, UNSPECIFIED ORGANISM: ICD-10-CM

## 2019-10-17 DIAGNOSIS — R39.15 URINARY URGENCY: Primary | ICD-10-CM

## 2019-10-17 DIAGNOSIS — R68.89 FEELING OFF-COLOR: Primary | ICD-10-CM

## 2019-10-17 LAB
SL AMB  POCT GLUCOSE, UA: NEGATIVE
SL AMB LEUKOCYTE ESTERASE,UA: NEGATIVE
SL AMB POCT BILIRUBIN,UA: NEGATIVE
SL AMB POCT BLOOD,UA: ABNORMAL
SL AMB POCT CLARITY,UA: CLEAR
SL AMB POCT COLOR,UA: YELLOW
SL AMB POCT KETONES,UA: NEGATIVE
SL AMB POCT NITRITE,UA: NEGATIVE
SL AMB POCT PH,UA: 5
SL AMB POCT SPECIFIC GRAVITY,UA: 1.01
SL AMB POCT URINE PROTEIN: NEGATIVE
SL AMB POCT UROBILINOGEN: 0.2

## 2019-10-17 PROCEDURE — 87086 URINE CULTURE/COLONY COUNT: CPT | Performed by: PHYSICIAN ASSISTANT

## 2019-10-17 PROCEDURE — 99213 OFFICE O/P EST LOW 20 MIN: CPT | Performed by: PHYSICIAN ASSISTANT

## 2019-10-17 PROCEDURE — 81002 URINALYSIS NONAUTO W/O SCOPE: CPT | Performed by: PHYSICIAN ASSISTANT

## 2019-10-17 RX ORDER — AZITHROMYCIN 250 MG/1
TABLET, FILM COATED ORAL
Qty: 6 TABLET | Refills: 0 | Status: SHIPPED | OUTPATIENT
Start: 2019-10-17 | End: 2019-10-21

## 2019-10-17 RX ORDER — BENZONATATE 200 MG/1
200 CAPSULE ORAL 3 TIMES DAILY PRN
Qty: 20 CAPSULE | Refills: 0 | Status: SHIPPED | OUTPATIENT
Start: 2019-10-17 | End: 2020-07-23

## 2019-10-17 NOTE — PROGRESS NOTES
[facility]        NAME: Garland Orozco is a 60 y o  female  : 1955    MRN: [id number]  DATE: 2019  TIME: 4:11 PM    Assessment and Plan   Cloudy urine [R82 90]  1  Cloudy urine  Urine culture    POCT urine dip   2  Acute bronchitis, unspecified organism  azithromycin (ZITHROMAX) 250 mg tablet    benzonatate (TESSALON) 200 MG capsule         Patient Instructions     Discussed condition with pt  UA shows microscopic blood but otherwise unremarkable  I do not suspect UTI as recent culture was negative but will reassess with repeat culture  She is to hydrate, rest, observe  She also has acute bronchitis with symptoms for the past 2 weeks and she is a smoker  Will place her on Z-pack and Tessalon Perles and rec hydration, rest, Mucinex DM, inhalers, and observation  Follow up with PCP in 3-5 days  Proceed to  ER if symptoms worsen  Chief Complaint     Chief Complaint   Patient presents with    Nasal Congestion     Patient complains of upper respitory symptoms and UTI symptom  Discomfort when urinating and cloudiness  Shortness of breath, cough  History of Present Illness       Pt presents for reevaluation from previous visit on 10/4/2019  She reports her urine is still cloudy and she has occ dizziness but denies any other typical UTI symptoms  She requests that her urine be rechecked for UTI  She now reports two week history of productive cough, runny nose with clear drainage, wheezing, chest tightness worse at night  Denies ST, fever, chills, N/V/D  She has taken Claritin and Tylenol  Started using Advair yesterday and Pro-Air  Smokes 1/2 ppd  Review of Systems   Review of Systems   Constitutional: Negative  HENT: Positive for rhinorrhea  Negative for congestion and sore throat  Respiratory: Positive for cough, chest tightness and wheezing  Cardiovascular: Negative  Gastrointestinal: Negative  Genitourinary: Negative    Negative for dysuria, flank pain, frequency, hematuria and urgency  Cloudy urine         Current Medications       Current Outpatient Medications:     amLODIPine (NORVASC) 10 mg tablet, Take 1 tablet (10 mg total) by mouth daily, Disp: 30 tablet, Rfl: 0    Ascorbic Acid (VITAMIN C) 100 MG tablet, Take 100 mg by mouth daily, Disp: , Rfl:     atoMOXetine (STRATTERA) 40 mg capsule, Take 40 mg by mouth daily, Disp: , Rfl: 0    buprenorphine (SUBUTEX) 8 mg, 8 mg 2 (two) times a day Oral , Disp: , Rfl:     disulfiram (ANTABUSE) 250 mg tablet, Take 250 mg by mouth daily, Disp: , Rfl:     DULoxetine (CYMBALTA) 30 mg delayed release capsule, Take 1 capsule (30 mg total) by mouth 2 (two) times a day, Disp: 60 capsule, Rfl: 3    fluticasone-salmeterol (ADVAIR DISKUS, WIXELA INHUB) 250-50 mcg/dose inhaler, inhale 1 dose by mouth twice a day, Disp: 1 Inhaler, Rfl: 3    gabapentin (NEURONTIN) 300 mg capsule, Take 1 capsule (300 mg total) by mouth 3 (three) times a day, Disp: 90 capsule, Rfl: 1    methylphenidate (RITALIN LA) 30 MG 24 hr capsule, , Disp: , Rfl: 0    multivitamin (THERAGRAN) TABS, Take 1 tablet by mouth daily at bedtime, Disp: , Rfl:     pantoprazole (PROTONIX) 40 mg tablet, Take 1 tablet by mouth 2 (two) times a day, Disp: 60 tablet, Rfl: 3    PROAIR  (90 Base) MCG/ACT inhaler, inhale 2 puffs by mouth every 6 hours if needed for wheezing, Disp: 8 5 g, Rfl: 3    simvastatin (ZOCOR) 20 mg tablet, take 1 tablet by mouth once daily, Disp: 90 tablet, Rfl: 0    SUMAtriptan (IMITREX) 100 mg tablet, Take 100 mg by mouth once as needed for migraine, Disp: , Rfl:     Turmeric 500 MG TABS, Take by mouth daily at bedtime, Disp: , Rfl:     azithromycin (ZITHROMAX) 250 mg tablet, Take 2 tablets day 1 with food, then 1 tablet daily days 2-5 with food  , Disp: 6 tablet, Rfl: 0    benzonatate (TESSALON) 200 MG capsule, Take 1 capsule (200 mg total) by mouth 3 (three) times a day as needed for cough, Disp: 20 capsule, Rfl: 0    fluticasone (FLONASE) 50 mcg/act nasal spray, 1 spray into each nostril 2 (two) times a day for 5 days, Disp: 1 Bottle, Rfl: 0    methylphenidate (RITALIN LA) 20 MG 24 hr capsule, , Disp: , Rfl:     milk thistle 175 MG tablet, Take 175 mg by mouth daily, Disp: , Rfl:     mupirocin (BACTROBAN) 2 % ointment, Apply topically 3 (three) times a day (Patient not taking: Reported on 10/4/2019), Disp: 22 g, Rfl: 0    psyllium (METAMUCIL) 58 6 % powder, Take 1 packet by mouth 2 (two) times a day, Disp: , Rfl:     Current Allergies     Allergies as of 10/17/2019 - Reviewed 10/17/2019   Allergen Reaction Noted    Bee venom Anaphylaxis 06/05/2017    Nsaids  12/07/2018    Ibuprofen Swelling 06/05/2017    Diphenhydramine  12/07/2018    Antihistamines, chlorpheniramine-type  06/05/2017            The following portions of the patient's history were reviewed and updated as appropriate: allergies, current medications, past family history, past medical history, past social history, past surgical history and problem list      Past Medical History:   Diagnosis Date    Amenorrhea     Anesthesia complication     hx of cocaine use-none in 5 yrs    Anxiety     Arthritis     bilateral knees-DJD, arthritis neck and back-cervical injection 2017    Bronchitis, chronic (HCC)     Cancer ([facility] [address] )     squamous-removed from the right foot    Chronic pain disorder     neck and back    COPD (chronic obstructive pulmonary disease) ([facility] [address] )     Depression     Eczema     last assessed 6/27/16, resolved 10/2/17    Fibromyalgia, primary     H/O ETOH abuse     None in the past 10 months since 8/1/17    Lumbar radiculopathy     resolved 6/6/17    Malignant melanoma of skin (HCC)     Migraine     MRSA (methicillin resistant Staphylococcus aureus) infection     last assessed 4/29/16    Raynauds syndrome     Spinal stenosis        Past Surgical History:   Procedure Laterality Date    ARTHROSCOPY KNEE Right 6/8/2017    Procedure: RIGHT KNEE ARTHROSCOPY MEDIAL MENISCECTOMY;  Surgeon: Heriberto Agustin MD;  Location: [facility] MAIN OR;  Service:    [address]    COLONOSCOPY      COLONOSCOPY N/A 1/25/2018    Procedure: COLONOSCOPY;  Surgeon: Mejia Lockhart MD;  Location: [facility] GI LAB; Service: Gastroenterology    DILATION AND CURETTAGE OF UTERUS      x 2 miscarriage    EPIDURAL BLOCK INJECTION N/A 12/8/2017    Procedure: INJECTION EPIDURAL STEROID CERVICAL C6-C7;  Surgeon: Lilli Johnson MD;  Location: [facility] MAIN OR;  Service: Pain Management     ESOPHAGOGASTRODUODENOSCOPY N/A 1/25/2018    Procedure: ESOPHAGOGASTRODUODENOSCOPY (EGD); Surgeon: Mejia Lockhart MD;  Location: [facility] GI LAB; Service: Gastroenterology    FOOT SURGERY      squamous removed from the right    HAND SURGERY Bilateral     arthroplasty -thumb joint    last assessed 6/6/17    HERNIA REPAIR      inguinal    WISDOM TOOTH EXTRACTION         Family History   Problem Relation Age of Onset    Peripheral vascular disease Mother     Arthritis Mother         osteo arthritis    Coronary artery disease Mother         Atherosclerosis    Alcohol abuse Mother     Stroke Mother         CVA    Transient ischemic attack Mother     Heart disease Father     Peripheral vascular disease Father     Stroke Father         CVA    Leukemia Father     Arthritis Sister         rheumatoid    Alcohol abuse Sister     No Known Problems Sister     No Known Problems Sister     No Known Problems Sister          Medications have been verified  Objective   /67 (BP Location: Left arm, Patient Position: Sitting, Cuff Size: Standard)   Pulse 77   Temp 98 3 °F (36 8 °C) (Tympanic)   Resp 16   Ht 5' 4" (1 626 m)   Wt 58 7 kg (129 lb 6 4 oz)   SpO2 99%   BMI 22 21 kg/m²        Physical Exam     Physical Exam   Constitutional: She is oriented to person, place, and time  She appears well-developed and well-nourished  No distress     HENT:   Right Ear: Hearing, tympanic membrane, external ear and ear canal normal    Left Ear: Hearing, tympanic membrane, external ear and ear canal normal    Nose: Mucosal edema (B/L boggy turbinates) present  Mouth/Throat: Mucous membranes are normal  Posterior oropharyngeal erythema (PND) present  No oropharyngeal exudate  Neck: Neck supple  Cardiovascular: Normal rate, regular rhythm and normal heart sounds  No murmur heard  Pulmonary/Chest: Effort normal  No respiratory distress  She has no wheezes  She has rhonchi (B/L diffuse coarse breath sounds heard throughout)  Abdominal: There is no CVA tenderness  Lymphadenopathy:     She has no cervical adenopathy  Neurological: She is alert and oriented to person, place, and time  Psychiatric: She has a normal mood and affect  Vitals reviewed

## 2019-10-18 LAB — BACTERIA UR CULT: NORMAL

## 2019-12-09 ENCOUNTER — OFFICE VISIT (OUTPATIENT)
Dept: FAMILY MEDICINE CLINIC | Facility: CLINIC | Age: 64
End: 2019-12-09
Payer: MEDICARE

## 2019-12-09 VITALS
HEART RATE: 88 BPM | DIASTOLIC BLOOD PRESSURE: 78 MMHG | SYSTOLIC BLOOD PRESSURE: 128 MMHG | TEMPERATURE: 98.9 F | BODY MASS INDEX: 21.72 KG/M2 | RESPIRATION RATE: 16 BRPM | HEIGHT: 64 IN | WEIGHT: 127.2 LBS

## 2019-12-09 DIAGNOSIS — R06.2 WHEEZES: ICD-10-CM

## 2019-12-09 DIAGNOSIS — J40 BRONCHITIS: Primary | ICD-10-CM

## 2019-12-09 PROCEDURE — 99213 OFFICE O/P EST LOW 20 MIN: CPT | Performed by: FAMILY MEDICINE

## 2019-12-09 RX ORDER — ALBUTEROL SULFATE 90 UG/1
2 AEROSOL, METERED RESPIRATORY (INHALATION) EVERY 4 HOURS PRN
Qty: 8.5 G | Refills: 5 | Status: SHIPPED | OUTPATIENT
Start: 2019-12-09 | End: 2021-06-25 | Stop reason: SDUPTHER

## 2019-12-09 RX ORDER — AZITHROMYCIN 250 MG/1
TABLET, FILM COATED ORAL
Qty: 6 TABLET | Refills: 0 | Status: SHIPPED | OUTPATIENT
Start: 2019-12-09 | End: 2019-12-13

## 2019-12-09 RX ORDER — PREDNISONE 20 MG/1
TABLET ORAL
Qty: 7 TABLET | Refills: 0 | Status: SHIPPED | OUTPATIENT
Start: 2019-12-09 | End: 2020-07-23

## 2019-12-16 ENCOUNTER — TELEPHONE (OUTPATIENT)
Dept: FAMILY MEDICINE CLINIC | Facility: CLINIC | Age: 64
End: 2019-12-16

## 2019-12-16 DIAGNOSIS — J40 BRONCHITIS: Primary | ICD-10-CM

## 2019-12-16 RX ORDER — AMOXICILLIN AND CLAVULANATE POTASSIUM 875; 125 MG/1; MG/1
1 TABLET, FILM COATED ORAL EVERY 12 HOURS SCHEDULED
Qty: 14 TABLET | Refills: 0 | Status: SHIPPED | OUTPATIENT
Start: 2019-12-16 | End: 2019-12-23

## 2020-01-01 NOTE — PROGRESS NOTES
Assessment/Plan:   Diagnoses and all orders for this visit:    Bronchitis  Comments:  Rxs for Zpk, streoid and Tessalon perlles given Check CXR  Orders:  -     azithromycin (ZITHROMAX) 250 mg tablet; Take 2 tablets today then 1 tablet daily x 4 days  -     predniSONE 20 mg tablet; 2 tabs po x2d ,1 tab po x2d, 1/2 tab po x2d--take w food  -     XR chest pa & lateral; Future    Wheezes  Comments:  meds as above +cont use Advair for maintenance and Albuterol inhaler for rescue use  Orders:  -     XR chest pa & lateral; Future  -     albuterol (PROAIR HFA) 90 mcg/act inhaler; Inhale 2 puffs every 4 (four) hours as needed for wheezing  -     fluticasone-salmeterol (ADVAIR DISKUS) 500-50 mcg/dose inhaler; Inhale 1 puff 2 (two) times a day            Subjective:      Patient ID: Mayank Hyde is a 60 y o  female  Chief Complaint   Patient presents with    URI     cough, congestion, weakness ,sob        URI    This is a new problem  The current episode started in the past 7 days  The problem has been gradually worsening  Associated symptoms include congestion, coughing, headaches, nausea, rhinorrhea, sinus pain, sneezing, a sore throat, swollen glands and wheezing  Pertinent negatives include no diarrhea, dysuria, neck pain, rash or vomiting  She has tried inhaler use, increased fluids and steam for the symptoms  The treatment provided no relief  The following portions of the patient's history were reviewed and updated as appropriate: allergies, current medications, past family history, past medical history, past social history, past surgical history and problem list      Review of Systems   HENT: Positive for congestion, rhinorrhea, sinus pain, sneezing and sore throat  Respiratory: Positive for cough and wheezing  Gastrointestinal: Positive for nausea  Negative for diarrhea and vomiting  Genitourinary: Negative for dysuria  Musculoskeletal: Negative for neck pain  Skin: Negative for rash  Neurological: Positive for headaches  Objective:    /78 (BP Location: Left arm, Patient Position: Sitting, Cuff Size: Standard)   Pulse 88   Temp 98 9 °F (37 2 °C)   Resp 16   Ht 5' 4" (1 626 m)   Wt 57 7 kg (127 lb 3 2 oz)   BMI 21 83 kg/m²        Physical Exam   Constitutional: She is oriented to person, place, and time  Acutely ill, looks tired   HENT:   Mouth/Throat: No oropharyngeal exudate  Nasal bogginess, pngtt  B/l facial tenderness   Eyes: No scleral icterus  Neck: Neck supple  Cardiovascular: Normal rate and regular rhythm  Pulmonary/Chest: She has wheezes  +rhonchi   Abdominal: Soft  Bowel sounds are normal  There is no tenderness  Neurological: She is alert and oriented to person, place, and time  No cranial nerve deficit  Skin: Skin is warm and dry  No rash noted  Nursing note and vitals reviewed              Kingsley Syed MD

## 2020-02-14 DIAGNOSIS — E78.5 HYPERLIPIDEMIA, UNSPECIFIED HYPERLIPIDEMIA TYPE: ICD-10-CM

## 2020-02-14 DIAGNOSIS — I10 ESSENTIAL HYPERTENSION: ICD-10-CM

## 2020-02-14 RX ORDER — SIMVASTATIN 20 MG
TABLET ORAL
Qty: 90 TABLET | Refills: 0 | Status: SHIPPED | OUTPATIENT
Start: 2020-02-14 | End: 2020-05-06

## 2020-02-15 RX ORDER — AMLODIPINE BESYLATE 10 MG/1
TABLET ORAL
Qty: 30 TABLET | Refills: 3 | Status: SHIPPED | OUTPATIENT
Start: 2020-02-15 | End: 2020-07-29

## 2020-05-06 DIAGNOSIS — E78.5 HYPERLIPIDEMIA, UNSPECIFIED HYPERLIPIDEMIA TYPE: ICD-10-CM

## 2020-05-06 RX ORDER — SIMVASTATIN 20 MG
TABLET ORAL
Qty: 90 TABLET | Refills: 0 | Status: SHIPPED | OUTPATIENT
Start: 2020-05-06 | End: 2021-04-28

## 2020-07-06 LAB — HBA1C MFR BLD HPLC: 5.5 %

## 2020-07-22 ENCOUNTER — TELEPHONE (OUTPATIENT)
Dept: OBGYN CLINIC | Facility: CLINIC | Age: 65
End: 2020-07-22

## 2020-07-22 ENCOUNTER — TELEPHONE (OUTPATIENT)
Dept: FAMILY MEDICINE CLINIC | Facility: CLINIC | Age: 65
End: 2020-07-22

## 2020-07-22 NOTE — TELEPHONE ENCOUNTER
PCP office called to schedule pt an apt for possible uterine prolapse  Would like to check symptoms  Wanted to confirm apt for tomorrow and complete covid screening

## 2020-07-23 ENCOUNTER — TRANSCRIBE ORDERS (OUTPATIENT)
Dept: ADMINISTRATIVE | Facility: HOSPITAL | Age: 65
End: 2020-07-23

## 2020-07-23 ENCOUNTER — APPOINTMENT (EMERGENCY)
Dept: RADIOLOGY | Facility: HOSPITAL | Age: 65
End: 2020-07-23
Payer: MEDICARE

## 2020-07-23 ENCOUNTER — OFFICE VISIT (OUTPATIENT)
Dept: URGENT CARE | Facility: CLINIC | Age: 65
End: 2020-07-23
Payer: MEDICARE

## 2020-07-23 ENCOUNTER — HOSPITAL ENCOUNTER (EMERGENCY)
Facility: HOSPITAL | Age: 65
Discharge: HOME | End: 2020-07-23
Attending: EMERGENCY MEDICINE
Payer: MEDICARE

## 2020-07-23 VITALS
SYSTOLIC BLOOD PRESSURE: 111 MMHG | OXYGEN SATURATION: 98 % | RESPIRATION RATE: 18 BRPM | HEART RATE: 60 BPM | DIASTOLIC BLOOD PRESSURE: 56 MMHG | TEMPERATURE: 97.2 F

## 2020-07-23 VITALS
HEIGHT: 65 IN | RESPIRATION RATE: 18 BRPM | OXYGEN SATURATION: 98 % | TEMPERATURE: 98.2 F | BODY MASS INDEX: 22.92 KG/M2 | DIASTOLIC BLOOD PRESSURE: 78 MMHG | HEART RATE: 78 BPM | WEIGHT: 137.6 LBS | SYSTOLIC BLOOD PRESSURE: 124 MMHG

## 2020-07-23 DIAGNOSIS — R21 RASH: ICD-10-CM
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
## 2020-07-23 DIAGNOSIS — R55 SYNCOPE: Primary | ICD-10-CM

## 2020-07-23 DIAGNOSIS — S61.412A LACERATION OF LEFT HAND WITHOUT FOREIGN BODY, INITIAL ENCOUNTER: ICD-10-CM

## 2020-07-23 DIAGNOSIS — S61.419A HAND LACERATION: ICD-10-CM

## 2020-07-23 LAB
ALBUMIN SERPL BCP-MCNC: 3.7 G/DL (ref 3.5–5)
ALP SERPL-CCNC: 79 U/L (ref 46–116)
ALT SERPL W P-5'-P-CCNC: 33 U/L (ref 12–78)
ANION GAP SERPL CALCULATED.3IONS-SCNC: 2 MMOL/L (ref 4–13)
APTT PPP: 29 SECONDS (ref 23–37)
AST SERPL W P-5'-P-CCNC: 32 U/L (ref 5–45)
BASOPHILS # BLD AUTO: 0.05 THOUSANDS/ΜL (ref 0–0.1)
BASOPHILS NFR BLD AUTO: 1 % (ref 0–1)
BILIRUB SERPL-MCNC: 0.3 MG/DL (ref 0.2–1)
BUN SERPL-MCNC: 16 MG/DL (ref 5–25)
CALCIUM SERPL-MCNC: 8.4 MG/DL (ref 8.3–10.1)
CHLORIDE SERPL-SCNC: 103 MMOL/L (ref 100–108)
CO2 SERPL-SCNC: 32 MMOL/L (ref 21–32)
CREAT SERPL-MCNC: 0.97 MG/DL (ref 0.6–1.3)
EOSINOPHIL # BLD AUTO: 0.39 THOUSAND/ΜL (ref 0–0.61)
EOSINOPHIL NFR BLD AUTO: 7 % (ref 0–6)
ERYTHROCYTE [DISTWIDTH] IN BLOOD BY AUTOMATED COUNT: 12.4 % (ref 11.6–15.1)
GFR SERPL CREATININE-BSD FRML MDRD: 62 ML/MIN/1.73SQ M
GLUCOSE SERPL-MCNC: 90 MG/DL (ref 65–140)
HCT VFR BLD AUTO: 30.7 % (ref 34.8–46.1)
HGB BLD-MCNC: 9.9 G/DL (ref 11.5–15.4)
IMM GRANULOCYTES # BLD AUTO: 0.01 THOUSAND/UL (ref 0–0.2)
IMM GRANULOCYTES NFR BLD AUTO: 0 % (ref 0–2)
INR PPP: 1.04 (ref 0.84–1.19)
LYMPHOCYTES # BLD AUTO: 1.95 THOUSANDS/ΜL (ref 0.6–4.47)
LYMPHOCYTES NFR BLD AUTO: 34 % (ref 14–44)
MCH RBC QN AUTO: 31.2 PG (ref 26.8–34.3)
MCHC RBC AUTO-ENTMCNC: 32.2 G/DL (ref 31.4–37.4)
MCV RBC AUTO: 97 FL (ref 82–98)
MONOCYTES # BLD AUTO: 0.54 THOUSAND/ΜL (ref 0.17–1.22)
MONOCYTES NFR BLD AUTO: 9 % (ref 4–12)
NEUTROPHILS # BLD AUTO: 2.86 THOUSANDS/ΜL (ref 1.85–7.62)
NEUTS SEG NFR BLD AUTO: 49 % (ref 43–75)
NRBC BLD AUTO-RTO: 0 /100 WBCS
PLATELET # BLD AUTO: 351 THOUSANDS/UL (ref 149–390)
PMV BLD AUTO: 9.7 FL (ref 8.9–12.7)
POTASSIUM SERPL-SCNC: 4 MMOL/L (ref 3.5–5.3)
PROT SERPL-MCNC: 7.1 G/DL (ref 6.4–8.2)
PROTHROMBIN TIME: 13.5 SECONDS (ref 11.6–14.5)
RBC # BLD AUTO: 3.17 MILLION/UL (ref 3.81–5.12)
SODIUM SERPL-SCNC: 137 MMOL/L (ref 136–145)
TROPONIN I SERPL-MCNC: <0.02 NG/ML
TROPONIN I SERPL-MCNC: <0.02 NG/ML
WBC # BLD AUTO: 5.8 THOUSAND/UL (ref 4.31–10.16)

## 2020-07-23 PROCEDURE — 71045 X-RAY EXAM CHEST 1 VIEW: CPT

## 2020-07-23 PROCEDURE — 36415 COLL VENOUS BLD VENIPUNCTURE: CPT | Performed by: EMERGENCY MEDICINE

## 2020-07-23 PROCEDURE — 80053 COMPREHEN METABOLIC PANEL: CPT | Performed by: EMERGENCY MEDICINE

## 2020-07-23 PROCEDURE — 73130 X-RAY EXAM OF HAND: CPT

## 2020-07-23 PROCEDURE — 70450 CT HEAD/BRAIN W/O DYE: CPT

## 2020-07-23 PROCEDURE — 96360 HYDRATION IV INFUSION INIT: CPT

## 2020-07-23 PROCEDURE — 85730 THROMBOPLASTIN TIME PARTIAL: CPT | Performed by: EMERGENCY MEDICINE

## 2020-07-23 PROCEDURE — 99284 EMERGENCY DEPT VISIT MOD MDM: CPT

## 2020-07-23 PROCEDURE — 85025 COMPLETE CBC W/AUTO DIFF WBC: CPT | Performed by: EMERGENCY MEDICINE

## 2020-07-23 PROCEDURE — 3078F DIAST BP <80 MM HG: CPT | Performed by: PHYSICIAN ASSISTANT

## 2020-07-23 PROCEDURE — 3008F BODY MASS INDEX DOCD: CPT | Performed by: PHYSICIAN ASSISTANT

## 2020-07-23 PROCEDURE — 4004F PT TOBACCO SCREEN RCVD TLK: CPT | Performed by: PHYSICIAN ASSISTANT

## 2020-07-23 PROCEDURE — 3074F SYST BP LT 130 MM HG: CPT | Performed by: PHYSICIAN ASSISTANT

## 2020-07-23 PROCEDURE — 85610 PROTHROMBIN TIME: CPT | Performed by: EMERGENCY MEDICINE

## 2020-07-23 PROCEDURE — 99285 EMERGENCY DEPT VISIT HI MDM: CPT | Performed by: EMERGENCY MEDICINE

## 2020-07-23 PROCEDURE — 84484 ASSAY OF TROPONIN QUANT: CPT | Performed by: EMERGENCY MEDICINE

## 2020-07-23 PROCEDURE — 99213 OFFICE O/P EST LOW 20 MIN: CPT | Performed by: PHYSICIAN ASSISTANT

## 2020-07-23 PROCEDURE — 93005 ELECTROCARDIOGRAM TRACING: CPT

## 2020-07-23 PROCEDURE — 96361 HYDRATE IV INFUSION ADD-ON: CPT

## 2020-07-23 RX ORDER — DULOXETIN HYDROCHLORIDE 60 MG/1
60 CAPSULE, DELAYED RELEASE ORAL DAILY
COMMUNITY
Start: 2020-07-17 | End: 2021-04-28

## 2020-07-23 RX ORDER — LANOLIN ALCOHOL/MO/W.PET/CERES
3 CREAM (GRAM) TOPICAL
COMMUNITY
Start: 2020-07-16 | End: 2021-04-28

## 2020-07-23 RX ORDER — ACAMPROSATE CALCIUM 333 MG/1
333 TABLET, DELAYED RELEASE ORAL 3 TIMES DAILY
COMMUNITY
Start: 2020-07-16 | End: 2021-04-28

## 2020-07-23 RX ORDER — NALOXONE HYDROCHLORIDE 4 MG/.1ML
4 SPRAY NASAL AS NEEDED
COMMUNITY
Start: 2020-07-16

## 2020-07-23 RX ORDER — LISINOPRIL 20 MG/1
20 TABLET ORAL EVERY MORNING
COMMUNITY
End: 2021-06-01 | Stop reason: ALTCHOICE

## 2020-07-23 RX ADMIN — SODIUM CHLORIDE 1000 ML: 0.9 INJECTION, SOLUTION INTRAVENOUS at 13:58

## 2020-07-23 NOTE — ED PROVIDER NOTES
History  Chief Complaint   Patient presents with    Fall     Patient drove self here from [facility]  Patient states had blacked out yesterday x 1 and fell during the night striking face  Laceration left hand steri strips at Urgent Care  States sleep deprived past few days, discharged from rehad ( [facility] ) on      Patient recently completed a stay at an alcohol rehab center  She states that yesterday after she drove her grandson home, she arrived back home and felt weak and lightheaded  She had no chest pain abdominal pain no nausea vomiting  Patient states she was in the bathroom and suddenly passed out  She states she had no prodrome or weakness  She did not know she was going to pass out  When she awoke she had injured the dorsum of her left hand  Patient states she felt well when she awoke  She did not seek medical attention at the time  Today she noticed increased pain swelling and tenderness in the left hand and went to a urgent care center  Patient was referred here for further evaluation  She states she has not resumed drinking since leaving rehab          Prior to Admission Medications   Prescriptions Last Dose Informant Patient Reported? Taking?    Ascorbic Acid (VITAMIN C) 100 MG tablet   Yes No   Sig: Take 100 mg by mouth daily   DULoxetine (CYMBALTA) 60 mg delayed release capsule   Yes No   Sig: Take 60 mg by mouth daily   SUMAtriptan (IMITREX) 100 mg tablet  Self Yes No   Sig: Take 100 mg by mouth once as needed for migraine   Turmeric 500 MG TABS  Self Yes No   Sig: Take by mouth daily at bedtime   acamprosate (CAMPRAL) 333 mg tablet   Yes No   Sig: Take 333 mg by mouth Three times a day   albuterol (PROAIR HFA) 90 mcg/act inhaler   No No   Sig: Inhale 2 puffs every 4 (four) hours as needed for wheezing   amLODIPine (NORVASC) 10 mg tablet   No No   Sig: take 1 tablet by mouth once daily   buprenorphine (SUBUTEX) 8 mg  Self Yes No   Si mg 2 (two) times a day Oral    fluticasone (FLONASE) 50 mcg/act nasal spray   No No   Si spray into each nostril 2 (two) times a day for 5 days   fluticasone-salmeterol (ADVAIR, WIXELA) 250-50 mcg/dose inhaler   Yes No   Sig: Inhale 1 puff 2 (two) times a day   gabapentin (NEURONTIN) 300 mg capsule  Self No No   Sig: Take 1 capsule (300 mg total) by mouth 3 (three) times a day   lisinopril (ZESTRIL) 20 mg tablet   Yes No   Sig: Take 20 mg by mouth daily   melatonin 3 mg   Yes No   Sig: Take 3 mg by mouth daily at bedtime as needed   methylphenidate (RITALIN LA) 20 MG 24 hr capsule  Self Yes No   multivitamin (THERAGRAN) TABS  Self Yes No   Sig: Take 1 tablet by mouth daily at bedtime   naloxone (NARCAN) 4 mg/0 1 mL nasal spray   Yes No   Si mg into each nostril as needed   pantoprazole (PROTONIX) 40 mg tablet  Self No No   Sig: Take 1 tablet by mouth 2 (two) times a day   psyllium (METAMUCIL) 58 6 % powder  Self Yes No   Sig: Take 1 packet by mouth 2 (two) times a day as needed    simvastatin (ZOCOR) 20 mg tablet   No No   Sig: take 1 tablet by mouth once daily      Facility-Administered Medications: None       Past Medical History:   Diagnosis Date    Allergic rhinitis     Amenorrhea     Anesthesia complication     hx of cocaine use-none in 5 yrs    Anxiety     Arthritis     bilateral knees-DJD, arthritis neck and back-cervical injection     Bronchitis, chronic (HCC)     Cancer (HCC)     squamous-removed from the right foot    Chronic pain disorder     neck and back    COPD [facility] obstructive [facility] disease) ([facility]     Depression     Eczema     last assessed 16, resolved 10/2/17    Fibromyalgia, primary     GERD (gastroesophageal reflux disease)     H/O ETOH abuse     None in the past 10 months since 17    Hypertension     Lumbar radiculopathy     resolved 17    Malignant melanoma of skin (HCC)     Migraine     Migraine     MRSA (methicillin resistant Staphylococcus aureus) infection     last assessed 16    Peptic ulceration     gastric    Pneumonia     Raynauds syndrome     Spinal stenosis     Substance abuse ([address] )     Urinary tract infection        Past Surgical History:   Procedure Laterality Date    ARTHROSCOPY KNEE Right 6/8/2017    Procedure: RIGHT KNEE ARTHROSCOPY MEDIAL MENISCECTOMY;  Surgeon: Rafalea Metzger MD;  Location: [facility] MAIN OR;  Service:    [facility]    COLONOSCOPY      COLONOSCOPY N/A 1/25/2018    Procedure: COLONOSCOPY;  Surgeon: Checo Castro MD;  Location: [facility] GI LAB; Service: Gastroenterology    DILATION AND CURETTAGE OF UTERUS      x 2 miscarriage    EPIDURAL BLOCK INJECTION N/A 12/8/2017    Procedure: INJECTION EPIDURAL STEROID CERVICAL C6-C7;  Surgeon: Margot Putnam MD;  Location: [facility] MAIN OR;  Service: Pain Management     ESOPHAGOGASTRODUODENOSCOPY N/A 1/25/2018    Procedure: ESOPHAGOGASTRODUODENOSCOPY (EGD); Surgeon: Checo Castro MD;  Location: [facility] GI LAB; Service: Gastroenterology    FOOT SURGERY      squamous removed from the right    HAND SURGERY Bilateral     arthroplasty -thumb joint    last assessed 6/6/17    HERNIA REPAIR      inguinal    WISDOM TOOTH EXTRACTION         Family History   Problem Relation Age of Onset    Peripheral vascular disease Mother     Arthritis Mother         osteo arthritis    Coronary artery disease Mother         Atherosclerosis    Alcohol abuse Mother     Stroke Mother         CVA    Transient ischemic attack Mother     Heart disease Father     Peripheral vascular disease Father     Stroke Father         CVA    Leukemia Father     Arthritis Sister         rheumatoid    Alcohol abuse Sister     No Known Problems Sister     No Known Problems Sister     No Known Problems Sister      I have reviewed and agree with the history as documented      E-Cigarette/Vaping    E-Cigarette Use Never User      E-Cigarette/Vaping Substances     Social History     Tobacco Use    Smoking status: Current Every Day Smoker     Packs/day: 0 50     Years: 15 00     Pack years: 7 50     Types: Cigarettes    Smokeless tobacco: Never Used    Tobacco comment: on and off for 40 yrs   Substance Use Topics    Alcohol use: No     Comment: [facility] rehab  - last drink 7/6/2020    Drug use: Yes     Types: Cocaine     Comment: last 10/2019       Review of Systems   Constitutional: Negative for fever  HENT: Negative for congestion and sore throat  Eyes: Negative for visual disturbance  Respiratory: Negative for cough and shortness of breath  Cardiovascular: Negative for chest pain and leg swelling  Gastrointestinal: Positive for nausea  Negative for abdominal pain and vomiting  Musculoskeletal: Positive for arthralgias and joint swelling  Skin: Positive for wound  Neurological: Positive for syncope, weakness and light-headedness  Hematological: Does not bruise/bleed easily  Psychiatric/Behavioral: Negative for confusion  Physical Exam  Physical Exam   Constitutional: She is oriented to person, place, and time  She appears well-developed and well-nourished  HENT:   Head: Normocephalic and atraumatic  Mouth/Throat: Oropharynx is clear and moist    Eyes: Conjunctivae and EOM are normal    Neck: Normal range of motion  Neck supple  Cardiovascular: Normal rate, regular rhythm and normal heart sounds  Pulmonary/Chest: Effort normal and breath sounds normal    Abdominal: Soft  Bowel sounds are normal  There is no tenderness  Musculoskeletal: She exhibits edema and tenderness  Wounds have been dressed at the urgent care center  There is tender swelling about the dorsum of the hand with full range of motion of the digits   Neurological: She is alert and oriented to person, place, and time  Skin: Skin is warm  Capillary refill takes less than 2 seconds  Psychiatric: She has a normal mood and affect  Her behavior is normal    Nursing note and vitals reviewed        Vital Signs  ED Triage Vitals [07/23/20 1304]   Temperature Pulse Respirations Blood Pressure SpO2   (!) 97 2 °F (36 2 °C) 86 18 121/67 99 %      Temp Source Heart Rate Source Patient Position - Orthostatic VS BP Location FiO2 (%)   Tympanic Monitor Sitting Left arm --      Pain Score       4           Vitals:    07/23/20 1615 07/23/20 1630 07/23/20 1645 07/23/20 1700   BP: 106/53  111/56    Pulse: 62 62 62 60   Patient Position - Orthostatic VS:   Lying          Visual Acuity      ED Medications  Medications   sodium chloride 0 9 % bolus 1,000 mL (0 mL Intravenous Stopped 7/23/20 1559)       Diagnostic Studies  Results Reviewed     Procedure Component Value Units Date/Time    Troponin I [[id number]]  (Normal) Collected:  07/23/20 1557    Lab Status:  Final result Specimen:  Blood from Arm, Right Updated:  07/23/20 1621     Troponin I <0 02 ng/mL     Troponin I [[id number]]  (Normal) Collected:  07/23/20 1358    Lab Status:  Final result Specimen:  Blood from Arm, Right Updated:  07/23/20 1434     Troponin I <0 02 ng/mL     Comprehensive metabolic panel [[id number]]  (Abnormal) Collected:  07/23/20 1358    Lab Status:  Final result Specimen:  Blood from Arm, Right Updated:  07/23/20 1430     Sodium 137 mmol/L      Potassium 4 0 mmol/L      Chloride 103 mmol/L      CO2 32 mmol/L      ANION GAP 2 mmol/L      BUN 16 mg/dL      Creatinine 0 97 mg/dL      Glucose 90 mg/dL      Calcium 8 4 mg/dL      AST 32 U/L      ALT 33 U/L      Alkaline Phosphatase 79 U/L      Total Protein 7 1 g/dL      Albumin 3 7 g/dL      Total Bilirubin 0 30 mg/dL      eGFR 62 ml/min/1 73sq m     Narrative:       María guidelines for Chronic Kidney Disease (CKD):     Stage 1 with normal or high GFR (GFR > 90 mL/min/1 73 square meters)    Stage 2 Mild CKD (GFR = 60-89 mL/min/1 73 square meters)    Stage 3A Moderate CKD (GFR = 45-59 mL/min/1 73 square meters)    Stage 3B Moderate CKD (GFR = 30-44 mL/min/1 73 square meters)    Stage 4 Severe CKD (GFR = 15-29 mL/min/1 73 square meters)    Stage 5 End Stage CKD (GFR <15 mL/min/1 73 square meters)  Note: GFR calculation is accurate only with a steady state creatinine    Protime-INR [[id number]]  (Normal) Collected:  07/23/20 1358    Lab Status:  Final result Specimen:  Blood from Arm, Right Updated:  07/23/20 1423     Protime 13 5 seconds      INR 1 04    APTT [[id number]]  (Normal) Collected:  07/23/20 1358    Lab Status:  Final result Specimen:  Blood from Arm, Right Updated:  07/23/20 1423     PTT 29 seconds     CBC and differential [[id number]]  (Abnormal) Collected:  07/23/20 1358    Lab Status:  Final result Specimen:  Blood from Arm, Right Updated:  07/23/20 1404     WBC 5 80 Thousand/uL      RBC 3 17 Million/uL      Hemoglobin 9 9 g/dL      Hematocrit 30 7 %      MCV 97 fL      MCH 31 2 pg      MCHC 32 2 g/dL      RDW 12 4 %      MPV 9 7 fL      Platelets 169 Thousands/uL      nRBC 0 /100 WBCs      Neutrophils Relative 49 %      Immat GRANS % 0 %      Lymphocytes Relative 34 %      Monocytes Relative 9 %      Eosinophils Relative 7 %      Basophils Relative 1 %      Neutrophils Absolute 2 86 Thousands/µL      Immature Grans Absolute 0 01 Thousand/uL      Lymphocytes Absolute 1 95 Thousands/µL      Monocytes Absolute 0 54 Thousand/µL      Eosinophils Absolute 0 39 Thousand/µL      Basophils Absolute 0 05 Thousands/µL                  CT head without contrast   Final Result by José Kamara MD (07/23 1457)      No acute intracranial abnormality  Workstation performed: [id number]         XR hand 3+ views LEFT   Final Result by Andreas Edouard MD (07/23 1453)      No radiopaque foreign body or acute fracture this patient status post left hand laceration  Status post first digit [facility] arthroplasty  Workstation performed: [id number]         XR chest 1 view portable   Final Result by José Kamara MD (07/23 1447)      No acute cardiopulmonary disease              Workstation performed: [id number]                    Procedures  ECG 12 Lead Documentation Only  Date/Time: 7/23/2020 2:18 PM  Performed by: Lacey Stephens MD  Authorized by: Lacey Stephens MD     Indications / Diagnosis:  Syncope  ECG reviewed by me, the ED Provider: yes    Patient location:  ED  Interpretation:     Interpretation: normal    Rate:     ECG rate:  60    ECG rate assessment: normal    Rhythm:     Rhythm: sinus rhythm    Ectopy:     Ectopy: none    QRS:     QRS axis:  Normal    QRS intervals:  Normal  Conduction:     Conduction: normal    ST segments:     ST segments:  Normal  T waves:     T waves: normal               ED Course                                             MDM  Number of Diagnoses or Management Options  Hand laceration:   Syncope:   Diagnosis management comments: Patient has syncopal event yesterday but did not seek medical attention  She states she does not want stay in the hospital today that all possible  I have suggested cardiac follow-up with Holter monitoring        Disposition  Final diagnoses:   Syncope   Hand laceration     Time reflects when diagnosis was documented in both MDM as applicable and the Disposition within this note     Time User Action Codes Description Comment    7/23/2020  4:54 PM Quinton Callaway Add [R55] Syncope     7/23/2020  4:55 PM Quinton Callaway Add [[id number]] Hand laceration       ED Disposition     ED Disposition Condition Date/Time Comment    Discharge Stable Thu Jul 23, 2020  4:54 PM Lisa Francois discharge to home/self care              Follow-up Information     Follow up With Specialties Details Why Contact Info    Chayo Handy MD [facility]   [address] [place] [address]  Burgess Jesus MD Cardiology Schedule an appointment as soon as possible for a visit   [address] [place] [address]  [phone]            Discharge Medication List as of 7/23/2020  4:59 PM      CONTINUE these medications which have NOT CHANGED    Details   acamprosate (CAMPRAL) 333 mg tablet Take 333 mg by mouth Three times a day, Starting Thu 7/16/2020, Historical Med      albuterol (PROAIR HFA) 90 mcg/act inhaler Inhale 2 puffs every 4 (four) hours as needed for wheezing, Starting Mon 12/9/2019, Normal      amLODIPine (NORVASC) 10 mg tablet take 1 tablet by mouth once daily, Normal      Ascorbic Acid (VITAMIN C) 100 MG tablet Take 100 mg by mouth daily, Historical Med      buprenorphine (SUBUTEX) 8 mg 8 mg 2 (two) times a day Oral , Historical Med      DULoxetine (CYMBALTA) 60 mg delayed release capsule Take 60 mg by mouth daily, Starting Fri 7/17/2020, Historical Med      fluticasone (FLONASE) 50 mcg/act nasal spray 1 spray into each nostril 2 (two) times a day for 5 days, Starting Thu 3/28/2019, Until Tue 4/2/2019, Normal      fluticasone-salmeterol (ADVAIR, WIXELA) 250-50 mcg/dose inhaler Inhale 1 puff 2 (two) times a day, Starting Thu 7/16/2020, Historical Med      gabapentin (NEURONTIN) 300 mg capsule Take 1 capsule (300 mg total) by mouth 3 (three) times a day, Starting Thu 2/28/2019, Normal      lisinopril (ZESTRIL) 20 mg tablet Take 20 mg by mouth daily, Historical Med      melatonin 3 mg Take 3 mg by mouth daily at bedtime as needed, Starting Thu 7/16/2020, Historical Med      methylphenidate (RITALIN LA) 20 MG 24 hr capsule Starting Thu 12/20/2018, Historical Med      multivitamin (THERAGRAN) TABS Take 1 tablet by mouth daily at bedtime, Historical Med      naloxone (NARCAN) 4 mg/0 1 mL nasal spray 4 mg into each nostril as needed, Starting Thu 7/16/2020, Historical Med      pantoprazole (PROTONIX) 40 mg tablet Take 1 tablet by mouth 2 (two) times a day, Starting Thu 1/25/2018, Normal      psyllium (METAMUCIL) 58 6 % powder Take 1 packet by mouth 2 (two) times a day as needed , Historical Med      simvastatin (ZOCOR) 20 mg tablet take 1 tablet by mouth once daily, Normal      SUMAtriptan (IMITREX) 100 mg tablet Take 100 mg by mouth once as needed for migraine, Historical Med      Turmeric 500 MG TABS Take by mouth daily at bedtime, Historical Med           No discharge procedures on file      PDMP Review     None          ED Provider  Electronically Signed by           Janelle Lipscomb MD  07/23/20 6937

## 2020-07-23 NOTE — PROGRESS NOTES
[facility]        NAME: Brandon Hope is a 63 y o  female  : 1955    MRN: [id number]  DATE: 2020  TIME: 12:16 PM    Assessment and Plan   Fall, initial encounter [W19  XXXA]  1  Fall, initial encounter  Transfer to other facility   2  Laceration of left hand without foreign body, initial encounter  CANCELED: TDAP Vaccine greater than or equal to 6yo    CANCELED: XR hand 3+ vw left   3  Rash       Patient Instructions     Pt to ER for workup of new onset dizziness with unwitnessed falls and potential LOC  She was agreeable and will go by private car  All questions answered  Precautions given  Chief Complaint     Chief Complaint   Patient presents with    Laceration     Fell x 2 yesterday  In [place] - L hand laceration - hit nail on cabinet drawer  Used 4 steri strips  Last Td - unknown    Fall     Kate Yo again at [facility] in [place] - states pain mid back  States recent discharge from rehab for alcohol and drugs  States Cymbalta and Gabaopentin were increased   Rash     Notes red, fine, abdulkadir rash on abdomen - since last Thursday  Started new med - Campral        History of Present Illness       64 y/o female presenting with c/o 2 episodes of blacking out x yesterday  States she just returned from rehab 2020 for ETOH abuse  States she was not sleeping well while there and has continued to have trouble sleeping since then  Yesterday morning had a sensation of chest pressure with pressure and numbness radiating down b/l arms  Reports she has had this sensation previously, noting it occurs sporadically unrelated to activity, and lasts approximately 30 seconds but no more than a minute  States she otherwise feels well during these episodes without pain, SOB, chest tightness, sweats, nausea or vomiting  Around noon yesterday developed lightheadedness and balance difficulty  States she blacked out and awoke on the floor   Is unsure of the amount of time that passed but notes it has to have only been a few minutes as her dryer was still running  Fall was unwitnessed  She spent time with her son immediately following this without any change in neuro status  Was then getting ready for bed and had a second fall, noting vision went black and she awoke on the floor  Again unsure of timing and fall was not witness  Reports a laceration of the left hand from hitting hand on cabinet during fall  Today hand is swollen and tender  She did cleanse the wound and applied butterfly bandages  Unsure of date of last tetanus vaccine  Recent changes to medications 1 week ago including: Change from 300-600 mg TID 1 week ago and increased Cymbalta, started Campral and ritalin  Started 1 day following with red, bumpy and itchy rash of abdomen that is enlarging  Denies throat swelling, SOB, or stomach upset  Today has neck pain  No change from chronic  Review of Systems   Review of Systems   Constitutional: Negative for chills, diaphoresis and fever  HENT: Negative for hearing loss  Eyes: Negative for visual disturbance  Respiratory: Negative for cough, shortness of breath and wheezing  Cardiovascular: Negative for chest pain and palpitations  Gastrointestinal: Negative for abdominal pain, nausea and vomiting  Musculoskeletal: Negative for neck pain  Neurological: Positive for light-headedness  Negative for speech difficulty, weakness, numbness (none other than chronic) and headaches  Psychiatric/Behavioral: Negative for confusion and decreased concentration           Current Medications       Current Outpatient Medications:     acamprosate (CAMPRAL) 333 mg tablet, Take 333 mg by mouth Three times a day, Disp: , Rfl:     albuterol (PROAIR HFA) 90 mcg/act inhaler, Inhale 2 puffs every 4 (four) hours as needed for wheezing, Disp: 8 5 g, Rfl: 5    amLODIPine (NORVASC) 10 mg tablet, take 1 tablet by mouth once daily, Disp: 30 tablet, Rfl: 3    Ascorbic Acid (VITAMIN C) 100 MG tablet, Take 100 mg by mouth daily, Disp: , Rfl:     buprenorphine (SUBUTEX) 8 mg, 8 mg 2 (two) times a day Oral , Disp: , Rfl:     DULoxetine (CYMBALTA) 60 mg delayed release capsule, Take 60 mg by mouth daily, Disp: , Rfl:     fluticasone-salmeterol (ADVAIR, WIXELA) 250-50 mcg/dose inhaler, Inhale 1 puff 2 (two) times a day, Disp: , Rfl:     lisinopril (ZESTRIL) 20 mg tablet, Take 20 mg by mouth daily, Disp: , Rfl:     melatonin 3 mg, Take 3 mg by mouth daily at bedtime as needed, Disp: , Rfl:     methylphenidate (RITALIN LA) 20 MG 24 hr capsule, , Disp: , Rfl:     multivitamin (THERAGRAN) TABS, Take 1 tablet by mouth daily at bedtime, Disp: , Rfl:     pantoprazole (PROTONIX) 40 mg tablet, Take 1 tablet by mouth 2 (two) times a day, Disp: 60 tablet, Rfl: 3    psyllium (METAMUCIL) 58 6 % powder, Take 1 packet by mouth 2 (two) times a day as needed , Disp: , Rfl:     simvastatin (ZOCOR) 20 mg tablet, take 1 tablet by mouth once daily, Disp: 90 tablet, Rfl: 0    SUMAtriptan (IMITREX) 100 mg tablet, Take 100 mg by mouth once as needed for migraine, Disp: , Rfl:     Turmeric 500 MG TABS, Take by mouth daily at bedtime, Disp: , Rfl:     fluticasone (FLONASE) 50 mcg/act nasal spray, 1 spray into each nostril 2 (two) times a day for 5 days, Disp: 1 Bottle, Rfl: 0    gabapentin (NEURONTIN) 300 mg capsule, Take 1 capsule (300 mg total) by mouth 3 (three) times a day, Disp: 90 capsule, Rfl: 1    naloxone (NARCAN) 4 mg/0 1 mL nasal spray, 4 mg into each nostril as needed, Disp: , Rfl:     Current Allergies     Allergies as of 07/23/2020 - Reviewed 07/23/2020   Allergen Reaction Noted    Bee venom Anaphylaxis 06/05/2017    Diphenhydramine Other (See Comments) 12/07/2018    Nsaids  12/07/2018    Ibuprofen Swelling 06/05/2017    Antihistamines, chlorpheniramine-type  06/05/2017            The following portions of the patient's history were reviewed and updated as appropriate: allergies, current medications, past family history, past medical history, past social history, past surgical history and problem list      Past Medical History:   Diagnosis Date    Allergic rhinitis     Amenorrhea     Anesthesia complication     hx of cocaine use-none in 5 yrs    Anxiety     Arthritis     bilateral knees-DJD, arthritis neck and back-cervical injection 2017    Bronchitis, chronic (HCC)     Cancer (HCC)     squamous-removed from the right foot    Chronic pain disorder     neck and back    COPD (chronic obstructive pulmonary disease) ([facility]     Depression     Eczema     last assessed 6/27/16, resolved 10/2/17    Fibromyalgia, primary     GERD (gastroesophageal reflux disease)     H/O ETOH abuse     None in the past 10 months since 8/1/17    Hypertension     Lumbar radiculopathy     resolved 6/6/17    Malignant melanoma of skin ([facility]     Migraine     Migraine     MRSA (methicillin resistant Staphylococcus aureus) infection     last assessed 4/29/16    Peptic ulceration     gastric    Pneumonia     Raynauds syndrome     Spinal stenosis     Substance abuse ([facility] )     Urinary tract infection        Past Surgical History:   Procedure Laterality Date    ARTHROSCOPY KNEE Right 6/8/2017    Procedure: RIGHT KNEE ARTHROSCOPY MEDIAL MENISCECTOMY;  Surgeon: Phani Dewey MD;  Location: [facility] MAIN OR;  Service:    [address] [place] [address]    COLONOSCOPY      COLONOSCOPY N/A 1/25/2018    Procedure: COLONOSCOPY;  Surgeon: Violette Griffith MD;  Location: [place] [address] GI LAB; Service: Gastroenterology    DILATION AND CURETTAGE OF UTERUS      x 2 miscarriage    EPIDURAL BLOCK INJECTION N/A 12/8/2017    Procedure: INJECTION EPIDURAL STEROID CERVICAL C6-C7;  Surgeon: Sarbjit Downs MD;  Location: [place] [address] MAIN OR;  Service: Pain Management     ESOPHAGOGASTRODUODENOSCOPY N/A 1/25/2018    Procedure: ESOPHAGOGASTRODUODENOSCOPY (EGD); Surgeon: Violette Griffith MD;  Location: [facility] GI LAB;   Service: Gastroenterology    FOOT SURGERY      squamous removed from the right    HAND SURGERY Bilateral     arthroplasty -thumb joint    last assessed 6/6/17    HERNIA REPAIR      inguinal    WISDOM TOOTH EXTRACTION         Family History   Problem Relation Age of Onset    Peripheral vascular disease Mother     Arthritis Mother         osteo arthritis    Coronary artery disease Mother         Atherosclerosis    Alcohol abuse Mother     Stroke Mother         CVA    Transient ischemic attack Mother     Heart disease Father     Peripheral vascular disease Father     Stroke Father         CVA    Leukemia Father     Arthritis Sister         rheumatoid    Alcohol abuse Sister     No Known Problems Sister     No Known Problems Sister     No Known Problems Sister      Medications have been verified  Objective   /78   Pulse 78   Temp 98 2 °F (36 8 °C)   Resp 18   Ht 5' 5" (1 651 m)   Wt 62 4 kg (137 lb 9 6 oz)   SpO2 98%   BMI 22 90 kg/m²      Physical Exam     Physical Exam   Constitutional: Vital signs are normal  She appears well-developed and well-nourished  She is cooperative  She does not appear ill  No distress  HENT:   Head: Normocephalic and atraumatic  Right Ear: Hearing, tympanic membrane, external ear and ear canal normal    Left Ear: Hearing, tympanic membrane, external ear and ear canal normal    Nose: Nose normal  No epistaxis  Mouth/Throat: Uvula is midline, oropharynx is clear and moist and mucous membranes are normal  Mucous membranes are not pale, not dry and not cyanotic  No oral lesions  No uvula swelling  No oropharyngeal exudate, posterior oropharyngeal edema, posterior oropharyngeal erythema or tonsillar abscesses  Eyes: Pupils are equal, round, and reactive to light  Conjunctivae, EOM and lids are normal  Right eye exhibits no chemosis, no discharge and no exudate  Left eye exhibits no chemosis, no discharge and no exudate  Right conjunctiva is not injected  Left conjunctiva is not injected     Neck: Trachea normal and phonation normal  Neck supple  Muscular tenderness present  No tracheal tenderness and no spinous process tenderness present  No neck rigidity  Decreased range of motion present  No edema and no erythema present  Cardiovascular: Normal rate, regular rhythm and normal heart sounds  Exam reveals no gallop, no distant heart sounds and no friction rub  No murmur heard  Pulmonary/Chest: Effort normal and breath sounds normal  No stridor  No respiratory distress  She has no decreased breath sounds  She has no wheezes  She has no rhonchi  She has no rales  Lymphadenopathy:     She has no cervical adenopathy  Neurological: She is alert  She has normal strength  She is not disoriented  No cranial nerve deficit  She displays a negative Romberg sign  Coordination and gait normal  GCS eye subscore is 4  GCS verbal subscore is 5  GCS motor subscore is 6  Skin: Skin is warm, dry and intact  She is not diaphoretic  No pallor  3 centimeter laceration of the posterior left hand  Granulation tissue present  Surrounding skin appears clean and dry  Mild edema surrounding the wound with tenderness over the mid to distal 3rd and 4th metacarpals  No visible deformity  Distal sensation and perfusion intact  Wound cleansed with antiseptic wash and Steri-Strips applied  Psychiatric: She has a normal mood and affect  Her behavior is normal  Judgment and thought content normal    Nursing note and vitals reviewed

## 2020-07-24 ENCOUNTER — OFFICE VISIT (OUTPATIENT)
Dept: FAMILY MEDICINE CLINIC | Facility: CLINIC | Age: 65
End: 2020-07-24
Payer: MEDICARE

## 2020-07-24 VITALS
RESPIRATION RATE: 14 BRPM | SYSTOLIC BLOOD PRESSURE: 100 MMHG | WEIGHT: 139.4 LBS | HEART RATE: 78 BPM | BODY MASS INDEX: 23.22 KG/M2 | HEIGHT: 65 IN | DIASTOLIC BLOOD PRESSURE: 72 MMHG | TEMPERATURE: 98 F

## 2020-07-24 DIAGNOSIS — B36.9 FUNGAL DERMATITIS: ICD-10-CM

## 2020-07-24 DIAGNOSIS — F10.20 ALCOHOLISM (HCC): ICD-10-CM

## 2020-07-24 DIAGNOSIS — Z12.31 ENCOUNTER FOR SCREENING MAMMOGRAM FOR MALIGNANT NEOPLASM OF BREAST: ICD-10-CM

## 2020-07-24 DIAGNOSIS — N81.10 FEMALE BLADDER PROLAPSE: ICD-10-CM

## 2020-07-24 DIAGNOSIS — R10.2 PELVIC PAIN IN FEMALE: Primary | ICD-10-CM

## 2020-07-24 DIAGNOSIS — Z12.4 CERVICAL CANCER SCREENING: ICD-10-CM

## 2020-07-24 DIAGNOSIS — J44.1 CHRONIC OBSTRUCTIVE PULMONARY DISEASE WITH ACUTE EXACERBATION (HCC): ICD-10-CM

## 2020-07-24 DIAGNOSIS — R31.1 BENIGN ESSENTIAL MICROSCOPIC HEMATURIA: ICD-10-CM

## 2020-07-24 DIAGNOSIS — R30.0 DYSURIA: ICD-10-CM

## 2020-07-24 DIAGNOSIS — K21.00 GASTRO-ESOPHAGEAL REFLUX DISEASE WITH ESOPHAGITIS: ICD-10-CM

## 2020-07-24 LAB
SL AMB  POCT GLUCOSE, UA: ABNORMAL
SL AMB LEUKOCYTE ESTERASE,UA: 25
SL AMB POCT BILIRUBIN,UA: ABNORMAL
SL AMB POCT BLOOD,UA: 50
SL AMB POCT CLARITY,UA: CLEAR
SL AMB POCT COLOR,UA: YELLOW
SL AMB POCT KETONES,UA: ABNORMAL
SL AMB POCT NITRITE,UA: ABNORMAL
SL AMB POCT PH,UA: 5
SL AMB POCT SPECIFIC GRAVITY,UA: 1.02
SL AMB POCT URINE PROTEIN: ABNORMAL
SL AMB POCT UROBILINOGEN: ABNORMAL

## 2020-07-24 PROCEDURE — 99214 OFFICE O/P EST MOD 30 MIN: CPT | Performed by: FAMILY MEDICINE

## 2020-07-24 PROCEDURE — 4004F PT TOBACCO SCREEN RCVD TLK: CPT | Performed by: FAMILY MEDICINE

## 2020-07-24 PROCEDURE — 3078F DIAST BP <80 MM HG: CPT | Performed by: FAMILY MEDICINE

## 2020-07-24 PROCEDURE — 81003 URINALYSIS AUTO W/O SCOPE: CPT | Performed by: FAMILY MEDICINE

## 2020-07-24 PROCEDURE — 3074F SYST BP LT 130 MM HG: CPT | Performed by: FAMILY MEDICINE

## 2020-07-24 RX ORDER — METHYLPHENIDATE HYDROCHLORIDE 20 MG/1
30 CAPSULE, EXTENDED RELEASE ORAL DAILY
COMMUNITY
Start: 2020-07-17 | End: 2020-08-27

## 2020-07-24 RX ORDER — NYSTATIN 100000 [USP'U]/G
POWDER TOPICAL 2 TIMES DAILY
Qty: 45 G | Refills: 1 | Status: SHIPPED | OUTPATIENT
Start: 2020-07-24 | End: 2021-04-28

## 2020-07-24 RX ORDER — NITROFURANTOIN 25; 75 MG/1; MG/1
100 CAPSULE ORAL 2 TIMES DAILY
Qty: 10 CAPSULE | Refills: 0 | Status: SHIPPED | OUTPATIENT
Start: 2020-07-24 | End: 2020-07-29

## 2020-07-26 LAB
ATRIAL RATE: 60 BPM
P AXIS: 46 DEGREES
PR INTERVAL: 156 MS
QRS AXIS: 29 DEGREES
QRSD INTERVAL: 94 MS
QT INTERVAL: 418 MS
QTC INTERVAL: 418 MS
T WAVE AXIS: 45 DEGREES
VENTRICULAR RATE: 60 BPM

## 2020-07-26 PROCEDURE — 93010 ELECTROCARDIOGRAM REPORT: CPT | Performed by: INTERNAL MEDICINE

## 2020-07-27 ENCOUNTER — TELEPHONE (OUTPATIENT)
Dept: CARDIOLOGY CLINIC | Facility: CLINIC | Age: 65
End: 2020-07-27

## 2020-07-27 LAB
BACTERIA UR CULT: NORMAL
Lab: NO GROWTH

## 2020-07-27 NOTE — TELEPHONE ENCOUNTER
Patient called stating that she has edema of the ankle   Saw Dr Krystin Jean-Baptiste on Friday  PCP advised her to mention this to you  She is not due to come see you till sept 1st     Please advise  [phone]

## 2020-07-27 NOTE — TELEPHONE ENCOUNTER
S/ pt - she will monitor the symptoms; if anything more emergent she will call us; no SOB, fatigue, or dizziness

## 2020-07-28 ENCOUNTER — TELEPHONE (OUTPATIENT)
Dept: FAMILY MEDICINE CLINIC | Facility: CLINIC | Age: 65
End: 2020-07-28

## 2020-07-28 ENCOUNTER — HOSPITAL ENCOUNTER (OUTPATIENT)
Dept: RADIOLOGY | Facility: HOSPITAL | Age: 65
Discharge: HOME | End: 2020-07-28
Attending: FAMILY MEDICINE
Payer: MEDICARE

## 2020-07-28 DIAGNOSIS — R10.2 PELVIC PAIN IN FEMALE: ICD-10-CM

## 2020-07-28 DIAGNOSIS — N81.10 FEMALE BLADDER PROLAPSE: ICD-10-CM

## 2020-07-28 LAB
CYTOLOGIST CVX/VAG CYTO: NORMAL
DX ICD CODE: NORMAL
PATH REPORT.FINAL DX SPEC: NORMAL
PATH REPORT.GROSS SPEC: NORMAL
PATH REPORT.SITE OF ORIGIN SPEC: NORMAL
PATHOLOGIST NAME: NORMAL

## 2020-07-28 PROCEDURE — 76856 US EXAM PELVIC COMPLETE: CPT

## 2020-07-28 PROCEDURE — 76830 TRANSVAGINAL US NON-OB: CPT

## 2020-07-28 NOTE — TELEPHONE ENCOUNTER
----- Message from Alvaro Pal MD sent at 7/28/2020 12:07 AM EDT -----  Please inform urine cx negative

## 2020-07-29 ENCOUNTER — CONSULT (OUTPATIENT)
Dept: CARDIOLOGY CLINIC | Facility: CLINIC | Age: 65
End: 2020-07-29
Payer: MEDICARE

## 2020-07-29 VITALS
TEMPERATURE: 98.4 F | BODY MASS INDEX: 22.16 KG/M2 | WEIGHT: 133 LBS | OXYGEN SATURATION: 97 % | HEART RATE: 76 BPM | HEIGHT: 65 IN | SYSTOLIC BLOOD PRESSURE: 120 MMHG | DIASTOLIC BLOOD PRESSURE: 78 MMHG

## 2020-07-29 DIAGNOSIS — R06.02 EXERTIONAL SHORTNESS OF BREATH: ICD-10-CM

## 2020-07-29 DIAGNOSIS — F41.8 DEPRESSION WITH ANXIETY: ICD-10-CM

## 2020-07-29 DIAGNOSIS — F10.21 HISTORY OF ALCOHOL DEPENDENCE (HCC): ICD-10-CM

## 2020-07-29 DIAGNOSIS — E78.5 DYSLIPIDEMIA: ICD-10-CM

## 2020-07-29 DIAGNOSIS — J44.9 CHRONIC OBSTRUCTIVE PULMONARY DISEASE, UNSPECIFIED COPD TYPE (HCC): ICD-10-CM

## 2020-07-29 DIAGNOSIS — R55 SYNCOPE, UNSPECIFIED SYNCOPE TYPE: ICD-10-CM

## 2020-07-29 DIAGNOSIS — G89.4 CHRONIC PAIN SYNDROME: ICD-10-CM

## 2020-07-29 DIAGNOSIS — Z72.0 TOBACCO USE: ICD-10-CM

## 2020-07-29 LAB
CYTOLOGIST CVX/VAG CYTO: NORMAL
DX ICD CODE: NORMAL
HPV I/H RISK 1 DNA CVX QL PROBE+SIG AMP: NEGATIVE
HPV LOW RISK DNA CVX QL PROBE+SIG AMP: NEGATIVE
OTHER STN SPEC: NORMAL
OTHER STN SPEC: NORMAL
PATH REPORT.FINAL DX SPEC: NORMAL
SL AMB NOTE:: NORMAL
SL AMB SPECIMEN ADEQUACY: NORMAL

## 2020-07-29 PROCEDURE — 99215 OFFICE O/P EST HI 40 MIN: CPT | Performed by: INTERNAL MEDICINE

## 2020-07-29 NOTE — PROGRESS NOTES
Consultation - Cardiology Office  [facility]  Leonard Patton 63 y o  female MRN: [id number]  : 1955  Unit/Bed#:  Encounter: [id number]      Assessment:     1  Exertional shortness of breath    2  Syncope, unspecified syncope type    3  Dyslipidemia    4  Chronic obstructive pulmonary disease, unspecified COPD type ([facility] [address] )    5  Tobacco use    6  History of alcohol dependence ([facility] )    7  Chronic pain syndrome    8  Depression with anxiety        Discussion summary and Plan:  1  Exertional shortness of breath with history of atypical chest pain  CAD need to be excluded as she has multiple risk factor including continues tobacco abuse and family history  Will check Lexiscan stress test as she cannot walk on the treadmill for long time previous stress test has been negative  Will check echo for LV function  2  Syncope  No clear etiology patient has history of alcohol abuse and she says she has been clean  She has been struggling with alcoholism  She denies she was drinking at that time  She was dizzy and lightheaded  But orthostasis negative in our office  Her amlodipine has been stopped  It could be possibly related low blood pressure but need to rule out cardiac arrhythmias and she may need to follow up with neuro  Will check Holter monitor  Echo and stress test already ordered    3  Dyslipidemia  Continue simvastatin    4  COPD with continues tobacco abuse  Patient still smokes about half pack a day  Has chronic shortness of breath  Which may be partially is secondary to her underlying tobacco abuse and COPD    5  Chronic pain syndrome  Patient has chronic pain with knees back  Cannot walk on the treadmill hence scheduled for Lexiscan stress test   She was also started gabapentin which has now been decreased  6  History of alcohol abuse/depression anxiety  Patient has quit drinking multiple time she was in the rehab about 3 months ago    Encouraged her not to go back to drinking    7  History of opioid dependence  As per patient currently clean      All those issues discussed with patient I took the liberty of making a neuro referral   Further plan as also of these tests become available  Thank you for your consultation  If you have any question please call me at [phone]    Counseling :  A description of the counseling  Goals and Barriers  Patient's ability to self care: Yes  Medication side effect reviewed with patient in detail and all their questions answered to their satisfaction  Primary Care Physician Requesting Consult: Teresa Dalton MD    Reason for Consult / Principal Problem: Near syncope        HPI :     Gloria Ybarra is a 63y o  year old female who was referred by primary care doctor for syncope  It happened last week as per patient she was at her son's place where she was feeling dizzy and lightheaded that day and then she does not remember what happened she passed out  She has medical history significant for essential hypertension, cardiac murmur with mild MR, dyslipidemia on statin, history of alcohol abuse and has been recovering  Alcoholic her last drink was  On July 6 2020  She has tried to quit many times in the past also  She also history of anemia with hemoglobin 9 9  She has chronic pain which she describe as a arthritic pain  She was seen by us in 2018 and had a stress test done as well as an echo Doppler which shows she has normal LV systolic function no ischemia  EKG at that time shows normal sinus rhythm with heart rate 60 beats per minute and no changes  Patient denies that she was drinking  And she denies any other drug abuse  No nausea no vomiting no fever no chills  Today her blood pressure is 120/78 and are orthostatics are negative  In the emergency room she was found to have a laceration and ED no noted  At 1 point she was on amlodipine which has been stopped      She was also recently started on gabapentin who is dose was increased  She was in rehab recovery unit and she was monitored for 3  days she was put on 600 mg were painted now she is back to 300  she has lot of arthritic problem she cannot walk on treadmill due to her knee pains  She still smokes about half pack a day  She had a family history of heart problems  Review of Systems   Constitutional: Negative for activity change, chills, diaphoresis, fever and unexpected weight change  HENT: Negative for congestion  Eyes: Negative for discharge and redness  Respiratory: Negative for cough, chest tightness, shortness of breath and wheezing  Cardiovascular: Negative  Negative for chest pain, palpitations and leg swelling  Gastrointestinal: Negative for abdominal pain, diarrhea and nausea  Endocrine: Negative  Genitourinary: Negative for decreased urine volume and urgency  Musculoskeletal: Positive for arthralgias, back pain and gait problem  Skin: Negative for rash and wound  Allergic/Immunologic: Negative  Neurological: Negative for dizziness, seizures, syncope, weakness, light-headedness and headaches  Syncope episode as mentioned above And she is not dizzy or lightheaded now   Hematological: Negative  Psychiatric/Behavioral: Negative for agitation and confusion  The patient is nervous/anxious          Historical Information   Past Medical History:   Diagnosis Date    Allergic rhinitis     Amenorrhea     Anesthesia complication     hx of cocaine use-none in 5 yrs    Anxiety     Arthritis     bilateral knees-DJD, arthritis neck and back-cervical injection 2017    Bronchitis, chronic (HCC)     Cancer (HCC)     squamous-removed from the right foot    Chronic pain disorder     neck and back    COPD (chronic obstructive pulmonary disease) ([facility] )     Depression     Eczema     last assessed 6/27/16, resolved 10/2/17    Fibromyalgia, primary     GERD (gastroesophageal reflux disease)     H/O ETOH abuse     None in the past 10 months since 8/1/17    Hypertension     Lumbar radiculopathy     resolved 6/6/17    Malignant melanoma of skin (HCC)     Migraine     Migraine     MRSA (methicillin resistant Staphylococcus aureus) infection     last assessed 4/29/16    Peptic ulceration     gastric    Pneumonia     Raynauds syndrome     Spinal stenosis     Substance abuse ([facility] )     Urinary tract infection      Past Surgical History:   Procedure Laterality Date    ARTHROSCOPY KNEE Right 6/8/2017    Procedure: RIGHT KNEE ARTHROSCOPY MEDIAL MENISCECTOMY;  Surgeon: Phani Dewey MD;  Location: [facility] MAIN OR;  Service:    [facility]    COLONOSCOPY      COLONOSCOPY N/A 1/25/2018    Procedure: COLONOSCOPY;  Surgeon: Violette Griffith MD;  Location: [place] [address] GI LAB; Service: Gastroenterology    DILATION AND CURETTAGE OF UTERUS      x 2 miscarriage    EPIDURAL BLOCK INJECTION N/A 12/8/2017    Procedure: INJECTION EPIDURAL STEROID CERVICAL C6-C7;  Surgeon: Sarbjit Downs MD;  Location: [place] [address] MAIN OR;  Service: Pain Management     ESOPHAGOGASTRODUODENOSCOPY N/A 1/25/2018    Procedure: ESOPHAGOGASTRODUODENOSCOPY (EGD); Surgeon: Violette Griffith MD;  Location: [facility] GI LAB;   Service: Gastroenterology    FOOT SURGERY      squamous removed from the right    HAND SURGERY Bilateral     arthroplasty -thumb joint    last assessed 6/6/17    HERNIA REPAIR      inguinal    WISDOM TOOTH EXTRACTION       Social History     Substance and Sexual Activity   Alcohol Use No    Comment: [address] [place] [address] rehab  - last drink 7/6/2020     Social History     Substance and Sexual Activity   Drug Use Yes    Types: Cocaine    Comment: last 10/2019     Social History     Tobacco Use   Smoking Status Current Every Day Smoker    Packs/day: 0 50    Years: 15 00    Pack years: 7 50    Types: Cigarettes   Smokeless Tobacco Never Used   Tobacco Comment    on and off for 40 yrs     Family History:   Family History Problem Relation Age of Onset    Peripheral vascular disease Mother     Arthritis Mother         osteo arthritis    Coronary artery disease Mother         Atherosclerosis    Alcohol abuse Mother     Stroke Mother         CVA    Transient ischemic attack Mother     Heart disease Father     Peripheral vascular disease Father     Stroke Father         CVA    Leukemia Father     Arthritis Sister         rheumatoid    Alcohol abuse Sister     No Known Problems Sister     No Known Problems Sister     No Known Problems Sister        Meds/Allergies     Allergies   Allergen Reactions    Bee Venom Anaphylaxis    Diphenhydramine Other (See Comments)     "jumpy"    Nsaids      Pt   Says she gets "violently sick"    Ibuprofen Swelling     And any anti-inflammatories, NSAIDS-causes severe diarrhea  Facial swelling    Antihistamines, Chlorpheniramine-Type      "jumpy"       Current Outpatient Medications:     acamprosate (CAMPRAL) 333 mg tablet, Take 333 mg by mouth Three times a day, Disp: , Rfl:     albuterol (PROAIR HFA) 90 mcg/act inhaler, Inhale 2 puffs every 4 (four) hours as needed for wheezing, Disp: 8 5 g, Rfl: 5    Ascorbic Acid (VITAMIN C) 100 MG tablet, Take 100 mg by mouth daily, Disp: , Rfl:     buprenorphine (SUBUTEX) 8 mg, 8 mg 2 (two) times a day Oral , Disp: , Rfl:     DULoxetine (CYMBALTA) 60 mg delayed release capsule, Take 60 mg by mouth daily, Disp: , Rfl:     fluticasone-salmeterol (ADVAIR, WIXELA) 250-50 mcg/dose inhaler, Inhale 1 puff 2 (two) times a day, Disp: , Rfl:     gabapentin (NEURONTIN) 300 mg capsule, Take 1 capsule (300 mg total) by mouth 3 (three) times a day, Disp: 90 capsule, Rfl: 1    lisinopril (ZESTRIL) 20 mg tablet, Take 20 mg by mouth daily, Disp: , Rfl:     melatonin 3 mg, Take 3 mg by mouth daily at bedtime as needed, Disp: , Rfl:     methylphenidate (RITALIN LA) 20 MG 24 hr capsule, , Disp: , Rfl:     multivitamin (THERAGRAN) TABS, Take 1 tablet by mouth daily at bedtime, Disp: , Rfl:     nitrofurantoin (MACROBID) 100 mg capsule, Take 1 capsule (100 mg total) by mouth 2 (two) times a day for 5 days, Disp: 10 capsule, Rfl: 0    nystatin (MYCOSTATIN) powder, Apply topically 2 (two) times a day, Disp: 45 g, Rfl: 1    nystatin-triamcinolone (MYCOLOG-II) cream, Apply topically 2 (two) times a day, Disp: 30 g, Rfl: 0    pantoprazole (PROTONIX) 40 mg tablet, Take 1 tablet by mouth 2 (two) times a day (Patient taking differently: Take 40 mg by mouth daily ), Disp: 60 tablet, Rfl: 3    simvastatin (ZOCOR) 20 mg tablet, take 1 tablet by mouth once daily, Disp: 90 tablet, Rfl: 0    SUMAtriptan (IMITREX) 100 mg tablet, Take 100 mg by mouth once as needed for migraine, Disp: , Rfl:     fluticasone (FLONASE) 50 mcg/act nasal spray, 1 spray into each nostril 2 (two) times a day for 5 days, Disp: 1 Bottle, Rfl: 0    methylphenidate (RITALIN LA) 20 MG 24 hr capsule, Take 30 mg by mouth daily, Disp: , Rfl:     naloxone (NARCAN) 4 mg/0 1 mL nasal spray, 4 mg into each nostril as needed, Disp: , Rfl:     psyllium (METAMUCIL) 58 6 % powder, Take 1 packet by mouth 2 (two) times a day as needed , Disp: , Rfl:     Turmeric 500 MG TABS, Take by mouth daily at bedtime, Disp: , Rfl:     Vitals: Blood pressure 120/78, pulse 76, temperature 98 4 °F (36 9 °C), temperature source Temporal, height 5' 5" (1 651 m), weight 60 3 kg (133 lb), SpO2 97 %  Body mass index is 22 13 kg/m²    Vitals:    07/29/20 1438   Weight: 60 3 kg (133 lb)     BP Readings from Last 3 Encounters:   07/29/20 120/78   07/24/20 100/72   07/23/20 111/56         Physical Exam    Neurologic:  Alert & oriented x 3, no new focal deficits, Not in any acute distress,  Constitutional:  Well developed, well nourished, non-toxic appearance   Eyes:  Pupil equal and reacting to light, conjunctiva normal, No JVP, No LNP   HENT:  Atraumatic, oropharynx moist, Neck- normal range of motion, no tenderness,  Neck supple Respiratory:  Bilateral air entry, mostly clear to auscultation  Cardiovascular: S1-S2 regular with a 2/6 ejection systolic murmur and S4 is present  GI:  Soft, nondistended, normal bowel sounds, nontender, no hepatosplenomegaly appreciated  Musculoskeletal:  No edema, no tenderness, no deformities  Skin:  Well hydrated, no rash   Lymphatic:  No lymphadenopathy noted   Extremities:  No edema and distal pulses are present    Diagnostic Studies Review Cardio:    Previous workup reviewed  EKG:   EKG from the hospital shows normal sinus rhythm heart rate 60 beats per minute no similar see changes  Cardiac testing:   Results for orders placed during the hospital encounter of 18   Echo complete with contrast if indicated    Narrative [address]  [facility] [facility] [phone]    Transthoracic Echocardiogram  2D, M-mode, Doppler, and Color Doppler    Study date:  07-May-2018    Patient: Kim Batista  MR number: [id number]  Account number: [de-identified]  : 1955  Age: 66 years  Gender: Female  Status: Routine  Location: Echo lab  Height: 65 in  Weight: 152 7 lb  BP: 116/ 78 mmHg    Indications: Murmur    Diagnoses: 785 2 - CARDIAC MURMURS NEC    Sonographer:  MATT Fermin  Primary Physician:  Ketan Craft MD  Referring Physician:  Enrike Mejia MD  Group:  [facility]  Interpreting Physician:  Kamlesh Hendricks MD    SUMMARY    LEFT VENTRICLE:  Systolic function was normal  Ejection fraction was estimated in the range of 55 % to 60 %  There were no regional wall motion abnormalities  MITRAL VALVE:  There was trace regurgitation  TRICUSPID VALVE:  There was trace regurgitation  The tricuspid jet envelope definition was inadequate for estimation of RV systolic pressure   There are no indirect findings (abnormal RV volume or geometry, altered pulmonary flow velocity profile, or leftward septal displacement) which  would suggest moderate or severe pulmonary hypertension  PULMONIC VALVE:  There was trace regurgitation  HISTORY: PRIOR HISTORY: Arthritis, MRSA, ETOH Abuse, Depression, COPD, Chronic Pain    PROCEDURE: The procedure was performed in the echo lab  This was a routine study  The transthoracic approach was used  The study included complete 2D imaging, M-mode, complete spectral Doppler, and color Doppler  The heart rate was 78 bpm,  at the start of the study  Image quality was adequate  LEFT VENTRICLE: Size was normal  Systolic function was normal  Ejection fraction was estimated in the range of 55 % to 60 %  There were no regional wall motion abnormalities  Wall thickness was normal  No evidence of apical thrombus  DOPPLER: Left ventricular diastolic function parameters were normal     RIGHT VENTRICLE: The size was normal  Systolic function was normal  Wall thickness was normal     LEFT ATRIUM: Size was normal     RIGHT ATRIUM: Size was normal     MITRAL VALVE: There was mild thickening  There was normal leaflet separation  DOPPLER: The transmitral velocity was within the normal range  There was no evidence for stenosis  There was trace regurgitation  AORTIC VALVE: Leaflets exhibited normal thickness and normal cuspal separation  DOPPLER: Transaortic velocity was within the normal range  There was no evidence for stenosis  There was no significant regurgitation  TRICUSPID VALVE: The valve structure was normal  There was normal leaflet separation  DOPPLER: The transtricuspid velocity was within the normal range  There was no evidence for stenosis  There was trace regurgitation  The tricuspid jet  envelope definition was inadequate for estimation of RV systolic pressure  There are no indirect findings (abnormal RV volume or geometry, altered pulmonary flow velocity profile, or leftward septal displacement) which would suggest  moderate or severe pulmonary hypertension      PULMONIC VALVE: Leaflets exhibited normal thickness, no calcification, and normal cuspal separation  DOPPLER: The transpulmonic velocity was within the normal range  There was trace regurgitation  PERICARDIUM: There was no pericardial effusion  The pericardium was normal in appearance  AORTA: The root exhibited normal size  SYSTEMIC VEINS: IVC: The inferior vena cava was normal in size  SYSTEM MEASUREMENT TABLES    2D mode  AoR Diam 2D: 2 7 cm  LA Diam (2D): 3 8 cm  LA/Ao (2D): 1 41  FS (2D Teich): 30 9 %  IVSd (2D): 0 86 cm  LVDEV: 110 cm³  LVESV: 45 8 cm³  LVIDd(2D): 4 85 cm  LVISd (2D): 3 35 cm  LVPWd (2D): 0 89 cm  SV (Teich): 64 2 cm³    Apical four chamber  LVEF A4C: 59 %    Unspecified Scan Mode  MV Peak A Johnson: 882 mm/s  MV Peak E Johnson   Mean: 1390 mm/s  MVA (PHT): 3 33 cm squared  PHT: 66 ms  Max P mm[Hg]  V Max: 2140 mm/s  Vmax: 2130 mm/s  RA Area: 15 cm squared  RA Volume: 39 5 cm³  TAPSE: 2 6 cm    [facility]    Prepared and electronically signed by    Danny Petit MD  Signed [id number] 10:14:00         Results for orders placed during the hospital encounter of 18   NM myocardial perfusion spect (rx stress and/or rest)    Jacey Pope 39  [address] [facility] [phone]    Rest/Stress Gated SPECT Myocardial Perfusion Imaging After Exercise    Patient: Mikey Vasquez  MR number: [id number]  Account number: [de-identified]  : 1955  Age: 66 years  Gender: Female  Status: Outpatient  Location: Stress lab  Height: 66 in  Weight: 154 lb  BP: 134/ 82 mmHg    Allergies: BEE VENOM, IBUPROFEN, ANTIHISTAMINES, CHLORPHENIRAMINE-TYPE    Diagnosis: R06 09 - Other forms of dyspnea, Z01 818 - Encounter for other preprocedural examination    RN:  SILKE Dietz  Group:  [facility]  Report Prepared By[de-identified]  SILKE Dietz  Interpreting Physician:  Danny Petit MD    INDICATIONS: Evaluation for coronary artery disease  HISTORY: The patient is a 66year old  female  Chest pain status: chest pain  Other symptoms: dyspnea  Coronary artery disease risk factors: smoking and family history of premature coronary artery disease  Cardiovascular history:  none significant  Co-morbidity: history of lung disease- COPD  Medications: no cardiac drugs  PHYSICAL EXAM: Baseline physical exam screening: no wheezes audible  REST ECG: Normal sinus rhythm  PROCEDURE: The study was performed in the stress lab  Treadmill exercise testing was performed, using the Leigh protocol  Gated SPECT myocardial perfusion imaging was performed after stress and at rest  Systolic blood pressure was 134  mmHg, at the start of the study  Diastolic blood pressure was 82 mmHg, at the start of the study  The heart rate was 72 bpm, at the start of the study  IV double checked  LEIGH PROTOCOL:  HR bpm SBP mmHg DBP mmHg Symptoms Rhythm/conduct  Baseline 71 134 82 none NSR  Stage 1 97 128 82 none --  Stage 2 117 132 82 none sinus tach  Stage 3 142 158 80 moderate fatigue --  Immediate 130 174 80 same as above --  Recovery 1 87 164 82 subsiding --  Recovery 2 77 132 78 none --  No medications or fluids given  STRESS SUMMARY: Duration of exercise was 8 min and 35 sec  The patient exercised to protocol stage 3  Maximal work rate was 10 1 METs  Maximal heart rate during stress was 142 bpm ( 89 % of maximal predicted heart rate)  The heart rate  response to stress was normal  There was normal resting blood pressure with an appropriate response to stress  The rate-pressure product for the peak heart rate and blood pressure was 37287  There was no chest pain during stress  The  stress test was terminated due to achievement of target heart rate and moderate fatigue  Pre oxygen saturation: 100 %  Peak oxygen saturation: 98 %   The stress ECG was negative for ischemia and normal  Arrhythmia during stress: isolated  premature ventricular beats     ISOTOPE ADMINISTRATION:  Resting isotope administration Stress isotope administration  Agent Tetrofosmin Tetrofosmin  Dose 11 mCi 32 8 mCi  Date 05/31/2018 05/31/2018    Radiopharmaceutical was administered 7 min, 27 sec into the stress protocol  There was 1 min of exercise after the injection  MYOCARDIAL PERFUSION IMAGING:  The image quality was good  Rotating projection images reveal mild subdiaphragmatic activity  PERFUSION DEFECTS:  -  There was a small, partially reversible myocardial perfusion defect of the anteroseptal wall likely due to attenuation from breast tissue  GATED SPECT:  The calculated left ventricular ejection fraction was 65 %  Left ventricular ejection fraction was within normal limits by visual estimate  There was no left ventricular regional abnormality  SUMMARY:  -  Stress results: Duration of exercise was 8 min and 35 sec  Maximal work rate was 10 1 METs  Maximal heart rate during stress was 142 bpm ( 89 % of maximal predicted heart rate)  Target heart rate was achieved  Maximal systolic blood  pressure during stress was 174 mmHg  There was no chest pain during stress  -  ECG conclusions: The stress ECG was negative for ischemia and normal   -  Perfusion imaging: There was a small, partially reversible myocardial perfusion defect of the anteroseptal wall likely due to attenuation from breast tissue   -  Gated SPECT: The calculated left ventricular ejection fraction was 65 %  Left ventricular ejection fraction was within normal limits by visual estimate  There was no left ventricular regional abnormality   -  Impressions and recommendations: Likely normal study after maximal exercise  IMPRESSIONS: Likely normal study after maximal exercise  Myocardial perfusion imaging was normal at rest and with stress      Prepared and signed by    Alisson Hinson MD  Signed 06/02/2018 18:16:10         Lab Review   Lab Results   Component Value Date    WBC 5 80 07/23/2020    HGB 9 9 (L) 07/23/2020    HCT 30 7 (L) 07/23/2020    MCV 97 07/23/2020    RDW 12 4 07/23/2020     07/23/2020     BMP:  Lab Results   Component Value Date    SODIUM 137 07/23/2020    K 4 0 07/23/2020     07/23/2020    CO2 32 07/23/2020    ANIONGAP 11 2 12/17/2015    BUN 16 07/23/2020    CREATININE 0 97 07/23/2020    GLUC 90 07/23/2020    GLUF 96 04/24/2019    CALCIUM 8 4 07/23/2020    EGFR 62 07/23/2020    MG 2 3 04/24/2019     LFT:  Lab Results   Component Value Date    AST 32 07/23/2020    ALT 33 07/23/2020    ALKPHOS 79 07/23/2020    TP 7 1 07/23/2020    ALB 3 7 07/23/2020      Lab Results   Component Value Date    [id number] 1 518 04/24/2019     Lab Results   Component Value Date    HGBA1C 5 5 07/06/2020     Lipid Profile:   Lab Results   Component Value Date    CHOLESTEROL 180 04/24/2019    HDL 84 (H) 04/24/2019    LDLCALC 90 04/24/2019    TRIG 32 04/24/2019     Lab Results   Component Value Date    CHOLESTEROL 180 04/24/2019    CHOLESTEROL 216 (H) 08/28/2018     Lab Results   Component Value Date    TROPONINI <0 02 07/23/2020     No results found for: NTBNP         Dr Heriberto Stewart MD [facility]      "This note has been constructed using a voice recognition system  Therefore there may be syntax, spelling, and/or grammatical errors   Please call if you have any questions  "

## 2020-07-30 ENCOUNTER — TELEPHONE (OUTPATIENT)
Dept: NEUROLOGY | Facility: CLINIC | Age: 65
End: 2020-07-30

## 2020-07-30 DIAGNOSIS — N81.10 FEMALE BLADDER PROLAPSE: Primary | ICD-10-CM

## 2020-07-30 NOTE — TELEPHONE ENCOUNTER
Pt called to sched CONSULT - Ref is from Cardiology  Needs to be from PCP  PCP is [facility] - told pt to call Dr Hannah Angelo and have her place PCP ref in chart - insurance requires it  Told pt Nate Chu 8/19 Kristi Lipoma is open for Syncope appt - unable to freeze  Pt will c/b when PCP ref completed  I will keep watching for it

## 2020-07-30 NOTE — TELEPHONE ENCOUNTER
I put in order for UROGYN--please give pt info-order in chart for Dr Jemma Pemberton, et al -if [facility] ([facility] doctor not in her plan then can also give number for alternate in [place]--  [facility]

## 2020-07-30 NOTE — TELEPHONE ENCOUNTER
[facility] for patient to CB and schedule with Dr Lillian El  Gave direct number in [employer]

## 2020-07-31 NOTE — TELEPHONE ENCOUNTER
Spoke with Graciela Doty and she has an appointment with Dr Dru Craft , she will call Dr Nichole Every to see if she can get in earlier    Tc/cma

## 2020-08-10 ENCOUNTER — TELEPHONE (OUTPATIENT)
Dept: FAMILY MEDICINE CLINIC | Facility: CLINIC | Age: 65
End: 2020-08-10

## 2020-08-10 NOTE — TELEPHONE ENCOUNTER
CALLED PT REVIEWED US AND ASKED ABOUT URO/GYN APPT  AND PT DID NOT SCHED  APPT  YET DUE TO RELAPSE IS GOING INTO REHAB AND WILL CALL FOR APPT   WHEN SHE IS OUT

## 2020-08-10 NOTE — TELEPHONE ENCOUNTER
----- Message from Cristian Ward MD sent at 8/9/2020  4:17 PM EDT -----  Please inform pelvic u/s without any significant findings-noted benign ovarian follicles  Also please find out if she has appt scheduled w urogyn scheduled to follow-up on the bladder issues

## 2020-08-10 NOTE — TELEPHONE ENCOUNTER
----- Message from Jayson Castillo MD sent at 8/9/2020  4:17 PM EDT -----  Please inform pelvic u/s without any significant findings-noted benign ovarian follicles  Also please find out if she has appt scheduled w urogyn scheduled to follow-up on the bladder issues

## 2020-08-21 ENCOUNTER — TELEPHONE (OUTPATIENT)
Dept: GASTROENTEROLOGY | Facility: CLINIC | Age: 65
End: 2020-08-21

## 2020-08-21 NOTE — TELEPHONE ENCOUNTER
Attempted to contact patient  LMOM for pt to call office to get scheduled for outpatient procedure  Left direct contact information

## 2020-08-23 PROBLEM — Z12.4 CERVICAL CANCER SCREENING: Status: ACTIVE | Noted: 2020-08-23

## 2020-08-23 PROBLEM — J44.9 COPD (CHRONIC OBSTRUCTIVE PULMONARY DISEASE) (HCC): Status: ACTIVE | Noted: 2018-12-07

## 2020-08-23 PROBLEM — B36.9 FUNGAL DERMATITIS: Status: ACTIVE | Noted: 2020-08-23

## 2020-08-23 PROBLEM — F43.10 PTSD (POST-TRAUMATIC STRESS DISORDER): Status: ACTIVE | Noted: 2018-12-07

## 2020-08-23 PROBLEM — Z12.31 ENCOUNTER FOR SCREENING MAMMOGRAM FOR MALIGNANT NEOPLASM OF BREAST: Status: ACTIVE | Noted: 2020-08-23

## 2020-08-23 PROBLEM — F41.1 GAD (GENERALIZED ANXIETY DISORDER): Status: ACTIVE | Noted: 2018-12-07

## 2020-08-23 PROBLEM — F17.200 NICOTINE DEPENDENCE: Status: ACTIVE | Noted: 2020-07-06

## 2020-08-23 PROBLEM — F11.20 OPIOID USE DISORDER, SEVERE, ON MAINTENANCE THERAPY (HCC): Status: ACTIVE | Noted: 2018-12-13

## 2020-08-23 PROBLEM — J45.909 ASTHMA: Status: ACTIVE | Noted: 2018-12-07

## 2020-08-23 PROBLEM — F10.20 ALCOHOL DEPENDENCE (HCC): Status: ACTIVE | Noted: 2018-12-07

## 2020-08-23 PROBLEM — R30.0 DYSURIA: Status: ACTIVE | Noted: 2020-08-23

## 2020-08-23 PROBLEM — R31.1 BENIGN ESSENTIAL MICROSCOPIC HEMATURIA: Status: ACTIVE | Noted: 2020-08-23

## 2020-08-23 PROBLEM — F31.9 BIPOLAR DISORDER (HCC): Status: ACTIVE | Noted: 2018-12-07

## 2020-08-23 PROBLEM — N81.10 FEMALE BLADDER PROLAPSE: Status: ACTIVE | Noted: 2020-08-23

## 2020-08-23 PROBLEM — R10.2 PELVIC PAIN IN FEMALE: Status: ACTIVE | Noted: 2020-08-23

## 2020-08-24 NOTE — PROGRESS NOTES
Assessment/Plan:    1  Pelvic pain in female  -     US pelvis complete w transvaginal; Future; Expected date: 07/24/2020    2  Female bladder prolapse  -     US pelvis complete w transvaginal; Future; Expected date: 07/24/2020    3  Cervical cancer screening  -     Thinprep Pap(Refl)H/L HPV; Future    4  Encounter for screening mammogram for malignant neoplasm of breast  -     Mammo screening bilateral w 3d & cad; Future; Expected date: 07/24/2020    5  Alcoholism ([facility] )    6  Dysuria  -     POCT urine dip auto non-scope  -     nitrofurantoin (MACROBID) 100 mg capsule; Take 1 capsule (100 mg total) by mouth 2 (two) times a day for 5 days    7  Benign essential microscopic hematuria  -     Urine culture  -     Cytology, urine; Future    8  [facility]    9  Gastro-esophageal reflux disease with esophagitis  -     Ambulatory referral to Gastroenterology; Future    10  Fungal dermatitis  -     nystatin (MYCOSTATIN) powder; Apply topically 2 (two) times a day  -     nystatin-triamcinolone (MYCOLOG-II) cream; Apply topically 2 (two) times a day          Subjective:      Patient ID: Yayo Sweet is a 63 y o  female  Chief Complaint   Patient presents with    Vaginal Pain     f/u pt feels like something is falling out of vagina, pt started feeling dizzy and blacked out and hurt her hand     Follow-up     ER visit [facility]-dizziness (blackout episode)       64 yo pt in for follow-up    Recently in [facility]) ED -sec to dizziness/syncopal episode-note reviewed in chart  Has upcoming apt w cardio for further eval  Hx alcoholism-has been in/out rehab on multiple occasions-recent d/c  Also in w c/o vaginal discomfort-?bladder prolapse  Feels protrusion from vagina-denies vaginal bleeding or d/c  No trauma, no fever ,rash, +dysuria      The following portions of the patient's history were reviewed and updated as appropriate: allergies, current medications, past family history, past medical history, past social history, past surgical history and problem list     Review of Systems   Constitutional: Positive for fatigue  Respiratory: Negative  Cardiovascular: Positive for palpitations and leg swelling  Musculoskeletal: Positive for arthralgias  Skin: Positive for wound (left hand )  Allergic/Immunologic: Positive for environmental allergies  Neurological: Positive for dizziness and syncope  Psychiatric/Behavioral: Positive for sleep disturbance  The patient is nervous/anxious            Current Outpatient Medications   Medication Sig Dispense Refill    acamprosate (CAMPRAL) 333 mg tablet Take 333 mg by mouth Three times a day      albuterol (PROAIR HFA) 90 mcg/act inhaler Inhale 2 puffs every 4 (four) hours as needed for wheezing 8 5 g 5    Ascorbic Acid (VITAMIN C) 100 MG tablet Take 100 mg by mouth daily      buprenorphine (SUBUTEX) 8 mg 8 mg 2 (two) times a day Oral       DULoxetine (CYMBALTA) 60 mg delayed release capsule Take 60 mg by mouth daily      fluticasone-salmeterol (ADVAIR, WIXELA) 250-50 mcg/dose inhaler Inhale 1 puff 2 (two) times a day      gabapentin (NEURONTIN) 300 mg capsule Take 1 capsule (300 mg total) by mouth 3 (three) times a day 90 capsule 1    lisinopril (ZESTRIL) 20 mg tablet Take 20 mg by mouth daily      melatonin 3 mg Take 3 mg by mouth daily at bedtime as needed      methylphenidate (RITALIN LA) 20 MG 24 hr capsule       methylphenidate (RITALIN LA) 20 MG 24 hr capsule Take 30 mg by mouth daily      multivitamin (THERAGRAN) TABS Take 1 tablet by mouth daily at bedtime      naloxone (NARCAN) 4 mg/0 1 mL nasal spray 4 mg into each nostril as needed      pantoprazole (PROTONIX) 40 mg tablet Take 1 tablet by mouth 2 (two) times a day (Patient taking differently: Take 40 mg by mouth daily ) 60 tablet 3    psyllium (METAMUCIL) 58 6 % powder Take 1 packet by mouth 2 (two) times a day as needed       simvastatin (ZOCOR) 20 mg tablet take 1 tablet by mouth once daily 90 tablet 0    SUMAtriptan (IMITREX) 100 mg tablet Take 100 mg by mouth once as needed for migraine      Turmeric 500 MG TABS Take by mouth daily at bedtime      fluticasone (FLONASE) 50 mcg/act nasal spray 1 spray into each nostril 2 (two) times a day for 5 days 1 Bottle 0    nystatin (MYCOSTATIN) powder Apply topically 2 (two) times a day 45 g 1    nystatin-triamcinolone (MYCOLOG-II) cream Apply topically 2 (two) times a day 30 g 0     No current facility-administered medications for this visit  Objective:    /72 (BP Location: Left arm, Patient Position: Sitting, Cuff Size: Standard)   Pulse 78   Temp 98 °F (36 7 °C)   Resp 14   Ht 5' 5" (1 651 m)   Wt 63 2 kg (139 lb 6 4 oz)   BMI 23 20 kg/m²        Physical Exam  Vitals signs and nursing note reviewed  Constitutional:       General: She is not in acute distress  Eyes:      General: No scleral icterus  Conjunctiva/sclera: Conjunctivae normal    Neck:      Musculoskeletal: Neck supple  Cardiovascular:      Rate and Rhythm: Normal rate and regular rhythm  Pulmonary:      Effort: Pulmonary effort is normal  No respiratory distress  Breath sounds: Normal breath sounds  Genitourinary:     General: Normal vulva  Vagina: No vaginal discharge  Comments: +bladder prolapse; no CMT, No uterine tenderness:  No v/vlesions  Breast exam: No discrete palp  mass, No SC or axillary LAD; No nipple rtx or d/c  Musculoskeletal:         General: Signs of injury (dorsum left hand, ROM intact) present  Skin:     General: Skin is warm and dry  Neurological:      General: No focal deficit present  Mental Status: She is alert and oriented to person, place, and time  Cranial Nerves: No cranial nerve deficit     Psychiatric:      Comments: Anxious, denies a/v Alejo Devi MD

## 2020-08-25 ENCOUNTER — TELEPHONE (OUTPATIENT)
Dept: NEUROLOGY | Facility: CLINIC | Age: 65
End: 2020-08-25

## 2020-08-25 NOTE — TELEPHONE ENCOUNTER
THE [facility] for patient to CB in regards to Previous Neurologist and to Confirm appt on 8/27/20 with Dr Gaby Giron  Gave direct number in [place]

## 2020-08-27 ENCOUNTER — CONSULT (OUTPATIENT)
Dept: NEUROLOGY | Facility: CLINIC | Age: 65
End: 2020-08-27
Payer: MEDICARE

## 2020-08-27 VITALS
SYSTOLIC BLOOD PRESSURE: 132 MMHG | WEIGHT: 129 LBS | BODY MASS INDEX: 21.49 KG/M2 | HEART RATE: 74 BPM | DIASTOLIC BLOOD PRESSURE: 88 MMHG | TEMPERATURE: 97.7 F | HEIGHT: 65 IN

## 2020-08-27 DIAGNOSIS — R55 SYNCOPE, UNSPECIFIED SYNCOPE TYPE: ICD-10-CM

## 2020-08-27 PROCEDURE — 4004F PT TOBACCO SCREEN RCVD TLK: CPT | Performed by: PSYCHIATRY & NEUROLOGY

## 2020-08-27 PROCEDURE — 3079F DIAST BP 80-89 MM HG: CPT | Performed by: PSYCHIATRY & NEUROLOGY

## 2020-08-27 PROCEDURE — 3008F BODY MASS INDEX DOCD: CPT | Performed by: PSYCHIATRY & NEUROLOGY

## 2020-08-27 PROCEDURE — 3075F SYST BP GE 130 - 139MM HG: CPT | Performed by: PSYCHIATRY & NEUROLOGY

## 2020-08-27 PROCEDURE — 99204 OFFICE O/P NEW MOD 45 MIN: CPT | Performed by: PSYCHIATRY & NEUROLOGY

## 2020-08-27 RX ORDER — ARIPIPRAZOLE 5 MG/1
2.5 TABLET ORAL
COMMUNITY
Start: 2020-08-21 | End: 2021-04-28

## 2020-08-27 RX ORDER — BUSPIRONE HYDROCHLORIDE 5 MG/1
5 TABLET ORAL 3 TIMES DAILY PRN
COMMUNITY
Start: 2020-08-18 | End: 2021-04-28

## 2020-08-27 RX ORDER — GABAPENTIN 400 MG/1
400 CAPSULE ORAL 3 TIMES DAILY
COMMUNITY
Start: 2020-08-18 | End: 2021-01-21 | Stop reason: SDUPTHER

## 2020-08-27 RX ORDER — AMLODIPINE BESYLATE 5 MG/1
5 TABLET ORAL DAILY
COMMUNITY
Start: 2020-08-18 | End: 2021-04-28

## 2020-08-27 NOTE — PROGRESS NOTES
Return NeuroOutpatient Note        Martha Nolasco  [id number]  63 y o   1955       Loss of Consciousness (NP-Abreu)        History obtained from:  Patient     HPI/Subjective:    Martha Nolasco is a 62 yo F with PMH of migraines, COPD presents for evaluation of syncope  She is referred by cardiology  On July 23rd, patient was off balance  Then she went to her son's place and had sudden black out  LOC was for few seconds  It wasn't witnessed  There was no bearing down prior to that  When she woke up, she was oriented to her surroundings  She suffered minor bruises  Her orthostatics were negative  There was no associated twitching or jerking  She can't remember anything out of ordinary that day  She was lightheaded  Denies any tongue lacerations  She does occasionally get chest pain with paresthesia in either arm  The next day, her bp was 100/72 in pcp office  Denies any recurrent event since July 23rd  She has had cardiology evaluation  She does have holter, echo and stress test pending       Past Medical History:   Diagnosis Date    Allergic rhinitis     Amenorrhea     Anesthesia complication     hx of cocaine use-none in 5 yrs    Anxiety     Arthritis     bilateral knees-DJD, arthritis neck and back-cervical injection 2017    Bronchitis, chronic (HCC)     Cancer (HCC)     squamous-removed from the right foot    Chronic pain disorder     neck and back    COPD [facility] disease) [facility]     Depression     Eczema     last assessed 6/27/16, resolved 10/2/17    Fibromyalgia, primary     GERD (gastroesophageal reflux disease)     H/O ETOH abuse     None in the past 10 months since 8/1/17    Hypertension     Lumbar radiculopathy     resolved 6/6/17    Malignant melanoma of skin (HCC)     Migraine     Migraine     MRSA (methicillin resistant Staphylococcus aureus) infection     last assessed 4/29/16    Peptic ulceration     gastric    Pneumonia     Raynauds syndrome     Spinal stenosis     [facility]     Syncope     Urinary tract infection      Social History     Socioeconomic History    Marital status:       Spouse name: Not on file    Number of children: Not on file    Years of education: Not on file    Highest education level: Not on file   Occupational History    Not on file   Social Needs    Financial resource strain: Not on file    Food insecurity     Worry: Not on file     Inability: Not on file    Transportation needs     Medical: Not on file     Non-medical: Not on file   Tobacco Use    Smoking status: Current Every Day Smoker     Packs/day: 0 50     Years: 15 00     Pack years: 7 50     Types: Cigarettes    Smokeless tobacco: Never Used    Tobacco comment: on and off for 40 yrs   Substance and Sexual Activity    Alcohol use: No     Comment: [address] rehab  - last drink 7/6/2020    Drug use: Yes     Types: Cocaine     Comment: last 10/2019    Sexual activity: Never   Lifestyle    Physical activity     Days per week: Not on file     Minutes per session: Not on file    Stress: Not on file   Relationships    Social connections     Talks on phone: Not on file     Gets together: Not on file     Attends Rastafarian service: Not on file     Active member of club or organization: Not on file     Attends meetings of clubs or organizations: Not on file     Relationship status: Not on file    Intimate partner violence     Fear of current or ex partner: Not on file     Emotionally abused: Not on file     Physically abused: Not on file     Forced sexual activity: Not on file   Other Topics Concern    Not on file   Social History Narrative    Daily caffeinated coffee consumption    Drinks caffeinated tea     Family History   Problem Relation Age of Onset    Peripheral vascular disease Mother     Arthritis Mother         osteo arthritis    Coronary artery disease Mother         Atherosclerosis    Alcohol abuse Mother     Stroke Mother         CVA    Transient ischemic attack Mother     Heart disease Father     Peripheral vascular disease Father     Stroke Father         CVA    Leukemia Father     Arthritis Sister         rheumatoid    Alcohol abuse Sister     No Known Problems Sister     No Known Problems Sister     No Known Problems Sister      Allergies   Allergen Reactions    Bee Venom Anaphylaxis    Diphenhydramine Other (See Comments)     "jumpy"    Nsaids      Pt   Says she gets "violently sick"    Ibuprofen Swelling     And any anti-inflammatories, NSAIDS-causes severe diarrhea  Facial swelling    Antihistamines, Chlorpheniramine-Type      "jumpy"     Current Outpatient Medications on File Prior to Visit   Medication Sig Dispense Refill    acamprosate (CAMPRAL) 333 mg tablet Take 333 mg by mouth Three times a day      albuterol (PROAIR HFA) 90 mcg/act inhaler Inhale 2 puffs every 4 (four) hours as needed for wheezing 8 5 g 5    amLODIPine (NORVASC) 5 mg tablet Take 5 mg by mouth daily      ARIPiprazole (ABILIFY) 5 mg tablet Take 2 5 mg by mouth      Ascorbic Acid (VITAMIN C) 100 MG tablet Take 100 mg by mouth daily      buprenorphine (SUBUTEX) 8 mg 8 mg 2 (two) times a day Oral       busPIRone (BUSPAR) 5 mg tablet Take 5 mg by mouth 3 (three) times a day as needed      DULoxetine (CYMBALTA) 60 mg delayed release capsule Take 60 mg by mouth daily      fluticasone-salmeterol (ADVAIR, WIXELA) 250-50 mcg/dose inhaler Inhale 1 puff 2 (two) times a day      gabapentin (NEURONTIN) 400 mg capsule Take 400 mg by mouth 3 (three) times a day      melatonin 3 mg Take 3 mg by mouth daily at bedtime as needed      multivitamin (THERAGRAN) TABS Take 1 tablet by mouth daily at bedtime      naloxone (NARCAN) 4 mg/0 1 mL nasal spray 4 mg into each nostril as needed      pantoprazole (PROTONIX) 40 mg tablet Take 1 tablet by mouth 2 (two) times a day (Patient taking differently: Take 40 mg by mouth daily ) 60 tablet 3    psyllium (METAMUCIL) 58 6 % powder Take 1 packet by mouth 2 (two) times a day as needed       simvastatin (ZOCOR) 20 mg tablet take 1 tablet by mouth once daily 90 tablet 0    SUMAtriptan (IMITREX) 100 mg tablet Take 100 mg by mouth once as needed for migraine      Turmeric 500 MG TABS Take by mouth daily at bedtime      lisinopril (ZESTRIL) 20 mg tablet Take 20 mg by mouth daily      nystatin (MYCOSTATIN) powder Apply topically 2 (two) times a day (Patient not taking: Reported on 8/27/2020) 45 g 1    nystatin-triamcinolone (MYCOLOG-II) cream Apply topically 2 (two) times a day (Patient not taking: Reported on 8/27/2020) 30 g 0    [DISCONTINUED] fluticasone (FLONASE) 50 mcg/act nasal spray 1 spray into each nostril 2 (two) times a day for 5 days 1 Bottle 0    [DISCONTINUED] gabapentin (NEURONTIN) 300 mg capsule Take 1 capsule (300 mg total) by mouth 3 (three) times a day 90 capsule 1    [DISCONTINUED] methylphenidate (RITALIN LA) 20 MG 24 hr capsule       [DISCONTINUED] methylphenidate (RITALIN LA) 20 MG 24 hr capsule Take 30 mg by mouth daily       No current facility-administered medications on file prior to visit  Review of Systems   Refer to positive review of systems in HPI     Review of Systems    Constitutional- No fever  Eyes- No visual change  ENT- Hearing normal  CV- No chest pain  Resp- No Shortness of breath  GI- No diarrhea  - Bladder normal  MS- No Arthritis   Skin- No rash  Psych- No depression  Endo- No DM  Heme- No nodes    Vitals:    08/27/20 0817   BP: 132/88   BP Location: Left arm   Patient Position: Sitting   Cuff Size: Adult   Pulse: 74   Temp: 97 7 °F (36 5 °C)   Weight: 58 5 kg (129 lb)   Height: 5' 5" (1 651 m)       PHYSICAL EXAM:  Appearance: No Acute Distress  Ophthalmoscopic: Disc Flat, Normal fundus  Mental status:  Orientation: Awake, Alert, and Orientedx3  Memory: Registation 3/3 Recall 3/3  Attention: normal  Knowledge: good  Language: No aphasia  Speech: No dysarthria  Cranial Nerves:  2 No Visual Defect on Confrontation, Pupils round, equal, reactive to light  3,4,6 Extraocular Movements Intact, no nystagmus  5 Facial Sensation Intact  7 No facial asymmetry  8 Intact hearing  9,10 Palate symmetric, normal gag  11 Good shoulder shrug  12 Tongue Midline  Gait: Stable  Coordination: No ataxia with finger to nose testing, and heel to shin  Sensory: Intact, Symmetric to pinprick, light touch, vibration, and joint position  Muscle Tone: Normal              Muscle exam:  Arm Right Left Leg Right Left   Deltoid 5/5 5/5 Iliopsoas 5/5 5/5   Biceps 5/5 5/5 Quads 5/5 5/5   Triceps 5/5 5/5 Hamstrings 5/5 5/5   Wrist Extension 5/5 5/5 Ankle Dorsi Flexion 5/5 5/5   Wrist Flexion 5/5 5/5 Ankle Plantar Flexion 5/5 5/5   Interossei 5/5 5/5 Ankle Eversion 5/5 5/5   APB 5/5 5/5 Ankle Inversion 5/5 5/5       Reflexes   RJ BJ TJ KJ AJ Plantars Gibson's   Right 2+ 2+ 2+ 2+ 2+ Downgoing Not present   Left 2+ 2+ 2+ 2+ 2+ Downgoing Not present     Personal review of  Labs:                  Diagnoses and all orders for this visit:      1  Syncope, unspecified syncope type  Ambulatory referral to Neurology       Patient's clinical event doesn't suggest seizure activity  She has pending cardiac work up at this time  We do not feel the need to do EEG at this time  She's aware of staying hydrated and occassionally checking her BP                Total time of encounter:  45 min  More than 50% of the time was used in counseling and/or coordination of care  Extent of counseling and/or coordination of care        MD [facility]  [address]  [address]  [phone]

## 2020-08-27 NOTE — LETTER
August 27, 2020     MD Gerardo Murdock [address]  [address] [facility]    Patient: Nirmal Herman   YOB: 1955   Date of Visit: 8/27/2020       Dear Dr Amadeo Conroy: Thank you for referring Nirmal Herman to me for evaluation  Below are my notes for this consultation  If you have questions, please do not hesitate to call me  I look forward to following your patient along with you  Sincerely,        Mitch Montanez MD        CC: No Recipients  Mitch Montanez MD  8/27/2020  9:10 AM  Sign when Signing Visit  Return NeuroOutpatient Note        Nirmal Herman  [id number]  63 y o   1955       Loss of Consciousness (NP-Abreu)        History obtained from:  Patient     HPI/Subjective:    Nirmal Herman is a 64 yo F with PMH of migraines, COPD presents for evaluation of syncope  She is referred by cardiology  On July 23rd, patient was off balance  Then she went to her son's place and had sudden black out  LOC was for few seconds  It wasn't witnessed  There was no bearing down prior to that  When she woke up, she was oriented to her surroundings  She suffered minor bruises  Her orthostatics were negative  There was no associated twitching or jerking  She can't remember anything out of ordinary that day  She was lightheaded  Denies any tongue lacerations  She does occasionally get chest pain with paresthesia in either arm  The next day, her bp was 100/72 in pcp office  Denies any recurrent event since July 23rd  She has had cardiology evaluation  She does have holter, echo and stress test pending       Past Medical History:   Diagnosis Date    Allergic rhinitis     Amenorrhea     Anesthesia complication     hx of cocaine use-none in 5 yrs    Anxiety     Arthritis     bilateral knees-DJD, arthritis neck and back-cervical injection 2017    Bronchitis, chronic (HCC)     Cancer (HCC)     squamous-removed from the right foot    Chronic pain disorder     neck and back    [facility]     Depression     Eczema     last assessed 6/27/16, resolved 10/2/17    Fibromyalgia, primary     GERD (gastroesophageal reflux disease)     H/O ETOH abuse     None in the past 10 months since 8/1/17    Hypertension     Lumbar radiculopathy     resolved 6/6/17    Malignant melanoma of skin (HCC)     Migraine     Migraine     MRSA (methicillin resistant Staphylococcus aureus) infection     last assessed 4/29/16    Peptic ulceration     gastric    Pneumonia     Raynauds syndrome     Spinal stenosis     Substance abuse ([facility] )     Syncope     Urinary tract infection      Social History     Socioeconomic History    Marital status:       Spouse name: Not on file    Number of children: Not on file    Years of education: Not on file    Highest education level: Not on file   Occupational History    Not on file   Social Needs    Financial resource strain: Not on file    Food insecurity     Worry: Not on file     Inability: Not on file    Transportation needs     Medical: Not on file     Non-medical: Not on file   Tobacco Use    Smoking status: Current Every Day Smoker     Packs/day: 0 50     Years: 15 00     Pack years: 7 50     Types: Cigarettes    Smokeless tobacco: Never Used    Tobacco comment: on and off for 40 yrs   Substance and Sexual Activity    Alcohol use: No     Comment: [facility]  - last drink 7/6/2020    Drug use: Yes     Types: Cocaine     Comment: last 10/2019    Sexual activity: Never   Lifestyle    Physical activity     Days per week: Not on file     Minutes per session: Not on file    Stress: Not on file   Relationships    Social connections     Talks on phone: Not on file     Gets together: Not on file     Attends Yazidism service: Not on file     Active member of club or organization: Not on file     Attends meetings of clubs or organizations: Not on file     Relationship status: Not on file    Intimate partner violence     Fear of current or ex partner: Not on file     Emotionally abused: Not on file     Physically abused: Not on file     Forced sexual activity: Not on file   Other Topics Concern    Not on file   Social History Narrative    Daily caffeinated coffee consumption    Drinks caffeinated tea     Family History   Problem Relation Age of Onset    Peripheral vascular disease Mother     Arthritis Mother         osteo arthritis    Coronary artery disease Mother         Atherosclerosis    Alcohol abuse Mother     Stroke Mother         CVA    Transient ischemic attack Mother     Heart disease Father     Peripheral vascular disease Father     Stroke Father         CVA    Leukemia Father     Arthritis Sister         rheumatoid    Alcohol abuse Sister     No Known Problems Sister     No Known Problems Sister     No Known Problems Sister      Allergies   Allergen Reactions    Bee Venom Anaphylaxis    Diphenhydramine Other (See Comments)     "jumpy"    Nsaids      Pt   Says she gets "violently sick"    Ibuprofen Swelling     And any anti-inflammatories, NSAIDS-causes severe diarrhea  Facial swelling    Antihistamines, Chlorpheniramine-Type      "jumpy"     Current Outpatient Medications on File Prior to Visit   Medication Sig Dispense Refill    acamprosate (CAMPRAL) 333 mg tablet Take 333 mg by mouth Three times a day      albuterol (PROAIR HFA) 90 mcg/act inhaler Inhale 2 puffs every 4 (four) hours as needed for wheezing 8 5 g 5    amLODIPine (NORVASC) 5 mg tablet Take 5 mg by mouth daily      ARIPiprazole (ABILIFY) 5 mg tablet Take 2 5 mg by mouth      Ascorbic Acid (VITAMIN C) 100 MG tablet Take 100 mg by mouth daily      buprenorphine (SUBUTEX) 8 mg 8 mg 2 (two) times a day Oral       busPIRone (BUSPAR) 5 mg tablet Take 5 mg by mouth 3 (three) times a day as needed      DULoxetine (CYMBALTA) 60 mg delayed release capsule Take 60 mg by mouth daily  fluticasone-salmeterol (ADVAIR, WIXELA) 250-50 mcg/dose inhaler Inhale 1 puff 2 (two) times a day      gabapentin (NEURONTIN) 400 mg capsule Take 400 mg by mouth 3 (three) times a day      melatonin 3 mg Take 3 mg by mouth daily at bedtime as needed      multivitamin (THERAGRAN) TABS Take 1 tablet by mouth daily at bedtime      naloxone (NARCAN) 4 mg/0 1 mL nasal spray 4 mg into each nostril as needed      pantoprazole (PROTONIX) 40 mg tablet Take 1 tablet by mouth 2 (two) times a day (Patient taking differently: Take 40 mg by mouth daily ) 60 tablet 3    psyllium (METAMUCIL) 58 6 % powder Take 1 packet by mouth 2 (two) times a day as needed       simvastatin (ZOCOR) 20 mg tablet take 1 tablet by mouth once daily 90 tablet 0    SUMAtriptan (IMITREX) 100 mg tablet Take 100 mg by mouth once as needed for migraine      Turmeric 500 MG TABS Take by mouth daily at bedtime      lisinopril (ZESTRIL) 20 mg tablet Take 20 mg by mouth daily      nystatin (MYCOSTATIN) powder Apply topically 2 (two) times a day (Patient not taking: Reported on 8/27/2020) 45 g 1    nystatin-triamcinolone (MYCOLOG-II) cream Apply topically 2 (two) times a day (Patient not taking: Reported on 8/27/2020) 30 g 0    [DISCONTINUED] fluticasone (FLONASE) 50 mcg/act nasal spray 1 spray into each nostril 2 (two) times a day for 5 days 1 Bottle 0    [DISCONTINUED] gabapentin (NEURONTIN) 300 mg capsule Take 1 capsule (300 mg total) by mouth 3 (three) times a day 90 capsule 1    [DISCONTINUED] methylphenidate (RITALIN LA) 20 MG 24 hr capsule       [DISCONTINUED] methylphenidate (RITALIN LA) 20 MG 24 hr capsule Take 30 mg by mouth daily       No current facility-administered medications on file prior to visit  Review of Systems   Refer to positive review of systems in HPI     Review of Systems    Constitutional- No fever  Eyes- No visual change  ENT- Hearing normal  CV- No chest pain  Resp- No Shortness of breath  GI- No diarrhea  - Bladder normal  MS- No Arthritis   Skin- No rash  Psych- No depression  Endo- No DM  Heme- No nodes    Vitals:    08/27/20 0817   BP: 132/88   BP Location: Left arm   Patient Position: Sitting   Cuff Size: Adult   Pulse: 74   Temp: 97 7 °F (36 5 °C)   Weight: 58 5 kg (129 lb)   Height: 5' 5" (1 651 m)       PHYSICAL EXAM:  Appearance: No Acute Distress  Ophthalmoscopic: Disc Flat, Normal fundus  Mental status:  Orientation: Awake, Alert, and Orientedx3  Memory: Registation 3/3 Recall 3/3  Attention: normal  Knowledge: good  Language: No aphasia  Speech: No dysarthria  Cranial Nerves:  2 No Visual Defect on Confrontation, Pupils round, equal, reactive to light  3,4,6 Extraocular Movements Intact, no nystagmus  5 Facial Sensation Intact  7 No facial asymmetry  8 Intact hearing  9,10 Palate symmetric, normal gag  11 Good shoulder shrug  12 Tongue Midline  Gait: Stable  Coordination: No ataxia with finger to nose testing, and heel to shin  Sensory: Intact, Symmetric to pinprick, light touch, vibration, and joint position  Muscle Tone: Normal              Muscle exam:  Arm Right Left Leg Right Left   Deltoid 5/5 5/5 Iliopsoas 5/5 5/5   Biceps 5/5 5/5 Quads 5/5 5/5   Triceps 5/5 5/5 Hamstrings 5/5 5/5   Wrist Extension 5/5 5/5 Ankle Dorsi Flexion 5/5 5/5   Wrist Flexion 5/5 5/5 Ankle Plantar Flexion 5/5 5/5   Interossei 5/5 5/5 Ankle Eversion 5/5 5/5   APB 5/5 5/5 Ankle Inversion 5/5 5/5       Reflexes   RJ BJ TJ KJ AJ Plantars Gibson's   Right 2+ 2+ 2+ 2+ 2+ Downgoing Not present   Left 2+ 2+ 2+ 2+ 2+ Downgoing Not present     Personal review of  Labs:                  Diagnoses and all orders for this visit:      1  Syncope, unspecified syncope type  Ambulatory referral to Neurology       Patient's clinical event doesn't suggest seizure activity  She has pending cardiac work up at this time  We do not feel the need to do EEG at this time     She's aware of staying hydrated and occassionally checking her BP                Total time of encounter:  45 min  More than 50% of the time was used in counseling and/or coordination of care  Extent of counseling and/or coordination of care        Arvind Crawford MD  [facility]  [address]  [address]  [phone]

## 2020-10-15 ENCOUNTER — OFFICE VISIT (OUTPATIENT)
Dept: URGENT CARE | Facility: CLINIC | Age: 65
End: 2020-10-15
Payer: MEDICARE

## 2020-10-15 ENCOUNTER — APPOINTMENT (OUTPATIENT)
Dept: RADIOLOGY | Facility: CLINIC | Age: 65
End: 2020-10-15
Payer: MEDICARE

## 2020-10-15 VITALS
BODY MASS INDEX: 23.18 KG/M2 | WEIGHT: 135.8 LBS | DIASTOLIC BLOOD PRESSURE: 61 MMHG | RESPIRATION RATE: 20 BRPM | OXYGEN SATURATION: 96 % | HEART RATE: 81 BPM | TEMPERATURE: 98.9 F | HEIGHT: 64 IN | SYSTOLIC BLOOD PRESSURE: 128 MMHG

## 2020-10-15 DIAGNOSIS — R21 RASH: ICD-10-CM

## 2020-10-15 DIAGNOSIS — R05.9 COUGH: ICD-10-CM

## 2020-10-15 DIAGNOSIS — R05.9 COUGH: Primary | ICD-10-CM

## 2020-10-15 PROCEDURE — U0003 INFECTIOUS AGENT DETECTION BY NUCLEIC ACID (DNA OR RNA); SEVERE ACUTE RESPIRATORY SYNDROME CORONAVIRUS 2 (SARS-COV-2) (CORONAVIRUS DISEASE [COVID-19]), AMPLIFIED PROBE TECHNIQUE, MAKING USE OF HIGH THROUGHPUT TECHNOLOGIES AS DESCRIBED BY CMS-2020-01-R: HCPCS | Performed by: PHYSICIAN ASSISTANT

## 2020-10-15 PROCEDURE — 71046 X-RAY EXAM CHEST 2 VIEWS: CPT

## 2020-10-15 PROCEDURE — 99214 OFFICE O/P EST MOD 30 MIN: CPT | Performed by: PHYSICIAN ASSISTANT

## 2020-10-17 ENCOUNTER — TELEPHONE (OUTPATIENT)
Dept: URGENT CARE | Facility: CLINIC | Age: 65
End: 2020-10-17

## 2020-10-17 LAB — SARS-COV-2 RNA SPEC QL NAA+PROBE: NOT DETECTED

## 2020-10-19 ENCOUNTER — TELEPHONE (OUTPATIENT)
Dept: URGENT CARE | Facility: CLINIC | Age: 65
End: 2020-10-19

## 2020-10-20 ENCOUNTER — TELEMEDICINE (OUTPATIENT)
Dept: FAMILY MEDICINE CLINIC | Facility: CLINIC | Age: 65
End: 2020-10-20
Payer: MEDICARE

## 2020-10-20 DIAGNOSIS — B96.89 ACUTE BACTERIAL BRONCHITIS: Primary | ICD-10-CM

## 2020-10-20 DIAGNOSIS — J20.8 ACUTE BACTERIAL BRONCHITIS: Primary | ICD-10-CM

## 2020-10-20 PROCEDURE — 99213 OFFICE O/P EST LOW 20 MIN: CPT | Performed by: NURSE PRACTITIONER

## 2020-10-20 RX ORDER — DOXYCYCLINE HYCLATE 100 MG
100 TABLET ORAL 2 TIMES DAILY
Qty: 14 TABLET | Refills: 0 | Status: SHIPPED | OUTPATIENT
Start: 2020-10-20 | End: 2020-10-27

## 2020-10-20 RX ORDER — PREDNISONE 10 MG/1
TABLET ORAL
Qty: 12 TABLET | Refills: 0 | Status: SHIPPED | OUTPATIENT
Start: 2020-10-20 | End: 2021-03-30 | Stop reason: ALTCHOICE

## 2020-10-27 ENCOUNTER — TELEPHONE (OUTPATIENT)
Dept: FAMILY MEDICINE CLINIC | Facility: CLINIC | Age: 65
End: 2020-10-27

## 2020-10-27 DIAGNOSIS — Z12.83 SKIN CANCER SCREENING: Primary | ICD-10-CM

## 2020-11-12 ENCOUNTER — HOSPITAL ENCOUNTER (OUTPATIENT)
Dept: NON INVASIVE DIAGNOSTICS | Facility: HOSPITAL | Age: 65
Discharge: HOME | End: 2020-11-12
Attending: INTERNAL MEDICINE

## 2020-11-16 ENCOUNTER — TELEPHONE (OUTPATIENT)
Dept: FAMILY MEDICINE CLINIC | Facility: CLINIC | Age: 65
End: 2020-11-16

## 2020-11-16 DIAGNOSIS — N81.10 FEMALE BLADDER PROLAPSE: Primary | ICD-10-CM

## 2020-11-19 ENCOUNTER — CONSULT (OUTPATIENT)
Dept: DERMATOLOGY | Facility: CLINIC | Age: 65
End: 2020-11-19
Payer: MEDICARE

## 2020-11-19 VITALS — HEIGHT: 65 IN | WEIGHT: 133.3 LBS | BODY MASS INDEX: 22.21 KG/M2 | TEMPERATURE: 97.6 F

## 2020-11-19 DIAGNOSIS — L82.1 SEBORRHEIC KERATOSIS: ICD-10-CM

## 2020-11-19 DIAGNOSIS — Z85.828 HISTORY OF SQUAMOUS CELL CARCINOMA OF SKIN: ICD-10-CM

## 2020-11-19 DIAGNOSIS — D18.01 CHERRY ANGIOMA: ICD-10-CM

## 2020-11-19 DIAGNOSIS — L81.4 SOLAR LENTIGO: ICD-10-CM

## 2020-11-19 DIAGNOSIS — D22.60 MULTIPLE BENIGN MELANOCYTIC NEVI OF UPPER AND LOWER EXTREMITIES AND TRUNK: ICD-10-CM

## 2020-11-19 DIAGNOSIS — L57.0 ACTINIC KERATOSIS: ICD-10-CM

## 2020-11-19 DIAGNOSIS — D48.5 NEOPLASM OF UNCERTAIN BEHAVIOR OF SKIN: Primary | ICD-10-CM

## 2020-11-19 DIAGNOSIS — D22.5 MULTIPLE BENIGN MELANOCYTIC NEVI OF UPPER AND LOWER EXTREMITIES AND TRUNK: ICD-10-CM

## 2020-11-19 DIAGNOSIS — D22.70 MULTIPLE BENIGN MELANOCYTIC NEVI OF UPPER AND LOWER EXTREMITIES AND TRUNK: ICD-10-CM

## 2020-11-19 PROCEDURE — 88304 TISSUE EXAM BY PATHOLOGIST: CPT | Performed by: PATHOLOGY

## 2020-11-19 PROCEDURE — 17003 DESTRUCT PREMALG LES 2-14: CPT | Performed by: DERMATOLOGY

## 2020-11-19 PROCEDURE — 17000 DESTRUCT PREMALG LESION: CPT | Performed by: DERMATOLOGY

## 2020-11-19 PROCEDURE — 99204 OFFICE O/P NEW MOD 45 MIN: CPT | Performed by: DERMATOLOGY

## 2020-11-19 PROCEDURE — 11104 PUNCH BX SKIN SINGLE LESION: CPT | Performed by: DERMATOLOGY

## 2021-01-03 ENCOUNTER — HOSPITAL ENCOUNTER (EMERGENCY)
Facility: HOSPITAL | Age: 66
Discharge: HOME | End: 2021-01-03
Attending: EMERGENCY MEDICINE | Admitting: EMERGENCY MEDICINE
Payer: COMMERCIAL

## 2021-01-03 ENCOUNTER — APPOINTMENT (EMERGENCY)
Dept: RADIOLOGY | Facility: HOSPITAL | Age: 66
End: 2021-01-03
Payer: COMMERCIAL

## 2021-01-03 VITALS
OXYGEN SATURATION: 98 % | DIASTOLIC BLOOD PRESSURE: 76 MMHG | TEMPERATURE: 97.1 F | WEIGHT: 135 LBS | HEIGHT: 65 IN | RESPIRATION RATE: 17 BRPM | BODY MASS INDEX: 22.49 KG/M2 | SYSTOLIC BLOOD PRESSURE: 160 MMHG | HEART RATE: 65 BPM
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
## 2021-01-03 DIAGNOSIS — S06.0X9A CONCUSSION: ICD-10-CM

## 2021-01-03 PROCEDURE — 90471 IMMUNIZATION ADMIN: CPT

## 2021-01-03 PROCEDURE — 99284 EMERGENCY DEPT VISIT MOD MDM: CPT | Performed by: PHYSICIAN ASSISTANT

## 2021-01-03 PROCEDURE — 90715 TDAP VACCINE 7 YRS/> IM: CPT | Performed by: PHYSICIAN ASSISTANT

## 2021-01-03 PROCEDURE — 72125 CT NECK SPINE W/O DYE: CPT

## 2021-01-03 PROCEDURE — 70450 CT HEAD/BRAIN W/O DYE: CPT

## 2021-01-03 PROCEDURE — 99284 EMERGENCY DEPT VISIT MOD MDM: CPT

## 2021-01-03 PROCEDURE — G1004 CDSM NDSC: HCPCS

## 2021-01-03 RX ORDER — GINSENG 100 MG
1 CAPSULE ORAL ONCE
Status: COMPLETED | OUTPATIENT
Start: 2021-01-03 | End: 2021-01-03

## 2021-01-03 RX ORDER — LIDOCAINE HYDROCHLORIDE AND EPINEPHRINE 10; 10 MG/ML; UG/ML
20 INJECTION, SOLUTION INFILTRATION; PERINEURAL ONCE
Status: COMPLETED | OUTPATIENT
Start: 2021-01-03 | End: 2021-01-03

## 2021-01-03 RX ADMIN — BACITRACIN 1 SMALL APPLICATION: 500 OINTMENT TOPICAL at 10:49

## 2021-01-03 RX ADMIN — LIDOCAINE HYDROCHLORIDE AND EPINEPHRINE 20 ML: 10; 10 INJECTION, SOLUTION INFILTRATION; PERINEURAL at 10:48

## 2021-01-03 RX ADMIN — TETANUS TOXOID, REDUCED DIPHTHERIA TOXOID AND ACELLULAR PERTUSSIS VACCINE, ADSORBED 0.5 ML: 5; 2.5; 8; 8; 2.5 SUSPENSION INTRAMUSCULAR at 10:48

## 2021-01-03 NOTE — ED NOTES
Blood cleaned off of patient's hair and scalp  Small abrasion, and golf ball sized bruising noted on posterior aspect of pt scalp  No evidence of any additional lacerations or bruising noted upon assessment of scalp        Fady Schroeder RN  01/03/21 [id number]
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
## 2021-01-11 ENCOUNTER — OFFICE VISIT (OUTPATIENT)
Dept: OBGYN CLINIC | Facility: CLINIC | Age: 66
End: 2021-01-11
Payer: COMMERCIAL

## 2021-01-11 VITALS
SYSTOLIC BLOOD PRESSURE: 112 MMHG | BODY MASS INDEX: 22.49 KG/M2 | DIASTOLIC BLOOD PRESSURE: 70 MMHG | WEIGHT: 135 LBS | HEIGHT: 65 IN | HEART RATE: 70 BPM

## 2021-01-11 DIAGNOSIS — S13.4XXA ACUTE WHIPLASH INJURY, INITIAL ENCOUNTER: Primary | ICD-10-CM

## 2021-01-11 DIAGNOSIS — M47.812 SPONDYLOSIS OF CERVICAL REGION WITHOUT MYELOPATHY OR RADICULOPATHY: ICD-10-CM

## 2021-01-11 PROCEDURE — 99213 OFFICE O/P EST LOW 20 MIN: CPT | Performed by: ORTHOPAEDIC SURGERY
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
## 2021-01-12 ENCOUNTER — TELEPHONE (OUTPATIENT)
Dept: OBGYN CLINIC | Facility: HOSPITAL | Age: 66
End: 2021-01-12

## 2021-01-12 NOTE — TELEPHONE ENCOUNTER
Elena Se[employer] is calling wanting to let us know that a form was sent to them and it was blank they are wondering if we could fill it out and send it to [phone]

## 2021-01-13 ENCOUNTER — TELEPHONE (OUTPATIENT)
Dept: OBGYN CLINIC | Facility: OTHER | Age: 66
End: 2021-01-13
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
## 2021-01-15 ENCOUNTER — TELEPHONE (OUTPATIENT)
Dept: OBGYN CLINIC | Facility: HOSPITAL | Age: 66
End: 2021-01-15

## 2021-01-15 NOTE — TELEPHONE ENCOUNTER
Patient sees Dr Lambert [facility] is calling in from [employer] on behalf of [employer]  She is calling in stating that they received a precertification form but it cannot be accepted since it does not have the total units- such as how many office visits they want needs to be included on this form   She is asking if this can be updated and refaxed back over to them at [phone]

## 2021-01-19 ENCOUNTER — TELEPHONE (OUTPATIENT)
Dept: GASTROENTEROLOGY | Facility: AMBULARY SURGERY CENTER | Age: 66
End: 2021-01-19

## 2021-01-19 ENCOUNTER — EVALUATION (OUTPATIENT)
Dept: PHYSICAL THERAPY | Facility: CLINIC | Age: 66
End: 2021-01-19
Payer: COMMERCIAL

## 2021-01-19 DIAGNOSIS — M47.812 SPONDYLOSIS OF CERVICAL REGION WITHOUT MYELOPATHY OR RADICULOPATHY: ICD-10-CM

## 2021-01-19 DIAGNOSIS — S13.4XXA ACUTE WHIPLASH INJURY, INITIAL ENCOUNTER: ICD-10-CM

## 2021-01-19 PROCEDURE — 97161 PT EVAL LOW COMPLEX 20 MIN: CPT | Performed by: PHYSICAL THERAPIST
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
## 2021-01-19 NOTE — TELEPHONE ENCOUNTER
Recall letter sent for patient to call in and schedule her three year colonoscopy with Dr Michel Ovalles

## 2021-01-21 DIAGNOSIS — M47.812 SPONDYLOSIS OF CERVICAL REGION WITHOUT MYELOPATHY OR RADICULOPATHY: ICD-10-CM

## 2021-01-21 DIAGNOSIS — M54.16 LUMBAR RADICULOPATHY: Primary | ICD-10-CM

## 2021-01-21 DIAGNOSIS — S13.4XXA ACUTE WHIPLASH INJURY, INITIAL ENCOUNTER: Primary | ICD-10-CM

## 2021-01-21 RX ORDER — GABAPENTIN 400 MG/1
400 CAPSULE ORAL 3 TIMES DAILY
Qty: 90 CAPSULE | Refills: 1 | Status: SHIPPED | OUTPATIENT
Start: 2021-01-21 | End: 2021-03-24

## 2021-01-29 ENCOUNTER — TELEPHONE (OUTPATIENT)
Dept: FAMILY MEDICINE CLINIC | Facility: CLINIC | Age: 66
End: 2021-01-29

## 2021-01-29 NOTE — TELEPHONE ENCOUNTER
Dr Magdalena Gray pt called to say she has loose bowels and wanted a covid test but said she has no other sxs and would call back tomorrow if things worsen

## 2021-02-03 ENCOUNTER — TELEMEDICINE (OUTPATIENT)
Dept: FAMILY MEDICINE CLINIC | Facility: CLINIC | Age: 66
End: 2021-02-03
Payer: MEDICARE

## 2021-02-03 DIAGNOSIS — Z87.898 HISTORY OF DIARRHEA: ICD-10-CM

## 2021-02-03 DIAGNOSIS — N81.11 CYSTOCELE, MIDLINE: Primary | ICD-10-CM

## 2021-02-03 DIAGNOSIS — Z71.89 COUNSELING ON HEALTH PROMOTION AND DISEASE PREVENTION: ICD-10-CM

## 2021-02-03 PROCEDURE — 99213 OFFICE O/P EST LOW 20 MIN: CPT | Performed by: FAMILY MEDICINE

## 2021-02-04 ENCOUNTER — OFFICE VISIT (OUTPATIENT)
Dept: PHYSICAL THERAPY | Facility: CLINIC | Age: 66
End: 2021-02-04
Payer: COMMERCIAL

## 2021-02-04 DIAGNOSIS — M47.812 SPONDYLOSIS OF CERVICAL REGION WITHOUT MYELOPATHY OR RADICULOPATHY: Primary | ICD-10-CM

## 2021-02-04 PROCEDURE — 97112 NEUROMUSCULAR REEDUCATION: CPT | Performed by: PHYSICAL THERAPIST

## 2021-02-04 PROCEDURE — 97140 MANUAL THERAPY 1/> REGIONS: CPT | Performed by: PHYSICAL THERAPIST

## 2021-02-04 PROCEDURE — 97110 THERAPEUTIC EXERCISES: CPT | Performed by: PHYSICAL THERAPIST

## 2021-02-04 NOTE — ASSESSMENT & PLAN NOTE
Consult from urologist--Dr Concepción Guerin reviewed  Pt will be sched pre-op clearance once date of surgery known

## 2021-02-04 NOTE — PROGRESS NOTES
Daily Note     Today's date: 2021  Patient name: Va Gant  : 1955  MRN: [id number]  Referring provider: Jerome Walden DO  Dx:   Encounter Diagnosis     ICD-10-CM    1  Spondylosis of cervical region without myelopathy or radiculopathy  M47 [id number]                   Subjective: I feel very tired all the time, hazy mind  Objective: See treatment diary below      Assessment: Tolerated treatment well  Patient demonstrated fatigue post treatment and exhibited good technique with therapeutic exercises      Plan: Continue per plan of care        Precautions: Standard      Manuals 2/4            STM fig 8 perf            Manual traction 10x                                      Neuro Re-Ed 2/4            Neurac cervical retract supine 2x5            Neurac scap retract supine 2x5            Neurac scap retract w depression sit 2x5            Neurac supine pelvic lift 2x5            Neurac Bridge 2x5            Neurac sdly hip ABD 2x5            Neurac sdly hip ADD 2x5            Ther Ex 2/4            scap retract 10x            Axial extension 10x            AROM C-Spine 10x                                                                             Ther Activity                                       Gait Training                                       Modalities

## 2021-02-04 NOTE — PROGRESS NOTES
Virtual Regular Visit      Assessment/Plan:    Problem List Items Addressed This Visit     Cystocele, midline - Primary     Consult from urologist--Dr Anderson Álvarez reviewed  Pt will be sched pre-op clearance once date of surgery known         Counseling on health promotion and disease prevention     Addressed questions regarding covid vaccine availability         History of diarrhea     Resolved since sched of appt  Will follow-up if recurs                    Reason for visit is   Chief Complaint   Patient presents with    Virtual Regular Visit        Encounter provider Mitchell Cervantes MD    Provider located at [address] [place] [phone]      Recent Visits  Date Type Provider Dept   01/29/21 Telephone Meronradhadarshana De Santiago, [address] [facility] recent visits within past 7 days and meeting all other requirements     Today's Visits  Date Type Provider Dept   02/03/21 Telemedicine Mitchell Cervantes  [employer] [facility] today's visits and meeting all other requirements     Future Appointments  No visits were found meeting these conditions  Showing future appointments within next 150 days and meeting all other requirements        The patient was identified by name and date of birth  Theresa Zoe was informed that this is a telemedicine visit and that the visit is being conducted through [facility] and patient was informed that this is a secure, HIPAA-compliant platform  She agrees to proceed     My office door was closed  No one else was in the room  She acknowledged consent and understanding of privacy and security of the video platform  The patient has agreed to participate and understands they can discontinue the visit at any time  Patient is aware this is a billable service  Neftaly Rizvi is a 63 y o  female          HPI   Follow-up  Pt had been having problems w diarrhea which has since resolved  Is having cont issues w urinary incontinence/prolapse   Consult from urologist-Dr Mandi Mccracken reviewed in chart  Pt considering surgical repair of her midline cystocele   Will sched pre-op clearance once date of surgery known  Also questions re: COVID vacine availability  Advised to pre-register for vaccine thru MyChart    Past Medical History:   Diagnosis Date    Allergic rhinitis     Amenorrhea     Anesthesia complication     hx of cocaine use-none in 5 yrs    Anxiety     Arthritis     bilateral knees-DJD, arthritis neck and back-cervical injection 2017    Bronchitis, chronic ([address] )     Cancer ([address] )     squamous-removed from the right foot    Chronic pain disorder     neck and back    COPD [facility]ch[facility] obstructive [facility] disease) [facility]     Depression     Eczema     last assessed 6/27/16, resolved 10/2/17    Fibromyalgia, primary     GERD (gastroesophageal reflux disease)     H/O ETOH abuse     None in the past 10 months since 8/1/17    Hypertension     Lumbar radiculopathy     resolved 6/6/17    Malignant melanoma of skin (HCC)     Migraine     Migraine     MRSA (methicillin resistant Staphylococcus aureus) infection     last assessed 4/29/16    Peptic ulceration     gastric    Pneumonia     Raynauds syndrome     Spinal stenosis     Squamous cell skin cancer     Right dorsal foot     Squamous cell skin cancer     Right lower leg    Substance abuse ([address] )     Syncope     Urinary tract infection        Past Surgical History:   Procedure Laterality Date    ARTHROSCOPY KNEE Right 6/8/2017    Procedure: RIGHT KNEE ARTHROSCOPY MEDIAL MENISCECTOMY;  Surgeon: Meagan Michel MD;  Location: [facility] MAIN OR;  Service:    [address] [place] [address]    COLONOSCOPY      COLONOSCOPY N/A 1/25/2018    Procedure: COLONOSCOPY;  Surgeon: Zunilda Wong MD;  Location: [place] [address] GI LAB;   Service: Gastroenterology    DILATION AND CURETTAGE OF UTERUS      x 2 miscarriage    EPIDURAL BLOCK INJECTION N/A 12/8/2017    Procedure: INJECTION EPIDURAL STEROID CERVICAL C6-C7;  Surgeon: Madison Gottlieb MD;  Location: [facility] MAIN OR;  Service: Pain Management     ESOPHAGOGASTRODUODENOSCOPY N/A 1/25/2018    Procedure: ESOPHAGOGASTRODUODENOSCOPY (EGD); Surgeon: Jimmy Gracia MD;  Location: [facility] GI LAB;   Service: Gastroenterology    FOOT SURGERY      squamous removed from the right    HAND SURGERY Bilateral     arthroplasty -thumb joint    last assessed 6/6/17    HERNIA REPAIR      inguinal    WISDOM TOOTH EXTRACTION         Current Outpatient Medications   Medication Sig Dispense Refill    acamprosate (CAMPRAL) 333 mg tablet Take 333 mg by mouth Three times a day      albuterol (PROAIR HFA) 90 mcg/act inhaler Inhale 2 puffs every 4 (four) hours as needed for wheezing (Patient not taking: Reported on 1/3/2021) 8 5 g 5    amLODIPine (NORVASC) 5 mg tablet Take 5 mg by mouth daily      ARIPiprazole (ABILIFY) 5 mg tablet Take 2 5 mg by mouth      Ascorbic Acid (VITAMIN C) 100 MG tablet Take 100 mg by mouth daily      buprenorphine (SUBUTEX) 8 mg 8 mg 2 (two) times a day Oral       busPIRone (BUSPAR) 5 mg tablet Take 5 mg by mouth 3 (three) times a day as needed      DULoxetine (CYMBALTA) 60 mg delayed release capsule Take 60 mg by mouth daily      fluticasone-salmeterol (ADVAIR, WIXELA) 250-50 mcg/dose inhaler Inhale 1 puff 2 (two) times a day (Patient not taking: Reported on 1/3/2021) 1 Inhaler 0    gabapentin (NEURONTIN) 400 mg capsule Take 1 capsule (400 mg total) by mouth 3 (three) times a day 90 capsule 1    lisinopril (ZESTRIL) 20 mg tablet Take 20 mg by mouth daily      melatonin 3 mg Take 3 mg by mouth daily at bedtime as needed      multivitamin (THERAGRAN) TABS Take 1 tablet by mouth daily at bedtime      naloxone (NARCAN) 4 mg/0 1 mL nasal spray 4 mg into each nostril as needed      nystatin (MYCOSTATIN) powder Apply topically 2 (two) times a day (Patient not taking: Reported on 8/27/2020) 45 g 1  nystatin-triamcinolone (MYCOLOG-II) cream Apply topically 2 (two) times a day (Patient not taking: Reported on 8/27/2020) 30 g 0    pantoprazole (PROTONIX) 40 mg tablet Take 1 tablet by mouth 2 (two) times a day (Patient taking differently: Take 40 mg by mouth daily ) 60 tablet 3    predniSONE 10 mg tablet Take 3 tabs daily for 2 days, then 2 tabs daily for 2 days, then 1 tab daily for 2 days, then stop 12 tablet 0    psyllium (METAMUCIL) 58 6 % powder Take 1 packet by mouth 2 (two) times a day as needed       simvastatin (ZOCOR) 20 mg tablet take 1 tablet by mouth once daily (Patient not taking: Reported on 1/3/2021) 90 tablet 0    SUMAtriptan (IMITREX) 100 mg tablet Take 100 mg by mouth once as needed for migraine      Turmeric 500 MG TABS Take by mouth daily at bedtime      vitamin E 100 UNIT capsule Take 100 Units by mouth daily       No current facility-administered medications for this visit  Allergies   Allergen Reactions    Bee Venom Anaphylaxis    Diphenhydramine Other (See Comments)     "jumpy"    Nsaids GI Bleeding     Pt  Says she gets "violently sick"    Ibuprofen Swelling     And any anti-inflammatories, NSAIDS-causes severe diarrhea  Facial swelling    Antihistamines, Chlorpheniramine-Type      "jumpy"       Review of Systems   Constitutional: Positive for fatigue  Gastrointestinal: Positive for diarrhea (resolved)  Genitourinary:        Cystocele  Urinary incontinence  Fecal incontinence   Musculoskeletal: Positive for arthralgias  Video Exam    There were no vitals filed for this visit  Physical Exam  Constitutional:       General: She is not in acute distress  Pulmonary:      Effort: No respiratory distress  Neurological:      Mental Status: She is alert and oriented to person, place, and time     Psychiatric:         Mood and Affect: Mood normal           I spent 20 minutes directly with the patient during this visit      VIRTUAL VISIT DISCLAIMER    [address] acknowledges that she has consented to an online visit or consultation  She understands that the online visit is based solely on information provided by her, and that, in the absence of a face-to-face physical evaluation by the physician, the diagnosis she receives is both limited and provisional in terms of accuracy and completeness  This is not intended to replace a full medical face-to-face evaluation by the physician  [address] understands and accepts these terms

## 2021-02-08 ENCOUNTER — TELEMEDICINE (OUTPATIENT)
Dept: FAMILY MEDICINE CLINIC | Facility: CLINIC | Age: 66
End: 2021-02-08
Payer: COMMERCIAL

## 2021-02-08 DIAGNOSIS — F10.20 ALCOHOLISM (HCC): ICD-10-CM

## 2021-02-08 DIAGNOSIS — F17.219 CIGARETTE NICOTINE DEPENDENCE WITH NICOTINE-INDUCED DISORDER: ICD-10-CM

## 2021-02-08 DIAGNOSIS — N81.10 FEMALE BLADDER PROLAPSE: Primary | ICD-10-CM

## 2021-02-08 DIAGNOSIS — K21.00 GASTROESOPHAGEAL REFLUX DISEASE WITH ESOPHAGITIS, UNSPECIFIED WHETHER HEMORRHAGE: ICD-10-CM

## 2021-02-08 PROCEDURE — 99213 OFFICE O/P EST LOW 20 MIN: CPT | Performed by: FAMILY MEDICINE

## 2021-02-09 ENCOUNTER — OFFICE VISIT (OUTPATIENT)
Dept: PHYSICAL THERAPY | Facility: CLINIC | Age: 66
End: 2021-02-09
Payer: COMMERCIAL

## 2021-02-09 DIAGNOSIS — M47.812 SPONDYLOSIS OF CERVICAL REGION WITHOUT MYELOPATHY OR RADICULOPATHY: Primary | ICD-10-CM

## 2021-02-09 PROCEDURE — 97112 NEUROMUSCULAR REEDUCATION: CPT

## 2021-02-09 PROCEDURE — 97110 THERAPEUTIC EXERCISES: CPT

## 2021-02-09 NOTE — PROGRESS NOTES
Daily Note     Today's date: 2021  Patient name: Chichi Kingston  : 1955  MRN: [id number]  Referring provider: Joe Houser DO  Dx:   Encounter Diagnosis     ICD-10-CM    1  Spondylosis of cervical region without myelopathy or radiculopathy  M47 [id number]        Start Time: 1415          Subjective: I try to do the chin tucks everyday  Objective: See treatment diary below      Assessment: Tolerated treatment fair  Patient demonstrated fatigue post treatment and would benefit from continued PT      Plan: Progress treatment as tolerated         Precautions: Standard      Manuals /           STM fig 8 perf perf           Manual traction 10x perf                                     Neuro Re-Ed             Neurac cervical retract supine 2x5 --           Neurac scap retract supine 2x5 --           Neurac scap retract w depression sit 2x5 --           Neurac supine pelvic lift 2x5 ---           Neurac Bridge 2x5 --           Neurac sdly hip ABD 2x5 --           Neurac sdly hip ADD 2x5 --           Ther Ex             scap retract 10x --           Axial extension 10x --           AROM C-Spine 10x 2x5 reps            NuStep  10min L1           TB rows   Blue x 20 reps            TB lunge   Blue x 10 reps            TRX squats  20x                        Ther Activity                                       Gait Training                                       Modalities             MH to c/s area (supine)  10min

## 2021-02-11 ENCOUNTER — OFFICE VISIT (OUTPATIENT)
Dept: CARDIOLOGY CLINIC | Facility: CLINIC | Age: 66
End: 2021-02-11
Payer: MEDICARE

## 2021-02-11 ENCOUNTER — APPOINTMENT (OUTPATIENT)
Dept: PHYSICAL THERAPY | Facility: CLINIC | Age: 66
End: 2021-02-11
Payer: COMMERCIAL

## 2021-02-11 VITALS
DIASTOLIC BLOOD PRESSURE: 72 MMHG | BODY MASS INDEX: 24.32 KG/M2 | HEIGHT: 65 IN | HEART RATE: 75 BPM | WEIGHT: 146 LBS | TEMPERATURE: 98.8 F | OXYGEN SATURATION: 98 % | SYSTOLIC BLOOD PRESSURE: 120 MMHG

## 2021-02-11 DIAGNOSIS — Z01.810 PREOP CARDIOVASCULAR EXAM: ICD-10-CM

## 2021-02-11 DIAGNOSIS — F41.8 DEPRESSION WITH ANXIETY: ICD-10-CM

## 2021-02-11 DIAGNOSIS — G89.4 CHRONIC PAIN SYNDROME: ICD-10-CM

## 2021-02-11 DIAGNOSIS — R06.02 EXERTIONAL SHORTNESS OF BREATH: ICD-10-CM

## 2021-02-11 DIAGNOSIS — M47.816 LUMBAR SPONDYLOSIS: ICD-10-CM

## 2021-02-11 DIAGNOSIS — J44.9 CHRONIC OBSTRUCTIVE PULMONARY DISEASE, UNSPECIFIED COPD TYPE (HCC): ICD-10-CM

## 2021-02-11 DIAGNOSIS — F10.21 HISTORY OF ALCOHOL DEPENDENCE (HCC): ICD-10-CM

## 2021-02-11 DIAGNOSIS — Z72.0 TOBACCO USE: ICD-10-CM

## 2021-02-11 DIAGNOSIS — E78.5 DYSLIPIDEMIA: ICD-10-CM

## 2021-02-11 PROCEDURE — 93000 ELECTROCARDIOGRAM COMPLETE: CPT | Performed by: INTERNAL MEDICINE

## 2021-02-11 PROCEDURE — 4004F PT TOBACCO SCREEN RCVD TLK: CPT | Performed by: INTERNAL MEDICINE

## 2021-02-11 PROCEDURE — 99214 OFFICE O/P EST MOD 30 MIN: CPT | Performed by: INTERNAL MEDICINE

## 2021-02-11 RX ORDER — MELATONIN
1000 DAILY
COMMUNITY
End: 2021-04-28

## 2021-02-11 NOTE — PROGRESS NOTES
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 Date    AST 32 07/23/2020    ALT 33 07/23/2020    ALKPHOS 79 07/23/2020    TP 7 1 07/23/2020    ALB 3 7 07/23/2020      Lab Results   Component Value Date    HPB6TVIVRZEH 1 518 04/24/2019     Lab Results   Component Value Date    HGBA1C 5 5 07/06/2020     Lipid Profile:   Lab Results   Component Value Date    CHOLESTEROL 180 04/24/2019    HDL 84 (H) 04/24/2019    LDLCALC 90 04/24/2019    TRIG 32 04/24/2019     Lab Results   Component Value Date    CHOLESTEROL 180 04/24/2019    CHOLESTEROL 216 (H) 08/28/2018     Lab Results   Component Value Date    TROPONINI <0 02 07/23/2020     No results found for: NTBNP         Dr Yassine Herrera MD [facility]      "This note has been constructed using a voice recognition system  Therefore there may be syntax, spelling, and/or grammatical errors   Please call if you have any questions  "

## 2021-02-15 ENCOUNTER — OFFICE VISIT (OUTPATIENT)
Dept: OBGYN CLINIC | Facility: CLINIC | Age: 66
End: 2021-02-15
Payer: COMMERCIAL

## 2021-02-15 VITALS
HEIGHT: 65 IN | DIASTOLIC BLOOD PRESSURE: 66 MMHG | WEIGHT: 145 LBS | HEART RATE: 78 BPM | BODY MASS INDEX: 24.16 KG/M2 | SYSTOLIC BLOOD PRESSURE: 101 MMHG

## 2021-02-15 DIAGNOSIS — S06.0X0D CONCUSSION WITHOUT LOSS OF CONSCIOUSNESS, SUBSEQUENT ENCOUNTER: Primary | ICD-10-CM

## 2021-02-15 DIAGNOSIS — M47.812 SPONDYLOSIS OF CERVICAL REGION WITHOUT MYELOPATHY OR RADICULOPATHY: ICD-10-CM

## 2021-02-15 PROCEDURE — 99213 OFFICE O/P EST LOW 20 MIN: CPT | Performed by: ORTHOPAEDIC SURGERY
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
## 2021-02-16 ENCOUNTER — TELEPHONE (OUTPATIENT)
Dept: OBGYN CLINIC | Facility: HOSPITAL | Age: 66
End: 2021-02-16

## 2021-02-16 ENCOUNTER — OFFICE VISIT (OUTPATIENT)
Dept: PHYSICAL THERAPY | Facility: CLINIC | Age: 66
End: 2021-02-16
Payer: COMMERCIAL

## 2021-02-16 DIAGNOSIS — M47.812 SPONDYLOSIS OF CERVICAL REGION WITHOUT MYELOPATHY OR RADICULOPATHY: Primary | ICD-10-CM

## 2021-02-16 DIAGNOSIS — S13.4XXA ACUTE WHIPLASH INJURY, INITIAL ENCOUNTER: ICD-10-CM

## 2021-02-16 PROCEDURE — 97110 THERAPEUTIC EXERCISES: CPT | Performed by: PHYSICAL THERAPIST

## 2021-02-16 PROCEDURE — 97112 NEUROMUSCULAR REEDUCATION: CPT | Performed by: PHYSICAL THERAPIST

## 2021-02-16 PROCEDURE — 97140 MANUAL THERAPY 1/> REGIONS: CPT | Performed by: PHYSICAL THERAPIST
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
## 2021-02-16 NOTE — TELEPHONE ENCOUNTER
Jamil called from [facility] to advise they have received an office visit authorization request and it will be processed within 72 business hours

## 2021-02-18 ENCOUNTER — APPOINTMENT (OUTPATIENT)
Dept: PHYSICAL THERAPY | Facility: CLINIC | Age: 66
End: 2021-02-18
Payer: COMMERCIAL
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
## 2021-02-23 ENCOUNTER — APPOINTMENT (OUTPATIENT)
Dept: PHYSICAL THERAPY | Facility: CLINIC | Age: 66
End: 2021-02-23
Payer: COMMERCIAL

## 2021-02-24 ENCOUNTER — EVALUATION (OUTPATIENT)
Dept: PHYSICAL THERAPY | Facility: CLINIC | Age: 66
End: 2021-02-24
Payer: COMMERCIAL

## 2021-02-24 DIAGNOSIS — M47.812 SPONDYLOSIS OF CERVICAL REGION WITHOUT MYELOPATHY OR RADICULOPATHY: ICD-10-CM

## 2021-02-24 DIAGNOSIS — S06.0X0D CONCUSSION WITHOUT LOSS OF CONSCIOUSNESS, SUBSEQUENT ENCOUNTER: Primary | ICD-10-CM

## 2021-02-24 DIAGNOSIS — S13.4XXA ACUTE WHIPLASH INJURY, INITIAL ENCOUNTER: ICD-10-CM

## 2021-02-24 PROCEDURE — 97162 PT EVAL MOD COMPLEX 30 MIN: CPT | Performed by: PHYSICAL THERAPIST
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
## 2021-02-25 ENCOUNTER — APPOINTMENT (OUTPATIENT)
Dept: PHYSICAL THERAPY | Facility: CLINIC | Age: 66
End: 2021-02-25
Payer: COMMERCIAL

## 2021-03-01 ENCOUNTER — APPOINTMENT (OUTPATIENT)
Dept: PHYSICAL THERAPY | Facility: CLINIC | Age: 66
End: 2021-03-01
Payer: COMMERCIAL

## 2021-03-03 ENCOUNTER — OFFICE VISIT (OUTPATIENT)
Dept: PHYSICAL THERAPY | Facility: CLINIC | Age: 66
End: 2021-03-03
Payer: COMMERCIAL

## 2021-03-03 DIAGNOSIS — F41.8 DEPRESSION WITH ANXIETY: Primary | ICD-10-CM

## 2021-03-03 DIAGNOSIS — M47.812 SPONDYLOSIS OF CERVICAL REGION WITHOUT MYELOPATHY OR RADICULOPATHY: Primary | ICD-10-CM

## 2021-03-03 DIAGNOSIS — S13.4XXA ACUTE WHIPLASH INJURY, INITIAL ENCOUNTER: ICD-10-CM

## 2021-03-03 DIAGNOSIS — S06.0X0D CONCUSSION WITHOUT LOSS OF CONSCIOUSNESS, SUBSEQUENT ENCOUNTER: ICD-10-CM

## 2021-03-03 RX ORDER — DULOXETINE 40 MG/1
CAPSULE, DELAYED RELEASE ORAL
Qty: 30 CAPSULE | Refills: 3 | Status: SHIPPED | OUTPATIENT
Start: 2021-03-03 | End: 2021-03-30 | Stop reason: ALTCHOICE
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
## 2021-03-08 ENCOUNTER — OFFICE VISIT (OUTPATIENT)
Dept: OBGYN CLINIC | Facility: CLINIC | Age: 66
End: 2021-03-08
Payer: MEDICARE

## 2021-03-08 ENCOUNTER — OFFICE VISIT (OUTPATIENT)
Dept: PHYSICAL THERAPY | Facility: CLINIC | Age: 66
End: 2021-03-08
Payer: COMMERCIAL

## 2021-03-08 VITALS
HEIGHT: 65 IN | BODY MASS INDEX: 23.32 KG/M2 | WEIGHT: 140 LBS | DIASTOLIC BLOOD PRESSURE: 77 MMHG | HEART RATE: 71 BPM | SYSTOLIC BLOOD PRESSURE: 118 MMHG

## 2021-03-08 DIAGNOSIS — M47.812 SPONDYLOSIS OF CERVICAL REGION WITHOUT MYELOPATHY OR RADICULOPATHY: Primary | ICD-10-CM

## 2021-03-08 DIAGNOSIS — S06.0X0D CONCUSSION WITHOUT LOSS OF CONSCIOUSNESS, SUBSEQUENT ENCOUNTER: ICD-10-CM

## 2021-03-08 DIAGNOSIS — S13.4XXA ACUTE WHIPLASH INJURY, INITIAL ENCOUNTER: ICD-10-CM

## 2021-03-08 PROCEDURE — 97110 THERAPEUTIC EXERCISES: CPT | Performed by: PHYSICAL THERAPIST

## 2021-03-08 PROCEDURE — 99214 OFFICE O/P EST MOD 30 MIN: CPT | Performed by: ORTHOPAEDIC SURGERY

## 2021-03-08 PROCEDURE — 97140 MANUAL THERAPY 1/> REGIONS: CPT | Performed by: PHYSICAL THERAPIST

## 2021-03-08 PROCEDURE — 97112 NEUROMUSCULAR REEDUCATION: CPT | Performed by: PHYSICAL THERAPIST

## 2021-03-08 NOTE — PROGRESS NOTES
Assessment/Plan:  1  Spondylosis of cervical region without myelopathy or radiculopathy  MRI cervical spine wo contrast   2  Concussion without loss of consciousness, subsequent encounter         ***    Subjective:   Cristian David is a 63 y o  female who presents ***      Review of Systems      Past Medical History:   Diagnosis Date    Allergic rhinitis     Amenorrhea     Anesthesia complication     hx of cocaine use-none in 5 yrs    Anxiety     Arthritis     bilateral knees-DJD, arthritis neck and back-cervical injection 2017    Bronchitis, chronic (HCC)     Cancer (HCC)     squamous-removed from the right foot    Chronic pain disorder     neck and back    COPD (chronic obstructive [facility] disease) ([facility] [address]     Depression     Eczema     last assessed 6/27/16, resolved 10/2/17    Fibromyalgia, primary     GERD (gastroesophageal reflux disease)     H/O ETOH abuse     None in the past 10 months since 8/1/17    Hypertension     Lumbar radiculopathy     resolved 6/6/17    Malignant melanoma of skin (HCC)     Migraine     Migraine     MRSA (methicillin resistant Staphylococcus aureus) infection     last assessed 4/29/16    Peptic ulceration     gastric    Pneumonia     Raynauds syndrome     Spinal stenosis     Squamous cell skin cancer     Right dorsal foot     Squamous cell skin cancer     Right lower leg    Substance abuse ([facility] [address] [facility]     Syncope     Urinary tract infection        Past Surgical History:   Procedure Laterality Date    ARTHROSCOPY KNEE Right 6/8/2017    Procedure: RIGHT KNEE ARTHROSCOPY MEDIAL MENISCECTOMY;  Surgeon: Mariana Vargas MD;  Location: [facility] OR;  Service:    [address] [place] [address]    COLONOSCOPY      COLONOSCOPY N/A 1/25/2018    Procedure: COLONOSCOPY;  Surgeon: Talon Renteria MD;  Location: [facility] GI LAB;   Service: Gastroenterology    DILATION AND CURETTAGE OF UTERUS      x 2 miscarriage    EPIDURAL BLOCK INJECTION N/A 12/8/2017    Procedure: INJECTION EPIDURAL STEROID CERVICAL C6-C7;  Surgeon: Shila Gutierrez MD;  Location: [facility] MAIN OR;  Service: Pain Management     ESOPHAGOGASTRODUODENOSCOPY N/A 1/25/2018    Procedure: ESOPHAGOGASTRODUODENOSCOPY (EGD); Surgeon: Gallo Guthrie MD;  Location: [facility];   Service: Gastroenterology    FOOT SURGERY      squamous removed from the right    HAND SURGERY Bilateral     arthroplasty -thumb joint    last assessed 6/6/17    HERNIA REPAIR      inguinal    WISDOM TOOTH EXTRACTION         Family History   Problem Relation Age of Onset    Peripheral vascular disease Mother     Arthritis Mother         osteo arthritis    Coronary artery disease Mother         Atherosclerosis    Alcohol abuse Mother     Stroke Mother         CVA    Transient ischemic attack Mother     Heart disease Father     Peripheral vascular disease Father     Stroke Father         CVA    Leukemia Father     Arthritis Sister         rheumatoid    Alcohol abuse Sister     No Known Problems Sister     No Known Problems Sister     No Known Problems Sister        Social History     Occupational History    Not on file   Tobacco Use    Smoking status: Current Every Day Smoker     Packs/day: 0 50     Years: 15 00     Pack years: 7 50     Types: Cigarettes    Smokeless tobacco: Never Used    Tobacco comment: on and off for 40 yrs   Substance and Sexual Activity    Alcohol use: No     Comment: [facility]  - last drink 7/6/2020    Drug use: Yes     Types: Cocaine     Comment: last 10/2019    Sexual activity: Never         Current Outpatient Medications:     acamprosate (CAMPRAL) 333 mg tablet, Take 333 mg by mouth Three times a day, Disp: , Rfl:     albuterol (PROAIR HFA) 90 mcg/act inhaler, Inhale 2 puffs every 4 (four) hours as needed for wheezing, Disp: 8 5 g, Rfl: 5    amLODIPine (NORVASC) 5 mg tablet, Take 5 mg by mouth daily, Disp: , Rfl:     ARIPiprazole (ABILIFY) 5 mg tablet, Take 2 5 mg by mouth, Disp: , Rfl:   Ascorbic Acid (VITAMIN C) 100 MG tablet, Take 100 mg by mouth daily, Disp: , Rfl:     buprenorphine (SUBUTEX) 8 mg, 8 mg 2 (two) times a day Oral , Disp: , Rfl:     busPIRone (BUSPAR) 5 mg tablet, Take 5 mg by mouth 3 (three) times a day as needed, Disp: , Rfl:     cholecalciferol (VITAMIN D3) 1,000 units tablet, Take 1,000 Units by mouth daily, Disp: , Rfl:     DULoxetine (CYMBALTA) 60 mg delayed release capsule, Take 60 mg by mouth daily, Disp: , Rfl:     DULoxetine HCl 40 MG CPEP, TAKE 1 CAPSULE BY MOUTH EVERY MORNING, Disp: 30 capsule, Rfl: 3    fluticasone-salmeterol (ADVAIR, WIXELA) 250-50 mcg/dose inhaler, Inhale 1 puff 2 (two) times a day, Disp: 1 Inhaler, Rfl: 0    gabapentin (NEURONTIN) 400 mg capsule, Take 1 capsule (400 mg total) by mouth 3 (three) times a day, Disp: 90 capsule, Rfl: 1    lisinopril (ZESTRIL) 20 mg tablet, Take 20 mg by mouth daily, Disp: , Rfl:     melatonin 3 mg, Take 3 mg by mouth daily at bedtime as needed, Disp: , Rfl:     multivitamin (THERAGRAN) TABS, Take 1 tablet by mouth daily at bedtime, Disp: , Rfl:     naloxone (NARCAN) 4 mg/0 1 mL nasal spray, 4 mg into each nostril as needed, Disp: , Rfl:     nystatin (MYCOSTATIN) powder, Apply topically 2 (two) times a day, Disp: 45 g, Rfl: 1    nystatin-triamcinolone (MYCOLOG-II) cream, Apply topically 2 (two) times a day, Disp: 30 g, Rfl: 0    pantoprazole (PROTONIX) 40 mg tablet, Take 1 tablet by mouth 2 (two) times a day (Patient taking differently: Take 40 mg by mouth daily ), Disp: 60 tablet, Rfl: 3    predniSONE 10 mg tablet, Take 3 tabs daily for 2 days, then 2 tabs daily for 2 days, then 1 tab daily for 2 days, then stop, Disp: 12 tablet, Rfl: 0    psyllium (METAMUCIL) 58 6 % powder, Take 1 packet by mouth 2 (two) times a day as needed , Disp: , Rfl:     simvastatin (ZOCOR) 20 mg tablet, take 1 tablet by mouth once daily, Disp: 90 tablet, Rfl: 0    SUMAtriptan (IMITREX) 100 mg tablet, Take 100 mg by mouth once as needed for migraine, Disp: , Rfl:     Turmeric 500 MG TABS, Take by mouth daily at bedtime, Disp: , Rfl:     vitamin E 100 UNIT capsule, Take 100 Units by mouth daily, Disp: , Rfl:     Allergies   Allergen Reactions    Bee Venom Anaphylaxis    Diphenhydramine Other (See Comments)     "jumpy"    Nsaids GI Bleeding     Pt   Says she gets "violently sick"    Ibuprofen Swelling     And any anti-inflammatories, NSAIDS-causes severe diarrhea  Facial swelling    Antihistamines, Chlorpheniramine-Type      "jumpy"       Objective:  Vitals:    03/08/21 1404   BP: 118/77   Pulse: 71       Ortho Exam    Physical Exam    I have personally reviewed pertinent films in PACS and my interpretation is as follows:  ***

## 2021-03-08 NOTE — PROGRESS NOTES
Assessment / Plan     1  Spondylosis of cervical region without myelopathy or radiculopathy  MRI cervical spine wo contrast    Ambulatory referral to Pain Management   2  Concussion without loss of consciousness, subsequent encounter         Minesh continues to have symptoms consistent with concussion however I do think her neck pain is bothering her far worse than the concussion symptoms  She has significant spinal stenosis on previous imaging and even had injection procedures in her cervical spine in the last several years  I think this most recent injury has cause severely increased pain in her neck and she has a lot of facet joint pain despite PT I do think this is the major cause of her discomfort  I have ordered an MRI of her cervical spine for further evaluation and will likely have her follow up with Dr Brii Young at pain management to consider repeat injection or procedure targeted at her facet pain and cervical stenosis  For her concussion I would like for her to continue with physical therapy  She does seem to be improving on several levels  With vision, balance and vestibular function  She will continue with therapy and follow up with me in 1 month for her concussion  Subjective       Patient ID:  Raynette Hatchet is a 63 y o  female   Who presents for follow-up for concussion she suffered on 1/3/2021  She also had ongoing neck pain following that injury  She has been doing conservative treatment of physical therapy for her neck and concussion symptoms  She feels like her concussion symptoms may be improving but her neck is causing severe discomfort and giving her consistent headaches   She continues to feel slow down and is having some irritability and drowsiness  She attributes this to not sleeping much due to her neck discomfort      Change in Symptoms:  No significant change  Back to School/work:  n/a  Current Physical Activity:  None  Recent Grades / Tests: n/a  Subsequent Injuries:  No  Do symptoms worsen with Physical Activity? Yes  Do symptoms worsen with Cognitive Activity? Yes  Overall Rating:  What percent is this person back to normal?  Patient 45 %  Response to Meds:  N/A    Review of Systems   Constitutional: Positive for fatigue  Negative for chills, fever and unexpected weight change  HENT: Negative for hearing loss, nosebleeds and sore throat  Eyes: Positive for photophobia  Negative for pain, redness and visual disturbance  Respiratory: Negative for cough, shortness of breath and wheezing  Cardiovascular: Negative for chest pain, palpitations and leg swelling  Gastrointestinal: Negative for abdominal pain, nausea and vomiting  Endocrine: Negative for polydipsia and polyuria  Genitourinary: Negative for dysuria and hematuria  Musculoskeletal:        See HPI   Skin: Negative for rash and wound  Neurological: Positive for dizziness and headaches  Negative for numbness  Psychiatric/Behavioral: Positive for decreased concentration  Negative for suicidal ideas  The patient is nervous/anxious        Past Medical History:   Diagnosis Date    Allergic rhinitis     Amenorrhea     Anesthesia complication     hx of cocaine use-none in 5 yrs    Anxiety     Arthritis     bilateral knees-DJD, arthritis neck and back-cervical injection 2017    Bronchitis, chronic (HCC)     Cancer (HCC)     squamous-removed from the right foot    Chronic pain disorder     neck and back    COPD [facility]ch[facility] obstructive [facility] disease) ([facility] [address]     Depression     Eczema     last assessed 6/27/16, resolved 10/2/17    Fibromyalgia, primary     GERD (gastroesophageal reflux disease)     H/O ETOH abuse     None in the past 10 months since 8/1/17    Hypertension     Lumbar radiculopathy     resolved 6/6/17    Malignant melanoma of skin ([facility])     Migraine     Migraine     MRSA (methicillin resistant Staphylococcus aureus) infection     last assessed 4/29/16    Peptic ulceration     gastric    Pneumonia     Raynauds syndrome     Spinal stenosis     Squamous cell skin cancer     Right dorsal foot     Squamous cell skin cancer     Right lower leg    Substance abuse ([facility] [address]     Syncope     Urinary tract infection        Past Surgical History:   Procedure Laterality Date    ARTHROSCOPY KNEE Right 6/8/2017    Procedure: RIGHT KNEE ARTHROSCOPY MEDIAL MENISCECTOMY;  Surgeon: Rayne Black MD;  Location: [facility] MAIN OR;  Service:    [facility]    COLONOSCOPY      COLONOSCOPY N/A 1/25/2018    Procedure: COLONOSCOPY;  Surgeon: Gallo Guthrie MD;  Location: [place] [address] GI LAB; Service: Gastroenterology    DILATION AND CURETTAGE OF UTERUS      x 2 miscarriage    EPIDURAL BLOCK INJECTION N/A 12/8/2017    Procedure: INJECTION EPIDURAL STEROID CERVICAL C6-C7;  Surgeon: Shila Gutierrez MD;  Location: [place] [address] MAIN OR;  Service: Pain Management     ESOPHAGOGASTRODUODENOSCOPY N/A 1/25/2018    Procedure: ESOPHAGOGASTRODUODENOSCOPY (EGD); Surgeon: Gallo Guthrie MD;  Location: [facility] GI LAB;   Service: Gastroenterology    FOOT SURGERY      squamous removed from the right    HAND SURGERY Bilateral     arthroplasty -thumb joint    last assessed 6/6/17    HERNIA REPAIR      inguinal    WISDOM TOOTH EXTRACTION         Family History   Problem Relation Age of Onset    Peripheral vascular disease Mother     Arthritis Mother         osteo arthritis    Coronary artery disease Mother         Atherosclerosis    Alcohol abuse Mother     Stroke Mother         CVA    Transient ischemic attack Mother     Heart disease Father     Peripheral vascular disease Father     Stroke Father         CVA    Leukemia Father     Arthritis Sister         rheumatoid    Alcohol abuse Sister     No Known Problems Sister     No Known Problems Sister     No Known Problems Sister        Social History     Occupational History    Not on file   Tobacco Use    Smoking status: Current Every Day Smoker     Packs/day: 0 50     Years: 15 00     Pack years: 7 50     Types: Cigarettes    Smokeless tobacco: Never Used    Tobacco comment: on and off for 40 yrs   Substance and Sexual Activity    Alcohol use: No     Comment: [address] [place] [address] rehab  - last drink 7/6/2020    Drug use: Yes     Types: Cocaine     Comment: last 10/2019    Sexual activity: Never         Current Outpatient Medications:     acamprosate (CAMPRAL) 333 mg tablet, Take 333 mg by mouth Three times a day, Disp: , Rfl:     albuterol (PROAIR HFA) 90 mcg/act inhaler, Inhale 2 puffs every 4 (four) hours as needed for wheezing, Disp: 8 5 g, Rfl: 5    amLODIPine (NORVASC) 5 mg tablet, Take 5 mg by mouth daily, Disp: , Rfl:     ARIPiprazole (ABILIFY) 5 mg tablet, Take 2 5 mg by mouth, Disp: , Rfl:     Ascorbic Acid (VITAMIN C) 100 MG tablet, Take 100 mg by mouth daily, Disp: , Rfl:     buprenorphine (SUBUTEX) 8 mg, 8 mg 2 (two) times a day Oral , Disp: , Rfl:     busPIRone (BUSPAR) 5 mg tablet, Take 5 mg by mouth 3 (three) times a day as needed, Disp: , Rfl:     cholecalciferol (VITAMIN D3) 1,000 units tablet, Take 1,000 Units by mouth daily, Disp: , Rfl:     DULoxetine (CYMBALTA) 60 mg delayed release capsule, Take 60 mg by mouth daily, Disp: , Rfl:     DULoxetine HCl 40 MG CPEP, TAKE 1 CAPSULE BY MOUTH EVERY MORNING, Disp: 30 capsule, Rfl: 3    fluticasone-salmeterol (ADVAIR, WIXELA) 250-50 mcg/dose inhaler, Inhale 1 puff 2 (two) times a day, Disp: 1 Inhaler, Rfl: 0    gabapentin (NEURONTIN) 400 mg capsule, Take 1 capsule (400 mg total) by mouth 3 (three) times a day, Disp: 90 capsule, Rfl: 1    lisinopril (ZESTRIL) 20 mg tablet, Take 20 mg by mouth daily, Disp: , Rfl:     melatonin 3 mg, Take 3 mg by mouth daily at bedtime as needed, Disp: , Rfl:     multivitamin (THERAGRAN) TABS, Take 1 tablet by mouth daily at bedtime, Disp: , Rfl:     naloxone (NARCAN) 4 mg/0 1 mL nasal spray, 4 mg into each nostril as needed, Disp: , Rfl:    nystatin (MYCOSTATIN) powder, Apply topically 2 (two) times a day, Disp: 45 g, Rfl: 1    nystatin-triamcinolone (MYCOLOG-II) cream, Apply topically 2 (two) times a day, Disp: 30 g, Rfl: 0    pantoprazole (PROTONIX) 40 mg tablet, Take 1 tablet by mouth 2 (two) times a day (Patient taking differently: Take 40 mg by mouth daily ), Disp: 60 tablet, Rfl: 3    predniSONE 10 mg tablet, Take 3 tabs daily for 2 days, then 2 tabs daily for 2 days, then 1 tab daily for 2 days, then stop, Disp: 12 tablet, Rfl: 0    psyllium (METAMUCIL) 58 6 % powder, Take 1 packet by mouth 2 (two) times a day as needed , Disp: , Rfl:     simvastatin (ZOCOR) 20 mg tablet, take 1 tablet by mouth once daily, Disp: 90 tablet, Rfl: 0    SUMAtriptan (IMITREX) 100 mg tablet, Take 100 mg by mouth once as needed for migraine, Disp: , Rfl:     Turmeric 500 MG TABS, Take by mouth daily at bedtime, Disp: , Rfl:     vitamin E 100 UNIT capsule, Take 100 Units by mouth daily, Disp: , Rfl:     Allergies   Allergen Reactions    Bee Venom Anaphylaxis    Diphenhydramine Other (See Comments)     "jumpy"    Nsaids GI Bleeding     Pt   Says she gets "violently sick"    Ibuprofen Swelling     And any anti-inflammatories, NSAIDS-causes severe diarrhea  Facial swelling    Antihistamines, Chlorpheniramine-Type      "jumpy"       Symptoms Checklist      Most Recent Value   Physical   Headache  1   Nausea  0   Vomiting  0   Balance problems  1   Dizziness  1   Visual problems  1   Fatigue  1   Sensitivity to light  1   Sensitivity to noise  0   Numbness / tingling  1   TOTAL PHYSICAL SCORE  7   Cognitive   Foggy  0   Slowed down  1   Difficulty concentrating  1   Difficulty remembering  0   TOTAL COGNITIVE SCORE  2   Emotional   Irritability  1   Sadness  1   More emotional  1   Nervousness  1   TOTAL EMOTIONAL SCORE  4   Sleep   Drowsiness  1   Sleeping less  1   Sleeping more  0   Difficulty falling asleep  0   TOTAL SLEEP SCORE  2   TOTAL SYMPTOM SCORE  15 Physical Exam     /77   Pulse 71   Ht 5' 4 5" (1 638 m)   Wt 63 5 kg (140 lb)   BMI 23 66 kg/m²     Objective:    Ortho Exam    Physical Exam  Vitals signs reviewed  Constitutional:       Appearance: She is well-developed  HENT:      Head: Normocephalic and atraumatic  Eyes:      Conjunctiva/sclera: Conjunctivae normal       Pupils: Pupils are equal, round, and reactive to light  Neck:      Musculoskeletal: Decreased range of motion  Neck rigidity and muscular tenderness present  No spinous process tenderness  Comments:   Significant facet tenderness throughout entire left cervical region  Positive facet joint loading on the left  Cardiovascular:      Rate and Rhythm: Normal rate  Pulmonary:      Effort: Pulmonary effort is normal  No respiratory distress  Skin:     General: Skin is warm and dry  Neurological:      Mental Status: She is alert and oriented to person, place, and time     Psychiatric:         Behavior: Behavior normal          AAOX3:  Yes   Mood and Affect:  Normal  HEENT:   Lacerations:  No   Bruising:  No   PEERLA:  Yes   EOMI: Yes   Fracture/Trauma:  No  Neuro:   CNII - XII Intact:  Yes   Examination of Coordination:    Limited Balance:   No    Romberg:  normal    Forward Tandem Gait:  not examined    Backward Tandem Gait:   not examined    Eyes Close Tandem Gait:   not examined        FTN: normal    Diadochokinesia: normal      Vestibular Ocular:     Gaze stability:    Nystagmus: no    Saccades: no/horizontal/vertical: normal    Vestibular Motion: normal

## 2021-03-08 NOTE — PROGRESS NOTES
Daily Note     Today's date: 3/8/2021  Patient name: Chantelle Schultz  : 1955  MRN: [id number]  Referring provider: Radha Boateng DO  Dx:   Encounter Diagnosis     ICD-10-CM    1  Spondylosis of cervical region without myelopathy or radiculopathy  M47 812    2  Acute whiplash injury, initial encounter  S13  4XXA    3  Concussion without loss of consciousness, subsequent encounter  S06 0X0D        Start Time: 1446  Stop Time: 1533  Total time in clinic (min): 47 minutes    Subjective: Client denies any headache or dizziness at this time  She comes in with 5-6/10 cervical pain,      Objective: See treatment diary below    TE:  - pec stretch 30 sec x 5  - scap rows with red theraband 2x10 with 3 sec holds  - SCM stretch 30 sec x 3    NMR:  - Chin tucks with 3 sec holds x 20  - Scap retraction in unsupported sitting 30x 3 sec holds    Manuals:  - TPR to upper trap, levator scap and suboccipitals   - Graston to B uppertrap and suboccipitals    Assessment: Client with good participation throughout session  She presented initially with 6/10 neck pain which improved to 4/10 by end of session  She was provided with comprehensive HEP in stretches and stabilization  Initiated use of graston to B upper trap and suboccipitals this session with good tolerance  She is motivated and receptive to education  She was provided with written HEP for exercises/stretches trialed today with good understanding  She will benefit from continued skilled OPPT services to maximize her functional mobility and participation in life roles of home and community  Plan: Continue per plan of care        Precautions:   Past Medical History:   Diagnosis Date    Allergic rhinitis     Amenorrhea     Anesthesia complication     hx of cocaine use-none in 5 yrs    Anxiety     Arthritis     bilateral knees-DJD, arthritis neck and back-cervical injection 2017    Bronchitis, chronic (HCC)     Cancer ([facility] [address] [facility]     squamous-removed from the right foot    Chronic pain disorder     neck and back    [facility]) [facility]     Depression     Eczema     last assessed 6/27/16, resolved 10/2/17    Fibromyalgia, primary     GERD (gastroesophageal reflux disease)     H/O ETOH abuse     None in the past 10 months since 8/1/17    Hypertension     Lumbar radiculopathy     resolved 6/6/17    Malignant melanoma of skin ([facility]     Migraine     Migraine     MRSA (methicillin resistant Staphylococcus aureus) infection     last assessed 4/29/16    Peptic ulceration     gastric    Pneumonia     Raynauds syndrome     Spinal stenosis     Squamous cell skin cancer     Right dorsal foot     Squamous cell skin cancer     Right lower leg    Substance abuse ([facility] [address] [facility]     Syncope     Urinary tract infection            Outcome Assessments 2/24 3/3        mCTSIB  EO firm: 30 sec  EC firm: 30 sec  EO foam: 30 sec  EC foam: 30 sec (+) sway        10MWT  8 22 sec   1 27 m/s        6MWT  1175 feet        FGA  21/30 (increased risk for falls)        DHI  30/100 low perception of handicapped        Oculomotor screen Performed

## 2021-03-10 ENCOUNTER — OFFICE VISIT (OUTPATIENT)
Dept: PHYSICAL THERAPY | Facility: CLINIC | Age: 66
End: 2021-03-10
Payer: COMMERCIAL

## 2021-03-10 DIAGNOSIS — M47.812 SPONDYLOSIS OF CERVICAL REGION WITHOUT MYELOPATHY OR RADICULOPATHY: Primary | ICD-10-CM

## 2021-03-10 DIAGNOSIS — S06.0X0D CONCUSSION WITHOUT LOSS OF CONSCIOUSNESS, SUBSEQUENT ENCOUNTER: ICD-10-CM

## 2021-03-10 DIAGNOSIS — S13.4XXA ACUTE WHIPLASH INJURY, INITIAL ENCOUNTER: ICD-10-CM

## 2021-03-10 PROCEDURE — 97110 THERAPEUTIC EXERCISES: CPT | Performed by: PHYSICAL THERAPIST

## 2021-03-10 PROCEDURE — 97140 MANUAL THERAPY 1/> REGIONS: CPT | Performed by: PHYSICAL THERAPIST

## 2021-03-10 PROCEDURE — 97112 NEUROMUSCULAR REEDUCATION: CPT | Performed by: PHYSICAL THERAPIST

## 2021-03-10 NOTE — PROGRESS NOTES
Daily Note     Today's date: 3/10/2021  Patient name: Mary Balderas  : 1955  MRN: [id number]  Referring provider: Seema Savage DO  Dx:   Encounter Diagnosis     ICD-10-CM    1  Spondylosis of cervical region without myelopathy or radiculopathy  M47 812    2  Acute whiplash injury, initial encounter  S13  4XXA    3  Concussion without loss of consciousness, subsequent encounter  S06 0X0D        Start Time: 2858  Stop Time: 1020  Total time in clinic (min): 42 minutes    Subjective: Client denies any headache or dizziness at this time  She comes in with 5/10 cervical pain,      Objective: See treatment diary below    TE:  - pec stretch 30 sec x 5  - upper trap 30 sec x 5   - levator scap stretch 30 sec x 5    NMR:  - Chin tucks with 3 sec holds x 20  - Scap retraction in unsupported sitting 30x 3 sec holds    Manuals:  - TPR to upper trap, levator scap and suboccipitals       NOT TODAY:  - scap rows with red theraband 2x10 with 3 sec holds  - SCM stretch 30 sec x 3  - Graston to B uppertrap and suboccipitals    Assessment: Client with good participation throughout session  She continues to present with decreased soft tissue compliance throughout B upper trap and levator scap  Noted difficulty with neck strain during supine to sit transfer  She was provided with written HEP for exercises/stretches trialed today with good understanding  She will benefit from continued skilled OPPT services to maximize her functional mobility and participation in life roles of home and community  Plan: Continue per plan of care        Precautions:   Past Medical History:   Diagnosis Date    Allergic rhinitis     Amenorrhea     Anesthesia complication     hx of cocaine use-none in 5 yrs    Anxiety     Arthritis     bilateral knees-DJD, arthritis neck and back-cervical injection 2017    Bronchitis, chronic (HCC)     Cancer (HCC)     squamous-removed from the right foot    Chronic pain disorder     neck and back    [facility]     Depression     Eczema     last assessed 6/27/16, resolved 10/2/17    Fibromyalgia, primary     GERD (gastroesophageal reflux disease)     H/O ETOH abuse     None in the past 10 months since 8/1/17    Hypertension     Lumbar radiculopathy     resolved 6/6/17    Malignant melanoma of skin ([facility])     Migraine     Migraine     MRSA (methicillin resistant Staphylococcus aureus) infection     last assessed 4/29/16    Peptic ulceration     gastric    Pneumonia     Raynauds syndrome     Spinal stenosis     Squamous cell skin cancer     Right dorsal foot     Squamous cell skin cancer     Right lower leg    Substance abuse ([facility] [address]     Syncope     Urinary tract infection            Outcome Assessments 2/24 3/3        mCTSIB  EO firm: 30 sec  EC firm: 30 sec  EO foam: 30 sec  EC foam: 30 sec (+) sway        10MWT  8 22 sec   1 27 m/s        6MWT  1175 feet        FGA  21/30 (increased risk for falls)        DHI  30/100 low perception of handicapped        Oculomotor screen Performed

## 2021-03-15 ENCOUNTER — OFFICE VISIT (OUTPATIENT)
Dept: PHYSICAL THERAPY | Facility: CLINIC | Age: 66
End: 2021-03-15
Payer: COMMERCIAL

## 2021-03-15 DIAGNOSIS — S13.4XXA ACUTE WHIPLASH INJURY, INITIAL ENCOUNTER: ICD-10-CM

## 2021-03-15 DIAGNOSIS — M47.812 SPONDYLOSIS OF CERVICAL REGION WITHOUT MYELOPATHY OR RADICULOPATHY: Primary | ICD-10-CM

## 2021-03-15 DIAGNOSIS — S06.0X0D CONCUSSION WITHOUT LOSS OF CONSCIOUSNESS, SUBSEQUENT ENCOUNTER: ICD-10-CM

## 2021-03-15 PROCEDURE — 97140 MANUAL THERAPY 1/> REGIONS: CPT | Performed by: PHYSICAL THERAPIST

## 2021-03-15 NOTE — PROGRESS NOTES
Daily Note     Today's date: 3/15/2021  Patient name: Rose Ovalles  : 1955  MRN: [id number]  Referring provider: Yasmin Olivares DO  Dx:   Encounter Diagnosis     ICD-10-CM    1  Spondylosis of cervical region without myelopathy or radiculopathy  M47 812    2  Acute whiplash injury, initial encounter  S13  4XXA    3  Concussion without loss of consciousness, subsequent encounter  S06 0X0D        Start Time: 1447  Stop Time: 1530  Total time in clinic (min): 43 minutes  Client seen concurrently with another client nearby  Subjective: Client denies any headache or dizziness at this time  She comes in with 5/10 cervical pain,      Objective: See treatment diary below    TE:  - pec stretch 30 sec x 5  - upper trap 30 sec x 5   - levator scap stretch 30 sec x 5  - scap rows with red theraband 2x10 with 3 sec holds  - y's with 2# dumbells  - T's with 2# dumbells    NMR:  - Chin tucks with 3 sec holds x 20  - Scap retraction in unsupported sitting 30x 3 sec holds    Manuals:  - TPR to upper trap, levator scap and suboccipitals       NOT TODAY:    - SCM stretch 30 sec x 3  - Graston to B uppertrap and suboccipitals    Assessment: Client with good participation throughout session  She came in with 5/10 headache which subsided to 0/10 by end of session  Initiated cervical strengthening of Y's and T's with 2# dumbells  Min cues required to reduce upper trap compensation during exercises  She will benefit from continued skilled OPPT services to maximize her functional mobility and participation in life roles of home and community  Plan: Continue per plan of care  Initiate postural strengthening with use of PB next session        Precautions:   Past Medical History:   Diagnosis Date    Allergic rhinitis     Amenorrhea     Anesthesia complication     hx of cocaine use-none in 5 yrs    Anxiety     Arthritis     bilateral knees-DJD, arthritis neck and back-cervical injection 2017    Bronchitis, chronic (HCC)     Cancer (HCC)     squamous-removed from the right foot    Chronic pain disorder     neck and back    COPD (chronic obstructive pulmonary disease) (HCC)     Depression     Eczema     last assessed 6/27/16, resolved 10/2/17    Fibromyalgia, primary     GERD (gastroesophageal reflux disease)     H/O ETOH abuse     None in the past 10 months since 8/1/17    Hypertension     Lumbar radiculopathy     resolved 6/6/17    Malignant melanoma of skin (HCC)     Migraine     Migraine     MRSA (methicillin resistant Staphylococcus aureus) infection     last assessed 4/29/16    Peptic ulceration     gastric    Pneumonia     Raynauds syndrome     Spinal stenosis     Squamous cell skin cancer     Right dorsal foot     Squamous cell skin cancer     Right lower leg    Substance abuse ([facility] [address]     Syncope     Urinary tract infection            Outcome Assessments 2/24 3/3        mCTSIB  EO firm: 30 sec  EC firm: 30 sec  EO foam: 30 sec  EC foam: 30 sec (+) sway        10MWT  8 22 sec   1 27 m/s        6MWT  1175 feet        FGA  21/30 (increased risk for falls)        DHI  30/100 low perception of handicapped        Oculomotor screen Performed

## 2021-03-16 ENCOUNTER — TELEPHONE (OUTPATIENT)
Dept: OBGYN CLINIC | Facility: CLINIC | Age: 66
End: 2021-03-16

## 2021-03-16 DIAGNOSIS — M47.812 SPONDYLOSIS OF CERVICAL REGION WITHOUT MYELOPATHY OR RADICULOPATHY: Primary | ICD-10-CM

## 2021-03-16 DIAGNOSIS — S06.0X0D CONCUSSION WITHOUT LOSS OF CONSCIOUSNESS, SUBSEQUENT ENCOUNTER: ICD-10-CM

## 2021-03-16 DIAGNOSIS — S13.4XXA ACUTE WHIPLASH INJURY, INITIAL ENCOUNTER: ICD-10-CM

## 2021-03-16 NOTE — TELEPHONE ENCOUNTER
Auto insurance called regarding cerical MRI and last office note from 03/08  She states the order needs a date range I e 03/08/2021-04/08/2121 the don't retrograde approval since the order says 03/08/2021  Placed a new order with date range in the comments  Please refax to auto for auth       Call back [phone]

## 2021-03-17 ENCOUNTER — OFFICE VISIT (OUTPATIENT)
Dept: PHYSICAL THERAPY | Facility: CLINIC | Age: 66
End: 2021-03-17
Payer: COMMERCIAL

## 2021-03-17 ENCOUNTER — APPOINTMENT (OUTPATIENT)
Dept: LAB | Facility: CLINIC | Age: 66
End: 2021-03-17
Payer: MEDICARE

## 2021-03-17 ENCOUNTER — TELEPHONE (OUTPATIENT)
Dept: CARDIOLOGY CLINIC | Facility: CLINIC | Age: 66
End: 2021-03-17

## 2021-03-17 ENCOUNTER — TRANSCRIBE ORDERS (OUTPATIENT)
Dept: LAB | Facility: CLINIC | Age: 66
End: 2021-03-17

## 2021-03-17 DIAGNOSIS — G89.4 CHRONIC PAIN SYNDROME: ICD-10-CM

## 2021-03-17 DIAGNOSIS — Z72.0 TOBACCO ABUSE: ICD-10-CM

## 2021-03-17 DIAGNOSIS — R06.02 SHORTNESS OF BREATH: ICD-10-CM

## 2021-03-17 DIAGNOSIS — M47.816 LUMBAR SPONDYLOSIS: ICD-10-CM

## 2021-03-17 DIAGNOSIS — F41.8 DEPRESSION WITH ANXIETY: ICD-10-CM

## 2021-03-17 DIAGNOSIS — F10.21 ACUTE ALCOHOLIC INTOXICATION IN ALCOHOLISM, IN REMISSION (HCC): ICD-10-CM

## 2021-03-17 DIAGNOSIS — M47.812 SPONDYLOSIS OF CERVICAL REGION WITHOUT MYELOPATHY OR RADICULOPATHY: Primary | ICD-10-CM

## 2021-03-17 DIAGNOSIS — Z01.810 PRE-OPERATIVE CARDIOVASCULAR EXAMINATION: ICD-10-CM

## 2021-03-17 DIAGNOSIS — J44.9 OBSTRUCTIVE CHRONIC BRONCHITIS WITHOUT EXACERBATION (HCC): ICD-10-CM

## 2021-03-17 DIAGNOSIS — S06.0X0D CONCUSSION WITHOUT LOSS OF CONSCIOUSNESS, SUBSEQUENT ENCOUNTER: ICD-10-CM

## 2021-03-17 DIAGNOSIS — E78.5 HYPERLIPIDEMIA, UNSPECIFIED HYPERLIPIDEMIA TYPE: ICD-10-CM

## 2021-03-17 DIAGNOSIS — Z01.810 PRE-OPERATIVE CARDIOVASCULAR EXAMINATION: Primary | ICD-10-CM

## 2021-03-17 DIAGNOSIS — S13.4XXA ACUTE WHIPLASH INJURY, INITIAL ENCOUNTER: ICD-10-CM

## 2021-03-17 LAB
ALBUMIN SERPL BCP-MCNC: 3.6 G/DL (ref 3.5–5)
ALP SERPL-CCNC: 77 U/L (ref 46–116)
ALT SERPL W P-5'-P-CCNC: 25 U/L (ref 12–78)
ANION GAP SERPL CALCULATED.3IONS-SCNC: 3 MMOL/L (ref 4–13)
AST SERPL W P-5'-P-CCNC: 19 U/L (ref 5–45)
BASOPHILS # BLD AUTO: 0.05 THOUSANDS/ΜL (ref 0–0.1)
BASOPHILS NFR BLD AUTO: 1 % (ref 0–1)
BILIRUB SERPL-MCNC: 0.3 MG/DL (ref 0.2–1)
BUN SERPL-MCNC: 20 MG/DL (ref 5–25)
CALCIUM SERPL-MCNC: 9.3 MG/DL (ref 8.3–10.1)
CHLORIDE SERPL-SCNC: 107 MMOL/L (ref 100–108)
CHOLEST SERPL-MCNC: 166 MG/DL (ref 50–200)
CO2 SERPL-SCNC: 31 MMOL/L (ref 21–32)
CREAT SERPL-MCNC: 1.1 MG/DL (ref 0.6–1.3)
EOSINOPHIL # BLD AUTO: 0.33 THOUSAND/ΜL (ref 0–0.61)
EOSINOPHIL NFR BLD AUTO: 6 % (ref 0–6)
ERYTHROCYTE [DISTWIDTH] IN BLOOD BY AUTOMATED COUNT: 13.1 % (ref 11.6–15.1)
GFR SERPL CREATININE-BSD FRML MDRD: 54 ML/MIN/1.73SQ M
GLUCOSE P FAST SERPL-MCNC: 89 MG/DL (ref 65–99)
HCT VFR BLD AUTO: 36.4 % (ref 34.8–46.1)
HDLC SERPL-MCNC: 74 MG/DL
HGB BLD-MCNC: 11.4 G/DL (ref 11.5–15.4)
IMM GRANULOCYTES # BLD AUTO: 0.02 THOUSAND/UL (ref 0–0.2)
IMM GRANULOCYTES NFR BLD AUTO: 0 % (ref 0–2)
LDLC SERPL CALC-MCNC: 84 MG/DL (ref 0–100)
LYMPHOCYTES # BLD AUTO: 2.61 THOUSANDS/ΜL (ref 0.6–4.47)
LYMPHOCYTES NFR BLD AUTO: 45 % (ref 14–44)
MCH RBC QN AUTO: 29.2 PG (ref 26.8–34.3)
MCHC RBC AUTO-ENTMCNC: 31.3 G/DL (ref 31.4–37.4)
MCV RBC AUTO: 93 FL (ref 82–98)
MONOCYTES # BLD AUTO: 0.42 THOUSAND/ΜL (ref 0.17–1.22)
MONOCYTES NFR BLD AUTO: 7 % (ref 4–12)
NEUTROPHILS # BLD AUTO: 2.35 THOUSANDS/ΜL (ref 1.85–7.62)
NEUTS SEG NFR BLD AUTO: 41 % (ref 43–75)
NONHDLC SERPL-MCNC: 92 MG/DL
NRBC BLD AUTO-RTO: 0 /100 WBCS
PLATELET # BLD AUTO: 314 THOUSANDS/UL (ref 149–390)
PMV BLD AUTO: 10.2 FL (ref 8.9–12.7)
POTASSIUM SERPL-SCNC: 4.7 MMOL/L (ref 3.5–5.3)
PROT SERPL-MCNC: 6.8 G/DL (ref 6.4–8.2)
RBC # BLD AUTO: 3.91 MILLION/UL (ref 3.81–5.12)
SODIUM SERPL-SCNC: 141 MMOL/L (ref 136–145)
TRIGL SERPL-MCNC: 42 MG/DL
TSH SERPL DL<=0.05 MIU/L-ACNC: 2.64 UIU/ML (ref 0.36–3.74)
WBC # BLD AUTO: 5.78 THOUSAND/UL (ref 4.31–10.16)

## 2021-03-17 PROCEDURE — 36415 COLL VENOUS BLD VENIPUNCTURE: CPT

## 2021-03-17 PROCEDURE — 97110 THERAPEUTIC EXERCISES: CPT | Performed by: PHYSICAL THERAPIST

## 2021-03-17 PROCEDURE — 84443 ASSAY THYROID STIM HORMONE: CPT

## 2021-03-17 PROCEDURE — 97112 NEUROMUSCULAR REEDUCATION: CPT | Performed by: PHYSICAL THERAPIST

## 2021-03-17 PROCEDURE — 80061 LIPID PANEL: CPT

## 2021-03-17 PROCEDURE — 85025 COMPLETE CBC W/AUTO DIFF WBC: CPT

## 2021-03-17 PROCEDURE — 80053 COMPREHEN METABOLIC PANEL: CPT

## 2021-03-17 NOTE — TELEPHONE ENCOUNTER
----- Message from Ranjeet Hayes MD sent at 3/17/2021  3:41 PM EDT -----  Patient blood test reviewed  They are acceptable  Will continue same medication  Patient should keep his appointment
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
## 2021-03-18 ENCOUNTER — TELEPHONE (OUTPATIENT)
Dept: CARDIOLOGY CLINIC | Facility: CLINIC | Age: 66
End: 2021-03-18

## 2021-03-18 NOTE — TELEPHONE ENCOUNTER
----- Message from Anabel Tafoya MD sent at 3/17/2021  5:19 PM EDT -----  Patient blood test reviewed  They are acceptable  Will continue same medication  Patient should keep his appointment

## 2021-03-19 ENCOUNTER — TELEPHONE (OUTPATIENT)
Dept: CARDIOLOGY CLINIC | Facility: CLINIC | Age: 66
End: 2021-03-19

## 2021-03-19 ENCOUNTER — HOSPITAL ENCOUNTER (OUTPATIENT)
Dept: RADIOLOGY | Facility: HOSPITAL | Age: 66
Discharge: HOME | End: 2021-03-19
Attending: INTERNAL MEDICINE
Payer: MEDICARE

## 2021-03-19 ENCOUNTER — HOSPITAL ENCOUNTER (OUTPATIENT)
Dept: NON INVASIVE DIAGNOSTICS | Facility: HOSPITAL | Age: 66
Discharge: HOME | End: 2021-03-19
Attending: INTERNAL MEDICINE
Payer: MEDICARE

## 2021-03-19 DIAGNOSIS — Z01.810 PREOP CARDIOVASCULAR EXAM: ICD-10-CM

## 2021-03-19 DIAGNOSIS — J44.9 CHRONIC OBSTRUCTIVE PULMONARY DISEASE, UNSPECIFIED COPD TYPE (HCC): ICD-10-CM

## 2021-03-19 DIAGNOSIS — M47.816 LUMBAR SPONDYLOSIS: ICD-10-CM

## 2021-03-19 DIAGNOSIS — G89.4 CHRONIC PAIN SYNDROME: ICD-10-CM

## 2021-03-19 DIAGNOSIS — R06.02 EXERTIONAL SHORTNESS OF BREATH: ICD-10-CM

## 2021-03-19 DIAGNOSIS — F41.8 DEPRESSION WITH ANXIETY: ICD-10-CM

## 2021-03-19 DIAGNOSIS — E78.5 DYSLIPIDEMIA: ICD-10-CM

## 2021-03-19 DIAGNOSIS — Z72.0 TOBACCO USE: ICD-10-CM

## 2021-03-19 DIAGNOSIS — F10.21 HISTORY OF ALCOHOL DEPENDENCE (HCC): ICD-10-CM

## 2021-03-19 LAB
CHEST PAIN STATEMENT: NORMAL
MAX DIASTOLIC BP: 61 MMHG
MAX HEART RATE: 77 BPM
MAX PREDICTED HEART RATE: 157 BPM
MAX. SYSTOLIC BP: 135 MMHG
PROTOCOL NAME: NORMAL
REASON FOR TERMINATION: NORMAL
TARGET HR FORMULA: NORMAL
TIME IN EXERCISE PHASE: NORMAL

## 2021-03-19 PROCEDURE — 93017 CV STRESS TEST TRACING ONLY: CPT

## 2021-03-19 PROCEDURE — 93018 CV STRESS TEST I&R ONLY: CPT | Performed by: INTERNAL MEDICINE

## 2021-03-19 PROCEDURE — 78452 HT MUSCLE IMAGE SPECT MULT: CPT | Performed by: INTERNAL MEDICINE

## 2021-03-19 PROCEDURE — 93306 TTE W/DOPPLER COMPLETE: CPT | Performed by: INTERNAL MEDICINE

## 2021-03-19 PROCEDURE — 93306 TTE W/DOPPLER COMPLETE: CPT

## 2021-03-19 PROCEDURE — A9502 TC99M TETROFOSMIN: HCPCS

## 2021-03-19 PROCEDURE — G1004 CDSM NDSC: HCPCS

## 2021-03-19 PROCEDURE — 93016 CV STRESS TEST SUPVJ ONLY: CPT | Performed by: INTERNAL MEDICINE

## 2021-03-19 PROCEDURE — 78452 HT MUSCLE IMAGE SPECT MULT: CPT

## 2021-03-19 RX ADMIN — REGADENOSON 0.4 MG: 0.08 INJECTION, SOLUTION INTRAVENOUS at 12:02

## 2021-03-19 NOTE — TELEPHONE ENCOUNTER
----- Message from Astrid Ziegler MD sent at 3/19/2021 11:45 AM EDT -----  Patient's echo shows normal LV systolic function  No significant valvular disease  Patient can keep an appointment  Please call patient about echo report

## 2021-03-22 ENCOUNTER — APPOINTMENT (OUTPATIENT)
Dept: PHYSICAL THERAPY | Facility: CLINIC | Age: 66
End: 2021-03-22
Payer: COMMERCIAL

## 2021-03-22 ENCOUNTER — TELEPHONE (OUTPATIENT)
Dept: CARDIOLOGY CLINIC | Facility: CLINIC | Age: 66
End: 2021-03-22

## 2021-03-22 NOTE — TELEPHONE ENCOUNTER
----- Message from Maura Espitia MD sent at 3/21/2021  1:20 PM EDT -----  Pt's Patient's stress test is normal    Patient can keep regular appointment  Please call patient with the result

## 2021-03-24 ENCOUNTER — EVALUATION (OUTPATIENT)
Dept: PHYSICAL THERAPY | Facility: CLINIC | Age: 66
End: 2021-03-24
Payer: COMMERCIAL

## 2021-03-24 ENCOUNTER — IMMUNIZATIONS (OUTPATIENT)
Dept: FAMILY MEDICINE CLINIC | Facility: HOSPITAL | Age: 66
End: 2021-03-24

## 2021-03-24 DIAGNOSIS — R42 DIZZINESS: ICD-10-CM

## 2021-03-24 DIAGNOSIS — Z23 ENCOUNTER FOR IMMUNIZATION: Primary | ICD-10-CM

## 2021-03-24 DIAGNOSIS — M54.16 LUMBAR RADICULOPATHY: ICD-10-CM

## 2021-03-24 DIAGNOSIS — M47.812 SPONDYLOSIS OF CERVICAL REGION WITHOUT MYELOPATHY OR RADICULOPATHY: Primary | ICD-10-CM

## 2021-03-24 DIAGNOSIS — S13.4XXA ACUTE WHIPLASH INJURY, INITIAL ENCOUNTER: ICD-10-CM

## 2021-03-24 PROCEDURE — 91301 SARS-COV-2 / COVID-19 MRNA VACCINE (MODERNA) 100 MCG: CPT

## 2021-03-24 PROCEDURE — 0011A SARS-COV-2 / COVID-19 MRNA VACCINE (MODERNA) 100 MCG: CPT

## 2021-03-24 PROCEDURE — 97112 NEUROMUSCULAR REEDUCATION: CPT | Performed by: PHYSICAL THERAPIST

## 2021-03-24 RX ORDER — GABAPENTIN 400 MG/1
CAPSULE ORAL
Qty: 90 CAPSULE | Refills: 1 | Status: SHIPPED | OUTPATIENT
Start: 2021-03-24 | End: 2021-03-29 | Stop reason: SDUPTHER
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
## 2021-03-25 ENCOUNTER — TRANSCRIBE ORDERS (OUTPATIENT)
Dept: LAB | Facility: CLINIC | Age: 66
End: 2021-03-25

## 2021-03-25 ENCOUNTER — APPOINTMENT (OUTPATIENT)
Dept: LAB | Facility: CLINIC | Age: 66
End: 2021-03-25
Payer: MEDICARE

## 2021-03-25 DIAGNOSIS — Z01.818 OTHER SPECIFIED PRE-OPERATIVE EXAMINATION: Primary | ICD-10-CM

## 2021-03-25 LAB
ANION GAP SERPL CALCULATED.3IONS-SCNC: 3 MMOL/L (ref 4–13)
BUN SERPL-MCNC: 19 MG/DL (ref 5–25)
CALCIUM SERPL-MCNC: 9.1 MG/DL (ref 8.3–10.1)
CHLORIDE SERPL-SCNC: 106 MMOL/L (ref 100–108)
CO2 SERPL-SCNC: 29 MMOL/L (ref 21–32)
CREAT SERPL-MCNC: 1.12 MG/DL (ref 0.6–1.3)
ERYTHROCYTE [DISTWIDTH] IN BLOOD BY AUTOMATED COUNT: 13.2 % (ref 11.6–15.1)
GFR SERPL CREATININE-BSD FRML MDRD: 52 ML/MIN/1.73SQ M
GLUCOSE SERPL-MCNC: 96 MG/DL (ref 65–140)
HCT VFR BLD AUTO: 32.7 % (ref 34.8–46.1)
HGB BLD-MCNC: 10.5 G/DL (ref 11.5–15.4)
MCH RBC QN AUTO: 29.7 PG (ref 26.8–34.3)
MCHC RBC AUTO-ENTMCNC: 32.1 G/DL (ref 31.4–37.4)
MCV RBC AUTO: 93 FL (ref 82–98)
PLATELET # BLD AUTO: 312 THOUSANDS/UL (ref 149–390)
PMV BLD AUTO: 10.7 FL (ref 8.9–12.7)
POTASSIUM SERPL-SCNC: 5 MMOL/L (ref 3.5–5.3)
RBC # BLD AUTO: 3.53 MILLION/UL (ref 3.81–5.12)
SODIUM SERPL-SCNC: 138 MMOL/L (ref 136–145)
WBC # BLD AUTO: 7.3 THOUSAND/UL (ref 4.31–10.16)

## 2021-03-25 PROCEDURE — 80048 BASIC METABOLIC PNL TOTAL CA: CPT | Performed by: OBSTETRICS & GYNECOLOGY

## 2021-03-25 PROCEDURE — 36415 COLL VENOUS BLD VENIPUNCTURE: CPT | Performed by: OBSTETRICS & GYNECOLOGY

## 2021-03-25 PROCEDURE — 85027 COMPLETE CBC AUTOMATED: CPT

## 2021-03-29 ENCOUNTER — APPOINTMENT (OUTPATIENT)
Dept: PHYSICAL THERAPY | Facility: CLINIC | Age: 66
End: 2021-03-29
Payer: COMMERCIAL

## 2021-03-29 DIAGNOSIS — M54.16 LUMBAR RADICULOPATHY: ICD-10-CM

## 2021-03-29 RX ORDER — GABAPENTIN 400 MG/1
400 CAPSULE ORAL 3 TIMES DAILY
Qty: 90 CAPSULE | Refills: 1 | Status: SHIPPED | OUTPATIENT
Start: 2021-03-29 | End: 2021-04-26 | Stop reason: SDUPTHER

## 2021-03-30 ENCOUNTER — OFFICE VISIT (OUTPATIENT)
Dept: FAMILY MEDICINE CLINIC | Facility: CLINIC | Age: 66
End: 2021-03-30
Payer: MEDICARE

## 2021-03-30 VITALS
HEIGHT: 65 IN | DIASTOLIC BLOOD PRESSURE: 60 MMHG | HEART RATE: 72 BPM | WEIGHT: 139 LBS | RESPIRATION RATE: 14 BRPM | BODY MASS INDEX: 23.16 KG/M2 | TEMPERATURE: 97.4 F | SYSTOLIC BLOOD PRESSURE: 92 MMHG

## 2021-03-30 DIAGNOSIS — G47.29: ICD-10-CM

## 2021-03-30 DIAGNOSIS — Z72.0 TOBACCO USE: ICD-10-CM

## 2021-03-30 DIAGNOSIS — R40.0 DAYTIME SLEEPINESS: ICD-10-CM

## 2021-03-30 DIAGNOSIS — R55 SYNCOPE, UNSPECIFIED SYNCOPE TYPE: Primary | ICD-10-CM

## 2021-03-30 DIAGNOSIS — F10.21 HISTORY OF ALCOHOL DEPENDENCE (HCC): ICD-10-CM

## 2021-03-30 DIAGNOSIS — I95.9 HYPOTENSION, UNSPECIFIED HYPOTENSION TYPE: ICD-10-CM

## 2021-03-30 DIAGNOSIS — K21.00 GASTROESOPHAGEAL REFLUX DISEASE WITH ESOPHAGITIS, UNSPECIFIED WHETHER HEMORRHAGE: ICD-10-CM

## 2021-03-30 DIAGNOSIS — D64.9 ANEMIA, UNSPECIFIED TYPE: ICD-10-CM

## 2021-03-30 PROCEDURE — 99215 OFFICE O/P EST HI 40 MIN: CPT | Performed by: FAMILY MEDICINE

## 2021-03-30 PROCEDURE — 3008F BODY MASS INDEX DOCD: CPT | Performed by: INTERNAL MEDICINE

## 2021-03-30 RX ORDER — CHLORAL HYDRATE 500 MG
1000 CAPSULE ORAL DAILY
COMMUNITY
End: 2021-06-23 | Stop reason: ALTCHOICE

## 2021-03-30 RX ORDER — METHYLPHENIDATE HYDROCHLORIDE 20 MG/1
10 CAPSULE, EXTENDED RELEASE ORAL DAILY
COMMUNITY
End: 2021-04-28

## 2021-03-30 RX ORDER — DISULFIRAM 250 MG/1
125 TABLET ORAL EVERY MORNING
COMMUNITY
Start: 2021-02-13 | End: 2022-01-25

## 2021-03-30 RX ORDER — TRAZODONE HYDROCHLORIDE 50 MG/1
50 TABLET ORAL DAILY
COMMUNITY
Start: 2021-03-16 | End: 2021-04-28

## 2021-03-30 RX ORDER — METHYLPHENIDATE HYDROCHLORIDE 10 MG/1
10 CAPSULE, EXTENDED RELEASE ORAL EVERY MORNING
COMMUNITY
Start: 2021-03-18 | End: 2021-11-15

## 2021-03-31 DIAGNOSIS — R42 DIZZINESS: ICD-10-CM

## 2021-03-31 DIAGNOSIS — S13.4XXA ACUTE WHIPLASH INJURY, INITIAL ENCOUNTER: ICD-10-CM

## 2021-03-31 DIAGNOSIS — M47.812 SPONDYLOSIS OF CERVICAL REGION WITHOUT MYELOPATHY OR RADICULOPATHY: Primary | ICD-10-CM

## 2021-04-01 ENCOUNTER — TELEPHONE (OUTPATIENT)
Dept: CARDIOLOGY CLINIC | Facility: CLINIC | Age: 66
End: 2021-04-01

## 2021-04-01 ENCOUNTER — TELEPHONE (OUTPATIENT)
Dept: FAMILY MEDICINE CLINIC | Facility: CLINIC | Age: 66
End: 2021-04-01

## 2021-04-01 NOTE — TELEPHONE ENCOUNTER
Yes she did-reported as normal--wasn't sure if she still needed holter monitor-had been ordered in past but do not see that patient ever followed thru having done     Thank you for your input and care of our patients--much appreciated    Stay safe-be well--

## 2021-04-01 NOTE — TELEPHONE ENCOUNTER
Surgery pending, Dr Vivi Dhillon called regarding cardiac clearance and further progress of potential clearance  She had seen Dr Vivi Dhillon with a syncopal episode recently wondering if she would need to be seen prior to surgery 4/13 - and if further testing would be required  Please advise  Lesley Garrett

## 2021-04-01 NOTE — TELEPHONE ENCOUNTER
Dr Quita Couch    Patient seen by you 3/30  Patient is requesting note from you excusing her from her outpatient program on 3/30- and today 4/1/21  Please call when ready for   Patient was advised that Dr Quita Couch is out of the office today

## 2021-04-01 NOTE — TELEPHONE ENCOUNTER
Patient says she did not go to program today, because she still was not feeling 100% since her appointment with you

## 2021-04-01 NOTE — TELEPHONE ENCOUNTER
Letter in chart--please print out and leave in my purple folder to sign tomorrow --pt can  after that or we can fax to center if she has number

## 2021-04-05 ENCOUNTER — APPOINTMENT (OUTPATIENT)
Dept: PHYSICAL THERAPY | Facility: CLINIC | Age: 66
End: 2021-04-05
Payer: COMMERCIAL

## 2021-04-05 ENCOUNTER — APPOINTMENT (OUTPATIENT)
Dept: RADIOLOGY | Facility: CLINIC | Age: 66
End: 2021-04-05
Payer: MEDICARE

## 2021-04-05 DIAGNOSIS — Z72.0 TOBACCO USE: ICD-10-CM

## 2021-04-05 DIAGNOSIS — Z01.818 PREOP EXAMINATION: ICD-10-CM

## 2021-04-05 PROCEDURE — 71046 X-RAY EXAM CHEST 2 VIEWS: CPT

## 2021-04-07 ENCOUNTER — TELEPHONE (OUTPATIENT)
Dept: CARDIOLOGY CLINIC | Facility: CLINIC | Age: 66
End: 2021-04-07

## 2021-04-07 ENCOUNTER — TELEPHONE (OUTPATIENT)
Dept: OBGYN CLINIC | Facility: HOSPITAL | Age: 66
End: 2021-04-07

## 2021-04-07 ENCOUNTER — OFFICE VISIT (OUTPATIENT)
Dept: PHYSICAL THERAPY | Facility: CLINIC | Age: 66
End: 2021-04-07
Payer: COMMERCIAL

## 2021-04-07 DIAGNOSIS — R42 DIZZINESS: ICD-10-CM

## 2021-04-07 DIAGNOSIS — S13.4XXA ACUTE WHIPLASH INJURY, INITIAL ENCOUNTER: ICD-10-CM

## 2021-04-07 DIAGNOSIS — M47.812 SPONDYLOSIS OF CERVICAL REGION WITHOUT MYELOPATHY OR RADICULOPATHY: Primary | ICD-10-CM

## 2021-04-07 PROBLEM — I95.9 HYPOTENSION: Status: ACTIVE | Noted: 2021-04-07

## 2021-04-07 PROBLEM — G47.29: Status: ACTIVE | Noted: 2021-04-07

## 2021-04-07 PROBLEM — D64.9 ANEMIA: Status: ACTIVE | Noted: 2021-04-07

## 2021-04-07 PROBLEM — R40.0 DAYTIME SLEEPINESS: Status: ACTIVE | Noted: 2021-04-07

## 2021-04-07 PROCEDURE — 97140 MANUAL THERAPY 1/> REGIONS: CPT | Performed by: PHYSICAL THERAPIST

## 2021-04-07 PROCEDURE — 97110 THERAPEUTIC EXERCISES: CPT | Performed by: PHYSICAL THERAPIST

## 2021-04-07 NOTE — TELEPHONE ENCOUNTER
Patient saw  Dr Hannah Angelo last week she would like her seen patient had testing did not have holter done  Surgery has been postponed   Please advise
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
## 2021-04-07 NOTE — PROGRESS NOTES
Assessment/Plan:    1  Syncope, unspecified syncope type  Assessment & Plan:   Recent stress test and echo within normal   to consider Holter monitor   call placed to cardiology office to assist in patient appointment scheduling   to consider  further neurology eval pending Cardiology findings    Orders:  -     Ambulatory referral to Cardiology; Future    2  Hypotension, unspecified hypotension type  -     Ambulatory referral to Cardiology; Future    3  Tobacco use  Assessment & Plan:   Smoking sensation encouraged      Orders:  -     Ambulatory referral to Cardiology; Future  -     XR chest pa & lateral; Future; Expected date: 03/30/2021    4  Anemia, unspecified type  -     Ambulatory referral to Gastroenterology; Future    5  Gastroesophageal reflux disease with esophagitis, unspecified whether hemorrhage  -     Ambulatory referral to Gastroenterology; Future    6  Other circadian rhythm sleep disorder  -     Ambulatory referral to Sleep Medicine; Future    7  Daytime sleepiness  -     Ambulatory referral to Sleep Medicine; Future    8  BMI 23 0-23 9, adult    9  History of alcohol dependence ([facility] [address] )  Assessment & Plan:   Counseled on continued abstinence from alcohol           continue current medications plus vitamin supplementation, maintain hydration/electrolytes   explained to patient that I did not feel she was optimized for surgery in light of her recent syncopal event  did not feel comfortable clearing her until she had further evaluation with both Cardiology, gastroenterology and pulm/sleep med   recommend that she undergo further evaluation with above specialists and then follow-up with me for clearance once workups have been completed  patient verbalized understanding   referrals were placed in chart and call was made to Cardiology to try to expedite patient's appointment    Subjective:      Patient ID: Elliott Frye is a 61 y o  female      Chief Complaint   Patient presents with    Follow-up per Dr Wray  bladder surgery fax [phone]    Loss of Consciousness    Blood Pressure Check       HPI   patient originally scheduled for preop exam for anterior repair of a female cystocele with Ruben Hubbard on 04/13/2021   visit subsequently changed as pt reporting significant interval history of syncopal episode   event was unwitnessed and patient was not clear on exactly what happened   states she got up from bed and walked into the kitchen and sometime thereafter passed out and awoke on the floor   she does not recall any preceding symptom of chest pain, headache or focal weakness   she has been working with PT for problems related to what/and dizziness-- reports reviewed in chart  Pt is a recovering alcoholic--denies any etoh use prior to the episode  She cont to smoke, denies any illicit drug use   patient has been followed by cardiology in the past but never completed a full workup ordered   she did have a normal stress test and echo but  never followed up to have the Holter monitor placed    Patient also has followed with gastroenterologist for history of alcoholic liver disease and anemia and is due for follow-up for same   in addition I discussed with patient my recommendation for evaluation with pulmonology and Sleep Medicine as she does report  Sleep disturbances and  subsequentexcessive daytime sleepiness    The following portions of the patient's history were reviewed and updated as appropriate: allergies, current medications, past family history, past medical history, past social history, past surgical history and problem list     Review of Systems   Constitutional: Positive for fatigue  Negative for fever  Respiratory: Positive for shortness of breath  Cardiovascular: Positive for palpitations  Gastrointestinal: Negative for blood in stool  Hx alcoholic liver disease, anemia   Musculoskeletal: Positive for arthralgias, back pain, myalgias and neck pain  Allergic/Immunologic: Positive for environmental allergies  Neurological: Positive for dizziness, syncope and weakness  Psychiatric/Behavioral: Positive for decreased concentration and sleep disturbance          Hx bipolar d/o         Current Outpatient Medications   Medication Sig Dispense Refill    albuterol (PROAIR HFA) 90 mcg/act inhaler Inhale 2 puffs every 4 (four) hours as needed for wheezing 8 5 g 5    amLODIPine (NORVASC) 5 mg tablet Take 5 mg by mouth daily       ARIPiprazole (ABILIFY) 5 mg tablet Take 2 5 mg by mouth      buprenorphine (SUBUTEX) 8 mg 8 mg 2 (two) times a day Oral       disulfiram (ANTABUSE) 250 mg tablet Take 250 mg by mouth daily      fluticasone-salmeterol (ADVAIR, WIXELA) 250-50 mcg/dose inhaler Inhale 1 puff 2 (two) times a day 1 Inhaler 0    gabapentin (NEURONTIN) 400 mg capsule Take 1 capsule (400 mg total) by mouth 3 (three) times a day 90 capsule 1    lisinopril (ZESTRIL) 20 mg tablet Take 20 mg by mouth daily      melatonin 3 mg Take 3 mg by mouth daily at bedtime as needed      methylphenidate (RITALIN LA) 10 MG 24 hr capsule Take 10 mg by mouth every morning      multivitamin (THERAGRAN) TABS Take 1 tablet by mouth daily at bedtime      naloxone (NARCAN) 4 mg/0 1 mL nasal spray 4 mg into each nostril as needed      nystatin (MYCOSTATIN) powder Apply topically 2 (two) times a day 45 g 1    nystatin-triamcinolone (MYCOLOG-II) cream Apply topically 2 (two) times a day 30 g 0    Omega-3 Fatty Acids (fish oil) 1,000 mg Take 1,000 mg by mouth daily      pantoprazole (PROTONIX) 40 mg tablet Take 1 tablet by mouth 2 (two) times a day (Patient taking differently: Take 40 mg by mouth daily ) 60 tablet 3    simvastatin (ZOCOR) 20 mg tablet take 1 tablet by mouth once daily 90 tablet 0    SUMAtriptan (IMITREX) 100 mg tablet Take 100 mg by mouth once as needed for migraine      Turmeric 500 MG TABS Take by mouth daily at bedtime      acamprosate (CAMPRAL) 333 mg tablet Take 333 mg by mouth Three times a day      Ascorbic Acid (VITAMIN C) 100 MG tablet Take 100 mg by mouth daily      busPIRone (BUSPAR) 5 mg tablet Take 5 mg by mouth 3 (three) times a day as needed      cholecalciferol (VITAMIN D3) 1,000 units tablet Take 1,000 Units by mouth daily      DULoxetine (CYMBALTA) 60 mg delayed release capsule Take 60 mg by mouth daily      methylphenidate (RITALIN LA) 20 MG 24 hr capsule Take 10 mg by mouth daily      psyllium (METAMUCIL) 58 6 % powder Take 1 packet by mouth 2 (two) times a day as needed       traZODone (DESYREL) 50 mg tablet Take 50 mg by mouth daily      vitamin E 100 UNIT capsule Take 100 Units by mouth daily       No current facility-administered medications for this visit  Objective:    BP 92/60 (BP Location: Left arm, Patient Position: Sitting, Cuff Size: Standard)   Pulse 72   Temp (!) 97 4 °F (36 3 °C) (Temporal)   Resp 14   Ht 5' 4 5" (1 638 m)   Wt 63 kg (139 lb)   BMI 23 49 kg/m²        Physical Exam  Vitals signs and nursing note reviewed  Constitutional:       General: She is not in acute distress  Eyes:      General: No scleral icterus  Conjunctiva/sclera: Conjunctivae normal    Neck:      Musculoskeletal: Neck supple  Cardiovascular:      Rate and Rhythm: Normal rate and regular rhythm  Pulmonary:      Effort: Pulmonary effort is normal       Breath sounds: Normal breath sounds  Abdominal:      General: Bowel sounds are normal       Palpations: Abdomen is soft  Tenderness: There is no abdominal tenderness  Musculoskeletal:         General: Tenderness present  Skin:     General: Skin is warm and dry  Coloration: Skin is not jaundiced  Neurological:      Mental Status: She is alert and oriented to person, place, and time  Cranial Nerves: No cranial nerve deficit     Psychiatric:      Comments: anxious         Time spent: 40min with >50% of time spent counseling and coordination of care     [facility] Toshia Mina MD

## 2021-04-08 NOTE — ASSESSMENT & PLAN NOTE
Recent stress test and echo within normal   to consider Holter monitor   call placed to cardiology office to assist in patient appointment scheduling   to consider  further neurology eval pending Cardiology findings

## 2021-04-12 ENCOUNTER — APPOINTMENT (OUTPATIENT)
Dept: PHYSICAL THERAPY | Facility: CLINIC | Age: 66
End: 2021-04-12
Payer: COMMERCIAL

## 2021-04-14 ENCOUNTER — OFFICE VISIT (OUTPATIENT)
Dept: CARDIOLOGY CLINIC | Facility: CLINIC | Age: 66
End: 2021-04-14
Payer: MEDICARE

## 2021-04-14 VITALS
SYSTOLIC BLOOD PRESSURE: 110 MMHG | TEMPERATURE: 97.6 F | OXYGEN SATURATION: 98 % | HEIGHT: 65 IN | HEART RATE: 72 BPM | DIASTOLIC BLOOD PRESSURE: 62 MMHG | WEIGHT: 146.2 LBS | BODY MASS INDEX: 24.36 KG/M2 | RESPIRATION RATE: 18 BRPM

## 2021-04-14 DIAGNOSIS — R55 SYNCOPE AND COLLAPSE: Primary | ICD-10-CM

## 2021-04-14 PROCEDURE — 99214 OFFICE O/P EST MOD 30 MIN: CPT | Performed by: INTERNAL MEDICINE

## 2021-04-19 ENCOUNTER — OFFICE VISIT (OUTPATIENT)
Dept: PHYSICAL THERAPY | Facility: CLINIC | Age: 66
End: 2021-04-19
Payer: COMMERCIAL

## 2021-04-19 DIAGNOSIS — R42 DIZZINESS: ICD-10-CM

## 2021-04-19 DIAGNOSIS — S13.4XXA ACUTE WHIPLASH INJURY, INITIAL ENCOUNTER: ICD-10-CM

## 2021-04-19 DIAGNOSIS — M47.812 SPONDYLOSIS OF CERVICAL REGION WITHOUT MYELOPATHY OR RADICULOPATHY: Primary | ICD-10-CM

## 2021-04-19 PROCEDURE — 97140 MANUAL THERAPY 1/> REGIONS: CPT

## 2021-04-19 PROCEDURE — 97112 NEUROMUSCULAR REEDUCATION: CPT

## 2021-04-19 NOTE — PROGRESS NOTES
Daily Note     Today's date: 2021  Patient name: Red Meyer  : 1955  MRN: [id number]  Referring provider: Stuart Mccauley DO  Dx:   Encounter Diagnosis     ICD-10-CM    1  Spondylosis of cervical region without myelopathy or radiculopathy  M47 812    2  Acute whiplash injury, initial encounter  S13  4XXA    3  Dizziness  R42        Start Time: 4796  Stop Time: 1530  Total time in clinic (min): 35 minutes    Subjective: Pt has been having increased neck pain on her L side  She has been performing her exercises at home and says they do help a little  Sleep has been problematic, pain wakes her up and it is difficult to fall asleep  Pt is getting an MRI of her neck on   Objective: See treatment diary below  C/s AROM:  Flexion- moderate limitation  Extension- maximum limitation  Lateral Flexion- maximum limitation bilaterally  Rotation- maximum limitation bilaterally  Retraction- moderate limitation  Protrusion- minimum limitation    Assessment: Tolerated treatment well  Patient demonstrated fatigue post treatment, exhibited good technique with therapeutic exercises and would benefit from continued PT  Pt experienced small pain relief in her neck with retraction coupled with extension in sitting  Plan: Continue per plan of care  Progress treatment as tolerated         Precautions:   Past Medical History:   Diagnosis Date    Allergic rhinitis     Amenorrhea     Anesthesia complication     hx of cocaine use-none in 5 yrs    Anxiety     Arthritis     bilateral knees-DJD, arthritis neck and back-cervical injection     Bronchitis, chronic (HCC)     Cancer (HCC)     squamous-removed from the right foot    Chronic pain disorder     neck and back    COPD [facility]ronic obstructive pulmonary disease) ([facility] [address] )     Depression     Eczema     last assessed 16, resolved 10/2/17    Fibromyalgia, primary     GERD (gastroesophageal reflux disease)     H/O ETOH abuse     None in the past 10 months since 8/1/17    Hypertension     Lumbar radiculopathy     resolved 6/6/17    Malignant melanoma of skin (HCC)     Migraine     Migraine     MRSA (methicillin resistant Staphylococcus aureus) infection     last assessed 4/29/16    Peptic ulceration     gastric    Pneumonia     Raynauds syndrome     Spinal stenosis     Squamous cell skin cancer     Right dorsal foot     Squamous cell skin cancer     Right lower leg    Substance abuse ([address] )     Syncope     Urinary tract infection      Manual: Suboccipital release- 5 mins  Retraction w/traction- 5 mins    TE: Thoracic extension in seating- 15x5s    NM: Retraction w/extension- 20x  Scap retraction- 20x      Outcome Assessments 2/24 3/3 3/24 4/19      mCTSIB  EO firm: 30 sec  EC firm: 30 sec  EO foam: 30 sec  EC foam: 30 sec (+) sway EO firm: 30 sec  EC firm: 30 sec  EO foam: 30 sec  EC foam: 30 sec (+) sway       10MWT  8 22 sec  1 27 m/s 7 85 sec   1 3 m/s       6MWT  1175 feet Deferred time       FGA  21/30 (increased risk for falls) Deferred time       DHI  30/100 low perception of handicapped Deferred time       Oculomotor screen Performed  Performed

## 2021-04-20 ENCOUNTER — OFFICE VISIT (OUTPATIENT)
Dept: GASTROENTEROLOGY | Facility: CLINIC | Age: 66
End: 2021-04-20
Payer: MEDICARE

## 2021-04-20 VITALS
TEMPERATURE: 97.1 F | WEIGHT: 144.2 LBS | HEART RATE: 72 BPM | SYSTOLIC BLOOD PRESSURE: 135 MMHG | BODY MASS INDEX: 24.62 KG/M2 | DIASTOLIC BLOOD PRESSURE: 78 MMHG | HEIGHT: 64 IN

## 2021-04-20 DIAGNOSIS — Z86.010 HISTORY OF COLON POLYPS: ICD-10-CM

## 2021-04-20 DIAGNOSIS — D64.9 ANEMIA, UNSPECIFIED TYPE: Primary | ICD-10-CM

## 2021-04-20 DIAGNOSIS — K21.00 GASTROESOPHAGEAL REFLUX DISEASE WITH ESOPHAGITIS, UNSPECIFIED WHETHER HEMORRHAGE: ICD-10-CM

## 2021-04-20 DIAGNOSIS — K59.09 CONSTIPATION, CHRONIC: ICD-10-CM

## 2021-04-20 PROBLEM — Z86.0100 HISTORY OF COLON POLYPS: Status: ACTIVE | Noted: 2021-04-20

## 2021-04-20 PROCEDURE — 99204 OFFICE O/P NEW MOD 45 MIN: CPT | Performed by: INTERNAL MEDICINE

## 2021-04-20 NOTE — ASSESSMENT & PLAN NOTE
Probable anemia of chronic disease  Rule out colorectal lesions or upper GI causes      -  EGD and colonoscopy

## 2021-04-20 NOTE — ASSESSMENT & PLAN NOTE
Personal history of colon polyps- patient is at increased risk for colon cancer screening    Rule out colorectal lesions including polyps or malignancy     -Schedule for colonoscopy

## 2021-04-20 NOTE — ASSESSMENT & PLAN NOTE
History of chronic constipation which appear to be physiologic     -  Advised to take MiraLax, stool softeners and plenty of liquids daily     -Schedule for colonoscopy  -High-fiber diet     -Patient was given instructions about the colonoscopy prep     -Patient was explained about  the risks and benefits of the procedure  Risks including but not limited to bleeding, infection, perforation were explained in detail  Also explained about less than 100% sensitivity with the exam and other alternatives

## 2021-04-20 NOTE — H&P (VIEW-ONLY)
Consultation - [facility]) [facility]  Calderon Tate 1955 female         Chief Complaint:  Anemia    HPI:   24-year-old female with history of hypertension, hyperlipidemia, COPD, depression, recovering alcoholic was noted to be anemic during preop work for bladder prolapse surgery  Patient always has problems with some constipation  She takes Protonix regularly for acid reflux  Complaining of some burning in the chest at times  Denies any blood or mucus in the stool  Good appetite, no recent weight loss  No abdominal pain, nausea or vomiting  Denies any difficulty swallowing  She has history of colon polyps on the last colonoscopy and EGD were in January 2018  REVIEW OF SYSTEMS: Review of Systems   Constitutional: Negative for activity change, appetite change, chills, diaphoresis, fatigue, fever and unexpected weight change  HENT: Negative for ear discharge, ear pain, facial swelling, hearing loss, nosebleeds, sore throat, tinnitus and voice change  Eyes: Negative for pain, discharge, redness, itching and visual disturbance  Respiratory: Negative for apnea, cough, chest tightness, shortness of breath and wheezing  Cardiovascular: Negative for chest pain and palpitations  Gastrointestinal:        As noted in HPI   Endocrine: Negative for cold intolerance, heat intolerance and polyuria  Genitourinary: Negative for difficulty urinating, dysuria, flank pain, hematuria and urgency  Musculoskeletal: Positive for arthralgias  Negative for back pain, gait problem, joint swelling and myalgias  Skin: Negative for rash and wound  Neurological: Negative for dizziness, tremors, seizures, speech difficulty, light-headedness, numbness and headaches  Hematological: Negative for adenopathy  Does not bruise/bleed easily  Psychiatric/Behavioral: Negative for agitation, behavioral problems and confusion  The patient is not nervous/anxious           Past Medical History: Diagnosis Date    Allergic rhinitis     Amenorrhea     Anemia     Anesthesia complication     hx of cocaine use-none in 5 yrs    Anxiety     Arthritis     bilateral knees-DJD, arthritis neck and back-cervical injection 2017    Bronchitis, chronic (HCC)     Cancer (HCC)     squamous-removed from the right foot    Chronic pain disorder     neck and back    COPD (chronic obstructive pulmonary disease) ([facility]     Depression     Eczema     last assessed 6/27/16, resolved 10/2/17    Fibromyalgia, primary     GERD (gastroesophageal reflux disease)     H/O ETOH abuse     None in the past 10 months since 8/1/17    Hypertension     Lumbar radiculopathy     resolved 6/6/17    Malignant melanoma of skin (HCC)     Migraine     Migraine     MRSA (methicillin resistant Staphylococcus aureus) infection     last assessed 4/29/16    Peptic ulceration     gastric    Pneumonia     Raynauds syndrome     Spinal stenosis     Squamous cell skin cancer     Right dorsal foot     Squamous cell skin cancer     Right lower leg    [facility]     Syncope     Urinary tract infection       Past Surgical History:   Procedure Laterality Date    ARTHROSCOPY KNEE Right 6/8/2017    Procedure: RIGHT KNEE ARTHROSCOPY MEDIAL MENISCECTOMY;  Surgeon: Javid Montilla MD;  Location: [facility] MAIN OR;  Service:    [address] [place] [address]    COLONOSCOPY      COLONOSCOPY N/A 1/25/2018    Procedure: COLONOSCOPY;  Surgeon: Alexia Benítez MD;  Location: [place] [address] GI LAB; Service: Gastroenterology    DILATION AND CURETTAGE OF UTERUS      x 2 miscarriage    EPIDURAL BLOCK INJECTION N/A 12/8/2017    Procedure: INJECTION EPIDURAL STEROID CERVICAL C6-C7;  Surgeon: Mary Moss MD;  Location: [place] [address] MAIN OR;  Service: Pain Management     ESOPHAGOGASTRODUODENOSCOPY N/A 1/25/2018    Procedure: ESOPHAGOGASTRODUODENOSCOPY (EGD); Surgeon: Alexia Benítez MD;  Location: [facility] GI LAB;   Service: Gastroenterology    FOOT SURGERY squamous removed from the right    HAND SURGERY Bilateral     arthroplasty -thumb joint    last assessed 6/6/17    HERNIA REPAIR      inguinal    UPPER GASTROINTESTINAL ENDOSCOPY      WISDOM TOOTH EXTRACTION       Social History     Socioeconomic History    Marital status:       Spouse name: Not on file    Number of children: Not on file    Years of education: Not on file    Highest education level: Not on file   Occupational History    Not on file   Social Needs    Financial resource strain: Not on file    Food insecurity     Worry: Not on file     Inability: Not on file    Transportation needs     Medical: Not on file     Non-medical: Not on file   Tobacco Use    Smoking status: Current Every Day Smoker     Packs/day: 0 50     Years: 15 00     Pack years: 7 50     Types: Cigarettes    Smokeless tobacco: Never Used    Tobacco comment: on and off for 40 yrs   Substance and Sexual Activity    Alcohol use: No     Comment: [address] [place] [address] rehab  - last drink 7/6/2020    Drug use: Yes     Types: Cocaine     Comment: last 10/2019    Sexual activity: Never   Lifestyle    Physical activity     Days per week: Not on file     Minutes per session: Not on file    Stress: Not on file   Relationships    Social connections     Talks on phone: Not on file     Gets together: Not on file     Attends Oriental orthodox service: Not on file     Active member of club or organization: Not on file     Attends meetings of clubs or organizations: Not on file     Relationship status: Not on file    Intimate partner violence     Fear of current or ex partner: Not on file     Emotionally abused: Not on file     Physically abused: Not on file     Forced sexual activity: Not on file   Other Topics Concern    Not on file   Social History Narrative    Daily caffeinated coffee consumption    Drinks caffeinated tea     Family History   Problem Relation Age of Onset    Peripheral vascular disease Mother    Lidia Sanders Arthritis Mother osteo arthritis    Coronary artery disease Mother         Atherosclerosis    Alcohol abuse Mother     Stroke Mother         CVA    Transient ischemic attack Mother     Heart disease Father     Peripheral vascular disease Father     Stroke Father         CVA    Leukemia Father     Arthritis Sister         rheumatoid    Alcohol abuse Sister     No Known Problems Sister     No Known Problems Sister     No Known Problems Sister      Bee venom; Diphenhydramine; Nsaids;  Ibuprofen; and Antihistamines, chlorpheniramine-type  Current Outpatient Medications   Medication Sig Dispense Refill    acamprosate (CAMPRAL) 333 mg tablet Take 333 mg by mouth Three times a day      albuterol (PROAIR HFA) 90 mcg/act inhaler Inhale 2 puffs every 4 (four) hours as needed for wheezing 8 5 g 5    amLODIPine (NORVASC) 5 mg tablet Take 5 mg by mouth daily       ARIPiprazole (ABILIFY) 5 mg tablet Take 2 5 mg by mouth      Ascorbic Acid (VITAMIN C) 100 MG tablet Take 100 mg by mouth daily      buprenorphine (SUBUTEX) 8 mg 8 mg 2 (two) times a day Oral       busPIRone (BUSPAR) 5 mg tablet Take 5 mg by mouth 3 (three) times a day as needed      cholecalciferol (VITAMIN D3) 1,000 units tablet Take 1,000 Units by mouth daily      disulfiram (ANTABUSE) 250 mg tablet Take 250 mg by mouth daily      DULoxetine (CYMBALTA) 60 mg delayed release capsule Take 60 mg by mouth daily      fluticasone-salmeterol (ADVAIR, WIXELA) 250-50 mcg/dose inhaler Inhale 1 puff 2 (two) times a day 1 Inhaler 0    gabapentin (NEURONTIN) 400 mg capsule Take 1 capsule (400 mg total) by mouth 3 (three) times a day 90 capsule 1    lisinopril (ZESTRIL) 20 mg tablet Take 20 mg by mouth daily      melatonin 3 mg Take 3 mg by mouth daily at bedtime as needed      methylphenidate (RITALIN LA) 10 MG 24 hr capsule Take 10 mg by mouth every morning      methylphenidate (RITALIN LA) 20 MG 24 hr capsule Take 10 mg by mouth daily      multivitamin (THERAGRAN) TABS Take 1 tablet by mouth daily at bedtime      naloxone (NARCAN) 4 mg/0 1 mL nasal spray 4 mg into each nostril as needed      Omega-3 Fatty Acids (fish oil) 1,000 mg Take 1,000 mg by mouth daily      pantoprazole (PROTONIX) 40 mg tablet Take 1 tablet by mouth 2 (two) times a day (Patient taking differently: Take 40 mg by mouth daily ) 60 tablet 3    psyllium (METAMUCIL) 58 6 % powder Take 1 packet by mouth 2 (two) times a day as needed       SUMAtriptan (IMITREX) 100 mg tablet Take 100 mg by mouth once as needed for migraine      traZODone (DESYREL) 50 mg tablet Take 50 mg by mouth daily      Turmeric 500 MG TABS Take by mouth daily at bedtime      vitamin E 100 UNIT capsule Take 100 Units by mouth daily      nystatin (MYCOSTATIN) powder Apply topically 2 (two) times a day (Patient not taking: Reported on 4/20/2021) 45 g 1    nystatin-triamcinolone (MYCOLOG-II) cream Apply topically 2 (two) times a day (Patient not taking: Reported on 4/14/2021) 30 g 0    simvastatin (ZOCOR) 20 mg tablet take 1 tablet by mouth once daily (Patient not taking: Reported on 4/14/2021) 90 tablet 0     No current facility-administered medications for this visit  Blood pressure 135/78, pulse 72, temperature (!) 97 1 °F (36 2 °C), temperature source Temporal, height 5' 4" (1 626 m), weight 65 4 kg (144 lb 3 2 oz)  PHYSICAL EXAM: Physical Exam  Constitutional:       Appearance: She is well-developed  HENT:      Head: Normocephalic and atraumatic  Nose: Nose normal    Eyes:      General: No scleral icterus  Right eye: No discharge  Left eye: No discharge  Conjunctiva/sclera: Conjunctivae normal    Neck:      Musculoskeletal: Neck supple  Thyroid: No thyromegaly  Vascular: No JVD  Trachea: No tracheal deviation  Cardiovascular:      Rate and Rhythm: Normal rate and regular rhythm  Heart sounds: Normal heart sounds  No murmur  No friction rub  No gallop      Pulmonary: Effort: Pulmonary effort is normal  No respiratory distress  Breath sounds: Normal breath sounds  No wheezing or rales  Chest:      Chest wall: No tenderness  Abdominal:      General: Bowel sounds are normal  There is no distension  Palpations: Abdomen is soft  There is no mass  Tenderness: There is no abdominal tenderness  There is no guarding or rebound  Hernia: No hernia is present  Musculoskeletal:         General: No tenderness or deformity  Lymphadenopathy:      Cervical: No cervical adenopathy  Skin:     General: Skin is warm and dry  Findings: No erythema or rash  Neurological:      Mental Status: She is alert and oriented to person, place, and time  Psychiatric:         Behavior: Behavior normal          Thought Content: Thought content normal           Lab Results   Component Value Date    WBC 7 30 03/25/2021    HGB 10 5 (L) 03/25/2021    HCT 32 7 (L) 03/25/2021    MCV 93 03/25/2021     03/25/2021     Lab Results   Component Value Date    GLUCOSE 83 12/17/2015    CALCIUM 9 1 03/25/2021     12/17/2015    K 5 0 03/25/2021    CO2 29 03/25/2021     03/25/2021    BUN 19 03/25/2021    CREATININE 1 12 03/25/2021     Lab Results   Component Value Date    ALT 25 03/17/2021    AST 19 03/17/2021    ALKPHOS 77 03/17/2021     Lab Results   Component Value Date    INR 1 04 07/23/2020    INR 0 92 09/25/2018    INR 0 94 04/24/2018    PROTIME 13 5 07/23/2020    PROTIME 12 5 09/25/2018    PROTIME 9 9 04/24/2018       No results found  ASSESSMENT & PLAN:    Gastro-esophageal reflux disease with esophagitis   Gastroesophageal reflux disease - Patient has the symptoms of chronic acid reflux for a long time  Possible hiatal hernia or LES weakness  Should rule out Murray's esophagus because of chronic symptoms         -Patient was explained about the lifestyle and dietary modifications    Advised to avoid fatty foods, chocolates, caffeine, alcohol and any other triggering foods  Avoid eating for at least 3 hours before going to bed  - schedule for EGD        - discussed about the long-term side effects from pantoprazole  Will try to change to Pepcid after EGD    Constipation, chronic   History of chronic constipation which appear to be physiologic     -  Advised to take MiraLax, stool softeners and plenty of liquids daily     -Schedule for colonoscopy  -High-fiber diet     -Patient was given instructions about the colonoscopy prep     -Patient was explained about  the risks and benefits of the procedure  Risks including but not limited to bleeding, infection, perforation were explained in detail  Also explained about less than 100% sensitivity with the exam and other alternatives  History of colon polyps  Personal history of colon polyps- patient is at increased risk for colon cancer screening  Rule out colorectal lesions including polyps or malignancy     -Schedule for colonoscopy    Anemia   Probable anemia of chronic disease  Rule out colorectal lesions or upper GI causes      -  EGD and colonoscopy

## 2021-04-20 NOTE — PROGRESS NOTES
Consultation - [facility]) [facility]  Mirna Gamez 1955 female         Chief Complaint:  Anemia    HPI:   65-year-old female with history of hypertension, hyperlipidemia, COPD, depression, recovering alcoholic was noted to be anemic during preop work for bladder prolapse surgery  Patient always has problems with some constipation  She takes Protonix regularly for acid reflux  Complaining of some burning in the chest at times  Denies any blood or mucus in the stool  Good appetite, no recent weight loss  No abdominal pain, nausea or vomiting  Denies any difficulty swallowing  She has history of colon polyps on the last colonoscopy and EGD were in January 2018  REVIEW OF SYSTEMS: Review of Systems   Constitutional: Negative for activity change, appetite change, chills, diaphoresis, fatigue, fever and unexpected weight change  HENT: Negative for ear discharge, ear pain, facial swelling, hearing loss, nosebleeds, sore throat, tinnitus and voice change  Eyes: Negative for pain, discharge, redness, itching and visual disturbance  Respiratory: Negative for apnea, cough, chest tightness, shortness of breath and wheezing  Cardiovascular: Negative for chest pain and palpitations  Gastrointestinal:        As noted in HPI   Endocrine: Negative for cold intolerance, heat intolerance and polyuria  Genitourinary: Negative for difficulty urinating, dysuria, flank pain, hematuria and urgency  Musculoskeletal: Positive for arthralgias  Negative for back pain, gait problem, joint swelling and myalgias  Skin: Negative for rash and wound  Neurological: Negative for dizziness, tremors, seizures, speech difficulty, light-headedness, numbness and headaches  Hematological: Negative for adenopathy  Does not bruise/bleed easily  Psychiatric/Behavioral: Negative for agitation, behavioral problems and confusion  The patient is not nervous/anxious           Past Medical History: Diagnosis Date    Allergic rhinitis     Amenorrhea     Anemia     Anesthesia complication     hx of cocaine use-none in 5 yrs    Anxiety     Arthritis     bilateral knees-DJD, arthritis neck and back-cervical injection 2017    Bronchitis, chronic (HCC)     Cancer (HCC)     squamous-removed from the right foot    Chronic pain disorder     neck and back    COPD (chronic obstructive [facility] disease) ([facility] [address] )     Depression     Eczema     last assessed 6/27/16, resolved 10/2/17    Fibromyalgia, primary     GERD (gastroesophageal reflux disease)     H/O ETOH abuse     None in the past 10 months since 8/1/17    Hypertension     Lumbar radiculopathy     resolved 6/6/17    Malignant melanoma of skin (HCC)     Migraine     Migraine     MRSA (methicillin resistant Staphylococcus aureus) infection     last assessed 4/29/16    Peptic ulceration     gastric    Pneumonia     Raynauds syndrome     Spinal stenosis     Squamous cell skin cancer     Right dorsal foot     Squamous cell skin cancer     Right lower leg    [facility]     Syncope     Urinary tract infection       Past Surgical History:   Procedure Laterality Date    ARTHROSCOPY KNEE Right 6/8/2017    Procedure: RIGHT KNEE ARTHROSCOPY MEDIAL MENISCECTOMY;  Surgeon: Jen Tian MD;  Location: [facility] MAIN OR;  Service:    [address] [place] [address]    COLONOSCOPY      COLONOSCOPY N/A 1/25/2018    Procedure: COLONOSCOPY;  Surgeon: Jess Cristobal MD;  Location: [facility] GI LAB; Service: Gastroenterology    DILATION AND CURETTAGE OF UTERUS      x 2 miscarriage    EPIDURAL BLOCK INJECTION N/A 12/8/2017    Procedure: INJECTION EPIDURAL STEROID CERVICAL C6-C7;  Surgeon: Sid Fink MD;  Location: [facility] MAIN OR;  Service: Pain Management     ESOPHAGOGASTRODUODENOSCOPY N/A 1/25/2018    Procedure: ESOPHAGOGASTRODUODENOSCOPY (EGD); Surgeon: Jess Cristobal MD;  Location: [facility] GI LAB;   Service: Gastroenterology    FOOT SURGERY squamous removed from the right    HAND SURGERY Bilateral     arthroplasty -thumb joint    last assessed 6/6/17    HERNIA REPAIR      inguinal    UPPER GASTROINTESTINAL ENDOSCOPY      WISDOM TOOTH EXTRACTION       Social History     Socioeconomic History    Marital status:       Spouse name: Not on file    Number of children: Not on file    Years of education: Not on file    Highest education level: Not on file   Occupational History    Not on file   Social Needs    Financial resource strain: Not on file    Food insecurity     Worry: Not on file     Inability: Not on file    Transportation needs     Medical: Not on file     Non-medical: Not on file   Tobacco Use    Smoking status: Current Every Day Smoker     Packs/day: 0 50     Years: 15 00     Pack years: 7 50     Types: Cigarettes    Smokeless tobacco: Never Used    Tobacco comment: on and off for 40 yrs   Substance and Sexual Activity    Alcohol use: No     Comment[facility]  - last drink 7/6/2020    Drug use: Yes     Types: Cocaine     Comment: last 10/2019    Sexual activity: Never   Lifestyle    Physical activity     Days per week: Not on file     Minutes per session: Not on file    Stress: Not on file   Relationships    Social connections     Talks on phone: Not on file     Gets together: Not on file     Attends [employer] service: Not on file     Active member of club or organization: Not on file     Attends meetings of clubs or organizations: Not on file     Relationship status: Not on file    Intimate partner violence     Fear of current or ex partner: Not on file     Emotionally abused: Not on file     Physically abused: Not on file     Forced sexual activity: Not on file   Other Topics Concern    Not on file   Social History Narrative    Daily caffeinated coffee consumption    Drinks caffeinated tea     Family History   Problem Relation Age of Onset    Peripheral vascular disease Mother    David Gilmore Arthritis Mother osteo arthritis    Coronary artery disease Mother         Atherosclerosis    Alcohol abuse Mother     Stroke Mother         CVA    Transient ischemic attack Mother     Heart disease Father     Peripheral vascular disease Father     Stroke Father         CVA    Leukemia Father     Arthritis Sister         rheumatoid    Alcohol abuse Sister     No Known Problems Sister     No Known Problems Sister     No Known Problems Sister      Bee venom; Diphenhydramine; Nsaids;  Ibuprofen; and Antihistamines, chlorpheniramine-type  Current Outpatient Medications   Medication Sig Dispense Refill    acamprosate (CAMPRAL) 333 mg tablet Take 333 mg by mouth Three times a day      albuterol (PROAIR HFA) 90 mcg/act inhaler Inhale 2 puffs every 4 (four) hours as needed for wheezing 8 5 g 5    amLODIPine (NORVASC) 5 mg tablet Take 5 mg by mouth daily       ARIPiprazole (ABILIFY) 5 mg tablet Take 2 5 mg by mouth      Ascorbic Acid (VITAMIN C) 100 MG tablet Take 100 mg by mouth daily      buprenorphine (SUBUTEX) 8 mg 8 mg 2 (two) times a day Oral       busPIRone (BUSPAR) 5 mg tablet Take 5 mg by mouth 3 (three) times a day as needed      cholecalciferol (VITAMIN D3) 1,000 units tablet Take 1,000 Units by mouth daily      disulfiram (ANTABUSE) 250 mg tablet Take 250 mg by mouth daily      DULoxetine (CYMBALTA) 60 mg delayed release capsule Take 60 mg by mouth daily      fluticasone-salmeterol (ADVAIR, WIXELA) 250-50 mcg/dose inhaler Inhale 1 puff 2 (two) times a day 1 Inhaler 0    gabapentin (NEURONTIN) 400 mg capsule Take 1 capsule (400 mg total) by mouth 3 (three) times a day 90 capsule 1    lisinopril (ZESTRIL) 20 mg tablet Take 20 mg by mouth daily      melatonin 3 mg Take 3 mg by mouth daily at bedtime as needed      methylphenidate (RITALIN LA) 10 MG 24 hr capsule Take 10 mg by mouth every morning      methylphenidate (RITALIN LA) 20 MG 24 hr capsule Take 10 mg by mouth daily      multivitamin (THERAGRAN) TABS Take 1 tablet by mouth daily at bedtime      naloxone (NARCAN) 4 mg/0 1 mL nasal spray 4 mg into each nostril as needed      Omega-3 Fatty Acids (fish oil) 1,000 mg Take 1,000 mg by mouth daily      pantoprazole (PROTONIX) 40 mg tablet Take 1 tablet by mouth 2 (two) times a day (Patient taking differently: Take 40 mg by mouth daily ) 60 tablet 3    psyllium (METAMUCIL) 58 6 % powder Take 1 packet by mouth 2 (two) times a day as needed       SUMAtriptan (IMITREX) 100 mg tablet Take 100 mg by mouth once as needed for migraine      traZODone (DESYREL) 50 mg tablet Take 50 mg by mouth daily      Turmeric 500 MG TABS Take by mouth daily at bedtime      vitamin E 100 UNIT capsule Take 100 Units by mouth daily      nystatin (MYCOSTATIN) powder Apply topically 2 (two) times a day (Patient not taking: Reported on 4/20/2021) 45 g 1    nystatin-triamcinolone (MYCOLOG-II) cream Apply topically 2 (two) times a day (Patient not taking: Reported on 4/14/2021) 30 g 0    simvastatin (ZOCOR) 20 mg tablet take 1 tablet by mouth once daily (Patient not taking: Reported on 4/14/2021) 90 tablet 0     No current facility-administered medications for this visit  Blood pressure 135/78, pulse 72, temperature (!) 97 1 °F (36 2 °C), temperature source Temporal, height 5' 4" (1 626 m), weight 65 4 kg (144 lb 3 2 oz)  PHYSICAL EXAM: Physical Exam  Constitutional:       Appearance: She is well-developed  HENT:      Head: Normocephalic and atraumatic  Nose: Nose normal    Eyes:      General: No scleral icterus  Right eye: No discharge  Left eye: No discharge  Conjunctiva/sclera: Conjunctivae normal    Neck:      Musculoskeletal: Neck supple  Thyroid: No thyromegaly  Vascular: No JVD  Trachea: No tracheal deviation  Cardiovascular:      Rate and Rhythm: Normal rate and regular rhythm  Heart sounds: Normal heart sounds  No murmur  No friction rub  No gallop      Pulmonary: Effort: Pulmonary effort is normal  No respiratory distress  Breath sounds: Normal breath sounds  No wheezing or rales  Chest:      Chest wall: No tenderness  Abdominal:      General: Bowel sounds are normal  There is no distension  Palpations: Abdomen is soft  There is no mass  Tenderness: There is no abdominal tenderness  There is no guarding or rebound  Hernia: No hernia is present  Musculoskeletal:         General: No tenderness or deformity  Lymphadenopathy:      Cervical: No cervical adenopathy  Skin:     General: Skin is warm and dry  Findings: No erythema or rash  Neurological:      Mental Status: She is alert and oriented to person, place, and time  Psychiatric:         Behavior: Behavior normal          Thought Content: Thought content normal           Lab Results   Component Value Date    WBC 7 30 03/25/2021    HGB 10 5 (L) 03/25/2021    HCT 32 7 (L) 03/25/2021    MCV 93 03/25/2021     03/25/2021     Lab Results   Component Value Date    GLUCOSE 83 12/17/2015    CALCIUM 9 1 03/25/2021     12/17/2015    K 5 0 03/25/2021    CO2 29 03/25/2021     03/25/2021    BUN 19 03/25/2021    CREATININE 1 12 03/25/2021     Lab Results   Component Value Date    ALT 25 03/17/2021    AST 19 03/17/2021    ALKPHOS 77 03/17/2021     Lab Results   Component Value Date    INR 1 04 07/23/2020    INR 0 92 09/25/2018    INR 0 94 04/24/2018    PROTIME 13 5 07/23/2020    PROTIME 12 5 09/25/2018    PROTIME 9 9 04/24/2018       No results found  ASSESSMENT & PLAN:    Gastro-esophageal reflux disease with esophagitis   Gastroesophageal reflux disease - Patient has the symptoms of chronic acid reflux for a long time  Possible hiatal hernia or LES weakness  Should rule out Murray's esophagus because of chronic symptoms         -Patient was explained about the lifestyle and dietary modifications    Advised to avoid fatty foods, chocolates, caffeine, alcohol and any other triggering foods  Avoid eating for at least 3 hours before going to bed  - schedule for EGD        - discussed about the long-term side effects from pantoprazole  Will try to change to Pepcid after EGD    Constipation, chronic   History of chronic constipation which appear to be physiologic     -  Advised to take MiraLax, stool softeners and plenty of liquids daily     -Schedule for colonoscopy  -High-fiber diet     -Patient was given instructions about the colonoscopy prep     -Patient was explained about  the risks and benefits of the procedure  Risks including but not limited to bleeding, infection, perforation were explained in detail  Also explained about less than 100% sensitivity with the exam and other alternatives  History of colon polyps  Personal history of colon polyps- patient is at increased risk for colon cancer screening  Rule out colorectal lesions including polyps or malignancy     -Schedule for colonoscopy    Anemia   Probable anemia of chronic disease  Rule out colorectal lesions or upper GI causes      -  EGD and colonoscopy

## 2021-04-20 NOTE — ASSESSMENT & PLAN NOTE
Gastroesophageal reflux disease - Patient has the symptoms of chronic acid reflux for a long time  Possible hiatal hernia or LES weakness  Should rule out Murray's esophagus because of chronic symptoms         -Patient was explained about the lifestyle and dietary modifications  Advised to avoid fatty foods, chocolates, caffeine, alcohol and any other triggering foods  Avoid eating for at least 3 hours before going to bed  - schedule for EGD        - discussed about the long-term side effects from pantoprazole    Will try to change to Pepcid after EGD

## 2021-04-21 ENCOUNTER — APPOINTMENT (OUTPATIENT)
Dept: PHYSICAL THERAPY | Facility: CLINIC | Age: 66
End: 2021-04-21
Payer: COMMERCIAL

## 2021-04-23 ENCOUNTER — TELEPHONE (OUTPATIENT)
Dept: CARDIOLOGY CLINIC | Facility: CLINIC | Age: 66
End: 2021-04-23

## 2021-04-26 ENCOUNTER — OFFICE VISIT (OUTPATIENT)
Dept: PHYSICAL THERAPY | Facility: CLINIC | Age: 66
End: 2021-04-26
Payer: COMMERCIAL

## 2021-04-26 ENCOUNTER — IMMUNIZATIONS (OUTPATIENT)
Dept: FAMILY MEDICINE CLINIC | Facility: HOSPITAL | Age: 66
End: 2021-04-26

## 2021-04-26 DIAGNOSIS — M54.16 LUMBAR RADICULOPATHY: ICD-10-CM

## 2021-04-26 DIAGNOSIS — R42 DIZZINESS: ICD-10-CM

## 2021-04-26 DIAGNOSIS — S13.4XXA ACUTE WHIPLASH INJURY, INITIAL ENCOUNTER: ICD-10-CM

## 2021-04-26 DIAGNOSIS — M47.812 SPONDYLOSIS OF CERVICAL REGION WITHOUT MYELOPATHY OR RADICULOPATHY: Primary | ICD-10-CM

## 2021-04-26 DIAGNOSIS — Z23 ENCOUNTER FOR IMMUNIZATION: Primary | ICD-10-CM

## 2021-04-26 PROCEDURE — 97112 NEUROMUSCULAR REEDUCATION: CPT

## 2021-04-26 PROCEDURE — 91301 SARS-COV-2 / COVID-19 MRNA VACCINE (MODERNA) 100 MCG: CPT

## 2021-04-26 PROCEDURE — 0012A SARS-COV-2 / COVID-19 MRNA VACCINE (MODERNA) 100 MCG: CPT

## 2021-04-26 PROCEDURE — 97140 MANUAL THERAPY 1/> REGIONS: CPT

## 2021-04-26 PROCEDURE — 97110 THERAPEUTIC EXERCISES: CPT

## 2021-04-26 RX ORDER — GABAPENTIN 400 MG/1
800 CAPSULE ORAL 2 TIMES DAILY
Qty: 120 CAPSULE | Refills: 1 | Status: SHIPPED | OUTPATIENT
Start: 2021-04-26 | End: 2021-09-29 | Stop reason: SDUPTHER

## 2021-04-26 NOTE — PROGRESS NOTES
Daily Note     Today's date: 2021  Patient name: Aj Echeverria  : 1955  MRN: [id number]  Referring provider: Jayce Kingston DO  Dx:   Encounter Diagnosis     ICD-10-CM    1  Spondylosis of cervical region without myelopathy or radiculopathy  M47 812    2  Acute whiplash injury, initial encounter  S13  4XXA    3  Dizziness  R42        Start Time: 1450  Stop Time: 1530  Total time in clinic (min): 40 minutes    Subjective: Pt reports she got her 2nd COVID shot today, lot of pain in her neck today, -7/10 currently  Pt states that she felt a little bit better after her last visit  Doesn't think anything she did caused the increased pain  Pt is getting an MRI on her neck tomorrow  Objective: See treatment diary below      Assessment: Tolerated treatment well  Patient demonstrated fatigue post treatment, exhibited good technique with therapeutic exercises and would benefit from continued PT  Pt responded well with no money and c/s rotations w/towels to help improve posture and c/s mobility  Plan: Continue per plan of care  Progress treatment as tolerated         Precautions:   Past Medical History:   Diagnosis Date    Allergic rhinitis     Amenorrhea     Anesthesia complication     hx of cocaine use-none in 5 yrs    Anxiety     Arthritis     bilateral knees-DJD, arthritis neck and back-cervical injection     Bronchitis, chronic (HCC)     Cancer (HCC)     squamous-removed from the right foot    Chronic pain disorder     neck and back    COPD (chronic obstructive pulmonary disease) ([facility] [address] )     Depression     Eczema     last assessed 16, resolved 10/2/17    Fibromyalgia, primary     GERD (gastroesophageal reflux disease)     H/O ETOH abuse     None in the past 10 months since 17    Hypertension     Lumbar radiculopathy     resolved 17    Malignant melanoma of skin (HCC)     Migraine     Migraine     MRSA (methicillin resistant Staphylococcus aureus) infection     last assessed 4/29/16    Peptic ulceration     gastric    Pneumonia     Raynauds syndrome     Spinal stenosis     Squamous cell skin cancer     Right dorsal foot     Squamous cell skin cancer     Right lower leg    Substance abuse ([address] )     Syncope     Urinary tract infection      Manual: Suboccipital release- 5 mins  Retraction w/traction- 5 mins    TE: Thoracic extension in seating- 15x5s   C/s rot w/towel- 10x10s ea    NM: Retraction w/extension- 20x   Scap retraction- 20x  No Money- 15x RTB  Posture overcorrect- 20x      Outcome Assessments 2/24 3/3 3/24 4/19 4/26     mCTSIB  EO firm: 30 sec  EC firm: 30 sec  EO foam: 30 sec  EC foam: 30 sec (+) sway EO firm: 30 sec  EC firm: 30 sec  EO foam: 30 sec  EC foam: 30 sec (+) sway       10MWT  8 22 sec  1 27 m/s 7 85 sec   1 3 m/s       6MWT  1175 feet Deferred time       FGA  21/30 (increased risk for falls) Deferred time       DHI  30/100 low perception of handicapped Deferred time       Oculomotor screen Performed  Performed

## 2021-04-27 ENCOUNTER — HOSPITAL ENCOUNTER (OUTPATIENT)
Dept: RADIOLOGY | Facility: HOSPITAL | Age: 66
Discharge: HOME | End: 2021-04-27
Attending: ORTHOPAEDIC SURGERY
Payer: MEDICARE

## 2021-04-27 DIAGNOSIS — M47.812 SPONDYLOSIS OF CERVICAL REGION WITHOUT MYELOPATHY OR RADICULOPATHY: ICD-10-CM

## 2021-04-27 PROCEDURE — 72141 MRI NECK SPINE W/O DYE: CPT

## 2021-04-27 PROCEDURE — G1004 CDSM NDSC: HCPCS

## 2021-04-28 ENCOUNTER — APPOINTMENT (OUTPATIENT)
Dept: PHYSICAL THERAPY | Facility: CLINIC | Age: 66
End: 2021-04-28
Payer: COMMERCIAL

## 2021-04-28 RX ORDER — GABAPENTIN 400 MG/1
400 CAPSULE ORAL
COMMUNITY
End: 2021-07-01

## 2021-04-28 RX ORDER — ASPIRIN 81 MG/1
81 TABLET ORAL DAILY
COMMUNITY

## 2021-04-28 RX ORDER — SENNOSIDES 8.6 MG
1300 CAPSULE ORAL EVERY 8 HOURS PRN
COMMUNITY
End: 2022-05-25 | Stop reason: ALTCHOICE

## 2021-04-28 RX ORDER — MULTIVIT-MIN/IRON/FOLIC ACID/K 18-600-40
CAPSULE ORAL
COMMUNITY

## 2021-04-28 NOTE — PRE-PROCEDURE INSTRUCTIONS
Pre-Surgery Instructions:   Medication Instructions    acetaminophen (Tylenol 8 Hour) 650 mg CR tablet Instructed patient per Anesthesia Guidelines   albuterol (PROAIR HFA) 90 mcg/act inhaler Pt may use the am of the procedures    aspirin (ECOTRIN LOW STRENGTH) 81 mg EC tablet Instructed patient per Anesthesia Guidelines   bisacodyl (DULCOLAX) 5 mg EC tablet Patient was instructed by Physician and understands   buprenorphine (SUBUTEX) 8 mg Pt to take the am of the procedures    Cholecalciferol (Vitamin D) 50 MCG (2000 UT) CAPS Instructed patient per Anesthesia Guidelines   disulfiram (ANTABUSE) 250 mg tablet Instructed patient per Anesthesia Guidelines   fluticasone-salmeterol (ADVAIR, WIXELA) 250-50 mcg/dose inhaler Pt to take the am of the procedures    gabapentin (NEURONTIN) 400 mg capsule Pt to take the am of the procedures    gabapentin (NEURONTIN) 400 mg capsule Instructed patient per Anesthesia Guidelines   lisinopril (ZESTRIL) 20 mg tablet Instructed patient per Anesthesia Guidelines   magnesium citrate solution Patient was instructed by Physician and understands   MELATONIN PO Instructed patient per Anesthesia Guidelines   methylphenidate (RITALIN LA) 10 MG 24 hr capsule Instructed patient per Anesthesia Guidelines   multivitamin (THERAGRAN) TABS Pt to stop one week prior    naloxone (NARCAN) 4 mg/0 1 mL nasal spray Instructed patient per Anesthesia Guidelines   Omega-3 Fatty Acids (fish oil) 1,000 mg Instructed patient per Anesthesia Guidelines   pantoprazole (PROTONIX) 40 mg tablet Instructed patient per Anesthesia Guidelines   Polyethylene Glycol 3350 (MIRALAX PO) Patient was instructed by Physician and understands   SUMAtriptan (IMITREX) 100 mg tablet Instructed patient per Anesthesia Guidelines   TURMERIC PO Pt to stop one week prior    [DISCONTINUED] melatonin 3 mg Instructed patient per Anesthesia Guidelines  Pt to follow Dr Denise Mcpherson instructions  Pt to have the covid screening on 4/29/21 at the [facility]    unknown at time of GI interview

## 2021-04-29 DIAGNOSIS — Z11.59 SCREENING FOR VIRAL DISEASE: ICD-10-CM

## 2021-04-29 PROCEDURE — U0005 INFEC AGEN DETEC AMPLI PROBE: HCPCS | Performed by: INTERNAL MEDICINE

## 2021-04-29 PROCEDURE — U0003 INFECTIOUS AGENT DETECTION BY NUCLEIC ACID (DNA OR RNA); SEVERE ACUTE RESPIRATORY SYNDROME CORONAVIRUS 2 (SARS-COV-2) (CORONAVIRUS DISEASE [COVID-19]), AMPLIFIED PROBE TECHNIQUE, MAKING USE OF HIGH THROUGHPUT TECHNOLOGIES AS DESCRIBED BY CMS-2020-01-R: HCPCS | Performed by: INTERNAL MEDICINE

## 2021-04-30 LAB — SARS-COV-2 RNA RESP QL NAA+PROBE: NEGATIVE

## 2021-05-03 ENCOUNTER — TELEPHONE (OUTPATIENT)
Dept: OBGYN CLINIC | Facility: CLINIC | Age: 66
End: 2021-05-03

## 2021-05-03 NOTE — TELEPHONE ENCOUNTER
----- Message from Eri Hector DO sent at 5/3/2021  8:24 AM EDT -----  Nae Vergara had an MRIOf the cervical spine which shows significant arthritis and spinal stenosis  Would like for her to follow-up with Dr Miguel Ángel Bennett at pain management to help with the pain in her neck  He will review the MRI with her and discuss treatment options

## 2021-05-04 ENCOUNTER — TELEPHONE (OUTPATIENT)
Dept: OTHER | Facility: OTHER | Age: 66
End: 2021-05-04

## 2021-05-04 ENCOUNTER — ANESTHESIA EVENT (OUTPATIENT)
Dept: GASTROENTEROLOGY | Facility: AMBULARY SURGERY CENTER | Age: 66
End: 2021-05-04

## 2021-05-04 NOTE — TELEPHONE ENCOUNTER
Patient is calling because   the magnesium has alcohol in it and she cant have any alcohol with her current medications  She states she needs an alternative  Patient has a colonoscopy tomorrow nd will need this to be changed and sen to her pharmacy

## 2021-05-05 ENCOUNTER — ANESTHESIA (OUTPATIENT)
Dept: GASTROENTEROLOGY | Facility: AMBULARY SURGERY CENTER | Age: 66
End: 2021-05-05

## 2021-05-05 ENCOUNTER — HOSPITAL ENCOUNTER (OUTPATIENT)
Dept: GASTROENTEROLOGY | Facility: AMBULARY SURGERY CENTER | Age: 66
Setting detail: OUTPATIENT SURGERY
Discharge: HOME | End: 2021-05-05
Attending: INTERNAL MEDICINE
Payer: MEDICARE

## 2021-05-05 ENCOUNTER — TELEPHONE (OUTPATIENT)
Dept: OTHER | Facility: OTHER | Age: 66
End: 2021-05-05

## 2021-05-05 VITALS
SYSTOLIC BLOOD PRESSURE: 90 MMHG | HEIGHT: 64 IN | TEMPERATURE: 97.1 F | OXYGEN SATURATION: 95 % | DIASTOLIC BLOOD PRESSURE: 54 MMHG | BODY MASS INDEX: 24.59 KG/M2 | WEIGHT: 144 LBS | HEART RATE: 57 BPM | RESPIRATION RATE: 18 BRPM

## 2021-05-05 DIAGNOSIS — Z86.010 HISTORY OF COLON POLYPS: ICD-10-CM

## 2021-05-05 DIAGNOSIS — K21.00 GASTROESOPHAGEAL REFLUX DISEASE WITH ESOPHAGITIS, UNSPECIFIED WHETHER HEMORRHAGE: ICD-10-CM

## 2021-05-05 PROCEDURE — 88305 TISSUE EXAM BY PATHOLOGIST: CPT | Performed by: PATHOLOGY

## 2021-05-05 PROCEDURE — 43239 EGD BIOPSY SINGLE/MULTIPLE: CPT | Performed by: INTERNAL MEDICINE

## 2021-05-05 RX ORDER — PROPOFOL 10 MG/ML
INJECTION, EMULSION INTRAVENOUS CONTINUOUS PRN
Status: DISCONTINUED | OUTPATIENT
Start: 2021-05-05 | End: 2021-05-05

## 2021-05-05 RX ORDER — SODIUM CHLORIDE, SODIUM LACTATE, POTASSIUM CHLORIDE, CALCIUM CHLORIDE 600; 310; 30; 20 MG/100ML; MG/100ML; MG/100ML; MG/100ML
125 INJECTION, SOLUTION INTRAVENOUS CONTINUOUS
Status: DISCONTINUED | OUTPATIENT
Start: 2021-05-05 | End: 2021-05-09 | Stop reason: HOSPADM

## 2021-05-05 RX ORDER — LIDOCAINE HYDROCHLORIDE 20 MG/ML
INJECTION, SOLUTION EPIDURAL; INFILTRATION; INTRACAUDAL; PERINEURAL AS NEEDED
Status: DISCONTINUED | OUTPATIENT
Start: 2021-05-05 | End: 2021-05-05

## 2021-05-05 RX ORDER — MIDAZOLAM HYDROCHLORIDE 2 MG/2ML
INJECTION, SOLUTION INTRAMUSCULAR; INTRAVENOUS AS NEEDED
Status: DISCONTINUED | OUTPATIENT
Start: 2021-05-05 | End: 2021-05-05

## 2021-05-05 RX ADMIN — SODIUM CHLORIDE, SODIUM LACTATE, POTASSIUM CHLORIDE, AND CALCIUM CHLORIDE: .6; .31; .03; .02 INJECTION, SOLUTION INTRAVENOUS at 09:53

## 2021-05-05 RX ADMIN — PROPOFOL 120 MCG/KG/MIN: 10 INJECTION, EMULSION INTRAVENOUS at 10:04

## 2021-05-05 RX ADMIN — MIDAZOLAM HYDROCHLORIDE 100 MG: 2 INJECTION, SOLUTION INTRAMUSCULAR; INTRAVENOUS at 10:04

## 2021-05-05 RX ADMIN — LIDOCAINE HYDROCHLORIDE 100 MG: 20 INJECTION, SOLUTION EPIDURAL; INFILTRATION; INTRACAUDAL; PERINEURAL at 10:04

## 2021-05-05 NOTE — INTERVAL H&P NOTE
H&P reviewed  After examining the patient I find no changes in the patients condition since the H&P had been written      Vitals:    05/05/21 0945   BP: 112/53   Pulse: 69   Resp: 20   Temp: (!) 97 1 °F (36 2 °C)   SpO2: 98%

## 2021-05-05 NOTE — ANESTHESIA POSTPROCEDURE EVALUATION
Post-Op Assessment Note    No complications documented  BP (!) 87/51 (05/05/21 1040)    Temp     Pulse 65 (05/05/21 1040)   Resp 18 (05/05/21 1040)    SpO2 99 % (05/05/21 1040)      Hypotension post procedure, 75/48  Ephedrine 5mg  Phenylephrine 100mcg  Dr Blankenship Rounds aware  LR Bolus 250cc  Will observe longer prior to discharge

## 2021-05-05 NOTE — TELEPHONE ENCOUNTER
Hung Text:    #: [phone] / Campbell Lerma Manual / 1955 / Dr Jennifer Peterson / Pt states she is supposed to have a colonoscopy tomorrow and she stated she has been throwing up everything that she has drank and has been dry heaving  She stated she didnt think that was supposed to happen and is not feeling good  She is concerned because of how unwell she is currently feeling

## 2021-05-05 NOTE — TELEPHONE ENCOUNTER
[phone] / Pt Mikayla Miles  [birth year]/Pt was unable to keep the Miralax down  she begin to vomit  Her colonoscopy is for 930 and she is worried that she is not prepped enough  Pt wants to know what to do    Dr Joe Tang was paged at [phone]    Please allow 20-30 mins for the provider to call you back  If you do not hear from the provider, please call us back

## 2021-05-05 NOTE — PROGRESS NOTES
Colonoscopy was canceled because patient could not complete the prep and having soft to loose brown stools witnessed

## 2021-05-05 NOTE — ANESTHESIA PREPROCEDURE EVALUATION
Procedure:  COLONOSCOPY  EGD    Relevant Problems   CARDIO   (+) Exertional shortness of breath      HEMATOLOGY   (+) Anemia      MUSCULOSKELETAL   (+) Chronic bilateral low back pain without sciatica   (+) Degeneration of intervertebral disc of mid-cervical region   (+) Degenerative arthritis of knee   (+) Localized osteoarthritis of right knee   (+) Lumbar spondylosis   (+) Spondylosis of cervical region without myelopathy or radiculopathy      NEURO/PSYCH   (+) Chronic pain syndrome   (+) Depression with anxiety   (+) TANYA (generalized anxiety disorder)   (+) History of alcohol dependence (HCC)   (+) History of colon polyps   (+) History of diarrhea   (+) PTSD (post-traumatic stress disorder)      PULMONARY   (+) Asthma   (+) COPD (chronic obstructive pulmonary disease) (HCC)   (+) Chronic obstructive pulmonary disease (HCC)   (+) Exertional shortness of breath   (+) Mild intermittent asthma without complication   (+) SOB (shortness of breath)        Physical Exam    Airway    Mallampati score: II  TM Distance: >3 FB  Neck ROM: full     Dental   No notable dental hx     Cardiovascular  Rhythm: regular, Rate: normal, Cardiovascular exam normal    Pulmonary  Breath sounds clear to auscultation, Decreased breath sounds,     Other Findings        Anesthesia Plan  ASA Score- 3     Anesthesia Type- IV sedation with anesthesia with ASA Monitors  Additional Monitors:   Airway Plan:           Plan Factors-Exercise tolerance (METS): >4 METS  Chart reviewed  EKG reviewed  Existing labs reviewed  Patient summary reviewed  Patient is a current smoker  Patient instructed to abstain from smoking on day of procedure  Patient did not smoke on day of surgery  Induction- intravenous  Postoperative Plan- Plan for postoperative opioid use  Informed Consent- Anesthetic plan and risks discussed with patient  I personally reviewed this patient with the CRNA   Discussed and agreed on the Anesthesia Plan with the NIKOLAY Hatch

## 2021-05-06 ENCOUNTER — CONSULT (OUTPATIENT)
Dept: PAIN MEDICINE | Facility: CLINIC | Age: 66
End: 2021-05-06
Payer: MEDICARE

## 2021-05-06 VITALS
WEIGHT: 145.2 LBS | SYSTOLIC BLOOD PRESSURE: 104 MMHG | BODY MASS INDEX: 24.79 KG/M2 | DIASTOLIC BLOOD PRESSURE: 65 MMHG | HEIGHT: 64 IN | HEART RATE: 65 BPM

## 2021-05-06 DIAGNOSIS — M54.2 NECK PAIN: ICD-10-CM

## 2021-05-06 DIAGNOSIS — M48.02 CERVICAL SPINAL STENOSIS: ICD-10-CM

## 2021-05-06 DIAGNOSIS — G89.4 CHRONIC PAIN SYNDROME: Primary | ICD-10-CM

## 2021-05-06 DIAGNOSIS — M47.812 CERVICAL SPONDYLOSIS: ICD-10-CM

## 2021-05-06 PROCEDURE — 99204 OFFICE O/P NEW MOD 45 MIN: CPT | Performed by: ANESTHESIOLOGY
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
## 2021-05-07 ENCOUNTER — TELEPHONE (OUTPATIENT)
Dept: GASTROENTEROLOGY | Facility: AMBULARY SURGERY CENTER | Age: 66
End: 2021-05-07

## 2021-05-07 NOTE — TELEPHONE ENCOUNTER
----- Message from Neto Panchal MD sent at 5/7/2021  1:10 PM EDT -----  Distal esophageal biopsies are benign and negative for Murray's     schedule for colonoscopy with 2 day bowel prep

## 2021-05-07 NOTE — TELEPHONE ENCOUNTER
Pt aware of results and verbalized understanding  Pt aware 2 day prep, Sixto Kumari will be calling

## 2021-05-20 ENCOUNTER — TELEPHONE (OUTPATIENT)
Dept: CARDIOLOGY CLINIC | Facility: CLINIC | Age: 66
End: 2021-05-20

## 2021-05-20 NOTE — TELEPHONE ENCOUNTER
Called pt  She states that Sean called her and she needs to charge her device after work today  She will take care of it

## 2021-05-25 ENCOUNTER — TELEPHONE (OUTPATIENT)
Dept: GASTROENTEROLOGY | Facility: AMBULARY SURGERY CENTER | Age: 66
End: 2021-05-25

## 2021-05-25 ENCOUNTER — OFFICE VISIT (OUTPATIENT)
Dept: PULMONOLOGY | Facility: MEDICAL CENTER | Age: 66
End: 2021-05-25
Payer: MEDICARE

## 2021-05-25 VITALS
HEART RATE: 72 BPM | WEIGHT: 140 LBS | TEMPERATURE: 97.3 F | BODY MASS INDEX: 23.9 KG/M2 | OXYGEN SATURATION: 100 % | SYSTOLIC BLOOD PRESSURE: 140 MMHG | RESPIRATION RATE: 12 BRPM | HEIGHT: 64 IN | DIASTOLIC BLOOD PRESSURE: 82 MMHG

## 2021-05-25 DIAGNOSIS — G47.29: ICD-10-CM

## 2021-05-25 DIAGNOSIS — G89.4 CHRONIC PAIN SYNDROME: ICD-10-CM

## 2021-05-25 DIAGNOSIS — J44.9 CHRONIC OBSTRUCTIVE PULMONARY DISEASE, UNSPECIFIED COPD TYPE (HCC): Primary | ICD-10-CM

## 2021-05-25 DIAGNOSIS — R40.0 DAYTIME SLEEPINESS: ICD-10-CM

## 2021-05-25 DIAGNOSIS — F11.20 OPIOID USE DISORDER, SEVERE, ON MAINTENANCE THERAPY (HCC): ICD-10-CM

## 2021-05-25 DIAGNOSIS — G47.19 EXCESSIVE DAYTIME SLEEPINESS: ICD-10-CM

## 2021-05-25 DIAGNOSIS — F51.12 INSUFFICIENT SLEEP SYNDROME: ICD-10-CM

## 2021-05-25 PROCEDURE — 99205 OFFICE O/P NEW HI 60 MIN: CPT | Performed by: INTERNAL MEDICINE

## 2021-05-25 NOTE — TELEPHONE ENCOUNTER
Patient is scheduled for colonoscopy on August 5 , 2021 at [address] [place] [address] with Truitt Cooks, MD  Patient is aware of pre-procedure prep of MIRALAX/DULCOLAX/MAG CITRATE 2DAY BOWEL PREP and they will be called the day prior between 2 and 6 pm for time to report for procedure   COVID TEST 7/30/2021/PT AWARE
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
## 2021-05-26 ENCOUNTER — OFFICE VISIT (OUTPATIENT)
Dept: OBGYN CLINIC | Facility: CLINIC | Age: 66
End: 2021-05-26
Payer: MEDICARE

## 2021-05-26 VITALS
BODY MASS INDEX: 23.9 KG/M2 | HEIGHT: 64 IN | HEART RATE: 101 BPM | DIASTOLIC BLOOD PRESSURE: 66 MMHG | SYSTOLIC BLOOD PRESSURE: 107 MMHG | WEIGHT: 140 LBS

## 2021-05-26 DIAGNOSIS — M48.02 CERVICAL SPINAL STENOSIS: ICD-10-CM

## 2021-05-26 DIAGNOSIS — M50.320 DEGENERATION OF INTERVERTEBRAL DISC OF MID-CERVICAL REGION, UNSPECIFIED SPINAL LEVEL: Primary | ICD-10-CM

## 2021-05-26 DIAGNOSIS — M47.812 CERVICAL SPONDYLOSIS: ICD-10-CM

## 2021-05-26 PROCEDURE — 99213 OFFICE O/P EST LOW 20 MIN: CPT | Performed by: ORTHOPAEDIC SURGERY

## 2021-05-26 NOTE — PROGRESS NOTES
Assessment:    Cervical degenerative disc disease,  Multilevel,  More specifically at the C1-2 junction (worse on the left) where there was significant arthritis progressed to the point that it is limiting her ability to rotate neck  Cervical bilateral foraminal stenosis, C3-4, C4-5, C5-6    Plan:    From our standpoint, there is no corrective surgery that would predictably decrease her pain, or decrease the amount of degenerative disease at the C1-C2 level  Our recommendation is long-term pain management in the way of oral medications and injections  She can follow up with us on a p r n  basis  Problem List Items Addressed This Visit        Musculoskeletal and Integument    Degeneration of intervertebral disc of mid-cervical region - Primary    Cervical spondylosis       Other    Cervical spinal stenosis                   Subjective:     Patient ID:  Marietta Melgar is a 61 y o  female    HPI    28-year-old female presenting for initial evaluation  She has been referred by Dr Tarah Barry for evaluation of her cervical spine  According to the patient, she has a longstanding history of chronic posterior neck pain and limited range of motion that has recently gotten worse over the last several months  She states her neck it is painful and sore when she sleeps when she does activities and when she does simple things like brushing her teeth  There was no injury  Pain does not radiate to the upper extremities  It does radiate proximally towards the back of the head  Injections have not helped her to date  She is unsure well to do for her problems  The following portions of the patient's history were reviewed and updated as appropriate: allergies, current medications, past family history, past medical history, past social history, past surgical history and problem list     Review of Systems   Constitutional: Positive for activity change   Negative for chills, diaphoresis, fatigue and fever  Respiratory: Negative  Cardiovascular: Negative  Gastrointestinal: Negative  Musculoskeletal: Positive for arthralgias, neck pain and neck stiffness  Skin: Negative  Neurological: Positive for numbness and headaches  All other systems reviewed and are negative  Objective:    Imaging:  MRI CERVICAL SPINE   IMPRESSION:     Progression of degenerative changes of the cervical spine since remote exam 7/13/2017  Striking degenerative change to the left at the C1-C2 facet joint, ankylosis of the right C2-C3 facet joint      Progressive stenosis of the right C3-4, bilateral C4-5 and right C5-6 foramen  Correlate for right C4, bilateral C5 and right C6 radiculitis respectively  Vitals:    05/26/21 1441   BP: 107/66   Pulse: 101           Physical Exam  Vitals signs and nursing note reviewed  Constitutional:       General: She is not in acute distress  Appearance: Normal appearance  She is normal weight  She is not ill-appearing, toxic-appearing or diaphoretic  HENT:      Head: Normocephalic and atraumatic  Eyes:      General:         Right eye: No discharge  Left eye: No discharge  Pulmonary:      Effort: Pulmonary effort is normal  No respiratory distress  Musculoskeletal:         General: Tenderness present  Skin:     General: Skin is warm and dry  Neurological:      General: No focal deficit present  Mental Status: She is alert and oriented to person, place, and time  Motor: No weakness  Gait: Gait normal    Psychiatric:         Mood and Affect: Mood normal          Behavior: Behavior normal         NAD  Gait without assistance    Inspection no open wounds or erythema  Significant reduction in the ability to rotate neck, worse to the left  Able to flex and extend  Strength 5/5 C5-T1 bilaterally  Sensation equal and intact  Negative Gibson's bilaterally
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
## 2021-05-28 ENCOUNTER — OFFICE VISIT (OUTPATIENT)
Dept: FAMILY MEDICINE CLINIC | Facility: CLINIC | Age: 66
End: 2021-05-28
Payer: MEDICARE

## 2021-05-28 VITALS
BODY MASS INDEX: 24.41 KG/M2 | OXYGEN SATURATION: 97 % | HEIGHT: 64 IN | DIASTOLIC BLOOD PRESSURE: 70 MMHG | WEIGHT: 143 LBS | TEMPERATURE: 98 F | SYSTOLIC BLOOD PRESSURE: 102 MMHG | HEART RATE: 83 BPM | RESPIRATION RATE: 14 BRPM

## 2021-05-28 DIAGNOSIS — J20.9 ACUTE BRONCHITIS, UNSPECIFIED ORGANISM: Primary | ICD-10-CM

## 2021-05-28 DIAGNOSIS — K21.00 GASTRO-ESOPHAGEAL REFLUX DISEASE WITH ESOPHAGITIS: ICD-10-CM

## 2021-05-28 DIAGNOSIS — J44.9 CHRONIC OBSTRUCTIVE PULMONARY DISEASE, UNSPECIFIED COPD TYPE (HCC): ICD-10-CM

## 2021-05-28 PROCEDURE — 99213 OFFICE O/P EST LOW 20 MIN: CPT | Performed by: FAMILY MEDICINE

## 2021-05-28 RX ORDER — AZITHROMYCIN 250 MG/1
TABLET, FILM COATED ORAL
Qty: 6 TABLET | Refills: 0 | Status: SHIPPED | OUTPATIENT
Start: 2021-05-28 | End: 2021-06-01

## 2021-05-28 RX ORDER — PANTOPRAZOLE SODIUM 40 MG/1
40 TABLET, DELAYED RELEASE ORAL EVERY MORNING
Qty: 30 TABLET | Refills: 0 | Status: SHIPPED | OUTPATIENT
Start: 2021-05-28 | End: 2021-12-15 | Stop reason: SDUPTHER

## 2021-05-28 NOTE — PROGRESS NOTES
Assessment/Plan:    1  Acute bronchitis, unspecified organism  -     azithromycin (ZITHROMAX) 250 mg tablet; Take 2 tablets today then 1 tablet daily x 4 days    2  Gastro-esophageal reflux disease with esophagitis  -     pantoprazole (PROTONIX) 40 mg tablet; Take 1 tablet (40 mg total) by mouth every morning    3  Chronic obstructive pulmonary disease, unspecified COPD type ([facility] [address] )        Acute bronchitis with copd: will treat with zpak  Patient advised to monitor for any shortness of breath or worsening wheezing  Continue with inhaler  Patient advise side effects and precautions  Possible prednisone if no improvement  Agrees with plan  GERD: needs refill of protonix  Return in about 4 days (around 6/1/2021)  Subjective:      Patient ID: Aj Echeverria is a 61 y o  female  Chief Complaint   Patient presents with    Cough     had for about a week, painful to cough       HPI  60 y/o female presents with cough and wheezing for the past 1 week  She states wheezing for about 1 5 weeks  Cough started about 5 days ago  She is bringing up phlegm  Coughing and wheezing causing her to have some shortness of breath  Took her inhalers and tylenol with no relief  No change in appetite  Fully vaccinated for covid  Denies any fevers, chills, abdominal pain, diarrhea, or vomiting  Has copd and uses advair  She also currently smokes half a pack a day  The following portions of the patient's history were reviewed and updated as appropriate: allergies, current medications, past family history, past medical history, past social history, past surgical history and problem list     Review of Systems   Constitutional: Negative for fever  HENT: Negative for congestion and sore throat  Eyes: Negative for visual disturbance  Respiratory: Positive for cough and shortness of breath  Cardiovascular: Negative for chest pain  Gastrointestinal: Negative for abdominal pain, constipation, nausea and vomiting  Musculoskeletal: Negative for back pain  Neurological: Negative for dizziness, weakness, light-headedness and headaches  Psychiatric/Behavioral: Negative for agitation           Current Outpatient Medications   Medication Sig Dispense Refill    acetaminophen (Tylenol 8 Hour) 650 mg CR tablet Take 1,300 mg by mouth 2 (two) times a day      albuterol (PROAIR HFA) 90 mcg/act inhaler Inhale 2 puffs every 4 (four) hours as needed for wheezing 8 5 g 5    aspirin (ECOTRIN LOW STRENGTH) 81 mg EC tablet Take 81 mg by mouth daily      buprenorphine (SUBUTEX) 8 mg 8 mg 2 (two) times a day SL      Cholecalciferol (Vitamin D) 50 MCG (2000 UT) CAPS Take by mouth daily at bedtime      disulfiram (ANTABUSE) 250 mg tablet Take 125 mg by mouth every morning       fluticasone-salmeterol (ADVAIR, WIXELA) 250-50 mcg/dose inhaler Inhale 1 puff 2 (two) times a day 1 Inhaler 0    gabapentin (NEURONTIN) 400 mg capsule Take 2 capsules (800 mg total) by mouth 2 (two) times a day (Patient taking differently: Take 800 mg by mouth every morning ) 120 capsule 1    gabapentin (NEURONTIN) 400 mg capsule Take 400 mg by mouth daily at bedtime      lisinopril (ZESTRIL) 20 mg tablet Take 20 mg by mouth every morning       MELATONIN PO Take 2 5 mg by mouth daily at bedtime as needed      methylphenidate (RITALIN LA) 10 MG 24 hr capsule Take 10 mg by mouth every morning      multivitamin (THERAGRAN) TABS Take 1 tablet by mouth daily at bedtime      naloxone (NARCAN) 4 mg/0 1 mL nasal spray 4 mg into each nostril as needed      pantoprazole (PROTONIX) 40 mg tablet Take 1 tablet (40 mg total) by mouth every morning 30 tablet 0    SUMAtriptan (IMITREX) 100 mg tablet Take 100 mg by mouth once as needed for migraine      TURMERIC PO Take 1,000 mg by mouth 2 (two) times a day      azithromycin (ZITHROMAX) 250 mg tablet Take 2 tablets today then 1 tablet daily x 4 days 6 tablet 0    Omega-3 Fatty Acids (fish oil) 1,000 mg Take 1,000 mg by mouth daily       No current facility-administered medications for this visit  Objective:    /70 (BP Location: Left arm, Patient Position: Sitting, Cuff Size: Standard)   Pulse 83   Temp 98 °F (36 7 °C) (Temporal)   Resp 14   Ht 5' 4" (1 626 m)   Wt 64 9 kg (143 lb)   SpO2 97%   BMI 24 55 kg/m²        Physical Exam  HENT:      Head: Normocephalic and atraumatic  Right Ear: Tympanic membrane normal  There is no impacted cerumen  Left Ear: Tympanic membrane normal  There is no impacted cerumen  Nose: No congestion  Mouth/Throat:      Pharynx: Oropharynx is clear  No oropharyngeal exudate  Eyes:      General:         Right eye: No discharge  Left eye: No discharge  Conjunctiva/sclera: Conjunctivae normal    Cardiovascular:      Rate and Rhythm: Normal rate and regular rhythm  Pulses: Normal pulses  Heart sounds: Normal heart sounds  No murmur  Pulmonary:      Effort: Pulmonary effort is normal       Breath sounds: Wheezing present  Comments: Expiratory wheezing noted through out lung fields   Musculoskeletal:         General: No swelling  Right lower leg: No edema  Left lower leg: No edema  Lymphadenopathy:      Cervical: No cervical adenopathy  Neurological:      General: No focal deficit present  Mental Status: She is alert  Mental status is at baseline  Cranial Nerves: No cranial nerve deficit     Psychiatric:         Mood and Affect: Mood normal          Behavior: Behavior normal                 Roman Burgess MD

## 2021-06-01 ENCOUNTER — OFFICE VISIT (OUTPATIENT)
Dept: FAMILY MEDICINE CLINIC | Facility: CLINIC | Age: 66
End: 2021-06-01
Payer: MEDICARE

## 2021-06-01 VITALS
DIASTOLIC BLOOD PRESSURE: 60 MMHG | RESPIRATION RATE: 14 BRPM | WEIGHT: 144.4 LBS | SYSTOLIC BLOOD PRESSURE: 84 MMHG | HEART RATE: 68 BPM | TEMPERATURE: 96.8 F | OXYGEN SATURATION: 97 % | HEIGHT: 64 IN | BODY MASS INDEX: 24.65 KG/M2

## 2021-06-01 DIAGNOSIS — Z12.31 ENCOUNTER FOR SCREENING MAMMOGRAM FOR MALIGNANT NEOPLASM OF BREAST: ICD-10-CM

## 2021-06-01 DIAGNOSIS — R05.9 COUGH: Primary | ICD-10-CM

## 2021-06-01 DIAGNOSIS — R30.0 DYSURIA: ICD-10-CM

## 2021-06-01 DIAGNOSIS — R09.81 SINUS CONGESTION: ICD-10-CM

## 2021-06-01 DIAGNOSIS — Z00.00 MEDICARE ANNUAL WELLNESS VISIT, SUBSEQUENT: ICD-10-CM

## 2021-06-01 PROBLEM — Z12.9 ENCOUNTER FOR SCREENING FOR MALIGNANT NEOPLASM: Status: ACTIVE | Noted: 2020-08-23

## 2021-06-01 PROBLEM — N30.01 ACUTE CYSTITIS WITH HEMATURIA: Status: ACTIVE | Noted: 2021-06-01

## 2021-06-01 LAB
SL AMB  POCT GLUCOSE, UA: ABNORMAL
SL AMB LEUKOCYTE ESTERASE,UA: 500
SL AMB POCT BILIRUBIN,UA: ABNORMAL
SL AMB POCT BLOOD,UA: 50
SL AMB POCT CLARITY,UA: ABNORMAL
SL AMB POCT COLOR,UA: YELLOW
SL AMB POCT KETONES,UA: ABNORMAL
SL AMB POCT NITRITE,UA: ABNORMAL
SL AMB POCT PH,UA: 5
SL AMB POCT SPECIFIC GRAVITY,UA: 1.01
SL AMB POCT URINE PROTEIN: ABNORMAL
SL AMB POCT UROBILINOGEN: ABNORMAL

## 2021-06-01 PROCEDURE — 81003 URINALYSIS AUTO W/O SCOPE: CPT | Performed by: FAMILY MEDICINE

## 2021-06-01 PROCEDURE — 99213 OFFICE O/P EST LOW 20 MIN: CPT | Performed by: FAMILY MEDICINE

## 2021-06-01 PROCEDURE — G0438 PPPS, INITIAL VISIT: HCPCS | Performed by: FAMILY MEDICINE

## 2021-06-01 RX ORDER — SULFAMETHOXAZOLE AND TRIMETHOPRIM 800; 160 MG/1; MG/1
1 TABLET ORAL EVERY 12 HOURS SCHEDULED
Qty: 14 TABLET | Refills: 0 | Status: SHIPPED | OUTPATIENT
Start: 2021-06-01 | End: 2021-06-08

## 2021-06-01 NOTE — PROGRESS NOTES
Assessment/Plan:    1  Cough  -     sulfamethoxazole-trimethoprim (BACTRIM DS) 800-160 mg per tablet; Take 1 tablet by mouth every 12 (twelve) hours for 7 days    2  Sinus congestion  -     sulfamethoxazole-trimethoprim (BACTRIM DS) 800-160 mg per tablet; Take 1 tablet by mouth every 12 (twelve) hours for 7 days    3  Dysuria  -     POCT urine dip auto non-scope  -     Urine culture  -     sulfamethoxazole-trimethoprim (BACTRIM DS) 800-160 mg per tablet; Take 1 tablet by mouth every 12 (twelve) hours for 7 days    4  Encounter for screening mammogram for malignant neoplasm of breast   -     Mammo screening bilateral w 3d & cad; Future; Expected date: 06/01/2021    5  Medicare annual wellness visit, subsequent      BMI Counseling: Body mass index is 25 18 kg/m²  The BMI is above normal  Nutrition recommendations include encouraging healthy choices of fruits and vegetables, decreasing fast food intake, consuming healthier snacks, limiting drinks that contain sugar, moderation in carbohydrate intake, increasing intake of lean protein, reducing intake of saturated and trans fat and reducing intake of cholesterol  Exercise recommendations include exercising 3-5 times per week and strength training exercises  No pharmacotherapy was ordered  Patient Instructions       Medicare Preventive Visit Patient Instructions  Thank you for completing your Welcome to Medicare Visit or Medicare Annual Wellness Visit today  Your next wellness visit will be due in one year (6/2/2022)  The screening/preventive services that you may require over the next 5-10 years are detailed below  Some tests may not apply to you based off risk factors and/or age  Screening tests ordered at today's visit but not completed yet may show as past due  Also, please note that scanned in results may not display below    Preventive Screenings:  Service Recommendations Previous Testing/Comments   Colorectal Cancer Screening  * Colonoscopy    * Fecal Occult Blood Test (FOBT)/Fecal Immunochemical Test (FIT)  * Fecal DNA/Cologuard Test  * Flexible Sigmoidoscopy Age: 54-65 years old   Colonoscopy: every 10 years (may be performed more frequently if at higher risk)  OR  FOBT/FIT: every 1 year  OR  Cologuard: every 3 years  OR  Sigmoidoscopy: every 5 years  Screening may be recommended earlier than age 48 if at higher risk for colorectal cancer  Also, an individualized decision between you and your healthcare provider will decide whether screening between the ages of 74-80 would be appropriate  Colonoscopy: 05/05/2021  FOBT/FIT: Not on file  Cologuard: Not on file  Sigmoidoscopy: Not on file    Screening Current     Breast Cancer Screening Age: 36 years old  Frequency: every 1-2 years  Not required if history of left and right mastectomy Mammogram: 05/09/2019        Cervical Cancer Screening Between the ages of 21-29, pap smear recommended once every 3 years  Between the ages of 33-67, can perform pap smear with HPV co-testing every 5 years  Recommendations may differ for women with a history of total hysterectomy, cervical cancer, or abnormal pap smears in past  Pap Smear: 07/24/2020    Screening Current   Hepatitis C Screening Once for adults born between 1945 and 1965  More frequently in patients at high risk for Hepatitis C Hep C Antibody: Not on file        Diabetes Screening 1-2 times per year if you're at risk for diabetes or have pre-diabetes Fasting glucose: 89 mg/dL   A1C: 5 5    Screening Current   Cholesterol Screening Once every 5 years if you don't have a lipid disorder  May order more often based on risk factors  Lipid panel: 03/17/2021    Screening Not Indicated  History Lipid Disorder     Other Preventive Screenings Covered by Medicare:  1  Abdominal Aortic Aneurysm (AAA) Screening: covered once if your at risk  You're considered to be at risk if you have a family history of AAA    2  Lung Cancer Screening: covers low dose CT scan once per year if you meet all of the following conditions: (1) Age 50-69; (2) No signs or symptoms of lung cancer; (3) Current smoker or have quit smoking within the last 15 years; (4) You have a tobacco smoking history of at least 30 pack years (packs per day multiplied by number of years you smoked); (5) You get a written order from a healthcare provider  3  Glaucoma Screening: covered annually if you're considered high risk: (1) You have diabetes OR (2) Family history of glaucoma OR (3)  aged 48 and older OR (3)  American aged 72 and older  3  Osteoporosis Screening: covered every 2 years if you meet one of the following conditions: (1) You're estrogen deficient and at risk for osteoporosis based off medical history and other findings; (2) Have a vertebral abnormality; (3) On glucocorticoid therapy for more than 3 months; (4) Have primary hyperparathyroidism; (5) On osteoporosis medications and need to assess response to drug therapy  · Last bone density test (DXA Scan): 12/08/2017  5  HIV Screening: covered annually if you're between the age of 12-76  Also covered annually if you are younger than 13 and older than 72 with risk factors for HIV infection  For pregnant patients, it is covered up to 3 times per pregnancy  Immunizations:  Immunization Recommendations   Influenza Vaccine Annual influenza vaccination during flu season is recommended for all persons aged >= 6 months who do not have contraindications   Pneumococcal Vaccine (Prevnar and Pneumovax)  * Prevnar = PCV13  * Pneumovax = PPSV23   Adults 25-60 years old: 1-3 doses may be recommended based on certain risk factors  Adults 72 years old: Prevnar (PCV13) vaccine recommended followed by Pneumovax (PPSV23) vaccine  If already received PPSV23 since turning 65, then PCV13 recommended at least one year after PPSV23 dose     Hepatitis B Vaccine 3 dose series if at intermediate or high risk (ex: diabetes, end stage renal disease, liver disease)   Tetanus (Td) Vaccine - COST NOT COVERED BY MEDICARE PART B Following completion of primary series, a booster dose should be given every 10 years to maintain immunity against tetanus  Td may also be given as tetanus wound prophylaxis  Tdap Vaccine - COST NOT COVERED BY MEDICARE PART B Recommended at least once for all adults  For pregnant patients, recommended with each pregnancy  Shingles Vaccine (Shingrix) - COST NOT COVERED BY MEDICARE PART B  2 shot series recommended in those aged 48 and above     Health Maintenance Due:      Topic Date Due    Hepatitis C Screening  Never done    HIV Screening  Never done    MAMMOGRAM  05/09/2020    Colonoscopy Surveillance  01/25/2021    Cervical Cancer Screening  07/24/2021    Colorectal Cancer Screening  01/25/2028     Immunizations Due:  There are no preventive care reminders to display for this patient  Advance Directives   What are advance directives? Advance directives are legal documents that state your wishes and plans for medical care  These plans are made ahead of time in case you lose your ability to make decisions for yourself  Advance directives can apply to any medical decision, such as the treatments you want, and if you want to donate organs  What are the types of advance directives? There are many types of advance directives, and each state has rules about how to use them  You may choose a combination of any of the following:  · Living will: This is a written record of the treatment you want  You can also choose which treatments you do not want, which to limit, and which to stop at a certain time  This includes surgery, medicine, IV fluid, and tube feedings  · Durable power of  for healthcare [facility]): This is a written record that states who you want to make healthcare choices for you when you are unable to make them for yourself  This person, called a proxy, is usually a family member or a friend   You may choose more than 1 proxy  · Do not resuscitate (DNR) order:  A DNR order is used in case your heart stops beating or you stop breathing  It is a request not to have certain forms of treatment, such as CPR  A DNR order may be included in other types of advance directives  · Medical directive: This covers the care that you want if you are in a coma, near death, or unable to make decisions for yourself  You can list the treatments you want for each condition  Treatment may include pain medicine, surgery, blood transfusions, dialysis, IV or tube feedings, and a ventilator (breathing machine)  · Values history: This document has questions about your views, beliefs, and how you feel and think about life  This information can help others choose the care that you would choose  Why are advance directives important? An advance directive helps you control your care  Although spoken wishes may be used, it is better to have your wishes written down  Spoken wishes can be misunderstood, or not followed  Treatments may be given even if you do not want them  An advance directive may make it easier for your family to make difficult choices about your care  Urinary Incontinence   Urinary incontinence (UI)  is when you lose control of your bladder  UI develops because your bladder cannot store or empty urine properly  The 3 most common types of UI are stress incontinence, urge incontinence, or both  Medicines:   · May be given to help strengthen your bladder control  Report any side effects of medication to your healthcare provider  Do pelvic muscle exercises often:  Your pelvic muscles help you stop urinating  Squeeze these muscles tight for 5 seconds, then relax for 5 seconds  Gradually work up to squeezing for 10 seconds  Do 3 sets of 15 repetitions a day, or as directed  This will help strengthen your pelvic muscles and improve bladder control    Train your bladder:  Go to the bathroom at set times, such as every 2 hours, even if you do not feel the urge to go  You can also try to hold your urine when you feel the urge to go  For example, hold your urine for 5 minutes when you feel the urge to go  As that becomes easier, hold your urine for 10 minutes  Self-care:   · Keep a UI record  Write down how often you leak urine and how much you leak  Make a note of what you were doing when you leaked urine  · Drink liquids as directed  You may need to limit the amount of liquid you drink to help control your urine leakage  Do not drink any liquid right before you go to bed  Limit or do not have drinks that contain caffeine or alcohol  · Prevent constipation  Eat a variety of high-fiber foods  Good examples are high-fiber cereals, beans, vegetables, and whole-grain breads  Walking is the best way to trigger your intestines to have a bowel movement  · Exercise regularly and maintain a healthy weight  Weight loss and exercise will decrease pressure on your bladder and help you control your leakage  · Use a catheter as directed  to help empty your bladder  A catheter is a tiny, plastic tube that is put into your bladder to drain your urine  · Go to behavior therapy as directed  Behavior therapy may be used to help you learn to control your urge to urinate  Cigarette Smoking and Your Health   Risks to your health if you smoke:  Nicotine and other chemicals found in tobacco damage every cell in your body  Even if you are a light smoker, you have an increased risk for cancer, heart disease, and lung disease  If you are pregnant or have diabetes, smoking increases your risk for complications  Benefits to your health if you stop smoking:   · You decrease respiratory symptoms such as coughing, wheezing, and shortness of breath  · You reduce your risk for cancers of the lung, mouth, throat, kidney, bladder, pancreas, stomach, and cervix  If you already have cancer, you increase the benefits of chemotherapy   You also reduce your risk for cancer returning or a second cancer from developing  · You reduce your risk for heart disease, blood clots, heart attack, and stroke  · You reduce your risk for lung infections, and diseases such as pneumonia, asthma, chronic bronchitis, and emphysema  · Your circulation improves  More oxygen can be delivered to your body  If you have diabetes, you lower your risk for complications, such as kidney, artery, and eye diseases  You also lower your risk for nerve damage  Nerve damage can lead to amputations, poor vision, and blindness  · You improve your body's ability to heal and to fight infections  For more information and support to stop smoking:   · [employer]  Phone: 1- 183 - 473-3612  Web Address: [employer]  Weight Management   Why it is important to manage your weight:  Being overweight increases your risk of health conditions such as heart disease, high blood pressure, type 2 diabetes, and certain types of cancer  It can also increase your risk for osteoarthritis, sleep apnea, and other respiratory problems  Aim for a slow, steady weight loss  Even a small amount of weight loss can lower your risk of health problems  How to lose weight safely:  A safe and healthy way to lose weight is to eat fewer calories and get regular exercise  You can lose up about 1 pound a week by decreasing the number of calories you eat by 500 calories each day  Healthy meal plan for weight management:  A healthy meal plan includes a variety of foods, contains fewer calories, and helps you stay healthy  A healthy meal plan includes the following:  · Eat whole-grain foods more often  A healthy meal plan should contain fiber  Fiber is the part of grains, fruits, and vegetables that is not broken down by your body  Whole-grain foods are healthy and provide extra fiber in your diet  Some examples of whole-grain foods are whole-wheat breads and pastas, oatmeal, brown rice, and bulgur  · Eat a variety of vegetables every day  Include dark, leafy greens such as spinach, kale, osorio greens, and mustard greens  Eat yellow and orange vegetables such as carrots, sweet potatoes, and winter squash  · Eat a variety of fruits every day  Choose fresh or canned fruit (canned in its own juice or light syrup) instead of juice  Fruit juice has very little or no fiber  · Eat low-fat dairy foods  Drink fat-free (skim) milk or 1% milk  Eat fat-free yogurt and low-fat cottage cheese  Try low-fat cheeses such as mozzarella and other reduced-fat cheeses  · Choose meat and other protein foods that are low in fat  Choose beans or other legumes such as split peas or lentils  Choose fish, skinless poultry (chicken or turkey), or lean cuts of red meat (beef or pork)  Before you cook meat or poultry, cut off any visible fat  · Use less fat and oil  Try baking foods instead of frying them  Add less fat, such as margarine, sour cream, regular salad dressing and mayonnaise to foods  Eat fewer high-fat foods  Some examples of high-fat foods include french fries, doughnuts, ice cream, and cakes  · Eat fewer sweets  Limit foods and drinks that are high in sugar  This includes candy, cookies, regular soda, and sweetened drinks  Exercise:  Exercise at least 30 minutes per day on most days of the week  Some examples of exercise include walking, biking, dancing, and swimming  You can also fit in more physical activity by taking the stairs instead of the elevator or parking farther away from stores  Ask your healthcare provider about the best exercise plan for you     Narcotic (Opioid) Safety    Use narcotics safely:  · Take prescribed narcotics exactly as directed  · Do not give narcotics to others or take narcotics that belong to someone else  · Do not mix narcotics without medicines or alcohol  · Do not drive or operate heavy machinery after you take the narcotic  · Monitor for side effects and notify your healthcare provider if you experienced side effects such as nausea, sleepiness, itching, or trouble thinking clearly  Manage constipation:    Constipation is the most common side effect of narcotic medicine  Constipation is when you have hard, dry bowel movements, or you go longer than usual between bowel movements  Tell your healthcare provider about all changes in your bowel movements while you are taking narcotics  He or she may recommend laxative medicine to help you have a bowel movement  He or she may also change the kind of narcotic you are taking, or change when you take it  The following are more ways you can prevent or relieve constipation:    · Drink liquids as directed  You may need to drink extra liquids to help soften and move your bowels  Ask how much liquid to drink each day and which liquids are best for you  · Eat high-fiber foods  This may help decrease constipation by adding bulk to your bowel movements  High-fiber foods include fruits, vegetables, whole-grain breads and cereals, and beans  Your healthcare provider or dietitian can help you create a high-fiber meal plan  Your provider may also recommend a fiber supplement if you cannot get enough fiber from food  · Exercise regularly  Regular physical activity can help stimulate your intestines  Walking is a good exercise to prevent or relieve constipation  Ask which exercises are best for you  · Schedule a time each day to have a bowel movement  This may help train your body to have regular bowel movements  Bend forward while you are on the toilet to help move the bowel movement out  Sit on the toilet for at least 10 minutes, even if you do not have a bowel movement  Store narcotics safely:   · Store narcotics where others cannot easily get them  Keep them in a locked cabinet or secure area  Do not  keep them in a purse or other bag you carry with you  A person may be looking for something else and find the narcotics  · Make sure narcotics are stored out of the reach of children  A child can easily overdose on narcotics  Narcotics may look like candy to a small child  The best way to dispose of narcotics: The laws vary by country and area  In the [place], the best way is to return the narcotics through a take-back program  This program is offered by the [employer])  The following are options for using the program:  · Take the narcotics to a [facility] collection site  The site is often a law enforcement center  Call your local law enforcement center for scheduled take-back days in your area  You will be given information on where to go if the collection site is in a different location  · Take the narcotics to an approved pharmacy or hospital   A pharmacy or hospital may be set up as a collection site  You will need to ask if it is a MELLY collection site if you were not directed there  A pharmacy or doctor's office may not be able to take back narcotics unless it is a MLELY site  · Use a mail-back system  This means you are given containers to put the narcotics into  You will then mail them in the containers  · Use a take-back drop box  This is a place to leave the narcotics at any time  People and animals will not be able to get into the box  Your local law enforcement agency can tell you where to find a drop box in your area  Other ways to manage pain:   · Ask your healthcare provider about non-narcotic medicines to control pain  Nonprescription medicines include NSAIDs (such as ibuprofen) and acetaminophen  Prescription medicines include muscle relaxers, antidepressants, and steroids  · Pain may be managed without any medicines  Some ways to relieve pain include massage, aromatherapy, or meditation  Physical or occupational therapy may also help  For more information:   · Drug Enforcement Administration  [address] [place] [address] [address]  Phone: 4- 938 - 168-3434  Web Address: [facility] gov/drug_disposal/    · [place] Food and Drug Administration  Stan Brady , [address] [facility]  Phone: 3- 655 - 233-3409  Web Address: http://[employer]/     © Copyright [employer] 2018 Information is for End User's use only and may not be sold, redistributed or otherwise used for commercial purposes  All illustrations and images included in CareNotes® are the copyrighted property of [employer]  or [employer]      Subjective:      Patient ID: Tino Hogan is a 61 y o  female  Chief Complaint   Patient presents with    Medicare Wellness Visit    Cough    Dizziness    Shortness of Breath    Difficulty Urinating       HPI  Follow-up  Cont w nonprod cough, occ wheeze, no hemoptysis , some shortness of breath w exertion  On/of dysuria, no fever,chills, d/c or rash  +Intermittent dizziness, no syncope or cp    The following portions of the patient's history were reviewed and updated as appropriate: allergies, current medications, past family history, past medical history, past social history, past surgical history and problem list     Review of Systems   Constitutional: Positive for fatigue  Negative for fever  HENT: Positive for congestion and sinus pressure  Respiratory: Positive for cough and wheezing  Cardiovascular: Negative  Gastrointestinal: Negative  Genitourinary: Positive for dysuria  Musculoskeletal: Positive for arthralgias  Skin: Negative for rash  Allergic/Immunologic: Positive for environmental allergies  Psychiatric/Behavioral: Positive for sleep disturbance  The patient is nervous/anxious            Current Outpatient Medications   Medication Sig Dispense Refill    acetaminophen (Tylenol 8 Hour) 650 mg CR tablet Take 1,300 mg by mouth 2 (two) times a day      albuterol (PROAIR HFA) 90 mcg/act inhaler Inhale 2 puffs every 4 (four) hours as needed for wheezing 8 5 g 5    aspirin (ECOTRIN LOW STRENGTH) 81 mg EC tablet Take 81 mg by mouth daily      buprenorphine (SUBUTEX) 8 mg 8 mg 2 (two) times a day SL      Cholecalciferol (Vitamin D) 50 MCG (2000 UT) CAPS Take by mouth daily at bedtime      disulfiram (ANTABUSE) 250 mg tablet Take 125 mg by mouth every morning       fluticasone-salmeterol (ADVAIR, WIXELA) 250-50 mcg/dose inhaler Inhale 1 puff 2 (two) times a day 1 Inhaler 0    gabapentin (NEURONTIN) 400 mg capsule Take 2 capsules (800 mg total) by mouth 2 (two) times a day (Patient taking differently: Take 800 mg by mouth every morning ) 120 capsule 1    gabapentin (NEURONTIN) 400 mg capsule Take 400 mg by mouth daily at bedtime      MELATONIN PO Take 2 5 mg by mouth daily at bedtime as needed      methylphenidate (RITALIN LA) 10 MG 24 hr capsule Take 10 mg by mouth every morning      multivitamin (THERAGRAN) TABS Take 1 tablet by mouth daily at bedtime      naloxone (NARCAN) 4 mg/0 1 mL nasal spray 4 mg into each nostril as needed      Omega-3 Fatty Acids (fish oil) 1,000 mg Take 1,000 mg by mouth daily      pantoprazole (PROTONIX) 40 mg tablet Take 1 tablet (40 mg total) by mouth every morning 30 tablet 0    SUMAtriptan (IMITREX) 100 mg tablet Take 100 mg by mouth once as needed for migraine      TURMERIC PO Take 1,000 mg by mouth 2 (two) times a day       No current facility-administered medications for this visit  Objective:    BP (!) 84/60 (BP Location: Left arm, Patient Position: Sitting, Cuff Size: Large)   Pulse 68   Temp (!) 96 8 °F (36 °C)   Resp 14   Ht 5' 3 5" (1 613 m)   Wt 65 5 kg (144 lb 6 4 oz)   SpO2 97%   BMI 25 18 kg/m²        Physical Exam  Vitals and nursing note reviewed  Constitutional:       General: She is not in acute distress  Eyes:      General: No scleral icterus  Conjunctiva/sclera: Conjunctivae normal    Cardiovascular:      Rate and Rhythm: Normal rate and regular rhythm  Pulmonary:      Effort: Pulmonary effort is normal  No respiratory distress        Comments: occ scattered exp wheeze, no localization  Abdominal:      General: Bowel sounds are normal       Palpations: Abdomen is soft  Tenderness: There is abdominal tenderness (mild suprapubic)  There is no guarding or rebound  Musculoskeletal:         General: Tenderness present  Cervical back: Neck supple  Skin:     General: Skin is warm and dry  Coloration: Skin is not jaundiced  Findings: No rash  Neurological:      Mental Status: She is alert and oriented to person, place, and time  Cranial Nerves: No cranial nerve deficit     Psychiatric:      Comments: anxious                Alvaro Pal MD

## 2021-06-01 NOTE — PATIENT INSTRUCTIONS
Medicare Preventive Visit Patient Instructions  Thank you for completing your Welcome to Medicare Visit or Medicare Annual Wellness Visit today  Your next wellness visit will be due in one year (6/2/2022)  The screening/preventive services that you may require over the next 5-10 years are detailed below  Some tests may not apply to you based off risk factors and/or age  Screening tests ordered at today's visit but not completed yet may show as past due  Also, please note that scanned in results may not display below  Preventive Screenings:  Service Recommendations Previous Testing/Comments   Colorectal Cancer Screening  * Colonoscopy    * Fecal Occult Blood Test (FOBT)/Fecal Immunochemical Test (FIT)  * Fecal DNA/Cologuard Test  * Flexible Sigmoidoscopy Age: 54-65 years old   Colonoscopy: every 10 years (may be performed more frequently if at higher risk)  OR  FOBT/FIT: every 1 year  OR  Cologuard: every 3 years  OR  Sigmoidoscopy: every 5 years  Screening may be recommended earlier than age 48 if at higher risk for colorectal cancer  Also, an individualized decision between you and your healthcare provider will decide whether screening between the ages of 74-80 would be appropriate  Colonoscopy: 05/05/2021  FOBT/FIT: Not on file  Cologuard: Not on file  Sigmoidoscopy: Not on file    Screening Current     Breast Cancer Screening Age: 36 years old  Frequency: every 1-2 years  Not required if history of left and right mastectomy Mammogram: 05/09/2019        Cervical Cancer Screening Between the ages of 21-29, pap smear recommended once every 3 years  Between the ages of 33-67, can perform pap smear with HPV co-testing every 5 years     Recommendations may differ for women with a history of total hysterectomy, cervical cancer, or abnormal pap smears in past  Pap Smear: 07/24/2020    Screening Current   Hepatitis C Screening Once for adults born between 1945 and 1965  More frequently in patients at high risk for Hepatitis C Hep C Antibody: Not on file        Diabetes Screening 1-2 times per year if you're at risk for diabetes or have pre-diabetes Fasting glucose: 89 mg/dL   A1C: 5 5    Screening Current   Cholesterol Screening Once every 5 years if you don't have a lipid disorder  May order more often based on risk factors  Lipid panel: 03/17/2021    Screening Not Indicated  History Lipid Disorder     Other Preventive Screenings Covered by Medicare:  1  Abdominal Aortic Aneurysm (AAA) Screening: covered once if your at risk  You're considered to be at risk if you have a family history of AAA  2  Lung Cancer Screening: covers low dose CT scan once per year if you meet all of the following conditions: (1) Age 50-69; (2) No signs or symptoms of lung cancer; (3) Current smoker or have quit smoking within the last 15 years; (4) You have a tobacco smoking history of at least 30 pack years (packs per day multiplied by number of years you smoked); (5) You get a written order from a healthcare provider  3  Glaucoma Screening: covered annually if you're considered high risk: (1) You have diabetes OR (2) Family history of glaucoma OR (3)  aged 48 and older OR (3)  American aged 72 and older  3  Osteoporosis Screening: covered every 2 years if you meet one of the following conditions: (1) You're estrogen deficient and at risk for osteoporosis based off medical history and other findings; (2) Have a vertebral abnormality; (3) On glucocorticoid therapy for more than 3 months; (4) Have primary hyperparathyroidism; (5) On osteoporosis medications and need to assess response to drug therapy  · Last bone density test (DXA Scan): 12/08/2017  5  HIV Screening: covered annually if you're between the age of 12-76  Also covered annually if you are younger than 13 and older than 72 with risk factors for HIV infection  For pregnant patients, it is covered up to 3 times per pregnancy      Immunizations:  Immunization Recommendations   Influenza Vaccine Annual influenza vaccination during flu season is recommended for all persons aged >= 6 months who do not have contraindications   Pneumococcal Vaccine (Prevnar and Pneumovax)  * Prevnar = PCV13  * Pneumovax = PPSV23   Adults 25-60 years old: 1-3 doses may be recommended based on certain risk factors  Adults 72 years old: Prevnar (PCV13) vaccine recommended followed by Pneumovax (PPSV23) vaccine  If already received PPSV23 since turning 65, then PCV13 recommended at least one year after PPSV23 dose  Hepatitis B Vaccine 3 dose series if at intermediate or high risk (ex: diabetes, end stage renal disease, liver disease)   Tetanus (Td) Vaccine - COST NOT COVERED BY MEDICARE PART B Following completion of primary series, a booster dose should be given every 10 years to maintain immunity against tetanus  Td may also be given as tetanus wound prophylaxis  Tdap Vaccine - COST NOT COVERED BY MEDICARE PART B Recommended at least once for all adults  For pregnant patients, recommended with each pregnancy  Shingles Vaccine (Shingrix) - COST NOT COVERED BY MEDICARE PART B  2 shot series recommended in those aged 48 and above     Health Maintenance Due:      Topic Date Due    Hepatitis C Screening  Never done    HIV Screening  Never done    MAMMOGRAM  05/09/2020    Colonoscopy Surveillance  01/25/2021    Cervical Cancer Screening  07/24/2021    Colorectal Cancer Screening  01/25/2028     Immunizations Due:  There are no preventive care reminders to display for this patient  Advance Directives   What are advance directives? Advance directives are legal documents that state your wishes and plans for medical care  These plans are made ahead of time in case you lose your ability to make decisions for yourself  Advance directives can apply to any medical decision, such as the treatments you want, and if you want to donate organs  What are the types of advance directives?   There are many types of advance directives, and each state has rules about how to use them  You may choose a combination of any of the following:  · Living will: This is a written record of the treatment you want  You can also choose which treatments you do not want, which to limit, and which to stop at a certain time  This includes surgery, medicine, IV fluid, and tube feedings  · Durable power of  for healthcare [facility]): This is a written record that states who you want to make healthcare choices for you when you are unable to make them for yourself  This person, called a proxy, is usually a family member or a friend  You may choose more than 1 proxy  · Do not resuscitate (DNR) order:  A DNR order is used in case your heart stops beating or you stop breathing  It is a request not to have certain forms of treatment, such as CPR  A DNR order may be included in other types of advance directives  · Medical directive: This covers the care that you want if you are in a coma, near death, or unable to make decisions for yourself  You can list the treatments you want for each condition  Treatment may include pain medicine, surgery, blood transfusions, dialysis, IV or tube feedings, and a ventilator (breathing machine)  · Values history: This document has questions about your views, beliefs, and how you feel and think about life  This information can help others choose the care that you would choose  Why are advance directives important? An advance directive helps you control your care  Although spoken wishes may be used, it is better to have your wishes written down  Spoken wishes can be misunderstood, or not followed  Treatments may be given even if you do not want them  An advance directive may make it easier for your family to make difficult choices about your care  Urinary Incontinence   Urinary incontinence (UI)  is when you lose control of your bladder   UI develops because your bladder cannot store or empty urine properly  The 3 most common types of UI are stress incontinence, urge incontinence, or both  Medicines:   · May be given to help strengthen your bladder control  Report any side effects of medication to your healthcare provider  Do pelvic muscle exercises often:  Your pelvic muscles help you stop urinating  Squeeze these muscles tight for 5 seconds, then relax for 5 seconds  Gradually work up to squeezing for 10 seconds  Do 3 sets of 15 repetitions a day, or as directed  This will help strengthen your pelvic muscles and improve bladder control  Train your bladder:  Go to the bathroom at set times, such as every 2 hours, even if you do not feel the urge to go  You can also try to hold your urine when you feel the urge to go  For example, hold your urine for 5 minutes when you feel the urge to go  As that becomes easier, hold your urine for 10 minutes  Self-care:   · Keep a UI record  Write down how often you leak urine and how much you leak  Make a note of what you were doing when you leaked urine  · Drink liquids as directed  You may need to limit the amount of liquid you drink to help control your urine leakage  Do not drink any liquid right before you go to bed  Limit or do not have drinks that contain caffeine or alcohol  · Prevent constipation  Eat a variety of high-fiber foods  Good examples are high-fiber cereals, beans, vegetables, and whole-grain breads  Walking is the best way to trigger your intestines to have a bowel movement  · Exercise regularly and maintain a healthy weight  Weight loss and exercise will decrease pressure on your bladder and help you control your leakage  · Use a catheter as directed  to help empty your bladder  A catheter is a tiny, plastic tube that is put into your bladder to drain your urine  · Go to behavior therapy as directed  Behavior therapy may be used to help you learn to control your urge to urinate      Cigarette Smoking and Your Health   Risks to your health if you smoke:  Nicotine and other chemicals found in tobacco damage every cell in your body  Even if you are a light smoker, you have an increased risk for cancer, heart disease, and lung disease  If you are pregnant or have diabetes, smoking increases your risk for complications  Benefits to your health if you stop smoking:   · You decrease respiratory symptoms such as coughing, wheezing, and shortness of breath  · You reduce your risk for cancers of the lung, mouth, throat, kidney, bladder, pancreas, stomach, and cervix  If you already have cancer, you increase the benefits of chemotherapy  You also reduce your risk for cancer returning or a second cancer from developing  · You reduce your risk for heart disease, blood clots, heart attack, and stroke  · You reduce your risk for lung infections, and diseases such as pneumonia, asthma, chronic bronchitis, and emphysema  · Your circulation improves  More oxygen can be delivered to your body  If you have diabetes, you lower your risk for complications, such as kidney, artery, and eye diseases  You also lower your risk for nerve damage  Nerve damage can lead to amputations, poor vision, and blindness  · You improve your body's ability to heal and to fight infections  For more information and support to stop smoking:   · [employer]  Phone: 1- 670 - 026-5853  Web Address: www [employer]  Weight Management   Why it is important to manage your weight:  Being overweight increases your risk of health conditions such as heart disease, high blood pressure, type 2 diabetes, and certain types of cancer  It can also increase your risk for osteoarthritis, sleep apnea, and other respiratory problems  Aim for a slow, steady weight loss  Even a small amount of weight loss can lower your risk of health problems  How to lose weight safely:  A safe and healthy way to lose weight is to eat fewer calories and get regular exercise   You can lose up about 1 pound a week by decreasing the number of calories you eat by 500 calories each day  Healthy meal plan for weight management:  A healthy meal plan includes a variety of foods, contains fewer calories, and helps you stay healthy  A healthy meal plan includes the following:  · Eat whole-grain foods more often  A healthy meal plan should contain fiber  Fiber is the part of grains, fruits, and vegetables that is not broken down by your body  Whole-grain foods are healthy and provide extra fiber in your diet  Some examples of whole-grain foods are whole-wheat breads and pastas, oatmeal, brown rice, and bulgur  · Eat a variety of vegetables every day  Include dark, leafy greens such as spinach, kale, osorio greens, and mustard greens  Eat yellow and orange vegetables such as carrots, sweet potatoes, and winter squash  · Eat a variety of fruits every day  Choose fresh or canned fruit (canned in its own juice or light syrup) instead of juice  Fruit juice has very little or no fiber  · Eat low-fat dairy foods  Drink fat-free (skim) milk or 1% milk  Eat fat-free yogurt and low-fat cottage cheese  Try low-fat cheeses such as mozzarella and other reduced-fat cheeses  · Choose meat and other protein foods that are low in fat  Choose beans or other legumes such as split peas or lentils  Choose fish, skinless poultry (chicken or turkey), or lean cuts of red meat (beef or pork)  Before you cook meat or poultry, cut off any visible fat  · Use less fat and oil  Try baking foods instead of frying them  Add less fat, such as margarine, sour cream, regular salad dressing and mayonnaise to foods  Eat fewer high-fat foods  Some examples of high-fat foods include french fries, doughnuts, ice cream, and cakes  · Eat fewer sweets  Limit foods and drinks that are high in sugar  This includes candy, cookies, regular soda, and sweetened drinks  Exercise:  Exercise at least 30 minutes per day on most days of the week   Some examples of exercise include walking, biking, dancing, and swimming  You can also fit in more physical activity by taking the stairs instead of the elevator or parking farther away from stores  Ask your healthcare provider about the best exercise plan for you  Narcotic (Opioid) Safety    Use narcotics safely:  · Take prescribed narcotics exactly as directed  · Do not give narcotics to others or take narcotics that belong to someone else  · Do not mix narcotics without medicines or alcohol  · Do not drive or operate heavy machinery after you take the narcotic  · Monitor for side effects and notify your healthcare provider if you experienced side effects such as nausea, sleepiness, itching, or trouble thinking clearly  Manage constipation:    Constipation is the most common side effect of narcotic medicine  Constipation is when you have hard, dry bowel movements, or you go longer than usual between bowel movements  Tell your healthcare provider about all changes in your bowel movements while you are taking narcotics  He or she may recommend laxative medicine to help you have a bowel movement  He or she may also change the kind of narcotic you are taking, or change when you take it  The following are more ways you can prevent or relieve constipation:    · Drink liquids as directed  You may need to drink extra liquids to help soften and move your bowels  Ask how much liquid to drink each day and which liquids are best for you  · Eat high-fiber foods  This may help decrease constipation by adding bulk to your bowel movements  High-fiber foods include fruits, vegetables, whole-grain breads and cereals, and beans  Your healthcare provider or dietitian can help you create a high-fiber meal plan  Your provider may also recommend a fiber supplement if you cannot get enough fiber from food  · Exercise regularly  Regular physical activity can help stimulate your intestines   Walking is a good exercise to prevent or relieve constipation  Ask which exercises are best for you  · Schedule a time each day to have a bowel movement  This may help train your body to have regular bowel movements  Bend forward while you are on the toilet to help move the bowel movement out  Sit on the toilet for at least 10 minutes, even if you do not have a bowel movement  Store narcotics safely:   · Store narcotics where others cannot easily get them  Keep them in a locked cabinet or secure area  Do not  keep them in a purse or other bag you carry with you  A person may be looking for something else and find the narcotics  · Make sure narcotics are stored out of the reach of children  A child can easily overdose on narcotics  Narcotics may look like candy to a small child  The best way to dispose of narcotics: The laws vary by country and area  In the [place], the best way is to return the narcotics through a take-back program  This program is offered by the [employer])  The following are options for using the program:  · Take the narcotics to a MELLY collection site  The site is often a law enforcement center  Call your local law enforcement center for scheduled take-back days in your area  You will be given information on where to go if the collection site is in a different location  · Take the narcotics to an approved pharmacy or hospital   A pharmacy or hospital may be set up as a collection site  You will need to ask if it is a MELLY collection site if you were not directed there  A pharmacy or doctor's office may not be able to take back narcotics unless it is a MELLY site  · Use a mail-back system  This means you are given containers to put the narcotics into  You will then mail them in the containers  · Use a take-back drop box  This is a place to leave the narcotics at any time  People and animals will not be able to get into the box   Your local law enforcement agency can tell you where to find a drop box in your area  Other ways to manage pain:   · Ask your healthcare provider about non-narcotic medicines to control pain  Nonprescription medicines include NSAIDs (such as ibuprofen) and acetaminophen  Prescription medicines include muscle relaxers, antidepressants, and steroids  · Pain may be managed without any medicines  Some ways to relieve pain include massage, aromatherapy, or meditation  Physical or occupational therapy may also help  For more information:   · Drug Enforcement Administration  [address] [facility] [address]  Phone: 5- 844 - 294-1367  Web Address: [email or web address]_[email or web address]    · [address]  and Drug Administration  [facility] , [address] [facility]  Phone: 5- 385 - 585-3582  Web Address: http://[employer]/     © Copyright [employer] 2018 Information is for End User's use only and may not be sold, redistributed or otherwise used for commercial purposes   All illustrations and images included in CareNotes® are the copyrighted property of A D A M , [employer]  or [address] [place], [place] [address]jhonatan tara
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
## 2021-06-04 LAB
BACTERIA UR CULT: ABNORMAL
Lab: ABNORMAL
SL AMB ANTIMICROBIAL SUSCEPTIBILITY: ABNORMAL

## 2021-06-07 ENCOUNTER — HOSPITAL ENCOUNTER (OUTPATIENT)
Dept: PULMONOLOGY | Facility: HOSPITAL | Age: 66
Discharge: HOME | End: 2021-06-07
Attending: INTERNAL MEDICINE
Payer: MEDICARE

## 2021-06-07 DIAGNOSIS — J44.9 CHRONIC OBSTRUCTIVE PULMONARY DISEASE, UNSPECIFIED COPD TYPE (HCC): ICD-10-CM

## 2021-06-07 PROCEDURE — 84295 ASSAY OF SERUM SODIUM: CPT

## 2021-06-07 PROCEDURE — 85014 HEMATOCRIT: CPT

## 2021-06-07 PROCEDURE — 36600 WITHDRAWAL OF ARTERIAL BLOOD: CPT

## 2021-06-07 PROCEDURE — 94729 DIFFUSING CAPACITY: CPT

## 2021-06-07 PROCEDURE — 94726 PLETHYSMOGRAPHY LUNG VOLUMES: CPT

## 2021-06-07 PROCEDURE — 82947 ASSAY GLUCOSE BLOOD QUANT: CPT

## 2021-06-07 PROCEDURE — 82803 BLOOD GASES ANY COMBINATION: CPT

## 2021-06-07 PROCEDURE — 94729 DIFFUSING CAPACITY: CPT | Performed by: INTERNAL MEDICINE

## 2021-06-07 PROCEDURE — 84132 ASSAY OF SERUM POTASSIUM: CPT

## 2021-06-07 PROCEDURE — 94726 PLETHYSMOGRAPHY LUNG VOLUMES: CPT | Performed by: INTERNAL MEDICINE

## 2021-06-07 PROCEDURE — 94760 N-INVAS EAR/PLS OXIMETRY 1: CPT

## 2021-06-07 PROCEDURE — 94060 EVALUATION OF WHEEZING: CPT

## 2021-06-07 PROCEDURE — 94060 EVALUATION OF WHEEZING: CPT | Performed by: INTERNAL MEDICINE

## 2021-06-07 RX ORDER — ALBUTEROL SULFATE 2.5 MG/3ML
2.5 SOLUTION RESPIRATORY (INHALATION) ONCE
Status: COMPLETED | OUTPATIENT
Start: 2021-06-07 | End: 2021-06-07

## 2021-06-07 RX ADMIN — ALBUTEROL SULFATE 2.5 MG: 2.5 SOLUTION RESPIRATORY (INHALATION) at 07:47

## 2021-06-09 LAB
BASE EXCESS BLDA CALC-SCNC: 0 MMOL/L (ref -2–3)
FIO2 GAS DIL.REBREATH: 21 L
GLUCOSE SERPL-MCNC: 91 MG/DL (ref 65–140)
HCO3 BLDA-SCNC: 25.5 MMOL/L (ref 22–28)
HCT VFR BLD CALC: 29 % (ref 34.8–46.1)
HGB BLDA-MCNC: 9.9 G/DL (ref 11.5–15.4)
PCO2 BLD: 27 MMOL/L (ref 21–32)
PCO2 BLD: 43.4 MM HG (ref 36–44)
PCO2 BLD: 79 MM HG
PCO2 BLDA: 43.2 MM HG
PH BLD: 7.38 [PH]
PH BLD: 7.38 [PH] (ref 7.35–7.45)
PO2 BLD: 79 MM HG (ref 75–129)
POTASSIUM BLD-SCNC: 4.1 MMOL/L (ref 3.5–5.3)
SAO2 % BLD FROM PO2: 95 % (ref 60–85)
SODIUM BLD-SCNC: 139 MMOL/L (ref 136–145)
SPECIMEN SOURCE: ABNORMAL

## 2021-06-15 PROBLEM — R05.9 COUGH: Status: ACTIVE | Noted: 2021-06-15

## 2021-06-15 PROBLEM — R09.81 SINUS CONGESTION: Status: ACTIVE | Noted: 2021-06-15

## 2021-06-16 ENCOUNTER — OFFICE VISIT (OUTPATIENT)
Dept: FAMILY MEDICINE CLINIC | Facility: CLINIC | Age: 66
End: 2021-06-16
Payer: MEDICARE

## 2021-06-16 ENCOUNTER — TELEPHONE (OUTPATIENT)
Dept: FAMILY MEDICINE CLINIC | Facility: CLINIC | Age: 66
End: 2021-06-16

## 2021-06-16 VITALS
DIASTOLIC BLOOD PRESSURE: 72 MMHG | SYSTOLIC BLOOD PRESSURE: 122 MMHG | RESPIRATION RATE: 14 BRPM | HEIGHT: 64 IN | HEART RATE: 80 BPM | BODY MASS INDEX: 24.14 KG/M2 | WEIGHT: 141.4 LBS | TEMPERATURE: 98.1 F

## 2021-06-16 DIAGNOSIS — N30.01 ACUTE CYSTITIS WITH HEMATURIA: ICD-10-CM

## 2021-06-16 DIAGNOSIS — J34.89 NASAL SORE: ICD-10-CM

## 2021-06-16 DIAGNOSIS — R05.9 COUGH: Primary | ICD-10-CM

## 2021-06-16 DIAGNOSIS — R06.2 WHEEZE: ICD-10-CM

## 2021-06-16 DIAGNOSIS — J45.21 MILD INTERMITTENT ASTHMA WITH ACUTE EXACERBATION: ICD-10-CM

## 2021-06-16 LAB
SL AMB  POCT GLUCOSE, UA: ABNORMAL
SL AMB LEUKOCYTE ESTERASE,UA: ABNORMAL
SL AMB POCT BILIRUBIN,UA: ABNORMAL
SL AMB POCT BLOOD,UA: 50
SL AMB POCT CLARITY,UA: CLEAR
SL AMB POCT COLOR,UA: YELLOW
SL AMB POCT KETONES,UA: ABNORMAL
SL AMB POCT NITRITE,UA: ABNORMAL
SL AMB POCT PH,UA: 6
SL AMB POCT SPECIFIC GRAVITY,UA: 1.01
SL AMB POCT URINE PROTEIN: ABNORMAL
SL AMB POCT UROBILINOGEN: ABNORMAL

## 2021-06-16 PROCEDURE — 81003 URINALYSIS AUTO W/O SCOPE: CPT | Performed by: FAMILY MEDICINE

## 2021-06-16 PROCEDURE — 99213 OFFICE O/P EST LOW 20 MIN: CPT | Performed by: FAMILY MEDICINE

## 2021-06-16 RX ORDER — PREDNISONE 20 MG/1
20 TABLET ORAL DAILY
Qty: 7 TABLET | Refills: 1 | Status: SHIPPED | OUTPATIENT
Start: 2021-06-16 | End: 2021-07-01

## 2021-06-16 NOTE — TELEPHONE ENCOUNTER
I spoke to sleep study at [address] and cancelled patient diagnostic sleep study for this evening because patient is sick tc/cma

## 2021-06-23 ENCOUNTER — TELEPHONE (OUTPATIENT)
Dept: PULMONOLOGY | Facility: CLINIC | Age: 66
End: 2021-06-23

## 2021-06-23 ENCOUNTER — OFFICE VISIT (OUTPATIENT)
Dept: FAMILY MEDICINE CLINIC | Facility: CLINIC | Age: 66
End: 2021-06-23
Payer: MEDICARE

## 2021-06-23 VITALS
RESPIRATION RATE: 14 BRPM | HEART RATE: 84 BPM | HEIGHT: 64 IN | TEMPERATURE: 97.9 F | DIASTOLIC BLOOD PRESSURE: 58 MMHG | SYSTOLIC BLOOD PRESSURE: 90 MMHG | BODY MASS INDEX: 24.07 KG/M2 | WEIGHT: 141 LBS

## 2021-06-23 DIAGNOSIS — Z12.31 BREAST CANCER SCREENING BY MAMMOGRAM: ICD-10-CM

## 2021-06-23 DIAGNOSIS — R30.0 DYSURIA: ICD-10-CM

## 2021-06-23 DIAGNOSIS — R05.9 COUGH: ICD-10-CM

## 2021-06-23 DIAGNOSIS — R06.2 WHEEZE: ICD-10-CM

## 2021-06-23 DIAGNOSIS — R53.82 CHRONIC FATIGUE: Primary | ICD-10-CM

## 2021-06-23 DIAGNOSIS — Z87.440 HISTORY OF RECURRENT UTIS: ICD-10-CM

## 2021-06-23 PROCEDURE — 99213 OFFICE O/P EST LOW 20 MIN: CPT | Performed by: FAMILY MEDICINE

## 2021-06-23 NOTE — TELEPHONE ENCOUNTER
Called left voice message PFT's showed moderate obstruction however she may have a component of asthma as there was significant response after bronchodilator, reported ABGs were normal patient any further questions to call office otherwise will follow-up in September

## 2021-06-24 ENCOUNTER — APPOINTMENT (OUTPATIENT)
Dept: RADIOLOGY | Facility: CLINIC | Age: 66
End: 2021-06-24
Payer: MEDICARE

## 2021-06-24 ENCOUNTER — APPOINTMENT (OUTPATIENT)
Dept: LAB | Facility: CLINIC | Age: 66
End: 2021-06-24
Payer: MEDICARE

## 2021-06-24 DIAGNOSIS — J45.21 MILD INTERMITTENT ASTHMA WITH ACUTE EXACERBATION: ICD-10-CM

## 2021-06-24 DIAGNOSIS — R06.2 WHEEZE: ICD-10-CM

## 2021-06-24 DIAGNOSIS — R30.0 DYSURIA: ICD-10-CM

## 2021-06-24 DIAGNOSIS — R53.82 CHRONIC FATIGUE: ICD-10-CM

## 2021-06-24 DIAGNOSIS — R05.9 COUGH: ICD-10-CM

## 2021-06-24 DIAGNOSIS — Z87.440 HISTORY OF RECURRENT UTIS: ICD-10-CM

## 2021-06-24 LAB
ERYTHROCYTE [DISTWIDTH] IN BLOOD BY AUTOMATED COUNT: 13.3 % (ref 11.6–15.1)
HCT VFR BLD AUTO: 34.8 % (ref 34.8–46.1)
HGB BLD-MCNC: 11 G/DL (ref 11.5–15.4)
MCH RBC QN AUTO: 30.5 PG (ref 26.8–34.3)
MCHC RBC AUTO-ENTMCNC: 31.6 G/DL (ref 31.4–37.4)
MCV RBC AUTO: 96 FL (ref 82–98)
PLATELET # BLD AUTO: 385 THOUSANDS/UL (ref 149–390)
PMV BLD AUTO: 10.3 FL (ref 8.9–12.7)
RBC # BLD AUTO: 3.61 MILLION/UL (ref 3.81–5.12)
WBC # BLD AUTO: 7.89 THOUSAND/UL (ref 4.31–10.16)

## 2021-06-24 PROCEDURE — 36415 COLL VENOUS BLD VENIPUNCTURE: CPT

## 2021-06-24 PROCEDURE — 85027 COMPLETE CBC AUTOMATED: CPT

## 2021-06-24 PROCEDURE — 71046 X-RAY EXAM CHEST 2 VIEWS: CPT

## 2021-06-24 PROCEDURE — 87086 URINE CULTURE/COLONY COUNT: CPT

## 2021-06-25 DIAGNOSIS — R06.2 WHEEZES: ICD-10-CM

## 2021-06-25 RX ORDER — ALBUTEROL SULFATE 90 UG/1
2 AEROSOL, METERED RESPIRATORY (INHALATION) EVERY 4 HOURS PRN
Qty: 8.5 G | Refills: 5 | Status: SHIPPED | OUTPATIENT
Start: 2021-06-25 | End: 2021-12-30 | Stop reason: SDUPTHER

## 2021-06-26 PROBLEM — J34.89 NASAL SORE: Status: ACTIVE | Noted: 2021-06-26

## 2021-06-26 PROBLEM — N30.00 ACUTE CYSTITIS WITHOUT HEMATURIA: Status: ACTIVE | Noted: 2021-06-01

## 2021-06-26 PROBLEM — R06.2 WHEEZE: Status: ACTIVE | Noted: 2021-06-26

## 2021-06-26 LAB — BACTERIA UR CULT: NORMAL

## 2021-06-26 NOTE — PROGRESS NOTES
Assessment/Plan:    1  Cough  -     XR chest pa & lateral; Future; Expected date: 06/16/2021    2  Mild intermittent asthma with acute exacerbation  -     predniSONE 20 mg tablet; Take 1 tablet (20 mg total) by mouth daily 2 tabs po x 2d, 1 tab po x2d, 1/2 tab po x2d--take w food  (Patient not taking: Reported on 6/23/2021)  -     XR chest pa & lateral; Future; Expected date: 06/16/2021    3  Wheeze  -     XR chest pa & lateral; Future; Expected date: 06/16/2021    4  Nasal sore  -     mupirocin (BACTROBAN) 2 % ointment; Apply topically 2 (two) times a day    5  Acute cystitis with hematuria  Assessment & Plan:  sxs resolved post recent abx    Orders:  -     POCT urine dip auto non-scope    6  BMI 24 0-24 9, adult      Tobacco Cessation Counseling: Tobacco cessation counseling was provided  Subjective:      Patient ID: Adonis Garcia is a 61 y o  female  Chief Complaint   Patient presents with    Follow-up     cough and some congestion , running ,follow up uti       HPI  Follow-up  Urinary sxs better -ua --neg nitrites, dec bld  Cont w cough, occ wheeze  Also c/o nasal sore and congestion  No rash or fever  The following portions of the patient's history were reviewed and updated as appropriate: allergies, current medications, past family history, past medical history, past social history, past surgical history and problem list     Review of Systems   Constitutional: Positive for fatigue  Negative for fever  HENT: Positive for congestion and sinus pressure  Respiratory: Positive for cough and wheezing  Cardiovascular: Negative  Gastrointestinal: Negative  Genitourinary: Positive for hematuria  Musculoskeletal: Positive for arthralgias  Skin: Negative for rash  Allergic/Immunologic: Positive for environmental allergies  Psychiatric/Behavioral: Positive for sleep disturbance  The patient is nervous/anxious            Current Outpatient Medications   Medication Sig Dispense Refill  acetaminophen (Tylenol 8 Hour) 650 mg CR tablet Take 1,300 mg by mouth 2 (two) times a day      aspirin (ECOTRIN LOW STRENGTH) 81 mg EC tablet Take 81 mg by mouth daily      buprenorphine (SUBUTEX) 8 mg 8 mg 2 (two) times a day SL      Cholecalciferol (Vitamin D) 50 MCG (2000 UT) CAPS Take by mouth daily at bedtime      disulfiram (ANTABUSE) 250 mg tablet Take 125 mg by mouth every morning       fluticasone-salmeterol (ADVAIR, WIXELA) 250-50 mcg/dose inhaler Inhale 1 puff 2 (two) times a day 1 Inhaler 0    gabapentin (NEURONTIN) 400 mg capsule Take 2 capsules (800 mg total) by mouth 2 (two) times a day (Patient taking differently: Take 800 mg by mouth every morning ) 120 capsule 1    gabapentin (NEURONTIN) 400 mg capsule Take 400 mg by mouth daily at bedtime      MELATONIN PO Take 2 5 mg by mouth daily at bedtime as needed      methylphenidate (RITALIN LA) 10 MG 24 hr capsule Take 10 mg by mouth every morning      multivitamin (THERAGRAN) TABS Take 1 tablet by mouth daily at bedtime      naloxone (NARCAN) 4 mg/0 1 mL nasal spray 4 mg into each nostril as needed      pantoprazole (PROTONIX) 40 mg tablet Take 1 tablet (40 mg total) by mouth every morning 30 tablet 0    SUMAtriptan (IMITREX) 100 mg tablet Take 100 mg by mouth once as needed for migraine      TURMERIC PO Take 1,000 mg by mouth 2 (two) times a day      albuterol (ProAir HFA) 90 mcg/act inhaler Inhale 2 puffs every 4 (four) hours as needed for wheezing 8 5 g 5    mupirocin (BACTROBAN) 2 % ointment Apply topically 2 (two) times a day 22 g 0    predniSONE 20 mg tablet Take 1 tablet (20 mg total) by mouth daily 2 tabs po x 2d, 1 tab po x2d, 1/2 tab po x2d--take w food  (Patient not taking: Reported on 6/23/2021) 7 tablet 1     No current facility-administered medications for this visit         Objective:    /72 (BP Location: Left arm, Patient Position: Sitting, Cuff Size: Large)   Pulse 80   Temp 98 1 °F (36 7 °C)   Resp 14   Ht 5' 3 5" (1 613 m)   Wt 64 1 kg (141 lb 6 4 oz)   BMI 24 65 kg/m²        Physical Exam  Vitals and nursing note reviewed  Constitutional:       General: She is not in acute distress  Eyes:      General: No scleral icterus  Conjunctiva/sclera: Conjunctivae normal    Cardiovascular:      Rate and Rhythm: Normal rate and regular rhythm  Pulmonary:      Effort: Pulmonary effort is normal  No respiratory distress  Comments: occ scattered exp wheeze, no localization  Abdominal:      General: Bowel sounds are normal       Palpations: Abdomen is soft  Tenderness: There is no abdominal tenderness  There is no guarding or rebound  Musculoskeletal:         General: Tenderness present  Cervical back: Neck supple  Skin:     General: Skin is warm and dry  Coloration: Skin is not jaundiced  Findings: No rash  Neurological:      Mental Status: She is alert and oriented to person, place, and time  Cranial Nerves: No cranial nerve deficit     Psychiatric:      Comments: anxious                Hossein Johnson MD

## 2021-06-29 PROBLEM — Z87.440 HISTORY OF RECURRENT UTIS: Status: ACTIVE | Noted: 2021-06-29

## 2021-06-30 NOTE — PROGRESS NOTES
Assessment/Plan:    1  Chronic fatigue  -     Urine culture; Future  -     CBC and Platelet; Future    2  History of recurrent UTIs  -     Urine culture; Future    3  Dysuria  -     Urine culture; Future  -     CBC and Platelet; Future    4  Breast cancer screening by mammogram  -     Mammo screening bilateral w 3d & cad; Future; Expected date: 06/23/2021    5  Cough    6  Wheeze          Check labs/cxs/cxr  Cont use of inhalers prn  Completed abx earlier in mth-may need rpt course pending cxr/labs  Inc fluids/rest  Daily nutritional supplement  Subjective:      Patient ID: Tino Hogan is a 61 y o  female  Chief Complaint   Patient presents with    Follow-up     URI       HPI  Cont w fatigue, dysuria, cough w occ wheezing  No fever/chills/hemoptysis or rash    The following portions of the patient's history were reviewed and updated as appropriate: allergies, current medications, past family history, past medical history, past social history, past surgical history and problem list     Review of Systems   Constitutional: Positive for fatigue  Negative for fever  HENT: Positive for congestion  Respiratory: Positive for cough and wheezing  Genitourinary: Positive for dysuria  Musculoskeletal: Positive for arthralgias  Skin: Negative for rash  Allergic/Immunologic: Positive for environmental allergies  Psychiatric/Behavioral: Positive for decreased concentration and sleep disturbance  Negative for hallucinations  The patient is nervous/anxious            Current Outpatient Medications   Medication Sig Dispense Refill    acetaminophen (Tylenol 8 Hour) 650 mg CR tablet Take 1,300 mg by mouth 2 (two) times a day      aspirin (ECOTRIN LOW STRENGTH) 81 mg EC tablet Take 81 mg by mouth daily      buprenorphine (SUBUTEX) 8 mg 8 mg 2 (two) times a day SL      Cholecalciferol (Vitamin D) 50 MCG (2000 UT) CAPS Take by mouth daily at bedtime      disulfiram (ANTABUSE) 250 mg tablet Take 125 mg by mouth every morning       fluticasone-salmeterol (ADVAIR, WIXELA) 250-50 mcg/dose inhaler Inhale 1 puff 2 (two) times a day 1 Inhaler 0    gabapentin (NEURONTIN) 400 mg capsule Take 2 capsules (800 mg total) by mouth 2 (two) times a day (Patient taking differently: Take 800 mg by mouth every morning ) 120 capsule 1    gabapentin (NEURONTIN) 400 mg capsule Take 400 mg by mouth daily at bedtime      MELATONIN PO Take 2 5 mg by mouth daily at bedtime as needed      methylphenidate (RITALIN LA) 10 MG 24 hr capsule Take 10 mg by mouth every morning      multivitamin (THERAGRAN) TABS Take 1 tablet by mouth daily at bedtime      mupirocin (BACTROBAN) 2 % ointment Apply topically 2 (two) times a day 22 g 0    naloxone (NARCAN) 4 mg/0 1 mL nasal spray 4 mg into each nostril as needed      pantoprazole (PROTONIX) 40 mg tablet Take 1 tablet (40 mg total) by mouth every morning 30 tablet 0    SUMAtriptan (IMITREX) 100 mg tablet Take 100 mg by mouth once as needed for migraine      TURMERIC PO Take 1,000 mg by mouth 2 (two) times a day      albuterol (ProAir HFA) 90 mcg/act inhaler Inhale 2 puffs every 4 (four) hours as needed for wheezing 8 5 g 5    predniSONE 20 mg tablet Take 1 tablet (20 mg total) by mouth daily 2 tabs po x 2d, 1 tab po x2d, 1/2 tab po x2d--take w food  (Patient not taking: Reported on 6/23/2021) 7 tablet 1     No current facility-administered medications for this visit  Objective:    BP 90/58 (BP Location: Left arm, Patient Position: Sitting, Cuff Size: Standard)   Pulse 84   Temp 97 9 °F (36 6 °C) (Temporal)   Resp 14   Ht 5' 3 5" (1 613 m)   Wt 64 kg (141 lb)   BMI 24 59 kg/m²        Physical Exam  Vitals and nursing note reviewed  Constitutional:       General: She is not in acute distress  Eyes:      General: No scleral icterus  Conjunctiva/sclera: Conjunctivae normal    Cardiovascular:      Rate and Rhythm: Normal rate and regular rhythm     Pulmonary:      Effort: Pulmonary effort is normal  No respiratory distress  Comments: occ scattered exp wheeze, no localization  Abdominal:      General: Bowel sounds are normal       Palpations: Abdomen is soft  Tenderness: There is no abdominal tenderness  There is no guarding or rebound  Musculoskeletal:         General: Tenderness present  Cervical back: Neck supple  Skin:     General: Skin is warm and dry  Coloration: Skin is not jaundiced  Findings: No rash  Neurological:      Mental Status: She is alert and oriented to person, place, and time  Cranial Nerves: No cranial nerve deficit     Psychiatric:      Comments: anxious             Jayson Castillo MD

## 2021-07-01 ENCOUNTER — OFFICE VISIT (OUTPATIENT)
Dept: CARDIOLOGY CLINIC | Facility: CLINIC | Age: 66
End: 2021-07-01
Payer: MEDICARE

## 2021-07-01 ENCOUNTER — TELEPHONE (OUTPATIENT)
Dept: FAMILY MEDICINE CLINIC | Facility: CLINIC | Age: 66
End: 2021-07-01

## 2021-07-01 ENCOUNTER — TELEPHONE (OUTPATIENT)
Dept: CARDIOLOGY CLINIC | Facility: CLINIC | Age: 66
End: 2021-07-01

## 2021-07-01 VITALS
HEART RATE: 69 BPM | SYSTOLIC BLOOD PRESSURE: 142 MMHG | WEIGHT: 142 LBS | OXYGEN SATURATION: 97 % | DIASTOLIC BLOOD PRESSURE: 80 MMHG | TEMPERATURE: 98.3 F | BODY MASS INDEX: 24.24 KG/M2 | HEIGHT: 64 IN

## 2021-07-01 DIAGNOSIS — Z72.0 TOBACCO USE: ICD-10-CM

## 2021-07-01 DIAGNOSIS — E78.5 DYSLIPIDEMIA: ICD-10-CM

## 2021-07-01 DIAGNOSIS — F41.8 DEPRESSION WITH ANXIETY: ICD-10-CM

## 2021-07-01 DIAGNOSIS — R06.02 EXERTIONAL SHORTNESS OF BREATH: ICD-10-CM

## 2021-07-01 DIAGNOSIS — F10.21 HISTORY OF ALCOHOL DEPENDENCE (HCC): ICD-10-CM

## 2021-07-01 DIAGNOSIS — G89.4 CHRONIC PAIN SYNDROME: ICD-10-CM

## 2021-07-01 DIAGNOSIS — J44.9 CHRONIC OBSTRUCTIVE PULMONARY DISEASE, UNSPECIFIED COPD TYPE (HCC): ICD-10-CM

## 2021-07-01 DIAGNOSIS — R55 SYNCOPE AND COLLAPSE: ICD-10-CM

## 2021-07-01 DIAGNOSIS — Z01.810 PREOP CARDIOVASCULAR EXAM: ICD-10-CM

## 2021-07-01 PROCEDURE — 99214 OFFICE O/P EST MOD 30 MIN: CPT | Performed by: INTERNAL MEDICINE

## 2021-07-01 PROCEDURE — 93000 ELECTROCARDIOGRAM COMPLETE: CPT | Performed by: INTERNAL MEDICINE

## 2021-07-01 RX ORDER — OMEGA-3-ACID ETHYL ESTERS 1 G/1
2 CAPSULE, LIQUID FILLED ORAL 2 TIMES DAILY
COMMUNITY
End: 2021-11-15

## 2021-07-01 RX ORDER — DIMENHYDRINATE 50 MG
2000 TABLET ORAL DAILY
COMMUNITY
End: 2021-11-15

## 2021-07-01 RX ORDER — METOPROLOL SUCCINATE 25 MG/1
25 TABLET, EXTENDED RELEASE ORAL EVERY OTHER DAY
Qty: 15 TABLET | Refills: 5 | Status: SHIPPED | OUTPATIENT
Start: 2021-07-01 | End: 2022-01-25

## 2021-07-01 NOTE — PROGRESS NOTES
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  MYOCARDIAL PERFUSION IMAGING:  The image quality was good  Rotating projection images reveal mild subdiaphragmatic activity  PERFUSION DEFECTS:  -  There was a small, partially reversible myocardial perfusion defect of the anteroseptal wall likely due to attenuation from breast tissue  GATED SPECT:  The calculated left ventricular ejection fraction was 65 %  Left ventricular ejection fraction was within normal limits by visual estimate  There was no left ventricular regional abnormality  SUMMARY:  -  Stress results: Duration of exercise was 8 min and 35 sec  Maximal work rate was 10 1 METs  Maximal heart rate during stress was 142 bpm ( 89 % of maximal predicted heart rate)  Target heart rate was achieved  Maximal systolic blood  pressure during stress was 174 mmHg  There was no chest pain during stress  -  ECG conclusions:  The stress ECG was negative for ischemia and normal   -  Perfusion imaging: There was a small, partially reversible myocardial perfusion defect of the anteroseptal wall likely due to attenuation from breast tissue   -  Gated SPECT: The calculated left ventricular ejection fraction was 65 %  Left ventricular ejection fraction was within normal limits by visual estimate  There was no left ventricular regional abnormality   -  Impressions and recommendations: Likely normal study after maximal exercise  IMPRESSIONS: Likely normal study after maximal exercise  Myocardial perfusion imaging was normal at rest and with stress      Prepared and signed by    Damaris Hayes MD  Signed 06/02/2018 18:16:10         Lab Review   Lab Results   Component Value Date    WBC 7 89 06/24/2021    HGB 11 0 (L) 06/24/2021    HCT 34 8 06/24/2021    MCV 96 06/24/2021    RDW 13 3 06/24/2021     06/24/2021     BMP:  Lab Results   Component Value Date    SODIUM 138 03/25/2021    K 5 0 03/25/2021     03/25/2021    CO2 27 06/07/2021    ANIONGAP 11 2 12/17/2015    BUN 19 03/25/2021    CREATININE 1 12 03/25/2021    GLUC 96 03/25/2021    GLUF 89 03/17/2021    CALCIUM 9 1 03/25/2021    EGFR 52 03/25/2021    MG 2 3 04/24/2019     LFT:  Lab Results   Component Value Date    AST 19 03/17/2021    ALT 25 03/17/2021    ALKPHOS 77 03/17/2021    TP 6 8 03/17/2021    ALB 3 6 03/17/2021      Lab Results   Component Value Date    VOP4PRQMHCXT 2 640 03/17/2021     Lab Results   Component Value Date    HGBA1C 5 5 07/06/2020     Lipid Profile:   Lab Results   Component Value Date    CHOLESTEROL 166 03/17/2021    HDL 74 03/17/2021    LDLCALC 84 03/17/2021    TRIG 42 03/17/2021     Lab Results   Component Value Date    CHOLESTEROL 166 03/17/2021    CHOLESTEROL 180 04/24/2019     Lab Results   Component Value Date    TROPONINI <0 02 07/23/2020       The 10-year ASCVD risk score (Phi Jo et al , 2013) is: 8 9%    Values used to calculate the score:      Age: 61 years Sex: Female      Is Non- : No      Diabetic: No      Tobacco smoker: Yes      Systolic Blood Pressure: 731 mmHg      Is BP treated: No      HDL Cholesterol: 74 mg/dL      Total Cholesterol: 166 mg/dL        Dr Matt Ruby MD [facility]      "This note has been constructed using a voice recognition system  Therefore there may be syntax, spelling, and/or grammatical errors   Please call if you have any questions  "

## 2021-07-01 NOTE — TELEPHONE ENCOUNTER
----- Message from Wendy Tai MD sent at 6/30/2021 12:55 PM EDT -----  Please inform that other than showing scoliosis chest xray negative

## 2021-07-02 ENCOUNTER — TELEPHONE (OUTPATIENT)
Dept: CARDIOLOGY CLINIC | Facility: CLINIC | Age: 66
End: 2021-07-02

## 2021-07-07 ENCOUNTER — OFFICE VISIT (OUTPATIENT)
Dept: FAMILY MEDICINE CLINIC | Facility: CLINIC | Age: 66
End: 2021-07-07
Payer: MEDICARE

## 2021-07-07 VITALS
TEMPERATURE: 97 F | HEIGHT: 64 IN | BODY MASS INDEX: 24.07 KG/M2 | RESPIRATION RATE: 14 BRPM | WEIGHT: 141 LBS | HEART RATE: 76 BPM | SYSTOLIC BLOOD PRESSURE: 94 MMHG | DIASTOLIC BLOOD PRESSURE: 60 MMHG

## 2021-07-07 DIAGNOSIS — N81.11 CYSTOCELE, MIDLINE: ICD-10-CM

## 2021-07-07 DIAGNOSIS — N39.3 STRESS INCONTINENCE IN FEMALE: ICD-10-CM

## 2021-07-07 DIAGNOSIS — Z01.818 PREOP EXAMINATION: Primary | ICD-10-CM

## 2021-07-07 PROCEDURE — 99215 OFFICE O/P EST HI 40 MIN: CPT | Performed by: FAMILY MEDICINE

## 2021-07-08 ENCOUNTER — APPOINTMENT (OUTPATIENT)
Dept: LAB | Facility: CLINIC | Age: 66
End: 2021-07-08
Payer: MEDICARE

## 2021-07-09 PROBLEM — Z01.818 PREOP EXAMINATION: Status: ACTIVE | Noted: 2021-07-09

## 2021-07-09 PROBLEM — N39.3 STRESS INCONTINENCE IN FEMALE: Status: ACTIVE | Noted: 2021-07-09

## 2021-07-09 NOTE — ASSESSMENT & PLAN NOTE
PATS reviewed  Pt medically cleared for bladder procedure  Cardiac and pulmonary clearance being sent under separate cover to surgeon from specialists
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
## 2021-08-03 DIAGNOSIS — R05.9 COUGH: ICD-10-CM

## 2021-08-03 RX ORDER — SUMATRIPTAN 100 MG/1
100 TABLET, FILM COATED ORAL ONCE AS NEEDED
OUTPATIENT
Start: 2021-08-03

## 2021-08-06 ENCOUNTER — OFFICE VISIT (OUTPATIENT)
Dept: FAMILY MEDICINE CLINIC | Facility: CLINIC | Age: 66
End: 2021-08-06
Payer: MEDICARE

## 2021-08-06 VITALS
HEIGHT: 64 IN | OXYGEN SATURATION: 96 % | BODY MASS INDEX: 23.9 KG/M2 | DIASTOLIC BLOOD PRESSURE: 70 MMHG | TEMPERATURE: 96.9 F | RESPIRATION RATE: 20 BRPM | HEART RATE: 84 BPM | SYSTOLIC BLOOD PRESSURE: 110 MMHG | WEIGHT: 140 LBS

## 2021-08-06 DIAGNOSIS — J44.1 CHRONIC OBSTRUCTIVE PULMONARY DISEASE WITH ACUTE EXACERBATION (HCC): Primary | ICD-10-CM

## 2021-08-06 PROCEDURE — 99214 OFFICE O/P EST MOD 30 MIN: CPT | Performed by: FAMILY MEDICINE

## 2021-08-06 RX ORDER — IPRATROPIUM BROMIDE AND ALBUTEROL SULFATE 2.5; .5 MG/3ML; MG/3ML
3 SOLUTION RESPIRATORY (INHALATION) 4 TIMES DAILY
Qty: 72 ML | Refills: 0 | Status: SHIPPED | OUTPATIENT
Start: 2021-08-06 | End: 2021-11-15

## 2021-08-06 RX ORDER — PREDNISONE 20 MG/1
40 TABLET ORAL DAILY
Qty: 10 TABLET | Refills: 0 | Status: SHIPPED | OUTPATIENT
Start: 2021-08-06 | End: 2021-08-11

## 2021-08-06 NOTE — PROGRESS NOTES
Chief Complaint   Patient presents with    Shortness of Breath        Patient ID: Nazario Sanchez is a 61 y o  female  HPI  Pt with COPD/current smoker seeing for worsening SOB x  1 wk after stopped using Advair -  Under pulmonologist care  -  Was in a different state and forgot to bring Advair with her - no fever, no URI symptoms, no direct sick contacts -  Does not have nebulizer at home but was using it in the past with help -  Restarted Advair 2 days ago -  Slowly better but still using rescue inhaler few times a day -  Last use over 12 h ago     The following portions of the patient's history were reviewed and updated as appropriate: allergies, current medications, past family history, past medical history, past social history, past surgical history and problem list     Review of Systems   Constitutional: Negative  Negative for fatigue and fever  HENT: Negative  Respiratory: Positive for chest tightness and shortness of breath  Negative for cough and wheezing  Cardiovascular: Negative  Gastrointestinal: Negative  Genitourinary: Negative  Musculoskeletal: Negative          Current Outpatient Medications   Medication Sig Dispense Refill    acetaminophen (Tylenol 8 Hour) 650 mg CR tablet Take 1,300 mg by mouth 2 (two) times a day      albuterol (ProAir HFA) 90 mcg/act inhaler Inhale 2 puffs every 4 (four) hours as needed for wheezing 8 5 g 5    aspirin (ECOTRIN LOW STRENGTH) 81 mg EC tablet Take 81 mg by mouth daily      buprenorphine (SUBUTEX) 8 mg 8 mg 3 (three) times a day SL      Cholecalciferol (Vitamin D) 50 MCG (2000 UT) CAPS Take by mouth daily at bedtime      disulfiram (ANTABUSE) 250 mg tablet Take 125 mg by mouth every morning       fluticasone-salmeterol (ADVAIR, WIXELA) 250-50 mcg/dose inhaler Inhale 1 puff 2 (two) times a day 1 Inhaler 0    gabapentin (NEURONTIN) 400 mg capsule Take 2 capsules (800 mg total) by mouth 2 (two) times a day (Patient taking differently: Take 800 mg by mouth every morning ) 120 capsule 1    MELATONIN PO Take 2 5 mg by mouth daily at bedtime as needed       methylphenidate (RITALIN LA) 10 MG 24 hr capsule Take 10 mg by mouth every morning      multivitamin (THERAGRAN) TABS Take 1 tablet by mouth daily at bedtime       omega-3-acid ethyl esters (LOVAZA) 1 g capsule Take 2 g by mouth 2 (two) times a day       pantoprazole (PROTONIX) 40 mg tablet Take 1 tablet (40 mg total) by mouth every morning 30 tablet 0    SUMAtriptan (IMITREX) 100 mg tablet Take 100 mg by mouth once as needed for migraine      TURMERIC PO Take 1,000 mg by mouth 2 (two) times a day       Flaxseed, Linseed, (Flax Seed Oil) 1000 MG CAPS Take 2,000 mg by mouth daily  (Patient not taking: Reported on 7/7/2021)      metoprolol succinate (TOPROL-XL) 25 mg 24 hr tablet Take 1 tablet (25 mg total) by mouth every other day (Patient not taking: Reported on 8/6/2021) 15 tablet 5    naloxone (NARCAN) 4 mg/0 1 mL nasal spray 4 mg into each nostril as needed  (Patient not taking: Reported on 8/6/2021)       No current facility-administered medications for this visit  Objective:    /70 (BP Location: Left arm, Patient Position: Sitting, Cuff Size: Standard)   Pulse 84   Temp (!) 96 9 °F (36 1 °C) (Temporal)   Resp 20   Ht 5' 3 5" (1 613 m)   Wt 63 5 kg (140 lb)   SpO2 96%   BMI 24 41 kg/m²        Physical Exam  Constitutional:       General: She is not in acute distress  Appearance: She is not ill-appearing  Interventions: She is not intubated  HENT:      Nose: No congestion or rhinorrhea  Cardiovascular:      Rate and Rhythm: Normal rate and regular rhythm  Pulmonary:      Effort: Tachypnea (minimal ) and accessory muscle usage (minimal ) present  No respiratory distress  She is not intubated  Breath sounds: No stridor  Decreased breath sounds present  No wheezing, rhonchi or rales  Neurological:      Mental Status: She is alert  Assessment/Plan:         Diagnoses and all orders for this visit:    Chronic obstructive pulmonary disease with acute exacerbation (HCC)  -     predniSONE 20 mg tablet; Take 2 tablets (40 mg total) by mouth daily for 5 days  -     ipratropium-albuterol (DUO-NEB) 0 5-2 5 mg/3 mL nebulizer solution;  Take 3 mL by nebulization 4 (four) times a day      Rx for nebulizer machine given   Was advised to stop smoking     rto prn                       Mann Hannon MD

## 2021-08-09 RX ORDER — SUMATRIPTAN 100 MG/1
100 TABLET, FILM COATED ORAL ONCE AS NEEDED
OUTPATIENT
Start: 2021-08-09

## 2021-08-10 ENCOUNTER — TELEMEDICINE (OUTPATIENT)
Dept: FAMILY MEDICINE CLINIC | Facility: CLINIC | Age: 66
End: 2021-08-10
Payer: MEDICARE

## 2021-08-10 DIAGNOSIS — J44.1 CHRONIC OBSTRUCTIVE PULMONARY DISEASE WITH ACUTE EXACERBATION (HCC): ICD-10-CM

## 2021-08-10 DIAGNOSIS — J20.8 ACUTE BACTERIAL BRONCHITIS: Primary | ICD-10-CM

## 2021-08-10 DIAGNOSIS — B96.89 ACUTE BACTERIAL BRONCHITIS: Primary | ICD-10-CM

## 2021-08-10 PROCEDURE — 99213 OFFICE O/P EST LOW 20 MIN: CPT | Performed by: NURSE PRACTITIONER

## 2021-08-10 RX ORDER — PREDNISONE 20 MG/1
40 TABLET ORAL DAILY
Qty: 4 TABLET | Refills: 0 | Status: SHIPPED | OUTPATIENT
Start: 2021-08-10 | End: 2021-08-12

## 2021-08-10 RX ORDER — AZITHROMYCIN 250 MG/1
TABLET, FILM COATED ORAL
Qty: 6 TABLET | Refills: 0 | Status: SHIPPED | OUTPATIENT
Start: 2021-08-10 | End: 2021-08-14
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
## 2021-08-11 ENCOUNTER — TELEPHONE (OUTPATIENT)
Dept: FAMILY MEDICINE CLINIC | Facility: CLINIC | Age: 66
End: 2021-08-11

## 2021-08-13 ENCOUNTER — TELEPHONE (OUTPATIENT)
Dept: FAMILY MEDICINE CLINIC | Facility: CLINIC | Age: 66
End: 2021-08-13

## 2021-08-13 NOTE — TELEPHONE ENCOUNTER
[facility] prior Radha Higgins [phone] med denied under medicare part D but should be covered under part B was told to call pharmacy back and have them run rx through under medicare part B  Pharmacy will call me back as they do not have card on file

## 2021-08-13 NOTE — TELEPHONE ENCOUNTER
Lotus Luevano from [facility] called the albuterol for nebulizer prior Vickey Lockwood is needed

## 2021-08-16 ENCOUNTER — OFFICE VISIT (OUTPATIENT)
Dept: FAMILY MEDICINE CLINIC | Facility: CLINIC | Age: 66
End: 2021-08-16
Payer: MEDICARE

## 2021-08-16 VITALS
HEIGHT: 64 IN | TEMPERATURE: 97.1 F | WEIGHT: 144.8 LBS | DIASTOLIC BLOOD PRESSURE: 88 MMHG | SYSTOLIC BLOOD PRESSURE: 134 MMHG | RESPIRATION RATE: 14 BRPM | BODY MASS INDEX: 24.72 KG/M2 | HEART RATE: 68 BPM

## 2021-08-16 DIAGNOSIS — G43.109 MIGRAINE WITH AURA AND WITHOUT STATUS MIGRAINOSUS, NOT INTRACTABLE: ICD-10-CM

## 2021-08-16 DIAGNOSIS — B02.9 HERPES ZOSTER WITHOUT COMPLICATION: Primary | ICD-10-CM

## 2021-08-16 DIAGNOSIS — M54.31 SCIATICA OF RIGHT SIDE: ICD-10-CM

## 2021-08-16 PROCEDURE — 99213 OFFICE O/P EST LOW 20 MIN: CPT | Performed by: FAMILY MEDICINE

## 2021-08-16 RX ORDER — DULOXETIN HYDROCHLORIDE 30 MG/1
30 CAPSULE, DELAYED RELEASE ORAL 2 TIMES DAILY
Qty: 60 CAPSULE | Refills: 2 | Status: SHIPPED | OUTPATIENT
Start: 2021-08-16 | End: 2021-09-03

## 2021-08-16 RX ORDER — SUMATRIPTAN 100 MG/1
100 TABLET, FILM COATED ORAL ONCE AS NEEDED
Qty: 9 TABLET | Refills: 0 | Status: SHIPPED | OUTPATIENT
Start: 2021-08-16 | End: 2022-01-13 | Stop reason: SDUPTHER

## 2021-08-16 RX ORDER — VALACYCLOVIR HYDROCHLORIDE 1 G/1
1000 TABLET, FILM COATED ORAL 3 TIMES DAILY
Qty: 21 TABLET | Refills: 0 | Status: SHIPPED | OUTPATIENT
Start: 2021-08-16 | End: 2021-09-07 | Stop reason: SDUPTHER

## 2021-08-17 ENCOUNTER — TELEPHONE (OUTPATIENT)
Dept: GASTROENTEROLOGY | Facility: AMBULARY SURGERY CENTER | Age: 66
End: 2021-08-17

## 2021-08-17 NOTE — TELEPHONE ENCOUNTER
Patients GI provider:  Dr Adam Edouard     Number to return call: [phone]    Reason for call: Pt calling requesting to reschedule past procedure       Scheduled procedure/appointment date if applicable: Apt/procedure n/a

## 2021-08-18 ENCOUNTER — TELEPHONE (OUTPATIENT)
Dept: GASTROENTEROLOGY | Facility: AMBULARY SURGERY CENTER | Age: 66
End: 2021-08-18

## 2021-08-20 ENCOUNTER — TELEPHONE (OUTPATIENT)
Dept: FAMILY MEDICINE CLINIC | Facility: CLINIC | Age: 66
End: 2021-08-20

## 2021-08-20 NOTE — TELEPHONE ENCOUNTER
Patty from [facility] called [employer] needed prior auth-called Patty told her was not covered under med d but went thru under med b she checked and it did this was all straightened out already

## 2021-09-03 DIAGNOSIS — B02.9 HERPES ZOSTER WITHOUT COMPLICATION: ICD-10-CM

## 2021-09-03 RX ORDER — DULOXETIN HYDROCHLORIDE 30 MG/1
CAPSULE, DELAYED RELEASE ORAL
Qty: 180 CAPSULE | Refills: 1 | Status: SHIPPED | OUTPATIENT
Start: 2021-09-03 | End: 2022-04-27

## 2021-09-06 PROBLEM — M54.31 SCIATICA OF RIGHT SIDE: Status: ACTIVE | Noted: 2021-09-06

## 2021-09-06 PROBLEM — B02.9 HERPES ZOSTER WITHOUT COMPLICATION: Status: ACTIVE | Noted: 2021-09-06

## 2021-09-06 NOTE — ASSESSMENT & PLAN NOTE
?whether true sciatica vs pain from shingles  Monitor response to valtrex  T/c PT eval once rash cleared   if continued radicular sx

## 2021-09-06 NOTE — PROGRESS NOTES
Assessment/Plan:    1  Herpes zoster without complication  -     valACYclovir (VALTREX) 1,000 mg tablet; Take 1 tablet (1,000 mg total) by mouth 3 (three) times a day for 7 days    2  Sciatica of right side  Assessment & Plan:  ?whether true sciatica vs pain from shingles  Monitor response to valtrex  T/c PT eval once rash cleared   if continued radicular sx  Orders:  -     Ambulatory referral to Physical Therapy; Future    3  Migraine with aura and without status migrainosus, not intractable  Assessment & Plan:  No current h/a--req med refill  Past rx thru neuro--no recent eval     Orders:  -     SUMAtriptan (IMITREX) 100 mg tablet; Take 1 tablet (100 mg total) by mouth once as needed for migraine    4  BMI 25 0-25 9,adult       Subjective:      Patient ID: Kati Griffiths is a 61 y o  female  Chief Complaint   Patient presents with    Medication Refill    Sciatica     started this past saturday     Rash     blisters between buttocks , itchy       HPI  Main concern right sided lbp w radiation to leg   Pain started over weekend  Now  w blistering by buttocks-some itchiness as well  No fever  No new trauma  Also req refill migraine medication    The following portions of the patient's history were reviewed and updated as appropriate: allergies, current medications, past family history, past medical history, past social history, past surgical history and problem list     Review of Systems   Constitutional: Positive for fatigue  Negative for fever  Respiratory: Negative  Cardiovascular: Negative  Musculoskeletal: Positive for back pain  Skin: Positive for rash  Neurological: Positive for headaches  Psychiatric/Behavioral: The patient is nervous/anxious            Current Outpatient Medications   Medication Sig Dispense Refill    acetaminophen (Tylenol 8 Hour) 650 mg CR tablet Take 1,300 mg by mouth 2 (two) times a day      albuterol (2 5 mg/3 mL) 0 083 % nebulizer solution Take 3 mL (2 5 mg total) by nebulization every 6 (six) hours as needed for wheezing or shortness of breath 180 mL 5    albuterol (ProAir HFA) 90 mcg/act inhaler Inhale 2 puffs every 4 (four) hours as needed for wheezing 8 5 g 5    aspirin (ECOTRIN LOW STRENGTH) 81 mg EC tablet Take 81 mg by mouth daily      buprenorphine (SUBUTEX) 8 mg 8 mg 3 (three) times a day SL      Cholecalciferol (Vitamin D) 50 MCG (2000 UT) CAPS Take by mouth daily at bedtime      disulfiram (ANTABUSE) 250 mg tablet Take 125 mg by mouth every morning       Flaxseed, Linseed, (Flax Seed Oil) 1000 MG CAPS Take 2,000 mg by mouth daily       fluticasone-salmeterol (ADVAIR, WIXELA) 250-50 mcg/dose inhaler Inhale 1 puff 2 (two) times a day 1 Inhaler 0    gabapentin (NEURONTIN) 400 mg capsule Take 2 capsules (800 mg total) by mouth 2 (two) times a day (Patient taking differently: Take 800 mg by mouth every morning ) 120 capsule 1    ipratropium-albuterol (DUO-NEB) 0 5-2 5 mg/3 mL nebulizer solution Take 3 mL by nebulization 4 (four) times a day 72 mL 0    MELATONIN PO Take 2 5 mg by mouth daily at bedtime as needed       methylphenidate (RITALIN LA) 10 MG 24 hr capsule Take 10 mg by mouth every morning      metoprolol succinate (TOPROL-XL) 25 mg 24 hr tablet Take 1 tablet (25 mg total) by mouth every other day 15 tablet 5    multivitamin (THERAGRAN) TABS Take 1 tablet by mouth daily at bedtime       naloxone (NARCAN) 4 mg/0 1 mL nasal spray 4 mg into each nostril as needed       omega-3-acid ethyl esters (LOVAZA) 1 g capsule Take 2 g by mouth 2 (two) times a day       pantoprazole (PROTONIX) 40 mg tablet Take 1 tablet (40 mg total) by mouth every morning 30 tablet 0    SUMAtriptan (IMITREX) 100 mg tablet Take 1 tablet (100 mg total) by mouth once as needed for migraine 9 tablet 0    TURMERIC PO Take 1,000 mg by mouth 2 (two) times a day       DULoxetine (CYMBALTA) 30 mg delayed release capsule TAKE 1 CAPSULE BY MOUTH TWICE A  capsule 1  valACYclovir (VALTREX) 1,000 mg tablet Take 1 tablet (1,000 mg total) by mouth 3 (three) times a day for 7 days 21 tablet 0     No current facility-administered medications for this visit  Objective:    /88 (BP Location: Left arm, Patient Position: Sitting, Cuff Size: Standard)   Pulse 68   Temp (!) 97 1 °F (36 2 °C)   Resp 14   Ht 5' 3 5" (1 613 m)   Wt 65 7 kg (144 lb 12 8 oz)   BMI 25 25 kg/m²        Physical Exam  Vitals and nursing note reviewed  Cardiovascular:      Rate and Rhythm: Normal rate and regular rhythm  Pulmonary:      Effort: Pulmonary effort is normal  No respiratory distress  Breath sounds: Normal breath sounds  Musculoskeletal:         General: Tenderness (LS paravertebral mm w mildly restricted ROM) present  Skin:     General: Skin is warm and dry  Findings: Rash (pustular rash right of midline upper buttocks) present  Neurological:      Mental Status: She is alert and oriented to person, place, and time                  Ian Mayberry MD

## 2021-09-07 ENCOUNTER — TELEPHONE (OUTPATIENT)
Dept: GASTROENTEROLOGY | Facility: AMBULARY SURGERY CENTER | Age: 66
End: 2021-09-07

## 2021-09-07 DIAGNOSIS — B02.9 HERPES ZOSTER WITHOUT COMPLICATION: ICD-10-CM

## 2021-09-07 RX ORDER — VALACYCLOVIR HYDROCHLORIDE 1 G/1
1000 TABLET, FILM COATED ORAL 3 TIMES DAILY
Qty: 21 TABLET | Refills: 0 | Status: SHIPPED | OUTPATIENT
Start: 2021-09-07 | End: 2021-11-15

## 2021-09-07 NOTE — TELEPHONE ENCOUNTER
Patient is scheduled for colonoscopy on November 17 , 2021 at [address] [place] [address] with Indy Mann MD  Patient is aware of pre-procedure prep of pt has instructions for 2 day bowel prep and they will be called the day prior between 2 and 6 pm for time to report for procedure  pt is Fully vaccinated

## 2021-09-29 DIAGNOSIS — M54.16 LUMBAR RADICULOPATHY: ICD-10-CM

## 2021-09-29 RX ORDER — GABAPENTIN 400 MG/1
800 CAPSULE ORAL 2 TIMES DAILY
Qty: 120 CAPSULE | Refills: 1 | Status: SHIPPED | OUTPATIENT
Start: 2021-09-29 | End: 2021-12-15 | Stop reason: SDUPTHER

## 2021-11-05 ENCOUNTER — OFFICE VISIT (OUTPATIENT)
Dept: OBGYN CLINIC | Facility: CLINIC | Age: 66
End: 2021-11-05
Payer: MEDICARE

## 2021-11-05 ENCOUNTER — APPOINTMENT (OUTPATIENT)
Dept: RADIOLOGY | Facility: CLINIC | Age: 66
End: 2021-11-05
Payer: MEDICARE

## 2021-11-05 ENCOUNTER — TELEPHONE (OUTPATIENT)
Dept: OBGYN CLINIC | Facility: HOSPITAL | Age: 66
End: 2021-11-05

## 2021-11-05 VITALS
SYSTOLIC BLOOD PRESSURE: 126 MMHG | BODY MASS INDEX: 25.1 KG/M2 | HEART RATE: 90 BPM | DIASTOLIC BLOOD PRESSURE: 70 MMHG | HEIGHT: 64 IN | WEIGHT: 147 LBS

## 2021-11-05 DIAGNOSIS — M17.0 BILATERAL PRIMARY OSTEOARTHRITIS OF KNEE: Primary | ICD-10-CM

## 2021-11-05 DIAGNOSIS — G89.29 CHRONIC PAIN OF RIGHT KNEE: ICD-10-CM

## 2021-11-05 DIAGNOSIS — Z01.89 ENCOUNTER FOR LOWER EXTREMITY COMPARISON IMAGING STUDY: ICD-10-CM

## 2021-11-05 DIAGNOSIS — M25.461 EFFUSION OF RIGHT KNEE: ICD-10-CM

## 2021-11-05 DIAGNOSIS — M25.561 CHRONIC PAIN OF RIGHT KNEE: ICD-10-CM

## 2021-11-05 DIAGNOSIS — F17.219 CIGARETTE NICOTINE DEPENDENCE WITH NICOTINE-INDUCED DISORDER: ICD-10-CM

## 2021-11-05 PROCEDURE — 99213 OFFICE O/P EST LOW 20 MIN: CPT | Performed by: ORTHOPAEDIC SURGERY

## 2021-11-05 PROCEDURE — 73562 X-RAY EXAM OF KNEE 3: CPT

## 2021-11-05 PROCEDURE — 20610 DRAIN/INJ JOINT/BURSA W/O US: CPT | Performed by: ORTHOPAEDIC SURGERY

## 2021-11-05 PROCEDURE — 73560 X-RAY EXAM OF KNEE 1 OR 2: CPT

## 2021-11-05 RX ORDER — BUPIVACAINE HYDROCHLORIDE 5 MG/ML
6 INJECTION, SOLUTION EPIDURAL; INTRACAUDAL
Status: COMPLETED | OUTPATIENT
Start: 2021-11-05 | End: 2021-11-05

## 2021-11-05 RX ORDER — TRIAMCINOLONE ACETONIDE 40 MG/ML
80 INJECTION, SUSPENSION INTRA-ARTICULAR; INTRAMUSCULAR
Status: COMPLETED | OUTPATIENT
Start: 2021-11-05 | End: 2021-11-05

## 2021-11-05 RX ADMIN — BUPIVACAINE HYDROCHLORIDE 6 ML: 5 INJECTION, SOLUTION EPIDURAL; INTRACAUDAL at 15:14

## 2021-11-05 RX ADMIN — TRIAMCINOLONE ACETONIDE 80 MG: 40 INJECTION, SUSPENSION INTRA-ARTICULAR; INTRAMUSCULAR at 15:14

## 2021-11-10 ENCOUNTER — TELEPHONE (OUTPATIENT)
Dept: PREADMISSION TESTING | Facility: HOSPITAL | Age: 66
End: 2021-11-10

## 2021-11-15 VITALS — HEIGHT: 64 IN | BODY MASS INDEX: 25.1 KG/M2 | WEIGHT: 147 LBS

## 2021-11-15 RX ORDER — DEXTROAMPHETAMINE SACCHARATE, AMPHETAMINE ASPARTATE, DEXTROAMPHETAMINE SULFATE AND AMPHETAMINE SULFATE 2.5; 2.5; 2.5; 2.5 MG/1; MG/1; MG/1; MG/1
10 TABLET ORAL EVERY MORNING
COMMUNITY

## 2021-11-16 ENCOUNTER — TELEPHONE (OUTPATIENT)
Dept: GASTROENTEROLOGY | Facility: CLINIC | Age: 66
End: 2021-11-16

## 2021-11-17 ENCOUNTER — TELEPHONE (OUTPATIENT)
Dept: GASTROENTEROLOGY | Facility: AMBULARY SURGERY CENTER | Age: 66
End: 2021-11-17

## 2021-12-15 DIAGNOSIS — K21.00 GASTRO-ESOPHAGEAL REFLUX DISEASE WITH ESOPHAGITIS: ICD-10-CM

## 2021-12-15 DIAGNOSIS — M54.16 LUMBAR RADICULOPATHY: ICD-10-CM

## 2021-12-15 RX ORDER — GABAPENTIN 400 MG/1
800 CAPSULE ORAL 2 TIMES DAILY
Qty: 120 CAPSULE | Refills: 1 | Status: SHIPPED | OUTPATIENT
Start: 2021-12-15 | End: 2022-03-07

## 2021-12-15 RX ORDER — PANTOPRAZOLE SODIUM 40 MG/1
40 TABLET, DELAYED RELEASE ORAL EVERY MORNING
Qty: 30 TABLET | Refills: 0 | Status: SHIPPED | OUTPATIENT
Start: 2021-12-15 | End: 2022-01-07

## 2021-12-29 ENCOUNTER — TELEPHONE (OUTPATIENT)
Dept: FAMILY MEDICINE CLINIC | Facility: CLINIC | Age: 66
End: 2021-12-29

## 2021-12-30 ENCOUNTER — TELEMEDICINE (OUTPATIENT)
Dept: FAMILY MEDICINE CLINIC | Facility: CLINIC | Age: 66
End: 2021-12-30
Payer: MEDICARE

## 2021-12-30 DIAGNOSIS — U07.1 COVID-19: Primary | ICD-10-CM

## 2021-12-30 DIAGNOSIS — R06.2 WHEEZES: ICD-10-CM

## 2021-12-30 PROCEDURE — 99442 PR PHYS/QHP TELEPHONE EVALUATION 11-20 MIN: CPT | Performed by: NURSE PRACTITIONER

## 2021-12-30 RX ORDER — DOXYCYCLINE HYCLATE 100 MG
100 TABLET ORAL 2 TIMES DAILY
Qty: 20 TABLET | Refills: 0 | Status: SHIPPED | OUTPATIENT
Start: 2021-12-30 | End: 2022-01-09

## 2021-12-30 RX ORDER — PREDNISONE 20 MG/1
20 TABLET ORAL DAILY
Qty: 10 TABLET | Refills: 0 | Status: SHIPPED | OUTPATIENT
Start: 2021-12-30 | End: 2022-01-25

## 2021-12-30 RX ORDER — ALBUTEROL SULFATE 90 UG/1
2 AEROSOL, METERED RESPIRATORY (INHALATION) EVERY 4 HOURS PRN
Qty: 8.5 G | Refills: 5 | Status: SHIPPED | OUTPATIENT
Start: 2021-12-30

## 2022-01-07 DIAGNOSIS — K21.00 GASTRO-ESOPHAGEAL REFLUX DISEASE WITH ESOPHAGITIS: ICD-10-CM

## 2022-01-07 RX ORDER — PANTOPRAZOLE SODIUM 40 MG/1
TABLET, DELAYED RELEASE ORAL
Qty: 30 TABLET | Refills: 0 | Status: SHIPPED | OUTPATIENT
Start: 2022-01-07 | End: 2022-02-02

## 2022-01-13 DIAGNOSIS — G43.109 MIGRAINE WITH AURA AND WITHOUT STATUS MIGRAINOSUS, NOT INTRACTABLE: ICD-10-CM

## 2022-01-13 RX ORDER — SUMATRIPTAN 100 MG/1
100 TABLET, FILM COATED ORAL ONCE AS NEEDED
Qty: 9 TABLET | Refills: 0 | Status: SHIPPED | OUTPATIENT
Start: 2022-01-13

## 2022-01-19 ENCOUNTER — NURSE TRIAGE (OUTPATIENT)
Dept: OTHER | Facility: OTHER | Age: 67
End: 2022-01-19

## 2022-01-19 NOTE — TELEPHONE ENCOUNTER
Reason for Disposition   [1] Very swollen ankle AND [2] no fever    Answer Assessment - Initial Assessment Questions  1  LOCATION: "Which ankle is swollen?" "Where is the swelling?"      Right ankle   2  ONSET: "When did the swelling start?"      yesterday  3  SIZE: "How large is the swelling?"      Pitting   4  PAIN: "Is there any pain?" If Yes, ask: "How bad is it?" (Scale 1-10; or mild, moderate, severe)    - NONE (0): no pain  - MILD (1-3): doesn't interfere with normal activities  - MODERATE (4-7): interferes with normal activities (e g , work or school) or awakens from sleep, limping      - SEVERE (8-10): excruciating pain, unable to do any normal activities, unable to walk        no  5  CAUSE: "What do you think caused the ankle swelling?"      Heart or the fact that her knee needs to be replaced   6  OTHER SYMPTOMS: "Do you have any other symptoms?" (e g , fever, chest pain, difficulty breathing, calf pain)      no  7   PREGNANCY: "Is there any chance you are pregnant?" "When was your last menstrual period?"      no    Protocols used: ANKLE SWELLING-ADULT-

## 2022-01-19 NOTE — TELEPHONE ENCOUNTER
Regarding: Right ankle swollen   ----- Message from [facility] MA sent at 1/19/2022  4:50 PM EST -----  I'm calling because my Right ankle is swollen I do not know why but I noticed I was swollen today not sure if this is concerning

## 2022-01-21 ENCOUNTER — TELEPHONE (OUTPATIENT)
Dept: PREADMISSION TESTING | Facility: HOSPITAL | Age: 67
End: 2022-01-21

## 2022-01-25 VITALS — WEIGHT: 143 LBS | BODY MASS INDEX: 24.93 KG/M2

## 2022-01-25 NOTE — PRE-PROCEDURE INSTRUCTIONS
Pre-Surgery Instructions:   Medication Instructions    acetaminophen (Tylenol 8 Hour) 650 mg CR tablet Patient was instructed by Physician and understands   albuterol (2 5 mg/3 mL) 0 083 % nebulizer solution Patient was instructed by Physician and understands   albuterol (ProAir HFA) 90 mcg/act inhaler Patient was instructed by Physician and understands   amphetamine-dextroamphetamine (ADDERALL) 10 mg tablet Patient was instructed by Physician and understands   aspirin (ECOTRIN LOW STRENGTH) 81 mg EC tablet Patient was instructed by Physician and understands   buprenorphine (SUBUTEX) 8 mg Instructed patient per Anesthesia Guidelines   Cholecalciferol (Vitamin D) 50 MCG (2000 UT) CAPS Patient was instructed by Physician and understands   DULoxetine (CYMBALTA) 30 mg delayed release capsule Patient was instructed by Physician and understands   fluticasone-salmeterol (ADVAIR, Brandon Norma) 250-50 mcg/dose inhaler Instructed patient per Anesthesia Guidelines   gabapentin (NEURONTIN) 400 mg capsule Patient was instructed by Physician and understands   MELATONIN PO Patient was instructed by Physician and understands   multivitamin [facility]) TABS Patient was instructed by Physician and understands   naloxone (NARCAN) 4 mg/0 1 mL nasal spray Patient was instructed by Physician and understands   pantoprazole (PROTONIX) 40 mg tablet Patient was instructed by Physician and understands   SUMAtriptan (IMITREX) 100 mg tablet Patient was instructed by Physician and understands   TURMERIC PO Patient was instructed by Physician and understands  Pt to take subutex and advair a m of procedure

## 2022-02-01 ENCOUNTER — HOSPITAL ENCOUNTER (OUTPATIENT)
Dept: GASTROENTEROLOGY | Facility: AMBULARY SURGERY CENTER | Age: 67
Setting detail: OUTPATIENT SURGERY
Discharge: HOME | End: 2022-02-01
Attending: INTERNAL MEDICINE | Admitting: INTERNAL MEDICINE
Payer: MEDICARE

## 2022-02-01 ENCOUNTER — ANESTHESIA EVENT (OUTPATIENT)
Dept: GASTROENTEROLOGY | Facility: AMBULARY SURGERY CENTER | Age: 67
End: 2022-02-01

## 2022-02-01 ENCOUNTER — ANESTHESIA (OUTPATIENT)
Dept: GASTROENTEROLOGY | Facility: AMBULARY SURGERY CENTER | Age: 67
End: 2022-02-01

## 2022-02-01 VITALS
DIASTOLIC BLOOD PRESSURE: 75 MMHG | HEIGHT: 64 IN | SYSTOLIC BLOOD PRESSURE: 157 MMHG | HEART RATE: 66 BPM | OXYGEN SATURATION: 99 % | TEMPERATURE: 96.3 F | RESPIRATION RATE: 16 BRPM | WEIGHT: 143 LBS | BODY MASS INDEX: 24.41 KG/M2

## 2022-02-01 DIAGNOSIS — Z86.010 HISTORY OF COLON POLYPS: ICD-10-CM

## 2022-02-01 PROBLEM — K21.9 GASTROESOPHAGEAL REFLUX DISEASE: Status: ACTIVE | Noted: 2022-02-01

## 2022-02-01 PROBLEM — IMO0001 SMOKING: Status: ACTIVE | Noted: 2020-07-06

## 2022-02-01 PROCEDURE — G0105 COLORECTAL SCRN; HI RISK IND: HCPCS | Performed by: INTERNAL MEDICINE

## 2022-02-01 RX ORDER — PROPOFOL 10 MG/ML
INJECTION, EMULSION INTRAVENOUS CONTINUOUS PRN
Status: DISCONTINUED | OUTPATIENT
Start: 2022-02-01 | End: 2022-02-01

## 2022-02-01 RX ORDER — SODIUM CHLORIDE, SODIUM LACTATE, POTASSIUM CHLORIDE, CALCIUM CHLORIDE 600; 310; 30; 20 MG/100ML; MG/100ML; MG/100ML; MG/100ML
INJECTION, SOLUTION INTRAVENOUS CONTINUOUS PRN
Status: DISCONTINUED | OUTPATIENT
Start: 2022-02-01 | End: 2022-02-01

## 2022-02-01 RX ORDER — SODIUM CHLORIDE, SODIUM LACTATE, POTASSIUM CHLORIDE, CALCIUM CHLORIDE 600; 310; 30; 20 MG/100ML; MG/100ML; MG/100ML; MG/100ML
50 INJECTION, SOLUTION INTRAVENOUS CONTINUOUS
Status: DISCONTINUED | OUTPATIENT
Start: 2022-02-01 | End: 2022-02-05 | Stop reason: HOSPADM

## 2022-02-01 RX ORDER — SODIUM CHLORIDE, SODIUM LACTATE, POTASSIUM CHLORIDE, CALCIUM CHLORIDE 600; 310; 30; 20 MG/100ML; MG/100ML; MG/100ML; MG/100ML
75 INJECTION, SOLUTION INTRAVENOUS CONTINUOUS
Status: DISCONTINUED | OUTPATIENT
Start: 2022-02-01 | End: 2022-02-05 | Stop reason: HOSPADM

## 2022-02-01 RX ORDER — PROPOFOL 10 MG/ML
INJECTION, EMULSION INTRAVENOUS AS NEEDED
Status: DISCONTINUED | OUTPATIENT
Start: 2022-02-01 | End: 2022-02-01

## 2022-02-01 RX ADMIN — PROPOFOL 140 MCG/KG/MIN: 10 INJECTION, EMULSION INTRAVENOUS at 08:33

## 2022-02-01 RX ADMIN — PROPOFOL 100 MG: 10 INJECTION, EMULSION INTRAVENOUS at 08:33

## 2022-02-01 RX ADMIN — SODIUM CHLORIDE, SODIUM LACTATE, POTASSIUM CHLORIDE, AND CALCIUM CHLORIDE: .6; .31; .03; .02 INJECTION, SOLUTION INTRAVENOUS at 08:24

## 2022-02-01 RX ADMIN — SODIUM CHLORIDE, SODIUM LACTATE, POTASSIUM CHLORIDE, AND CALCIUM CHLORIDE 75 ML/HR: .6; .31; .03; .02 INJECTION, SOLUTION INTRAVENOUS at 08:01

## 2022-02-01 NOTE — ANESTHESIA POSTPROCEDURE EVALUATION
Post-Op Assessment Note    CV Status:  Stable  Pain Score: 0    Pain management: adequate     Mental Status:  Sleepy   Hydration Status:  Stable   PONV Controlled:  None   Airway Patency:  Patent   Two or more mitigation strategies used for obstructive sleep apnea   Post Op Vitals Reviewed: Yes      Staff: CRNA         No complications documented      BP   154/74   Temp 97   Pulse 65   Resp 16   SpO2 99
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
## 2022-02-01 NOTE — ANESTHESIA PREPROCEDURE EVALUATION
Procedure:  COLONOSCOPY    Relevant Problems   CARDIO   (+) Classic migraine with aura   (+) Exertional shortness of breath      GI/HEPATIC   (+) Gastroesophageal reflux disease      HEMATOLOGY   (+) Anemia      MUSCULOSKELETAL  s/p bilateral thumb hardware   (+) Cervical spondylosis   (+) Chronic bilateral low back pain without sciatica   (+) Degeneration of intervertebral disc of mid-cervical region   (+) Degenerative arthritis of knee   (+) Localized osteoarthritis of right knee   (+) Lumbar spondylosis   (+) Sciatica of right side      NEURO/PSYCH   (+) Chronic pain syndrome   (+) Classic migraine with aura   (+) Depression with anxiety   (+) TANYA (generalized anxiety disorder)   (+) History of alcohol dependence (HCC)   (+) History of colon polyps   (+) History of diarrhea   (+) History of recurrent UTIs   (+) PTSD (post-traumatic stress disorder)      PULMONARY   (+) Asthma   (+) COPD (chronic obstructive pulmonary disease) (HCC)   (+) Chronic obstructive pulmonary disease (HCC)   (+) Exertional shortness of breath   (+) Mild intermittent asthma without complication   (+) SOB (shortness of breath)   (+) Smoking        Physical Exam    Airway    Mallampati score: II  TM Distance: >3 FB  Neck ROM: full     Dental       Cardiovascular  Rhythm: regular, Rate: normal,     Pulmonary  Breath sounds clear to auscultation,     Other Findings        Anesthesia Plan  ASA Score- 2     Anesthesia Type- IV sedation with anesthesia with ASA Monitors  Additional Monitors:   Airway Plan:           Plan Factors-    Chart reviewed  Patient is not a current smoker  Induction- intravenous  Postoperative Plan-     Informed Consent- Anesthetic plan and risks discussed with patient  I personally reviewed this patient with the CRNA  Discussed and agreed on the Anesthesia Plan with the CRNA  Werner Chaudhry

## 2022-02-02 DIAGNOSIS — K21.00 GASTRO-ESOPHAGEAL REFLUX DISEASE WITH ESOPHAGITIS: ICD-10-CM

## 2022-02-02 RX ORDER — PANTOPRAZOLE SODIUM 40 MG/1
TABLET, DELAYED RELEASE ORAL
Qty: 30 TABLET | Refills: 0 | Status: SHIPPED | OUTPATIENT
Start: 2022-02-02 | End: 2022-02-28

## 2022-03-17 ENCOUNTER — TELEPHONE (OUTPATIENT)
Dept: PULMONOLOGY | Facility: CLINIC | Age: 67
End: 2022-03-17

## 2022-03-17 NOTE — TELEPHONE ENCOUNTER
Pt called re: she is a recovering alcoholic and she just had a recent relapse  She is attempting to get into [address] [place] [address] which is a rehab but they need a letter from us stating that she doesn't need her nebulizer  She said she no longer takes it  This letter decides how soon she can get into the rehab     Please fax letter to [address] [place] [address] [phone]  [facility] phone number: [phone]  Please advise: [phone]

## 2022-03-21 NOTE — TELEPHONE ENCOUNTER
Sapna Chin or Aparna Sam can you please fax this letter ASAP stating she does not need to use her nebulizer so we can start her rehabilitation process

## 2022-04-02 DIAGNOSIS — K21.00 GASTRO-ESOPHAGEAL REFLUX DISEASE WITH ESOPHAGITIS: ICD-10-CM

## 2022-04-04 RX ORDER — PANTOPRAZOLE SODIUM 40 MG/1
TABLET, DELAYED RELEASE ORAL
Qty: 90 TABLET | Refills: 1 | Status: SHIPPED | OUTPATIENT
Start: 2022-04-04

## 2022-04-05 ENCOUNTER — OFFICE VISIT (OUTPATIENT)
Dept: CARDIOLOGY CLINIC | Facility: CLINIC | Age: 67
End: 2022-04-05
Payer: MEDICARE

## 2022-04-05 VITALS
OXYGEN SATURATION: 98 % | BODY MASS INDEX: 23.9 KG/M2 | HEART RATE: 74 BPM | HEIGHT: 64 IN | TEMPERATURE: 98.3 F | SYSTOLIC BLOOD PRESSURE: 130 MMHG | DIASTOLIC BLOOD PRESSURE: 82 MMHG | WEIGHT: 140 LBS

## 2022-04-05 DIAGNOSIS — E78.5 DYSLIPIDEMIA: ICD-10-CM

## 2022-04-05 DIAGNOSIS — G89.4 CHRONIC PAIN SYNDROME: ICD-10-CM

## 2022-04-05 DIAGNOSIS — Z72.0 TOBACCO USE: ICD-10-CM

## 2022-04-05 DIAGNOSIS — F41.8 DEPRESSION WITH ANXIETY: ICD-10-CM

## 2022-04-05 DIAGNOSIS — J44.9 CHRONIC OBSTRUCTIVE PULMONARY DISEASE, UNSPECIFIED COPD TYPE (HCC): ICD-10-CM

## 2022-04-05 DIAGNOSIS — R06.02 EXERTIONAL SHORTNESS OF BREATH: ICD-10-CM

## 2022-04-05 DIAGNOSIS — F10.21 HISTORY OF ALCOHOL DEPENDENCE (HCC): ICD-10-CM

## 2022-04-05 DIAGNOSIS — R55 SYNCOPE, UNSPECIFIED SYNCOPE TYPE: ICD-10-CM

## 2022-04-05 PROCEDURE — 99214 OFFICE O/P EST MOD 30 MIN: CPT | Performed by: INTERNAL MEDICINE

## 2022-04-05 PROCEDURE — 93000 ELECTROCARDIOGRAM COMPLETE: CPT | Performed by: INTERNAL MEDICINE

## 2022-04-05 RX ORDER — MIRTAZAPINE 15 MG/1
TABLET, FILM COATED ORAL
COMMUNITY
Start: 2022-04-03 | End: 2022-05-25 | Stop reason: ALTCHOICE

## 2022-04-05 RX ORDER — DISULFIRAM 250 MG/1
250 TABLET ORAL DAILY
COMMUNITY
Start: 2022-02-10 | End: 2022-04-07

## 2022-04-05 RX ORDER — PENICILLIN V POTASSIUM 500 MG/1
500 TABLET ORAL 4 TIMES DAILY
COMMUNITY
Start: 2022-03-18 | End: 2022-04-07

## 2022-04-05 NOTE — PROGRESS NOTES
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 %  The stress ECG was negative for ischemia and normal  Arrhythmia during stress: isolated  premature ventricular beats  ISOTOPE ADMINISTRATION:  Resting isotope administration Stress isotope administration  Agent Tetrofosmin Tetrofosmin  Dose 11 mCi 32 8 mCi  Date 05/31/2018 05/31/2018    Radiopharmaceutical was administered 7 min, 27 sec into the stress protocol  There was 1 min of exercise after the injection  MYOCARDIAL PERFUSION IMAGING:  The image quality was good  Rotating projection images reveal mild subdiaphragmatic activity  PERFUSION DEFECTS:  -  There was a small, partially reversible myocardial perfusion defect of the anteroseptal wall likely due to attenuation from breast tissue  GATED SPECT:  The calculated left ventricular ejection fraction was 65 %   Left ventricular ejection fraction was within normal limits by visual estimate  There was no left ventricular regional abnormality  SUMMARY:  -  Stress results: Duration of exercise was 8 min and 35 sec  Maximal work rate was 10 1 METs  Maximal heart rate during stress was 142 bpm ( 89 % of maximal predicted heart rate)  Target heart rate was achieved  Maximal systolic blood  pressure during stress was 174 mmHg  There was no chest pain during stress  -  ECG conclusions: The stress ECG was negative for ischemia and normal   -  Perfusion imaging: There was a small, partially reversible myocardial perfusion defect of the anteroseptal wall likely due to attenuation from breast tissue   -  Gated SPECT: The calculated left ventricular ejection fraction was 65 %  Left ventricular ejection fraction was within normal limits by visual estimate  There was no left ventricular regional abnormality   -  Impressions and recommendations: Likely normal study after maximal exercise  IMPRESSIONS: Likely normal study after maximal exercise  Myocardial perfusion imaging was normal at rest and with stress      Prepared and signed by    Leonor Dupont MD  Signed 06/02/2018 18:16:10         Lab Review   Lab Results   Component Value Date    WBC 6 08 07/08/2021    HGB 11 0 (L) 07/08/2021    HCT 34 7 (L) 07/08/2021    MCV 97 07/08/2021    RDW 13 1 07/08/2021     07/08/2021     BMP:  Lab Results   Component Value Date    SODIUM 138 07/08/2021    K 5 0 07/08/2021     07/08/2021    CO2 28 07/08/2021    ANIONGAP 11 2 12/17/2015    BUN 14 07/08/2021    CREATININE 0 89 07/08/2021    GLUC 96 03/25/2021    GLUF 105 (H) 07/08/2021    CALCIUM 9 2 07/08/2021    EGFR 69 07/08/2021    MG 2 3 04/24/2019     LFT:  Lab Results   Component Value Date    AST 19 03/17/2021    ALT 25 03/17/2021    ALKPHOS 77 03/17/2021    TP 6 8 03/17/2021    ALB 3 6 03/17/2021      Lab Results   Component Value Date    WGP8LXOBHDUB 2 640 03/17/2021     Lab Results   Component Value Date    HGBA1C 5 5 07/06/2020     Lipid Profile:   Lab Results   Component Value Date    CHOLESTEROL 166 03/17/2021    HDL 74 03/17/2021    LDLCALC 84 03/17/2021    TRIG 42 03/17/2021     Lab Results   Component Value Date    CHOLESTEROL 166 03/17/2021    CHOLESTEROL 180 04/24/2019     Lab Results   Component Value Date    TROPONINI <0 02 07/23/2020       The 10-year ASCVD risk score (Randy Worthington et al , 2013) is: 8 4%    Values used to calculate the score:      Age: 74 years      Sex: Female      Is Non- : No      Diabetic: No      Tobacco smoker: Yes      Systolic Blood Pressure: 952 mmHg      Is BP treated: No      HDL Cholesterol: 74 mg/dL      Total Cholesterol: 166 mg/dL        Dr Anabel Tafoya MD [facility] [place]      "This note has been constructed using a voice recognition system  Therefore there may be syntax, spelling, and/or grammatical errors   Please call if you have any questions  "

## 2022-04-07 ENCOUNTER — OFFICE VISIT (OUTPATIENT)
Dept: FAMILY MEDICINE CLINIC | Facility: CLINIC | Age: 67
End: 2022-04-07
Payer: MEDICARE

## 2022-04-07 VITALS
HEART RATE: 92 BPM | RESPIRATION RATE: 14 BRPM | SYSTOLIC BLOOD PRESSURE: 140 MMHG | TEMPERATURE: 97.2 F | DIASTOLIC BLOOD PRESSURE: 70 MMHG

## 2022-04-07 DIAGNOSIS — B02.9 HERPES ZOSTER WITHOUT COMPLICATION: Primary | ICD-10-CM

## 2022-04-07 DIAGNOSIS — F31.11 BIPOLAR AFFECTIVE DISORDER, CURRENTLY MANIC, MILD (HCC): ICD-10-CM

## 2022-04-07 PROCEDURE — 99214 OFFICE O/P EST MOD 30 MIN: CPT | Performed by: FAMILY MEDICINE

## 2022-04-07 RX ORDER — VALACYCLOVIR HYDROCHLORIDE 1 G/1
1000 TABLET, FILM COATED ORAL 3 TIMES DAILY
Qty: 21 TABLET | Refills: 0 | Status: SHIPPED | OUTPATIENT
Start: 2022-04-07 | End: 2022-08-03 | Stop reason: SDUPTHER
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
## 2022-04-14 ENCOUNTER — OFFICE VISIT (OUTPATIENT)
Dept: URGENT CARE | Facility: CLINIC | Age: 67
End: 2022-04-14
Payer: MEDICARE

## 2022-04-14 VITALS
TEMPERATURE: 98 F | HEIGHT: 63 IN | DIASTOLIC BLOOD PRESSURE: 82 MMHG | HEART RATE: 91 BPM | RESPIRATION RATE: 20 BRPM | WEIGHT: 138 LBS | BODY MASS INDEX: 24.45 KG/M2 | SYSTOLIC BLOOD PRESSURE: 169 MMHG | OXYGEN SATURATION: 99 %

## 2022-04-14 DIAGNOSIS — S01.511A LIP LACERATION, INITIAL ENCOUNTER: Primary | ICD-10-CM

## 2022-04-14 DIAGNOSIS — S61.011A LACERATION OF RIGHT THUMB WITHOUT FOREIGN BODY WITHOUT DAMAGE TO NAIL, INITIAL ENCOUNTER: ICD-10-CM

## 2022-04-14 PROCEDURE — 12011 RPR F/E/E/N/L/M 2.5 CM/<: CPT | Performed by: PREVENTIVE MEDICINE

## 2022-04-14 PROCEDURE — 12020 TX SUPFC WND DEHSN SMPL CLSR: CPT | Performed by: PREVENTIVE MEDICINE

## 2022-04-15 ENCOUNTER — TELEMEDICINE (OUTPATIENT)
Dept: FAMILY MEDICINE CLINIC | Facility: CLINIC | Age: 67
End: 2022-04-15
Payer: MEDICARE

## 2022-04-15 VITALS — TEMPERATURE: 98 F

## 2022-04-15 DIAGNOSIS — R09.81 SINUS CONGESTION: Primary | ICD-10-CM
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
## 2022-04-15 DIAGNOSIS — S01.511D LIP LACERATION, SUBSEQUENT ENCOUNTER: ICD-10-CM

## 2022-04-15 DIAGNOSIS — R09.89 CHEST CONGESTION: ICD-10-CM

## 2022-04-15 PROCEDURE — 99213 OFFICE O/P EST LOW 20 MIN: CPT | Performed by: FAMILY MEDICINE

## 2022-04-15 RX ORDER — AMOXICILLIN AND CLAVULANATE POTASSIUM 875; 125 MG/1; MG/1
1 TABLET, FILM COATED ORAL EVERY 12 HOURS SCHEDULED
Qty: 20 TABLET | Refills: 0 | Status: SHIPPED | OUTPATIENT
Start: 2022-04-15 | End: 2022-04-25
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
## 2022-04-24 PROBLEM — R09.89 CHEST CONGESTION: Status: ACTIVE | Noted: 2022-04-24

## 2022-04-24 PROBLEM — S01.511A LIP LACERATION: Status: ACTIVE | Noted: 2022-04-24
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
## 2022-04-27 DIAGNOSIS — B02.9 HERPES ZOSTER WITHOUT COMPLICATION: ICD-10-CM

## 2022-04-27 RX ORDER — DULOXETIN HYDROCHLORIDE 30 MG/1
CAPSULE, DELAYED RELEASE ORAL
Qty: 180 CAPSULE | Refills: 1 | Status: SHIPPED | OUTPATIENT
Start: 2022-04-27

## 2022-05-25 ENCOUNTER — OFFICE VISIT (OUTPATIENT)
Dept: FAMILY MEDICINE CLINIC | Facility: CLINIC | Age: 67
End: 2022-05-25
Payer: MEDICARE

## 2022-05-25 VITALS
HEART RATE: 92 BPM | HEIGHT: 63 IN | DIASTOLIC BLOOD PRESSURE: 86 MMHG | SYSTOLIC BLOOD PRESSURE: 144 MMHG | RESPIRATION RATE: 16 BRPM | WEIGHT: 136.8 LBS | TEMPERATURE: 98.2 F | BODY MASS INDEX: 24.24 KG/M2

## 2022-05-25 DIAGNOSIS — I10 HYPERTENSION, UNSPECIFIED TYPE: Primary | ICD-10-CM

## 2022-05-25 DIAGNOSIS — Z78.0 POSTMENOPAUSAL: ICD-10-CM

## 2022-05-25 DIAGNOSIS — Z12.31 SCREENING MAMMOGRAM FOR BREAST CANCER: ICD-10-CM

## 2022-05-25 PROCEDURE — 99213 OFFICE O/P EST LOW 20 MIN: CPT | Performed by: FAMILY MEDICINE

## 2022-05-25 RX ORDER — LOSARTAN POTASSIUM 25 MG/1
25 TABLET ORAL DAILY
Qty: 30 TABLET | Refills: 1 | Status: SHIPPED | OUTPATIENT
Start: 2022-05-25 | End: 2022-06-16

## 2022-05-25 RX ORDER — DISULFIRAM 250 MG/1
TABLET ORAL
COMMUNITY
Start: 2022-05-21

## 2022-06-29 PROBLEM — I10 HYPERTENSION: Status: ACTIVE | Noted: 2022-06-29

## 2022-06-29 PROBLEM — Z78.0 POSTMENOPAUSAL: Status: ACTIVE | Noted: 2022-06-29

## 2022-06-30 NOTE — PROGRESS NOTES
Assessment/Plan:    1  Hypertension, unspecified type  Assessment & Plan:  Borderline high  Cont current meds  Low sodium diet  Limit caffeine  Avoid NSAIDs      2  Postmenopausal  -     DXA bone density spine hip and pelvis; Future; Expected date: 05/25/2022    3  Screening mammogram for breast cancer  -     Mammo screening bilateral w 3d & cad; Future; Expected date: 05/25/2022        Subjective:      Patient ID: Cristian David is a 74 y o  female      Chief Complaint   Patient presents with    Follow-up     From shingles , review bw        HPI  Follow-up  Interval hx reviewed including shingles-now resolved  No fever or further rash  Recent labs--CMP wnl,urq2d=0 4%BP borderline high  Due for mammogram and DEXA scan      The following portions of the patient's history were reviewed and updated as appropriate: allergies, current medications, past family history, past medical history, past social history, past surgical history and problem list     Review of Systems    Per hpi    Current Outpatient Medications   Medication Sig Dispense Refill    albuterol (2 5 mg/3 mL) 0 083 % nebulizer solution Take 3 mL (2 5 mg total) by nebulization every 6 (six) hours as needed for wheezing or shortness of breath 180 mL 5    albuterol (ProAir HFA) 90 mcg/act inhaler Inhale 2 puffs every 4 (four) hours as needed for wheezing 8 5 g 5    amphetamine-dextroamphetamine (ADDERALL) 10 mg tablet Take 10 mg by mouth every morning      buprenorphine (SUBUTEX) 8 mg 8 mg 3 (three) times a day SL       Cholecalciferol (Vitamin D) 50 MCG (2000 UT) CAPS Take by mouth daily at bedtime      DULoxetine (CYMBALTA) 30 mg delayed release capsule TAKE 1 CAPSULE BY MOUTH TWICE A  capsule 1    fluticasone-salmeterol (ADVAIR, WIXELA) 250-50 mcg/dose inhaler Inhale 1 puff 2 (two) times a day 1 Inhaler 0    gabapentin (NEURONTIN) 400 mg capsule TAKE 2 CAPSULES (800 MG TOTAL) BY MOUTH 2 (TWO) TIMES A  capsule 1    MELATONIN PO Take 3 mg by mouth daily at bedtime as needed        multivitamin (THERAGRAN) TABS Take 1 tablet by mouth daily at bedtime Last dose 11/14/21       naloxone (NARCAN) 4 mg/0 1 mL nasal spray 4 mg into each nostril as needed      pantoprazole (PROTONIX) 40 mg tablet TAKE 1 TABLET BY MOUTH EVERY DAY IN THE MORNING 90 tablet 1    SUMAtriptan (IMITREX) 100 mg tablet Take 1 tablet (100 mg total) by mouth once as needed for migraine 9 tablet 0    aspirin (ECOTRIN LOW STRENGTH) 81 mg EC tablet Take 81 mg by mouth daily Last dose 11/13/21  (Patient not taking: No sig reported)      bisacodyl (DULCOLAX) 5 mg EC tablet Take 15 mg by mouth once (Patient not taking: No sig reported)      disulfiram (ANTABUSE) 250 mg tablet       losartan (COZAAR) 25 mg tablet TAKE 1 TABLET (25 MG TOTAL) BY MOUTH DAILY  30 tablet 3    valACYclovir (VALTREX) 1,000 mg tablet Take 1 tablet (1,000 mg total) by mouth 3 (three) times a day for 7 days 21 tablet 0     No current facility-administered medications for this visit  Objective:    /86 (BP Location: Left arm, Patient Position: Sitting, Cuff Size: Large)   Pulse 92   Temp 98 2 °F (36 8 °C)   Resp 16   Ht 5' 3" (1 6 m)   Wt 62 1 kg (136 lb 12 8 oz)   BMI 24 23 kg/m²        Physical Exam  Vitals and nursing note reviewed  Cardiovascular:      Rate and Rhythm: Normal rate and regular rhythm  Pulmonary:      Effort: Pulmonary effort is normal  No respiratory distress  Breath sounds: Normal breath sounds  Musculoskeletal:         General: Tenderness (LS paravertebral mm w mildly restricted ROM) present  Cervical back: Neck supple  Skin:     General: Skin is warm and dry  Neurological:      Mental Status: She is alert and oriented to person, place, and time          Nikki Duong MD

## 2022-07-29 ENCOUNTER — OFFICE VISIT (OUTPATIENT)
Dept: FAMILY MEDICINE CLINIC | Facility: CLINIC | Age: 67
End: 2022-07-29
Payer: MEDICARE

## 2022-07-29 VITALS
WEIGHT: 132 LBS | BODY MASS INDEX: 23.39 KG/M2 | RESPIRATION RATE: 16 BRPM | HEIGHT: 63 IN | DIASTOLIC BLOOD PRESSURE: 80 MMHG | SYSTOLIC BLOOD PRESSURE: 130 MMHG | HEART RATE: 76 BPM

## 2022-07-29 DIAGNOSIS — N39.0 URINARY TRACT INFECTION WITHOUT HEMATURIA, SITE UNSPECIFIED: ICD-10-CM

## 2022-07-29 DIAGNOSIS — R11.0 NAUSEA: ICD-10-CM

## 2022-07-29 DIAGNOSIS — Z01.89 ENCOUNTER FOR URINE TEST: ICD-10-CM

## 2022-07-29 DIAGNOSIS — I10 HYPERTENSION, UNSPECIFIED TYPE: ICD-10-CM

## 2022-07-29 DIAGNOSIS — Z11.52 ENCOUNTER FOR SCREENING FOR COVID-19: Primary | ICD-10-CM

## 2022-07-29 LAB
SARS-COV-2 AG UPPER RESP QL IA: NEGATIVE
SL AMB  POCT GLUCOSE, UA: ABNORMAL
SL AMB LEUKOCYTE ESTERASE,UA: ABNORMAL
SL AMB POCT BILIRUBIN,UA: ABNORMAL
SL AMB POCT BLOOD,UA: 50
SL AMB POCT CLARITY,UA: CLEAR
SL AMB POCT COLOR,UA: YELLOW
SL AMB POCT KETONES,UA: ABNORMAL
SL AMB POCT NITRITE,UA: ABNORMAL
SL AMB POCT PH,UA: 6
SL AMB POCT SPECIFIC GRAVITY,UA: 1.01
SL AMB POCT URINE PROTEIN: ABNORMAL
SL AMB POCT UROBILINOGEN: ABNORMAL
VALID CONTROL: NORMAL

## 2022-07-29 PROCEDURE — 87811 SARS-COV-2 COVID19 W/OPTIC: CPT | Performed by: STUDENT IN AN ORGANIZED HEALTH CARE EDUCATION/TRAINING PROGRAM

## 2022-07-29 PROCEDURE — 99214 OFFICE O/P EST MOD 30 MIN: CPT | Performed by: STUDENT IN AN ORGANIZED HEALTH CARE EDUCATION/TRAINING PROGRAM

## 2022-07-29 PROCEDURE — 81003 URINALYSIS AUTO W/O SCOPE: CPT | Performed by: STUDENT IN AN ORGANIZED HEALTH CARE EDUCATION/TRAINING PROGRAM

## 2022-07-29 RX ORDER — ONDANSETRON 4 MG/1
4 TABLET, ORALLY DISINTEGRATING ORAL EVERY 6 HOURS PRN
Qty: 20 TABLET | Refills: 0 | Status: SHIPPED | OUTPATIENT
Start: 2022-07-29

## 2022-07-29 RX ORDER — CEFDINIR 300 MG/1
300 CAPSULE ORAL EVERY 12 HOURS SCHEDULED
Qty: 10 CAPSULE | Refills: 0 | Status: SHIPPED | OUTPATIENT
Start: 2022-07-29 | End: 2022-08-03

## 2022-07-29 RX ORDER — LOSARTAN POTASSIUM 25 MG/1
25 TABLET ORAL DAILY
Qty: 30 TABLET | Refills: 3 | Status: SHIPPED | OUTPATIENT
Start: 2022-07-29 | End: 2022-10-21
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
## 2022-08-03 ENCOUNTER — OFFICE VISIT (OUTPATIENT)
Dept: FAMILY MEDICINE CLINIC | Facility: CLINIC | Age: 67
End: 2022-08-03
Payer: MEDICARE

## 2022-08-03 VITALS
HEIGHT: 63 IN | TEMPERATURE: 97.4 F | DIASTOLIC BLOOD PRESSURE: 94 MMHG | WEIGHT: 133.6 LBS | HEART RATE: 64 BPM | RESPIRATION RATE: 16 BRPM | SYSTOLIC BLOOD PRESSURE: 150 MMHG | BODY MASS INDEX: 23.67 KG/M2

## 2022-08-03 DIAGNOSIS — R21 RASH IN ADULT: Primary | ICD-10-CM

## 2022-08-03 DIAGNOSIS — B02.9 HERPES ZOSTER WITHOUT COMPLICATION: ICD-10-CM

## 2022-08-03 PROCEDURE — 99214 OFFICE O/P EST MOD 30 MIN: CPT | Performed by: FAMILY MEDICINE

## 2022-08-03 RX ORDER — VALACYCLOVIR HYDROCHLORIDE 1 G/1
1000 TABLET, FILM COATED ORAL 3 TIMES DAILY
Qty: 21 TABLET | Refills: 0 | Status: SHIPPED | OUTPATIENT
Start: 2022-08-03 | End: 2022-08-10

## 2022-08-03 NOTE — PROGRESS NOTES
Chief Complaint   Patient presents with    Rash     2 small red spots around vaginal area , one spot in between her buttocks and she said she had a rash under her belly button , she said they are very itch,feet , knees and joints hurt         Patient ID: Yayo Sweet is a 74 y o  female  HPI  Pt is seeing for itchy rash on abd and painful lesions on vulva and buttock are x 2 days  -  H/o "recurrent shingles" -  Last episode 4 m ago     The following portions of the patient's history were reviewed and updated as appropriate: allergies, current medications, past family history, past medical history, past social history, past surgical history and problem list     Review of Systems   Constitutional: Negative  HENT: Negative  Respiratory: Negative  Skin: Positive for rash         Current Outpatient Medications   Medication Sig Dispense Refill    albuterol (2 5 mg/3 mL) 0 083 % nebulizer solution Take 3 mL (2 5 mg total) by nebulization every 6 (six) hours as needed for wheezing or shortness of breath 180 mL 5    albuterol (ProAir HFA) 90 mcg/act inhaler Inhale 2 puffs every 4 (four) hours as needed for wheezing 8 5 g 5    amphetamine-dextroamphetamine (ADDERALL) 10 mg tablet Take 10 mg by mouth every morning      bisacodyl (DULCOLAX) 5 mg EC tablet Take 15 mg by mouth once      buprenorphine (SUBUTEX) 8 mg 8 mg 3 (three) times a day SL       cefdinir (OMNICEF) 300 mg capsule Take 1 capsule (300 mg total) by mouth every 12 (twelve) hours for 5 days 10 capsule 0    Cholecalciferol (Vitamin D) 50 MCG (2000 UT) CAPS Take by mouth daily at bedtime      disulfiram (ANTABUSE) 250 mg tablet       DULoxetine (CYMBALTA) 30 mg delayed release capsule TAKE 1 CAPSULE BY MOUTH TWICE A  capsule 1    fluticasone-salmeterol (ADVAIR, WIXELA) 250-50 mcg/dose inhaler Inhale 1 puff 2 (two) times a day 1 Inhaler 0    gabapentin (NEURONTIN) 400 mg capsule TAKE 2 CAPSULES (800 MG TOTAL) BY MOUTH 2 (TWO) TIMES A  capsule 1    losartan (COZAAR) 25 mg tablet Take 1 tablet (25 mg total) by mouth daily 30 tablet 3    MELATONIN PO Take 3 mg by mouth daily at bedtime as needed        multivitamin (THERAGRAN) TABS Take 1 tablet by mouth daily at bedtime Last dose 11/14/21       naloxone (NARCAN) 4 mg/0 1 mL nasal spray 4 mg into each nostril as needed      ondansetron (Zofran ODT) 4 mg disintegrating tablet Take 1 tablet (4 mg total) by mouth every 6 (six) hours as needed for nausea or vomiting 20 tablet 0    pantoprazole (PROTONIX) 40 mg tablet TAKE 1 TABLET BY MOUTH EVERY DAY IN THE MORNING 90 tablet 1    SUMAtriptan (IMITREX) 100 mg tablet Take 1 tablet (100 mg total) by mouth once as needed for migraine 9 tablet 0    aspirin (ECOTRIN LOW STRENGTH) 81 mg EC tablet Take 81 mg by mouth daily Last dose 11/13/21  (Patient not taking: Reported on 8/3/2022)              No current facility-administered medications for this visit  Objective:    /94 (BP Location: Left arm, Patient Position: Sitting, Cuff Size: Standard)   Pulse 64   Temp (!) 97 4 °F (36 3 °C)   Resp 16   Ht 5' 3" (1 6 m)   Wt 60 6 kg (133 lb 9 6 oz)   BMI 23 67 kg/m²        Physical Exam  Constitutional:       Appearance: She is not ill-appearing  Skin:     Findings: Rash present  Rash is macular (L vulva ( few lesions ), R buttock lesion )  Neurological:      Mental Status: She is alert  Assessment/Plan:         Diagnoses and all orders for this visit:    Rash in adult  -     mupirocin (BACTROBAN) 2 % ointment; Apply topically 3 (three) times a day    Herpes zoster without complication    VS genital herpes   -     valACYclovir (VALTREX) 1,000 mg tablet;  Take 1 tablet (1,000 mg total) by mouth 3 (three) times a day for 7 days        rto prn                   Kacie Putnam MD

## 2022-08-25 ENCOUNTER — HOSPITAL ENCOUNTER (EMERGENCY)
Facility: HOSPITAL | Age: 67
Discharge: HOME | End: 2022-08-25
Attending: EMERGENCY MEDICINE
Payer: MEDICARE

## 2022-08-25 ENCOUNTER — APPOINTMENT (EMERGENCY)
Dept: RADIOLOGY | Facility: HOSPITAL | Age: 67
End: 2022-08-25
Payer: MEDICARE

## 2022-08-25 ENCOUNTER — APPOINTMENT (OUTPATIENT)
Dept: RADIOLOGY | Facility: HOSPITAL | Age: 67
End: 2022-08-25
Payer: MEDICARE

## 2022-08-25 ENCOUNTER — TELEPHONE (OUTPATIENT)
Dept: FAMILY MEDICINE CLINIC | Facility: CLINIC | Age: 67
End: 2022-08-25

## 2022-08-25 VITALS
HEART RATE: 76 BPM | DIASTOLIC BLOOD PRESSURE: 67 MMHG | TEMPERATURE: 98.1 F | RESPIRATION RATE: 20 BRPM | SYSTOLIC BLOOD PRESSURE: 167 MMHG | BODY MASS INDEX: 23.38 KG/M2 | OXYGEN SATURATION: 98 % | WEIGHT: 132 LBS

## 2022-08-25 DIAGNOSIS — J44.1 COPD EXACERBATION (HCC): ICD-10-CM

## 2022-08-25 DIAGNOSIS — S00.31XA ABRASION OF NOSE, INITIAL ENCOUNTER: ICD-10-CM

## 2022-08-25 DIAGNOSIS — R55 SYNCOPE: Primary | ICD-10-CM

## 2022-08-25 LAB
ALBUMIN SERPL BCP-MCNC: 3.5 G/DL (ref 3.5–5)
ALP SERPL-CCNC: 78 U/L (ref 46–116)
ALT SERPL W P-5'-P-CCNC: 24 U/L (ref 12–78)
ANION GAP SERPL CALCULATED.3IONS-SCNC: 6 MMOL/L (ref 4–13)
AST SERPL W P-5'-P-CCNC: 22 U/L (ref 5–45)
BASOPHILS # BLD AUTO: 0.05 THOUSANDS/ΜL (ref 0–0.1)
BASOPHILS NFR BLD AUTO: 1 % (ref 0–1)
BILIRUB SERPL-MCNC: 0.26 MG/DL (ref 0.2–1)
BILIRUB UR QL STRIP: NEGATIVE
BUN SERPL-MCNC: 11 MG/DL (ref 5–25)
CALCIUM SERPL-MCNC: 8.3 MG/DL (ref 8.3–10.1)
CARDIAC TROPONIN I PNL SERPL HS: 3 NG/L
CHLORIDE SERPL-SCNC: 103 MMOL/L (ref 96–108)
CLARITY UR: CLEAR
CO2 SERPL-SCNC: 33 MMOL/L (ref 21–32)
COLOR UR: NORMAL
CREAT SERPL-MCNC: 0.93 MG/DL (ref 0.6–1.3)
EOSINOPHIL # BLD AUTO: 0.67 THOUSAND/ΜL (ref 0–0.61)
EOSINOPHIL NFR BLD AUTO: 11 % (ref 0–6)
ERYTHROCYTE [DISTWIDTH] IN BLOOD BY AUTOMATED COUNT: 13.9 % (ref 11.6–15.1)
GFR SERPL CREATININE-BSD FRML MDRD: 64 ML/MIN/1.73SQ M
GLUCOSE SERPL-MCNC: 106 MG/DL (ref 65–140)
GLUCOSE SERPL-MCNC: 114 MG/DL (ref 65–140)
GLUCOSE UR STRIP-MCNC: NEGATIVE MG/DL
HCT VFR BLD AUTO: 33 % (ref 34.8–46.1)
HGB BLD-MCNC: 10.7 G/DL (ref 11.5–15.4)
HGB UR QL STRIP.AUTO: NEGATIVE
IMM GRANULOCYTES # BLD AUTO: 0.01 THOUSAND/UL (ref 0–0.2)
IMM GRANULOCYTES NFR BLD AUTO: 0 % (ref 0–2)
KETONES UR STRIP-MCNC: NEGATIVE MG/DL
LEUKOCYTE ESTERASE UR QL STRIP: NEGATIVE
LYMPHOCYTES # BLD AUTO: 2.03 THOUSANDS/ΜL (ref 0.6–4.47)
LYMPHOCYTES NFR BLD AUTO: 35 % (ref 14–44)
MCH RBC QN AUTO: 30.6 PG (ref 26.8–34.3)
MCHC RBC AUTO-ENTMCNC: 32.4 G/DL (ref 31.4–37.4)
MCV RBC AUTO: 94 FL (ref 82–98)
MONOCYTES # BLD AUTO: 0.42 THOUSAND/ΜL (ref 0.17–1.22)
MONOCYTES NFR BLD AUTO: 7 % (ref 4–12)
NEUTROPHILS # BLD AUTO: 2.69 THOUSANDS/ΜL (ref 1.85–7.62)
NEUTS SEG NFR BLD AUTO: 46 % (ref 43–75)
NITRITE UR QL STRIP: NEGATIVE
NRBC BLD AUTO-RTO: 0 /100 WBCS
PH UR STRIP.AUTO: 7 [PH]
PLATELET # BLD AUTO: 296 THOUSANDS/UL (ref 149–390)
PMV BLD AUTO: 9.1 FL (ref 8.9–12.7)
POTASSIUM SERPL-SCNC: 3.9 MMOL/L (ref 3.5–5.3)
PROT SERPL-MCNC: 6.8 G/DL (ref 6.4–8.4)
PROT UR STRIP-MCNC: NEGATIVE MG/DL
RBC # BLD AUTO: 3.5 MILLION/UL (ref 3.81–5.12)
SARS-COV-2 RNA RESP QL NAA+PROBE: NEGATIVE
SODIUM SERPL-SCNC: 142 MMOL/L (ref 135–147)
SP GR UR STRIP.AUTO: 1.01 (ref 1–1.03)
UROBILINOGEN UR QL STRIP.AUTO: 0.2 E.U./DL
WBC # BLD AUTO: 5.87 THOUSAND/UL (ref 4.31–10.16)

## 2022-08-25 PROCEDURE — 70450 CT HEAD/BRAIN W/O DYE: CPT

## 2022-08-25 PROCEDURE — 71045 X-RAY EXAM CHEST 1 VIEW: CPT

## 2022-08-25 PROCEDURE — 94640 AIRWAY INHALATION TREATMENT: CPT

## 2022-08-25 PROCEDURE — 81003 URINALYSIS AUTO W/O SCOPE: CPT | Performed by: EMERGENCY MEDICINE

## 2022-08-25 PROCEDURE — 80053 COMPREHEN METABOLIC PANEL: CPT | Performed by: EMERGENCY MEDICINE

## 2022-08-25 PROCEDURE — 36415 COLL VENOUS BLD VENIPUNCTURE: CPT | Performed by: EMERGENCY MEDICINE

## 2022-08-25 PROCEDURE — 84484 ASSAY OF TROPONIN QUANT: CPT | Performed by: EMERGENCY MEDICINE

## 2022-08-25 PROCEDURE — 85025 COMPLETE CBC W/AUTO DIFF WBC: CPT | Performed by: EMERGENCY MEDICINE

## 2022-08-25 PROCEDURE — U0003 INFECTIOUS AGENT DETECTION BY NUCLEIC ACID (DNA OR RNA); SEVERE ACUTE RESPIRATORY SYNDROME CORONAVIRUS 2 (SARS-COV-2) (CORONAVIRUS DISEASE [COVID-19]), AMPLIFIED PROBE TECHNIQUE, MAKING USE OF HIGH THROUGHPUT TECHNOLOGIES AS DESCRIBED BY CMS-2020-01-R: HCPCS | Performed by: EMERGENCY MEDICINE

## 2022-08-25 PROCEDURE — 99285 EMERGENCY DEPT VISIT HI MDM: CPT

## 2022-08-25 PROCEDURE — 99285 EMERGENCY DEPT VISIT HI MDM: CPT | Performed by: EMERGENCY MEDICINE

## 2022-08-25 PROCEDURE — 93005 ELECTROCARDIOGRAM TRACING: CPT

## 2022-08-25 PROCEDURE — 82948 REAGENT STRIP/BLOOD GLUCOSE: CPT

## 2022-08-25 PROCEDURE — 96360 HYDRATION IV INFUSION INIT: CPT

## 2022-08-25 PROCEDURE — U0005 INFEC AGEN DETEC AMPLI PROBE: HCPCS | Performed by: EMERGENCY MEDICINE

## 2022-08-25 RX ORDER — AZITHROMYCIN 250 MG/1
TABLET, FILM COATED ORAL
Qty: 6 TABLET | Refills: 0 | Status: SHIPPED | OUTPATIENT
Start: 2022-08-25 | End: 2022-08-29

## 2022-08-25 RX ORDER — PREDNISONE 20 MG/1
60 TABLET ORAL DAILY
Qty: 15 TABLET | Refills: 0 | Status: SHIPPED | OUTPATIENT
Start: 2022-08-25

## 2022-08-25 RX ORDER — IPRATROPIUM BROMIDE AND ALBUTEROL SULFATE 2.5; .5 MG/3ML; MG/3ML
3 SOLUTION RESPIRATORY (INHALATION) ONCE
Status: COMPLETED | OUTPATIENT
Start: 2022-08-25 | End: 2022-08-25

## 2022-08-25 RX ADMIN — IPRATROPIUM BROMIDE AND ALBUTEROL SULFATE 3 ML: 2.5; .5 SOLUTION RESPIRATORY (INHALATION) at 18:20

## 2022-08-25 RX ADMIN — SODIUM CHLORIDE 1000 ML: 0.9 INJECTION, SOLUTION INTRAVENOUS at 18:14

## 2022-08-25 NOTE — TELEPHONE ENCOUNTER
Patent called to say she experienced syncope and hit her head  Then hit her lip and cut it on coffee cup  Discussed with Dr Otoniel Leon who said she needed the ER  Patient said her son was home who could drive her there
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
## 2022-08-26 LAB
ATRIAL RATE: 72 BPM
ATRIAL RATE: 72 BPM
P AXIS: 52 DEGREES
P AXIS: 76 DEGREES
PR INTERVAL: 154 MS
PR INTERVAL: 158 MS
QRS AXIS: 26 DEGREES
QRS AXIS: 31 DEGREES
QRSD INTERVAL: 82 MS
QRSD INTERVAL: 90 MS
QT INTERVAL: 396 MS
QT INTERVAL: 400 MS
QTC INTERVAL: 433 MS
QTC INTERVAL: 438 MS
T WAVE AXIS: 33 DEGREES
T WAVE AXIS: 37 DEGREES
VENTRICULAR RATE: 72 BPM
VENTRICULAR RATE: 72 BPM

## 2022-08-26 PROCEDURE — 93010 ELECTROCARDIOGRAM REPORT: CPT | Performed by: INTERNAL MEDICINE

## 2022-08-31 DIAGNOSIS — R06.2 WHEEZES: ICD-10-CM

## 2022-08-31 RX ORDER — ALBUTEROL SULFATE 90 UG/1
2 AEROSOL, METERED RESPIRATORY (INHALATION) EVERY 4 HOURS PRN
Qty: 8.5 G | Refills: 5 | Status: SHIPPED | OUTPATIENT
Start: 2022-08-31

## 2022-10-05 DIAGNOSIS — M54.16 LUMBAR RADICULOPATHY: ICD-10-CM

## 2022-10-05 RX ORDER — GABAPENTIN 400 MG/1
800 CAPSULE ORAL 2 TIMES DAILY
Qty: 120 CAPSULE | Refills: 1 | Status: SHIPPED | OUTPATIENT
Start: 2022-10-05

## 2022-10-07 ENCOUNTER — OFFICE VISIT (OUTPATIENT)
Dept: CARDIOLOGY CLINIC | Facility: CLINIC | Age: 67
End: 2022-10-07
Payer: MEDICARE

## 2022-10-07 VITALS
WEIGHT: 136 LBS | BODY MASS INDEX: 24.1 KG/M2 | DIASTOLIC BLOOD PRESSURE: 80 MMHG | HEART RATE: 89 BPM | TEMPERATURE: 97 F | HEIGHT: 63 IN | SYSTOLIC BLOOD PRESSURE: 130 MMHG | OXYGEN SATURATION: 98 %

## 2022-10-07 DIAGNOSIS — F10.21 HISTORY OF ALCOHOL DEPENDENCE (HCC): ICD-10-CM

## 2022-10-07 DIAGNOSIS — Z72.0 TOBACCO USE: ICD-10-CM

## 2022-10-07 DIAGNOSIS — G89.4 CHRONIC PAIN SYNDROME: ICD-10-CM

## 2022-10-07 DIAGNOSIS — E78.5 DYSLIPIDEMIA: ICD-10-CM

## 2022-10-07 DIAGNOSIS — I10 HYPERTENSION, UNSPECIFIED TYPE: ICD-10-CM

## 2022-10-07 DIAGNOSIS — F41.8 DEPRESSION WITH ANXIETY: ICD-10-CM

## 2022-10-07 DIAGNOSIS — R06.02 EXERTIONAL SHORTNESS OF BREATH: ICD-10-CM

## 2022-10-07 PROCEDURE — 99214 OFFICE O/P EST MOD 30 MIN: CPT | Performed by: INTERNAL MEDICINE

## 2022-10-07 PROCEDURE — 93000 ELECTROCARDIOGRAM COMPLETE: CPT | Performed by: INTERNAL MEDICINE

## 2022-10-07 RX ORDER — ROSUVASTATIN CALCIUM 5 MG/1
5 TABLET, COATED ORAL EVERY OTHER DAY
Qty: 45 TABLET | Refills: 1 | Status: SHIPPED | OUTPATIENT
Start: 2022-10-07

## 2022-10-07 NOTE — PROGRESS NOTES
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 images reveal mild subdiaphragmatic activity  PERFUSION DEFECTS:  -  There was a small, partially reversible myocardial perfusion defect of the anteroseptal wall likely due to attenuation from breast tissue  GATED SPECT:  The calculated left ventricular ejection fraction was 65 %  Left ventricular ejection fraction was within normal limits by visual estimate  There was no left ventricular regional abnormality  SUMMARY:  -  Stress results: Duration of exercise was 8 min and 35 sec  Maximal work rate was 10 1 METs  Maximal heart rate during stress was 142 bpm ( 89 % of maximal predicted heart rate)  Target heart rate was achieved  Maximal systolic blood  pressure during stress was 174 mmHg  There was no chest pain during stress  -  ECG conclusions:  The stress ECG was negative for ischemia and normal   -  Perfusion imaging: There was a small, partially reversible myocardial perfusion defect of the anteroseptal wall likely due to attenuation from breast tissue   -  Gated SPECT: The calculated left ventricular ejection fraction was 65 %  Left ventricular ejection fraction was within normal limits by visual estimate  There was no left ventricular regional abnormality   -  Impressions and recommendations: Likely normal study after maximal exercise  IMPRESSIONS: Likely normal study after maximal exercise  Myocardial perfusion imaging was normal at rest and with stress      Prepared and signed by    Holli Camacho MD  Signed 06/02/2018 18:16:10         Lab Review   Lab Results   Component Value Date    WBC 5 87 08/25/2022    HGB 10 7 (L) 08/25/2022    HCT 33 0 (L) 08/25/2022    MCV 94 08/25/2022    RDW 13 9 08/25/2022     08/25/2022     BMP:  Lab Results   Component Value Date    SODIUM 142 08/25/2022    K 3 9 08/25/2022     08/25/2022    CO2 33 (H) 08/25/2022    ANIONGAP 11 2 12/17/2015    BUN 11 08/25/2022    CREATININE 0 93 08/25/2022    GLUC 114 08/25/2022    GLUF 105 (H) 07/08/2021    CALCIUM 8 3 08/25/2022    EGFR 64 08/25/2022    MG 2 3 04/24/2019     LFT:  Lab Results   Component Value Date    AST 22 08/25/2022    ALT 24 08/25/2022    ALKPHOS 78 08/25/2022    TP 6 8 08/25/2022    ALB 3 5 08/25/2022      Lab Results   Component Value Date    PFO7OHJMBIZJ 2 640 03/17/2021     Lab Results   Component Value Date    HGBA1C 5 5 07/06/2020     Lipid Profile:   Lab Results   Component Value Date    CHOLESTEROL 166 03/17/2021    HDL 74 03/17/2021    LDLCALC 84 03/17/2021    TRIG 42 03/17/2021     Lab Results   Component Value Date    CHOLESTEROL 166 03/17/2021    CHOLESTEROL 180 04/24/2019     Lab Results   Component Value Date    TROPONINI <0 02 07/23/2020       The 10-year ASCVD risk score (Porter Kelly et al , 2013) is: 11 2%    Values used to calculate the score: Age: 74 years      Sex: Female      Is Non- : No      Diabetic: No      Tobacco smoker: Yes      Systolic Blood Pressure: 742 mmHg      Is BP treated: Yes      HDL Cholesterol: 74 mg/dL      Total Cholesterol: 166 mg/dL        Dr Janett Polanco MD [facility]      "This note has been constructed using a voice recognition system  Therefore there may be syntax, spelling, and/or grammatical errors   Please call if you have any questions  "

## 2022-10-07 NOTE — PATIENT INSTRUCTIONS
Take your cholesterol medical medication 5 mg  Crestor every other day       Do blood test in 6-8 weeks

## 2022-10-11 PROBLEM — S01.511A LIP LACERATION: Status: RESOLVED | Noted: 2022-04-24 | Resolved: 2022-10-11

## 2022-10-12 PROBLEM — R05.9 COUGH: Status: RESOLVED | Noted: 2021-06-15 | Resolved: 2022-10-12

## 2022-10-12 PROBLEM — N30.00 ACUTE CYSTITIS WITHOUT HEMATURIA: Status: RESOLVED | Noted: 2021-06-01 | Resolved: 2022-10-12

## 2022-10-21 DIAGNOSIS — I10 HYPERTENSION, UNSPECIFIED TYPE: ICD-10-CM

## 2022-10-21 RX ORDER — LOSARTAN POTASSIUM 25 MG/1
25 TABLET ORAL DAILY
Qty: 90 TABLET | Refills: 1 | Status: SHIPPED | OUTPATIENT
Start: 2022-10-21

## 2022-10-23 ENCOUNTER — HOSPITAL ENCOUNTER (EMERGENCY)
Facility: HOSPITAL | Age: 67
End: 2022-10-25
Attending: EMERGENCY MEDICINE
Payer: MEDICARE

## 2022-10-23 DIAGNOSIS — F10.10 ALCOHOL ABUSE: Primary | ICD-10-CM

## 2022-10-23 PROCEDURE — 99284 EMERGENCY DEPT VISIT MOD MDM: CPT | Performed by: EMERGENCY MEDICINE

## 2022-10-23 RX ORDER — LORAZEPAM 1 MG/1
1 TABLET ORAL ONCE
Status: COMPLETED | OUTPATIENT
Start: 2022-10-23 | End: 2022-10-23

## 2022-10-23 RX ADMIN — LORAZEPAM 1 MG: 1 TABLET ORAL at 19:49

## 2022-10-23 NOTE — ED NOTES
Pt given lunchbox and water  Patient awake and eating in bed  Crisis aware [facility] being requested by provider for detox        Adelina Tavarez RN  10/23/22 7020
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
## 2022-10-24 LAB
ALBUMIN SERPL BCP-MCNC: 4.1 G/DL (ref 3.5–5)
ALP SERPL-CCNC: 76 U/L (ref 46–116)
ALT SERPL W P-5'-P-CCNC: 23 U/L (ref 12–78)
AMPHETAMINES SERPL QL SCN: NEGATIVE
ANION GAP SERPL CALCULATED.3IONS-SCNC: 7 MMOL/L (ref 4–13)
AST SERPL W P-5'-P-CCNC: 23 U/L (ref 5–45)
BACTERIA UR QL AUTO: ABNORMAL /HPF
BARBITURATES UR QL: NEGATIVE
BASOPHILS # BLD AUTO: 0.06 THOUSANDS/ÂΜL (ref 0–0.1)
BASOPHILS NFR BLD AUTO: 1 % (ref 0–1)
BENZODIAZ UR QL: POSITIVE
BILIRUB SERPL-MCNC: 0.97 MG/DL (ref 0.2–1)
BILIRUB UR QL STRIP: NEGATIVE
BUN SERPL-MCNC: 21 MG/DL (ref 5–25)
CALCIUM SERPL-MCNC: 9 MG/DL (ref 8.3–10.1)
CHLORIDE SERPL-SCNC: 102 MMOL/L (ref 96–108)
CLARITY UR: ABNORMAL
CO2 SERPL-SCNC: 30 MMOL/L (ref 21–32)
COCAINE UR QL: NEGATIVE
COLOR UR: YELLOW
CREAT SERPL-MCNC: 0.93 MG/DL (ref 0.6–1.3)
EOSINOPHIL # BLD AUTO: 0.08 THOUSAND/ÂΜL (ref 0–0.61)
EOSINOPHIL NFR BLD AUTO: 1 % (ref 0–6)
ERYTHROCYTE [DISTWIDTH] IN BLOOD BY AUTOMATED COUNT: 14.1 % (ref 11.6–15.1)
ETHANOL SERPL-MCNC: <3 MG/DL (ref 0–3)
GFR SERPL CREATININE-BSD FRML MDRD: 64 ML/MIN/1.73SQ M
GLUCOSE SERPL-MCNC: 102 MG/DL (ref 65–140)
GLUCOSE UR STRIP-MCNC: ABNORMAL MG/DL
HCT VFR BLD AUTO: 37.2 % (ref 34.8–46.1)
HGB BLD-MCNC: 12.1 G/DL (ref 11.5–15.4)
HGB UR QL STRIP.AUTO: ABNORMAL
IMM GRANULOCYTES # BLD AUTO: 0.01 THOUSAND/UL (ref 0–0.2)
IMM GRANULOCYTES NFR BLD AUTO: 0 % (ref 0–2)
KETONES UR STRIP-MCNC: NEGATIVE MG/DL
LEUKOCYTE ESTERASE UR QL STRIP: ABNORMAL
LYMPHOCYTES # BLD AUTO: 1.25 THOUSANDS/ÂΜL (ref 0.6–4.47)
LYMPHOCYTES NFR BLD AUTO: 22 % (ref 14–44)
MAGNESIUM SERPL-MCNC: 2.2 MG/DL (ref 1.6–2.6)
MCH RBC QN AUTO: 29.7 PG (ref 26.8–34.3)
MCHC RBC AUTO-ENTMCNC: 32.5 G/DL (ref 31.4–37.4)
MCV RBC AUTO: 91 FL (ref 82–98)
METHADONE UR QL: NEGATIVE
MONOCYTES # BLD AUTO: 0.17 THOUSAND/ÂΜL (ref 0.17–1.22)
MONOCYTES NFR BLD AUTO: 3 % (ref 4–12)
NEUTROPHILS # BLD AUTO: 4.13 THOUSANDS/ÂΜL (ref 1.85–7.62)
NEUTS SEG NFR BLD AUTO: 73 % (ref 43–75)
NITRITE UR QL STRIP: POSITIVE
NON-SQ EPI CELLS URNS QL MICRO: ABNORMAL /HPF
NRBC BLD AUTO-RTO: 0 /100 WBCS
OPIATES UR QL SCN: NEGATIVE
OXYCODONE+OXYMORPHONE UR QL SCN: NEGATIVE
PCP UR QL: NEGATIVE
PH UR STRIP.AUTO: 6.5 [PH]
PLATELET # BLD AUTO: 394 THOUSANDS/UL (ref 149–390)
PMV BLD AUTO: 9 FL (ref 8.9–12.7)
POTASSIUM SERPL-SCNC: 4 MMOL/L (ref 3.5–5.3)
PROT SERPL-MCNC: 7.7 G/DL (ref 6.4–8.4)
PROT UR STRIP-MCNC: ABNORMAL MG/DL
RBC # BLD AUTO: 4.07 MILLION/UL (ref 3.81–5.12)
RBC #/AREA URNS AUTO: ABNORMAL /HPF
SARS-COV-2 RNA RESP QL NAA+PROBE: NEGATIVE
SODIUM SERPL-SCNC: 139 MMOL/L (ref 135–147)
SP GR UR STRIP.AUTO: 1.02 (ref 1–1.03)
THC UR QL: NEGATIVE
UROBILINOGEN UR QL STRIP.AUTO: 1 E.U./DL
WBC # BLD AUTO: 5.7 THOUSAND/UL (ref 4.31–10.16)
WBC #/AREA URNS AUTO: ABNORMAL /HPF

## 2022-10-24 PROCEDURE — U0003 INFECTIOUS AGENT DETECTION BY NUCLEIC ACID (DNA OR RNA); SEVERE ACUTE RESPIRATORY SYNDROME CORONAVIRUS 2 (SARS-COV-2) (CORONAVIRUS DISEASE [COVID-19]), AMPLIFIED PROBE TECHNIQUE, MAKING USE OF HIGH THROUGHPUT TECHNOLOGIES AS DESCRIBED BY CMS-2020-01-R: HCPCS | Performed by: EMERGENCY MEDICINE

## 2022-10-24 PROCEDURE — 80053 COMPREHEN METABOLIC PANEL: CPT | Performed by: EMERGENCY MEDICINE

## 2022-10-24 PROCEDURE — 36415 COLL VENOUS BLD VENIPUNCTURE: CPT | Performed by: EMERGENCY MEDICINE

## 2022-10-24 PROCEDURE — 83735 ASSAY OF MAGNESIUM: CPT | Performed by: EMERGENCY MEDICINE

## 2022-10-24 PROCEDURE — U0005 INFEC AGEN DETEC AMPLI PROBE: HCPCS | Performed by: EMERGENCY MEDICINE

## 2022-10-24 PROCEDURE — 85025 COMPLETE CBC W/AUTO DIFF WBC: CPT | Performed by: EMERGENCY MEDICINE

## 2022-10-24 PROCEDURE — 80307 DRUG TEST PRSMV CHEM ANLYZR: CPT | Performed by: EMERGENCY MEDICINE

## 2022-10-24 PROCEDURE — NC001 PR NO CHARGE: Performed by: EMERGENCY MEDICINE

## 2022-10-24 PROCEDURE — 82077 ASSAY SPEC XCP UR&BREATH IA: CPT | Performed by: EMERGENCY MEDICINE

## 2022-10-24 PROCEDURE — 81001 URINALYSIS AUTO W/SCOPE: CPT | Performed by: EMERGENCY MEDICINE

## 2022-10-24 RX ORDER — LORAZEPAM 1 MG/1
1 TABLET ORAL ONCE
Status: COMPLETED | OUTPATIENT
Start: 2022-10-24 | End: 2022-10-24

## 2022-10-24 RX ORDER — DIAZEPAM 5 MG/1
10 TABLET ORAL ONCE
Status: COMPLETED | OUTPATIENT
Start: 2022-10-24 | End: 2022-10-24

## 2022-10-24 RX ORDER — ACETAMINOPHEN 325 MG/1
650 TABLET ORAL ONCE
Status: COMPLETED | OUTPATIENT
Start: 2022-10-24 | End: 2022-10-24

## 2022-10-24 RX ORDER — CHLORDIAZEPOXIDE HYDROCHLORIDE 25 MG/1
50 CAPSULE, GELATIN COATED ORAL ONCE
Status: DISCONTINUED | OUTPATIENT
Start: 2022-10-24 | End: 2022-10-24

## 2022-10-24 RX ADMIN — LORAZEPAM 1 MG: 1 TABLET ORAL at 22:23

## 2022-10-24 RX ADMIN — LORAZEPAM 1 MG: 1 TABLET ORAL at 18:16

## 2022-10-24 RX ADMIN — ACETAMINOPHEN 650 MG: 325 TABLET, FILM COATED ORAL at 08:33

## 2022-10-24 RX ADMIN — LORAZEPAM 1 MG: 1 TABLET ORAL at 12:11

## 2022-10-24 RX ADMIN — DIAZEPAM 10 MG: 5 TABLET ORAL at 06:49

## 2022-10-24 RX ADMIN — PREDNISONE 50 MG: 20 TABLET ORAL at 09:13

## 2022-10-24 NOTE — ED PROVIDER NOTES
Patient awaiting EVANGELISTA NAIDU saw her referred her to crisis  Patient is medically clear currently  She is being referred to [address] [place] [address] for alcohol detox at the present time  No acute events  She was given 1 mg Ativan for shaking    Hemodynamically stable     Sara Mccarthy, DO  10/24/22 4629

## 2022-10-24 NOTE — ED NOTES
Face sheet and H&P faxed to [facility] for a detox bed about 14:35 - called to verify receipt  PH called back about 15:25 - need note of medical clearance; covid results and a brief psychiatric assessment  ER Attending was advised  Mood is "anxious and depressed"; sleep has been "good in ER but at home it is interrupted due to chronic pain issuers"; appetite - "somewhat - eating smaller amounts of yogurt and soup - no weight loss is reported"; the patient denied all lethality and psychosis  This form with covid results and note of medical clearance faxed to [facility] about 16:45  Called [facility] @ 17:00 for an update - accepted by Dr Adin Hopson; Rn report to call [phone]   to fax over consents  TIFFANY/Shakila called about 17:05 for transport - they will call back with a time  12:30 on 10/25 with [facility]  17:30 - consents faxed back to [facility] - originals placed on chart in an envelope  PH advised of transport time @ 20:35

## 2022-10-24 NOTE — ED NOTES
Pt's son called for an update expressing concern that his mother had been drinking "like a fish" for the last three days and that she may be dehydrated  Son was updated and encouraged to call back for another update when he has time       Sulaiman Julian RN  10/24/22 8496

## 2022-10-24 NOTE — ED NOTES
Pt ambulated to nurses station, requested second lunchbox  Patient appeared to be in no acute distress  Son Tylor Butler given update after patient gave RN permission to speak with family       Dona Herrmann RN  10/23/22 2027

## 2022-10-24 NOTE — ED NOTES
Pt resting comfortably  No signs of distress noted   Respirations even     Rikki Harvey [facility]  10/24/22 3983

## 2022-10-24 NOTE — ED NOTES
10/24/22 @ 1053:  PES called family guidance for update; Citlaly Monet will reach out to Chance Rondon, [place]    1100: PES received call from [facility]; she will reach out to [facility], and if bed is available, which is patient's preference, patient will have to complete phone screening  PES will continue to monitor  [address] [facility], [address] [facility] 10: PES reached out to [facility], but this would be treated as a transfer, which would require all the referral information faxed before phone consult could be completed  PES discussed with ED MD, who will make referral within network, as patient has Medicare  PES will wait to find out if bed is available and they can accept  If not, PES will continue with referral process to [facility]  [address] [facility], [address]: PES notified by ED MD that no beds were available at [facility]  PES will continue with referral to [facility]  ED MD placed orders for blood work    [facility] [place]

## 2022-10-24 NOTE — ED NOTES
Pt sleeping at this time  No signs of distress noted   Respirations even     Madhavi Sharp RN  10/23/22 6271

## 2022-10-24 NOTE — ED NOTES
Pt resting quietly  No signs of distress at this time   Respirations even     Maribell Fuetnes RN  10/23/22 2588

## 2022-10-24 NOTE — ED NOTES
Pt sleeping soundly  Respirations regular  No distress noted        Sapna Startton, RN  10/24/22 0217

## 2022-10-24 NOTE — ED NOTES
Pt offered something to eat but declines at this time  Pt directed to use call bell if she would like something to eat or has any other needs       Ivan Schroeder, RN  10/24/22 0659

## 2022-10-25 VITALS
OXYGEN SATURATION: 98 % | SYSTOLIC BLOOD PRESSURE: 137 MMHG | RESPIRATION RATE: 20 BRPM | HEART RATE: 92 BPM | TEMPERATURE: 97.7 F | DIASTOLIC BLOOD PRESSURE: 66 MMHG

## 2022-10-25 RX ORDER — CHLORDIAZEPOXIDE HYDROCHLORIDE 25 MG/1
50 CAPSULE, GELATIN COATED ORAL EVERY 6 HOURS PRN
Status: DISCONTINUED | OUTPATIENT
Start: 2022-10-25 | End: 2022-10-25

## 2022-10-25 RX ORDER — LORAZEPAM 1 MG/1
1 TABLET ORAL ONCE
Status: COMPLETED | OUTPATIENT
Start: 2022-10-25 | End: 2022-10-25

## 2022-10-25 RX ADMIN — LORAZEPAM 1 MG: 1 TABLET ORAL at 07:02

## 2022-10-25 NOTE — ED NOTES
10/25/22 @ [phone]:  PES received call from Rosamond at [facility]; may be able to pick patient up between [phone], but will marcia before coming so that patient is ready  [address], Dunne Franklin from [facility] called and estimated 35 minute arrival time  Kashmir Andrade Min 87    1050: [facility] arrived and transported patient to [place]  Patient was tearful, but calm and cooperative    Allie Andrade MS

## 2022-11-12 DIAGNOSIS — K21.00 GASTRO-ESOPHAGEAL REFLUX DISEASE WITH ESOPHAGITIS: ICD-10-CM

## 2022-11-12 RX ORDER — PANTOPRAZOLE SODIUM 40 MG/1
TABLET, DELAYED RELEASE ORAL
Qty: 90 TABLET | Refills: 1 | Status: SHIPPED | OUTPATIENT
Start: 2022-11-12

## 2022-12-31 ENCOUNTER — HOSPITAL ENCOUNTER (EMERGENCY)
Facility: HOSPITAL | Age: 67
Discharge: HOME | End: 2023-01-01
Attending: EMERGENCY MEDICINE

## 2022-12-31 ENCOUNTER — HOSPITAL ENCOUNTER (EMERGENCY)
Facility: HOSPITAL | Age: 67
Discharge: HOME | End: 2022-12-31
Attending: EMERGENCY MEDICINE

## 2022-12-31 VITALS
OXYGEN SATURATION: 97 % | HEART RATE: 65 BPM | SYSTOLIC BLOOD PRESSURE: 161 MMHG | RESPIRATION RATE: 20 BRPM | TEMPERATURE: 98.5 F | DIASTOLIC BLOOD PRESSURE: 77 MMHG

## 2022-12-31 VITALS
TEMPERATURE: 97.7 F | RESPIRATION RATE: 18 BRPM | DIASTOLIC BLOOD PRESSURE: 77 MMHG | HEART RATE: 77 BPM | WEIGHT: 136 LBS | BODY MASS INDEX: 24.09 KG/M2 | SYSTOLIC BLOOD PRESSURE: 125 MMHG | OXYGEN SATURATION: 99 %

## 2022-12-31 DIAGNOSIS — R45.851 SUICIDAL IDEATION: Primary | ICD-10-CM

## 2022-12-31 DIAGNOSIS — F10.10 ALCOHOL ABUSE: ICD-10-CM

## 2022-12-31 DIAGNOSIS — F10.929 ALCOHOL INTOXICATION (HCC): Primary | ICD-10-CM

## 2022-12-31 LAB
ALBUMIN SERPL BCP-MCNC: 3.5 G/DL (ref 3.5–5)
ALBUMIN SERPL BCP-MCNC: 3.6 G/DL (ref 3.5–5)
ALP SERPL-CCNC: 76 U/L (ref 46–116)
ALP SERPL-CCNC: 78 U/L (ref 46–116)
ALT SERPL W P-5'-P-CCNC: 23 U/L (ref 12–78)
ALT SERPL W P-5'-P-CCNC: 25 U/L (ref 12–78)
AMPHETAMINES SERPL QL SCN: POSITIVE
ANION GAP SERPL CALCULATED.3IONS-SCNC: 6 MMOL/L (ref 4–13)
ANION GAP SERPL CALCULATED.3IONS-SCNC: 9 MMOL/L (ref 4–13)
AST SERPL W P-5'-P-CCNC: 25 U/L (ref 5–45)
AST SERPL W P-5'-P-CCNC: 26 U/L (ref 5–45)
BACTERIA UR QL AUTO: ABNORMAL /HPF
BARBITURATES UR QL: NEGATIVE
BASOPHILS # BLD AUTO: 0.03 THOUSANDS/ÂΜL (ref 0–0.1)
BASOPHILS # BLD AUTO: 0.05 THOUSANDS/ÂΜL (ref 0–0.1)
BASOPHILS NFR BLD AUTO: 1 % (ref 0–1)
BASOPHILS NFR BLD AUTO: 1 % (ref 0–1)
BENZODIAZ UR QL: NEGATIVE
BILIRUB SERPL-MCNC: 0.23 MG/DL (ref 0.2–1)
BILIRUB SERPL-MCNC: 0.36 MG/DL (ref 0.2–1)
BILIRUB UR QL STRIP: NEGATIVE
BUN SERPL-MCNC: 13 MG/DL (ref 5–25)
BUN SERPL-MCNC: 13 MG/DL (ref 5–25)
CALCIUM SERPL-MCNC: 8.1 MG/DL (ref 8.3–10.1)
CALCIUM SERPL-MCNC: 8.6 MG/DL (ref 8.3–10.1)
CHLORIDE SERPL-SCNC: 104 MMOL/L (ref 96–108)
CHLORIDE SERPL-SCNC: 105 MMOL/L (ref 96–108)
CLARITY UR: CLEAR
CO2 SERPL-SCNC: 30 MMOL/L (ref 21–32)
CO2 SERPL-SCNC: 31 MMOL/L (ref 21–32)
COCAINE UR QL: NEGATIVE
COLOR UR: YELLOW
CREAT SERPL-MCNC: 0.69 MG/DL (ref 0.6–1.3)
CREAT SERPL-MCNC: 0.76 MG/DL (ref 0.6–1.3)
EOSINOPHIL # BLD AUTO: 0.03 THOUSAND/ÂΜL (ref 0–0.61)
EOSINOPHIL # BLD AUTO: 0.11 THOUSAND/ÂΜL (ref 0–0.61)
EOSINOPHIL NFR BLD AUTO: 1 % (ref 0–6)
EOSINOPHIL NFR BLD AUTO: 2 % (ref 0–6)
ERYTHROCYTE [DISTWIDTH] IN BLOOD BY AUTOMATED COUNT: 13.5 % (ref 11.6–15.1)
ERYTHROCYTE [DISTWIDTH] IN BLOOD BY AUTOMATED COUNT: 13.6 % (ref 11.6–15.1)
ETHANOL SERPL-MCNC: 175 MG/DL (ref 0–3)
ETHANOL SERPL-MCNC: 228 MG/DL (ref 0–3)
GFR SERPL CREATININE-BSD FRML MDRD: 82 ML/MIN/1.73SQ M
GFR SERPL CREATININE-BSD FRML MDRD: 91 ML/MIN/1.73SQ M
GLUCOSE SERPL-MCNC: 101 MG/DL (ref 65–140)
GLUCOSE SERPL-MCNC: 94 MG/DL (ref 65–140)
GLUCOSE UR STRIP-MCNC: NEGATIVE MG/DL
HCT VFR BLD AUTO: 32.3 % (ref 34.8–46.1)
HCT VFR BLD AUTO: 33.4 % (ref 34.8–46.1)
HGB BLD-MCNC: 10.9 G/DL (ref 11.5–15.4)
HGB BLD-MCNC: 11.2 G/DL (ref 11.5–15.4)
HGB UR QL STRIP.AUTO: ABNORMAL
IMM GRANULOCYTES # BLD AUTO: 0 THOUSAND/UL (ref 0–0.2)
IMM GRANULOCYTES # BLD AUTO: 0.04 THOUSAND/UL (ref 0–0.2)
IMM GRANULOCYTES NFR BLD AUTO: 0 % (ref 0–2)
IMM GRANULOCYTES NFR BLD AUTO: 1 % (ref 0–2)
KETONES UR STRIP-MCNC: NEGATIVE MG/DL
LEUKOCYTE ESTERASE UR QL STRIP: ABNORMAL
LYMPHOCYTES # BLD AUTO: 2.13 THOUSANDS/ÂΜL (ref 0.6–4.47)
LYMPHOCYTES # BLD AUTO: 2.44 THOUSANDS/ÂΜL (ref 0.6–4.47)
LYMPHOCYTES NFR BLD AUTO: 36 % (ref 14–44)
LYMPHOCYTES NFR BLD AUTO: 46 % (ref 14–44)
MCH RBC QN AUTO: 29.8 PG (ref 26.8–34.3)
MCH RBC QN AUTO: 30.3 PG (ref 26.8–34.3)
MCHC RBC AUTO-ENTMCNC: 33.5 G/DL (ref 31.4–37.4)
MCHC RBC AUTO-ENTMCNC: 33.7 G/DL (ref 31.4–37.4)
MCV RBC AUTO: 89 FL (ref 82–98)
MCV RBC AUTO: 90 FL (ref 82–98)
METHADONE UR QL: NEGATIVE
MONOCYTES # BLD AUTO: 0.25 THOUSAND/ÂΜL (ref 0.17–1.22)
MONOCYTES # BLD AUTO: 0.29 THOUSAND/ÂΜL (ref 0.17–1.22)
MONOCYTES NFR BLD AUTO: 5 % (ref 4–12)
MONOCYTES NFR BLD AUTO: 5 % (ref 4–12)
MUCOUS THREADS UR QL AUTO: ABNORMAL
NEUTROPHILS # BLD AUTO: 2.41 THOUSANDS/ÂΜL (ref 1.85–7.62)
NEUTROPHILS # BLD AUTO: 3.43 THOUSANDS/ÂΜL (ref 1.85–7.62)
NEUTS SEG NFR BLD AUTO: 46 % (ref 43–75)
NEUTS SEG NFR BLD AUTO: 56 % (ref 43–75)
NITRITE UR QL STRIP: NEGATIVE
NON-SQ EPI CELLS URNS QL MICRO: ABNORMAL /HPF
NRBC BLD AUTO-RTO: 0 /100 WBCS
NRBC BLD AUTO-RTO: 0 /100 WBCS
OPIATES UR QL SCN: NEGATIVE
OXYCODONE+OXYMORPHONE UR QL SCN: NEGATIVE
PCP UR QL: NEGATIVE
PH UR STRIP.AUTO: 5.5 [PH]
PLATELET # BLD AUTO: 306 THOUSANDS/UL (ref 149–390)
PLATELET # BLD AUTO: 321 THOUSANDS/UL (ref 149–390)
PMV BLD AUTO: 8.9 FL (ref 8.9–12.7)
PMV BLD AUTO: 9.2 FL (ref 8.9–12.7)
POTASSIUM SERPL-SCNC: 3.2 MMOL/L (ref 3.5–5.3)
POTASSIUM SERPL-SCNC: 3.7 MMOL/L (ref 3.5–5.3)
PROT SERPL-MCNC: 6.5 G/DL (ref 6.4–8.4)
PROT SERPL-MCNC: 6.8 G/DL (ref 6.4–8.4)
PROT UR STRIP-MCNC: NEGATIVE MG/DL
RBC # BLD AUTO: 3.6 MILLION/UL (ref 3.81–5.12)
RBC # BLD AUTO: 3.76 MILLION/UL (ref 3.81–5.12)
RBC #/AREA URNS AUTO: ABNORMAL /HPF
SODIUM SERPL-SCNC: 142 MMOL/L (ref 135–147)
SODIUM SERPL-SCNC: 143 MMOL/L (ref 135–147)
SP GR UR STRIP.AUTO: 1.02 (ref 1–1.03)
THC UR QL: NEGATIVE
UROBILINOGEN UR QL STRIP.AUTO: 0.2 E.U./DL
WBC # BLD AUTO: 5.24 THOUSAND/UL (ref 4.31–10.16)
WBC # BLD AUTO: 5.97 THOUSAND/UL (ref 4.31–10.16)
WBC #/AREA URNS AUTO: ABNORMAL /HPF

## 2022-12-31 RX ORDER — CHLORDIAZEPOXIDE HYDROCHLORIDE 25 MG/1
50 CAPSULE, GELATIN COATED ORAL ONCE
Status: COMPLETED | OUTPATIENT
Start: 2022-12-31 | End: 2022-12-31

## 2022-12-31 RX ADMIN — CHLORDIAZEPOXIDE HYDROCHLORIDE 50 MG: 25 CAPSULE ORAL at 09:44

## 2022-12-31 NOTE — ED NOTES
Pt offered librium at this time as ordered by physician  Patient refusing, states "It has the opposite effect on me " Patient requesting ativan instead        Malina Dutta RN  12/31/22 9664

## 2022-12-31 NOTE — ED NOTES
Per Josiane Schuler, from [facility], patient ok to be d/c'd - states family is on their way to pick her up  Patient requesting to wait in the waiting room         Leah Blanton RN  12/31/22 2023

## 2022-12-31 NOTE — ED CARE HANDOFF
Emergency Department Sign Out Note        Sign out and transfer of care from previous provider see Separate Emergency Department note  The patient, Lisandra Granados, was evaluated by the previous provider for psychiatric evaluation  Workup Completed:  Medical clearance labs performed  Patient is intoxicated    ED Course / Workup Pending (followup): Patient is observed until sober  Will be evaluated by crisis this morning for possible suicidal ideation                                     Procedures  MDM        Disposition  Final diagnoses:   None     ED Disposition     None      Follow-up Information    None       Patient's Medications   Discharge Prescriptions    No medications on file     No discharge procedures on file         ED Provider  Electronically Signed by     Coreen Perry MD  12/31/22 8307

## 2022-12-31 NOTE — ED NOTES
Breathalyzer performed at this time,   [address], [address] [place] [address]  12/31/22 2780
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
## 2022-12-31 NOTE — ED NOTES
Received phone call from family of patient requesting to speak with Darlin Avitia, from [address]  [place] text sent to Darlin Avitia who will contact family       Leah Blanton RN  12/31/22 1500

## 2022-12-31 NOTE — ED NOTES
Patient was brought to ED by police after she told her son Eloisa Ospina that she wanted to just die and she was just going to end it because he wouldn't go buy her alcohol  She did not have a plan on how she would do it but she was yelling at son so he called the police to bring her to the ED for psychiatric evaluation  Crisis assessed patient this am when she was sober and she denied any suicidal/homicidal ideations, hallucinations, delusions, paranoia, sleep or appetite disturbances  She said that she told her son she was going to end it in an attempt to get him to go buy her alcohol  She denies any previous suicide attempts or hospitalizations  She gave me permission to speak with her son Eloisa Ospina  When I spoke with him he confirmed that she has never attempted silicide or been hospitalized for mental health treatment only detox and rehab  She has had numerous detox and rehab stays over the years  She is currently abusing alcohol and Adderall and would like detox for this  Family is in support with this plan and will bring her belongings so that she can go to treatment  They are not concerned that she will harm herself and just want her to be sober  Family guidance called and Sid Soares will be out to assess for detox treatment
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
## 2023-01-01 NOTE — ED NOTES
Patient resting in bed comfortably  States can call for ride at 0700  Charge nurse gave permission for patient to stay in room until phone call can be made        Emily Arriaza RN  01/01/23 4807

## 2023-01-03 ENCOUNTER — TELEPHONE (OUTPATIENT)
Dept: GASTROENTEROLOGY | Facility: AMBULARY SURGERY CENTER | Age: 68
End: 2023-01-03

## 2023-01-09 ENCOUNTER — TRANSITIONAL CARE MANAGEMENT (OUTPATIENT)
Dept: FAMILY MEDICINE CLINIC | Facility: CLINIC | Age: 68
End: 2023-01-09

## 2023-01-09 DIAGNOSIS — M54.16 LUMBAR RADICULOPATHY: ICD-10-CM

## 2023-01-09 RX ORDER — GABAPENTIN 400 MG/1
800 CAPSULE ORAL 2 TIMES DAILY
Qty: 120 CAPSULE | Refills: 1 | Status: SHIPPED | OUTPATIENT
Start: 2023-01-09

## 2023-01-18 ENCOUNTER — RA CDI HCC (OUTPATIENT)
Dept: OTHER | Facility: HOSPITAL | Age: 68
End: 2023-01-18

## 2023-01-18 NOTE — PROGRESS NOTES
[facility]  coding opportunities       Chart reviewed, no opportunity found: CHART REVIEWED, NO OPPORTUNITY FOUND        Patients Insurance     Medicare Insurance: Medicare

## 2023-01-30 NOTE — TELEPHONE ENCOUNTER
History  Chief Complaint   Patient presents with   • Foot Injury     States had L foot fx about 2 months ago  Yesterday fell and injured foot again but in different spot has lump on side of foot and feels like pins and needles     Patient presents for evaluation of left foot injury  Patient states yesterday she fell injuring her left foot  She has been able to walk on it but it got more swollen and painful today  She feels a throbbing sensation  Is concerned because she broke the same foot about 4 months ago  Denies any other injury from the fall  History provided by:  Patient   used: No        None       History reviewed  No pertinent past medical history  History reviewed  No pertinent surgical history  History reviewed  No pertinent family history  I have reviewed and agree with the history as documented  E-Cigarette/Vaping   • E-Cigarette Use Never User      E-Cigarette/Vaping Substances     Social History     Tobacco Use   • Smoking status: Never   • Smokeless tobacco: Never   Vaping Use   • Vaping Use: Never used   Substance Use Topics   • Alcohol use: Not Currently   • Drug use: Not Currently       Review of Systems   Constitutional: Negative for chills and fever  HENT: Negative for ear pain and sore throat  Eyes: Negative for pain and visual disturbance  Respiratory: Negative for cough and shortness of breath  Cardiovascular: Negative for chest pain and palpitations  Gastrointestinal: Negative for abdominal pain and vomiting  Genitourinary: Negative for dysuria and hematuria  Musculoskeletal: Negative for arthralgias and back pain  Skin: Negative for color change and rash  Neurological: Negative for seizures and syncope  All other systems reviewed and are negative  Physical Exam  Physical Exam  Vitals and nursing note reviewed  Constitutional:       General: She is not in acute distress    Musculoskeletal:         General: Swelling and Pt was given results  Expressed understanding  No questions/concerns at this time  tenderness present  No deformity  Normal range of motion  Feet:    Skin:     Capillary Refill: Capillary refill takes less than 2 seconds  Findings: No rash  Neurological:      General: No focal deficit present  Mental Status: She is alert and oriented to person, place, and time  Vital Signs  ED Triage Vitals [01/27/23 2140]   Temperature Pulse Respirations Blood Pressure SpO2   98 1 °F (36 7 °C) 78 22 129/68 100 %      Temp Source Heart Rate Source Patient Position - Orthostatic VS BP Location FiO2 (%)   Tympanic Monitor Sitting Right arm --      Pain Score       8           Vitals:    01/27/23 2140   BP: 129/68   Pulse: 78   Patient Position - Orthostatic VS: Sitting         Visual Acuity      ED Medications  Medications - No data to display    Diagnostic Studies  Results Reviewed     None                 XR foot 3+ views LEFT   Final Result by Jazmin Michael MD (01/28 5619)      No acute osseous abnormality  Previously seen anterior process of calcaneus fracture has healed  Workstation performed: [id number]                    Procedures  Procedures         ED Course                                             Medical Decision Making  Pulse ox 100% on room air indicating adequate oxygenation  Xray L foot: No fracture or dislocation as read by me    Sprain of left foot, initial encounter: complicated acute illness or injury  Amount and/or Complexity of Data Reviewed  Radiology: ordered and independent interpretation performed            Disposition  Final diagnoses:   Sprain of left foot, initial encounter     Time reflects when diagnosis was documented in both MDM as applicable and the Disposition within this note     Time User Action Codes Description Comment    1/27/2023 10:41 PM Josse De Souza Jean-Paul Carroll Sprain of left foot, initial encounter       ED Disposition     ED Disposition   Discharge    Condition   Stable    Date/Time   Fri Jan 27, 2023 10:41 PM    Comment [id number] Gil discharge to home/self care  Follow-up Information     Follow up With Specialties Details Why [address] [place] [address], DPM Podiatry In 1 week  [address] [facility]            There are no discharge medications for this patient  No discharge procedures on file      PDMP Review     None          ED Provider  Electronically Signed by           Lucille Moura DO  01/30/23 5548

## 2023-02-02 ENCOUNTER — TRANSITIONAL CARE MANAGEMENT (OUTPATIENT)
Dept: FAMILY MEDICINE CLINIC | Facility: CLINIC | Age: 68
End: 2023-02-02

## 2023-02-02 DIAGNOSIS — R06.2 WHEEZES: ICD-10-CM

## 2023-02-02 RX ORDER — ATOMOXETINE 40 MG/1
40 CAPSULE ORAL DAILY
COMMUNITY
Start: 2023-01-05

## 2023-02-02 RX ORDER — ALBUTEROL SULFATE 90 UG/1
2 AEROSOL, METERED RESPIRATORY (INHALATION) EVERY 4 HOURS PRN
Qty: 8.5 G | Refills: 5 | Status: SHIPPED | OUTPATIENT
Start: 2023-02-02

## 2023-02-02 RX ORDER — DULOXETIN HYDROCHLORIDE 20 MG/1
40 CAPSULE, DELAYED RELEASE ORAL DAILY
COMMUNITY
Start: 2023-01-05

## 2023-02-10 ENCOUNTER — TELEPHONE (OUTPATIENT)
Dept: GASTROENTEROLOGY | Facility: AMBULARY SURGERY CENTER | Age: 68
End: 2023-02-10

## 2023-02-10 ENCOUNTER — TRANSITIONAL CARE MANAGEMENT (OUTPATIENT)
Dept: FAMILY MEDICINE CLINIC | Facility: CLINIC | Age: 68
End: 2023-02-10

## 2023-02-10 ENCOUNTER — TELEPHONE (OUTPATIENT)
Dept: FAMILY MEDICINE CLINIC | Facility: CLINIC | Age: 68
End: 2023-02-10

## 2023-02-10 NOTE — TELEPHONE ENCOUNTER
RHETTM for pt to return call re: scheduling repeat COLONOSCOPY w/ 2 day bowel prep w/ Dr Nicholas Martinez

## 2023-02-10 NOTE — TELEPHONE ENCOUNTER
Patient was discharged from [address] [place] [address] to [facility] rehab yesterday  She left rehab today as she was too sick- cough, sob/fatigued- was diagnosed pneumonia in hospital  scheduled KENROY appointment 2/15  Patient is requesting antibiotic sent [facility]

## 2023-02-11 ENCOUNTER — OFFICE VISIT (OUTPATIENT)
Dept: FAMILY MEDICINE CLINIC | Facility: CLINIC | Age: 68
End: 2023-02-11

## 2023-02-11 VITALS
HEIGHT: 63 IN | BODY MASS INDEX: 23.21 KG/M2 | SYSTOLIC BLOOD PRESSURE: 120 MMHG | WEIGHT: 131 LBS | DIASTOLIC BLOOD PRESSURE: 72 MMHG | TEMPERATURE: 97.3 F | RESPIRATION RATE: 14 BRPM | OXYGEN SATURATION: 94 % | HEART RATE: 72 BPM

## 2023-02-11 DIAGNOSIS — F41.8 DEPRESSION WITH ANXIETY: ICD-10-CM

## 2023-02-11 DIAGNOSIS — R05.9 COUGH, UNSPECIFIED TYPE: Primary | ICD-10-CM

## 2023-02-11 DIAGNOSIS — R06.2 WHEEZE: ICD-10-CM

## 2023-02-11 DIAGNOSIS — F31.11 BIPOLAR AFFECTIVE DISORDER, CURRENTLY MANIC, MILD (HCC): ICD-10-CM

## 2023-02-11 DIAGNOSIS — F10.10 ALCOHOL ABUSE, EPISODIC: ICD-10-CM

## 2023-02-11 RX ORDER — AMOXICILLIN AND CLAVULANATE POTASSIUM 875; 125 MG/1; MG/1
1 TABLET, FILM COATED ORAL EVERY 12 HOURS SCHEDULED
Qty: 20 TABLET | Refills: 0 | Status: SHIPPED | OUTPATIENT
Start: 2023-02-11 | End: 2023-02-21

## 2023-02-15 ENCOUNTER — TELEPHONE (OUTPATIENT)
Dept: FAMILY MEDICINE CLINIC | Facility: CLINIC | Age: 68
End: 2023-02-15

## 2023-02-15 NOTE — TELEPHONE ENCOUNTER
LMOM for patient to c/b to reschedule "No Show" appointment  KENROY to be rescheduled for 2/17 if appointment available

## 2023-02-18 ENCOUNTER — HOSPITAL ENCOUNTER (INPATIENT)
Facility: HOSPITAL | Age: 68
LOS: 3 days | Discharge: HOME | End: 2023-02-21
Attending: STUDENT IN AN ORGANIZED HEALTH CARE EDUCATION/TRAINING PROGRAM | Admitting: EMERGENCY MEDICINE

## 2023-02-18 ENCOUNTER — APPOINTMENT (EMERGENCY)
Dept: CT IMAGING | Facility: HOSPITAL | Age: 68
End: 2023-02-18

## 2023-02-18 ENCOUNTER — APPOINTMENT (EMERGENCY)
Dept: RADIOLOGY | Facility: HOSPITAL | Age: 68
End: 2023-02-18

## 2023-02-18 DIAGNOSIS — F10.20 ALCOHOL USE DISORDER, SEVERE, DEPENDENCE (HCC): ICD-10-CM

## 2023-02-18 DIAGNOSIS — B02.9 HERPES ZOSTER WITHOUT COMPLICATION: ICD-10-CM

## 2023-02-18 DIAGNOSIS — F32.A DEPRESSION: ICD-10-CM

## 2023-02-18 DIAGNOSIS — F10.10 ALCOHOL ABUSE: Primary | ICD-10-CM

## 2023-02-18 PROBLEM — R74.8 ABNORMAL TRANSAMINASES: Status: ACTIVE | Noted: 2023-02-18

## 2023-02-18 PROBLEM — F10.939 ALCOHOL WITHDRAWAL SYNDROME WITH COMPLICATION (HCC): Status: ACTIVE | Noted: 2023-02-18

## 2023-02-18 LAB
ALBUMIN SERPL BCP-MCNC: 4.3 G/DL (ref 3.5–5)
ALP SERPL-CCNC: 128 U/L (ref 43–122)
ALT SERPL W P-5'-P-CCNC: 29 U/L
ANION GAP SERPL CALCULATED.3IONS-SCNC: 9 MMOL/L (ref 5–14)
APAP SERPL-MCNC: <10 UG/ML (ref 10–20)
AST SERPL W P-5'-P-CCNC: 43 U/L (ref 14–36)
BACTERIA UR QL AUTO: ABNORMAL /HPF
BASOPHILS # BLD AUTO: 0.03 THOUSANDS/ÂΜL (ref 0–0.1)
BASOPHILS NFR BLD AUTO: 0 % (ref 0–1)
BILIRUB SERPL-MCNC: 0.33 MG/DL (ref 0.2–1)
BILIRUB UR QL STRIP: NEGATIVE
BUN SERPL-MCNC: 11 MG/DL (ref 5–25)
CALCIUM SERPL-MCNC: 9.2 MG/DL (ref 8.4–10.2)
CHLORIDE SERPL-SCNC: 100 MMOL/L (ref 96–108)
CLARITY UR: CLEAR
CO2 SERPL-SCNC: 33 MMOL/L (ref 21–32)
COLOR UR: ABNORMAL
CREAT SERPL-MCNC: 0.6 MG/DL (ref 0.6–1.2)
EOSINOPHIL # BLD AUTO: 0.02 THOUSAND/ÂΜL (ref 0–0.61)
EOSINOPHIL NFR BLD AUTO: 0 % (ref 0–6)
ERYTHROCYTE [DISTWIDTH] IN BLOOD BY AUTOMATED COUNT: 13.8 % (ref 11.6–15.1)
ETHANOL SERPL-MCNC: 236 MG/DL (ref 0–10)
GFR SERPL CREATININE-BSD FRML MDRD: 95 ML/MIN/1.73SQ M
GLUCOSE SERPL-MCNC: 105 MG/DL (ref 70–99)
GLUCOSE UR STRIP-MCNC: NEGATIVE MG/DL
HCT VFR BLD AUTO: 37.8 % (ref 34.8–46.1)
HGB BLD-MCNC: 12.3 G/DL (ref 11.5–15.4)
HGB UR QL STRIP.AUTO: 25
IMM GRANULOCYTES # BLD AUTO: 0.03 THOUSAND/UL (ref 0–0.2)
IMM GRANULOCYTES NFR BLD AUTO: 0 % (ref 0–2)
KETONES UR STRIP-MCNC: NEGATIVE MG/DL
LEUKOCYTE ESTERASE UR QL STRIP: 25
LYMPHOCYTES # BLD AUTO: 2.21 THOUSANDS/ÂΜL (ref 0.6–4.47)
LYMPHOCYTES NFR BLD AUTO: 25 % (ref 14–44)
MAGNESIUM SERPL-MCNC: 2.3 MG/DL (ref 1.6–2.3)
MCH RBC QN AUTO: 29.6 PG (ref 26.8–34.3)
MCHC RBC AUTO-ENTMCNC: 32.5 G/DL (ref 31.4–37.4)
MCV RBC AUTO: 91 FL (ref 82–98)
MONOCYTES # BLD AUTO: 0.44 THOUSAND/ÂΜL (ref 0.17–1.22)
MONOCYTES NFR BLD AUTO: 5 % (ref 4–12)
MUCOUS THREADS UR QL AUTO: ABNORMAL
NEUTROPHILS # BLD AUTO: 6.22 THOUSANDS/ÂΜL (ref 1.85–7.62)
NEUTS SEG NFR BLD AUTO: 70 % (ref 43–75)
NITRITE UR QL STRIP: NEGATIVE
NON-SQ EPI CELLS URNS QL MICRO: ABNORMAL /HPF
NRBC BLD AUTO-RTO: 0 /100 WBCS
PH UR STRIP.AUTO: 5 [PH]
PLATELET # BLD AUTO: 574 THOUSANDS/UL (ref 149–390)
PMV BLD AUTO: 8.2 FL (ref 8.9–12.7)
POTASSIUM SERPL-SCNC: 4.7 MMOL/L (ref 3.5–5.3)
PROT SERPL-MCNC: 7.7 G/DL (ref 6.4–8.4)
PROT UR STRIP-MCNC: ABNORMAL MG/DL
RBC # BLD AUTO: 4.16 MILLION/UL (ref 3.81–5.12)
RBC #/AREA URNS AUTO: ABNORMAL /HPF
SALICYLATES SERPL-MCNC: <1 MG/DL (ref 10–30)
SODIUM SERPL-SCNC: 142 MMOL/L (ref 135–147)
SP GR UR STRIP.AUTO: 1.01 (ref 1–1.04)
UROBILINOGEN UA: NEGATIVE MG/DL
WBC # BLD AUTO: 8.95 THOUSAND/UL (ref 4.31–10.16)
WBC #/AREA URNS AUTO: ABNORMAL /HPF

## 2023-02-18 RX ORDER — TRAZODONE HYDROCHLORIDE 50 MG/1
50 TABLET ORAL
Status: DISCONTINUED | OUTPATIENT
Start: 2023-02-18 | End: 2023-02-21 | Stop reason: HOSPADM

## 2023-02-18 RX ORDER — ACETAMINOPHEN 325 MG/1
650 TABLET ORAL EVERY 6 HOURS PRN
Status: DISCONTINUED | OUTPATIENT
Start: 2023-02-18 | End: 2023-02-21 | Stop reason: HOSPADM

## 2023-02-18 RX ORDER — SODIUM CHLORIDE 9 MG/ML
100 INJECTION, SOLUTION INTRAVENOUS CONTINUOUS
Status: DISCONTINUED | OUTPATIENT
Start: 2023-02-18 | End: 2023-02-20

## 2023-02-18 RX ORDER — PANTOPRAZOLE SODIUM 40 MG/1
40 TABLET, DELAYED RELEASE ORAL
Status: DISCONTINUED | OUTPATIENT
Start: 2023-02-19 | End: 2023-02-21 | Stop reason: HOSPADM

## 2023-02-18 RX ORDER — BUPRENORPHINE HYDROCHLORIDE 8 MG/1
8 TABLET SUBLINGUAL 3 TIMES DAILY
Status: DISCONTINUED | OUTPATIENT
Start: 2023-02-18 | End: 2023-02-21 | Stop reason: HOSPADM

## 2023-02-18 RX ORDER — NICOTINE 21 MG/24HR
1 PATCH, TRANSDERMAL 24 HOURS TRANSDERMAL DAILY
Status: DISCONTINUED | OUTPATIENT
Start: 2023-02-18 | End: 2023-02-21 | Stop reason: HOSPADM

## 2023-02-18 RX ORDER — DIAZEPAM 5 MG/ML
10 INJECTION, SOLUTION INTRAMUSCULAR; INTRAVENOUS ONCE
Status: COMPLETED | OUTPATIENT
Start: 2023-02-18 | End: 2023-02-18

## 2023-02-18 RX ORDER — ENOXAPARIN SODIUM 100 MG/ML
40 INJECTION SUBCUTANEOUS DAILY
Status: DISCONTINUED | OUTPATIENT
Start: 2023-02-18 | End: 2023-02-20

## 2023-02-18 RX ORDER — PRAVASTATIN SODIUM 40 MG
40 TABLET ORAL EVERY OTHER DAY
Status: DISCONTINUED | OUTPATIENT
Start: 2023-02-18 | End: 2023-02-21 | Stop reason: HOSPADM

## 2023-02-18 RX ORDER — ONDANSETRON 2 MG/ML
4 INJECTION INTRAMUSCULAR; INTRAVENOUS ONCE
Status: COMPLETED | OUTPATIENT
Start: 2023-02-18 | End: 2023-02-18

## 2023-02-18 RX ORDER — BUPRENORPHINE HYDROCHLORIDE 8 MG/1
8 TABLET SUBLINGUAL 2 TIMES DAILY
Status: DISCONTINUED | OUTPATIENT
Start: 2023-02-18 | End: 2023-02-18

## 2023-02-18 RX ORDER — LORAZEPAM 0.5 MG/1
0.5 TABLET ORAL ONCE
Status: COMPLETED | OUTPATIENT
Start: 2023-02-18 | End: 2023-02-18

## 2023-02-18 RX ORDER — PHENOBARBITAL SODIUM 130 MG/ML
260 INJECTION INTRAMUSCULAR ONCE
Status: COMPLETED | OUTPATIENT
Start: 2023-02-18 | End: 2023-02-18

## 2023-02-18 RX ORDER — DULOXETIN HYDROCHLORIDE 30 MG/1
30 CAPSULE, DELAYED RELEASE ORAL DAILY
Status: DISCONTINUED | OUTPATIENT
Start: 2023-02-19 | End: 2023-02-21 | Stop reason: HOSPADM

## 2023-02-18 RX ORDER — LOSARTAN POTASSIUM 25 MG/1
25 TABLET ORAL DAILY
Status: DISCONTINUED | OUTPATIENT
Start: 2023-02-19 | End: 2023-02-21 | Stop reason: HOSPADM

## 2023-02-18 RX ORDER — PHENOBARBITAL SODIUM 65 MG/ML
65 INJECTION INTRAMUSCULAR ONCE
Status: COMPLETED | OUTPATIENT
Start: 2023-02-18 | End: 2023-02-18

## 2023-02-18 RX ORDER — ONDANSETRON 2 MG/ML
4 INJECTION INTRAMUSCULAR; INTRAVENOUS EVERY 6 HOURS PRN
Status: DISCONTINUED | OUTPATIENT
Start: 2023-02-18 | End: 2023-02-21 | Stop reason: HOSPADM

## 2023-02-18 RX ADMIN — BUPRENORPHINE HYDROCHLORIDE 8 MG: 8 TABLET SUBLINGUAL at 15:45

## 2023-02-18 RX ADMIN — SODIUM CHLORIDE 100 ML/HR: 0.9 INJECTION, SOLUTION INTRAVENOUS at 15:47

## 2023-02-18 RX ADMIN — ONDANSETRON 4 MG: 2 INJECTION INTRAMUSCULAR; INTRAVENOUS at 13:02

## 2023-02-18 RX ADMIN — FOLIC ACID 1 MG: 5 INJECTION, SOLUTION INTRAMUSCULAR; INTRAVENOUS; SUBCUTANEOUS at 13:04

## 2023-02-18 RX ADMIN — DIAZEPAM 10 MG: 5 INJECTION, SOLUTION INTRAMUSCULAR; INTRAVENOUS at 15:52

## 2023-02-18 RX ADMIN — THIAMINE HYDROCHLORIDE 100 MG: 100 INJECTION, SOLUTION INTRAMUSCULAR; INTRAVENOUS at 13:58

## 2023-02-18 RX ADMIN — PHENOBARBITAL SODIUM 65 MG: 65 INJECTION INTRAMUSCULAR; INTRAVENOUS at 21:07

## 2023-02-18 RX ADMIN — LORAZEPAM 0.5 MG: 0.5 TABLET ORAL at 13:02

## 2023-02-18 RX ADMIN — PHENOBARBITAL SODIUM 260 MG: 130 INJECTION INTRAMUSCULAR at 16:02

## 2023-02-18 RX ADMIN — ENOXAPARIN SODIUM 40 MG: 40 INJECTION SUBCUTANEOUS at 15:56

## 2023-02-18 RX ADMIN — BUPRENORPHINE HYDROCHLORIDE 8 MG: 8 TABLET SUBLINGUAL at 20:55

## 2023-02-18 NOTE — ASSESSMENT & PLAN NOTE
· Endorses a history of MDD, bipolar disorder, PTSD  · Currently managed with duloxetine 30 mg daily  · Currently denies SI/thoughts of self-harm   · Continue home cymbalta   · She is currently not established with a psychiatrist but is interested in engaging upon discharge    · Encourage outpatient psychiatry follow-up

## 2023-02-18 NOTE — H&P
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 reports  Counseling / Coordination of Care  Total floor / unit time spent today 40 minutes  Greater than 50% of total time was spent with the patient and / or family counseling and / or coordination of care  Minutes of critical care time 28  -Critical care time was exclusive of separately billable procedures and teaching time    -Critical care was necessary to treat or prevent imminent or life-threatening deterioration of the following condition: CNS failure/compromise, toxidrome (ethanol withdrawal),  withdrawal  -Critical care time was spent personally by me on the following activities as well as the above as per the course and rest of chart: obtaining history from patient/surrogate, development of a treatment plan, discussions with referring provider(s), evaluation of patient's response to the treatment, examination of the patient, performing treatments and interventions, re-evaluation of the patient's condition, review of old charts, ordering/interpreting laboratory studies, ordering/interpreting of radiographic studies        ** Please Note: This note has been constructed using a voice recognition system   **

## 2023-02-18 NOTE — PLAN OF CARE
Problem: MOBILITY - ADULT  Goal: Maintain or return to baseline ADL function  Description: INTERVENTIONS:  -  Assess patient's ability to carry out ADLs; assess patient's baseline for ADL function and identify physical deficits which impact ability to perform ADLs (bathing, care of mouth/teeth, toileting, grooming, dressing, etc )  - Assess/evaluate cause of self-care deficits   - Assess range of motion  - Assess patient's mobility; develop plan if impaired  - Assess patient's need for assistive devices and provide as appropriate  - Encourage maximum independence but intervene and supervise when necessary  - Involve family in performance of ADLs  - Assess for home care needs following discharge   - Consider OT consult to assist with ADL evaluation and planning for discharge  - Provide patient education as appropriate  Outcome: Progressing  Goal: Maintains/Returns to pre admission functional level  Description: INTERVENTIONS:  - Perform BMAT or MOVE assessment daily    - Set and communicate daily mobility goal to care team and patient/family/caregiver       - Out of bed for toileting  - Record patient progress and toleration of activity level   Outcome: Progressing

## 2023-02-18 NOTE — ASSESSMENT & PLAN NOTE
H/o chronic alcohol use; denies h/o withdrawal seizures   Follow [facility] protocol with symptom-triggered phenobarbital for medical management of alcohol withdrawal  · Received 650 mg phenobarbital total thus far; last dose administered 2/20/2023 0227  · Currently appears stable- VSS, no tremors   · Continue to monitor vitals and symptoms and administer additional phenobarbital as indicated  · Suspect can likely come off SEWS this afternoon   Continuous pulse ox and telemetry monitoring
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
## 2023-02-18 NOTE — ASSESSMENT & PLAN NOTE
· Managed with losartan and Crestor, will continue with formulary alternative  · Encourage outpatient PCP follow-up

## 2023-02-18 NOTE — ASSESSMENT & PLAN NOTE
· H/o chronic back pain  · Currently follows with pain management   · On Buprenorpine 8 mg TID- confirmed on PDMP  · Denies acute injuries; no red flag symptoms  · Continue home Buprenorphine   · Recommend outpatient f/u pain management

## 2023-02-18 NOTE — ASSESSMENT & PLAN NOTE
Patient with longstanding history of alcohol use disorder  Has not been on naltrexone in the past due to use of TID Subutex for chronic pain  Withdrawal managed as above   Continue daily thiamine/folic acid supplementation, and MVI  Consult case management for assistance with rehab resources - pt interested in inpatient rehabilitation at this time

## 2023-02-18 NOTE — ASSESSMENT & PLAN NOTE
Recent Labs     02/18/23  1251 02/19/23  0543 02/20/23  0458   AST 43* 31 41*   ALT 29 21 22   ALKPHOS 128* 95 82      · Mildly elevated LFTs upon admission; improved and stable   · Denies any abdominal pain, nausea, vomiting    · Likely secondary to alcohol use  · No further indication for ongoing inpatient monitoring of LFTs- recommend outpatient monitoring  · Encourage alcohol cessation

## 2023-02-18 NOTE — ASSESSMENT & PLAN NOTE
· Opioid dependence on 8 mg TID Subutex for management of chronic pain  · Denies any history of fentanyl or heroin use  · She is seeing a pain specialist regularly for management    · PDMP reviewed: last script filled 2/9/2023 Buprenorphine 8 mg tabs (60 tabs, 20 days)  · Continue home Subutex  · encourage continued outpatient follow-up

## 2023-02-19 LAB
ALBUMIN SERPL BCP-MCNC: 3 G/DL (ref 3.5–5)
ALP SERPL-CCNC: 95 U/L (ref 43–122)
ALT SERPL W P-5'-P-CCNC: 21 U/L
ANION GAP SERPL CALCULATED.3IONS-SCNC: 2 MMOL/L (ref 5–14)
AST SERPL W P-5'-P-CCNC: 31 U/L (ref 14–36)
ATRIAL RATE: 66 BPM
BILIRUB SERPL-MCNC: 0.38 MG/DL (ref 0.2–1)
BUN SERPL-MCNC: 18 MG/DL (ref 5–25)
CALCIUM ALBUM COR SERPL-MCNC: 9.1 MG/DL (ref 8.3–10.1)
CALCIUM SERPL-MCNC: 8.3 MG/DL (ref 8.4–10.2)
CHLORIDE SERPL-SCNC: 104 MMOL/L (ref 96–108)
CO2 SERPL-SCNC: 32 MMOL/L (ref 21–32)
CREAT SERPL-MCNC: 0.71 MG/DL (ref 0.6–1.2)
ERYTHROCYTE [DISTWIDTH] IN BLOOD BY AUTOMATED COUNT: 14.1 % (ref 11.6–15.1)
ERYTHROCYTE [DISTWIDTH] IN BLOOD BY AUTOMATED COUNT: 14.2 % (ref 11.6–15.1)
GFR SERPL CREATININE-BSD FRML MDRD: 89 ML/MIN/1.73SQ M
GLUCOSE SERPL-MCNC: 84 MG/DL (ref 70–99)
HCT VFR BLD AUTO: 29.6 % (ref 34.8–46.1)
HCT VFR BLD AUTO: 30.4 % (ref 34.8–46.1)
HGB BLD-MCNC: 9.2 G/DL (ref 11.5–15.4)
HGB BLD-MCNC: 9.8 G/DL (ref 11.5–15.4)
MAGNESIUM SERPL-MCNC: 2.1 MG/DL (ref 1.6–2.3)
MCH RBC QN AUTO: 29.8 PG (ref 26.8–34.3)
MCH RBC QN AUTO: 30 PG (ref 26.8–34.3)
MCHC RBC AUTO-ENTMCNC: 31.1 G/DL (ref 31.4–37.4)
MCHC RBC AUTO-ENTMCNC: 32.2 G/DL (ref 31.4–37.4)
MCV RBC AUTO: 93 FL (ref 82–98)
MCV RBC AUTO: 96 FL (ref 82–98)
P AXIS: 37 DEGREES
PLATELET # BLD AUTO: 375 THOUSANDS/UL (ref 149–390)
PLATELET # BLD AUTO: 447 THOUSANDS/UL (ref 149–390)
PMV BLD AUTO: 8.9 FL (ref 8.9–12.7)
PMV BLD AUTO: 9 FL (ref 8.9–12.7)
POTASSIUM SERPL-SCNC: 4.2 MMOL/L (ref 3.5–5.3)
PR INTERVAL: 152 MS
PROT SERPL-MCNC: 5.8 G/DL (ref 6.4–8.4)
QRS AXIS: 42 DEGREES
QRSD INTERVAL: 88 MS
QT INTERVAL: 392 MS
QTC INTERVAL: 410 MS
RBC # BLD AUTO: 3.09 MILLION/UL (ref 3.81–5.12)
RBC # BLD AUTO: 3.27 MILLION/UL (ref 3.81–5.12)
SODIUM SERPL-SCNC: 138 MMOL/L (ref 135–147)
T WAVE AXIS: 51 DEGREES
VENTRICULAR RATE: 66 BPM
WBC # BLD AUTO: 5.82 THOUSAND/UL (ref 4.31–10.16)
WBC # BLD AUTO: 7.21 THOUSAND/UL (ref 4.31–10.16)

## 2023-02-19 RX ORDER — PHENOBARBITAL SODIUM 65 MG/ML
65 INJECTION INTRAMUSCULAR ONCE
Status: COMPLETED | OUTPATIENT
Start: 2023-02-19 | End: 2023-02-19

## 2023-02-19 RX ORDER — HYDROXYZINE HYDROCHLORIDE 25 MG/1
25 TABLET, FILM COATED ORAL EVERY 6 HOURS PRN
Status: DISCONTINUED | OUTPATIENT
Start: 2023-02-19 | End: 2023-02-21 | Stop reason: HOSPADM

## 2023-02-19 RX ADMIN — PHENOBARBITAL SODIUM 65 MG: 65 INJECTION INTRAMUSCULAR; INTRAVENOUS at 15:26

## 2023-02-19 RX ADMIN — THIAMINE HYDROCHLORIDE: 100 INJECTION, SOLUTION INTRAMUSCULAR; INTRAVENOUS at 08:07

## 2023-02-19 RX ADMIN — PHENOBARBITAL SODIUM 65 MG: 65 INJECTION INTRAMUSCULAR; INTRAVENOUS at 21:28

## 2023-02-19 RX ADMIN — SODIUM CHLORIDE 100 ML/HR: 0.9 INJECTION, SOLUTION INTRAVENOUS at 21:30

## 2023-02-19 RX ADMIN — BUPRENORPHINE HYDROCHLORIDE 8 MG: 8 TABLET SUBLINGUAL at 15:26

## 2023-02-19 RX ADMIN — SODIUM CHLORIDE 100 ML/HR: 0.9 INJECTION, SOLUTION INTRAVENOUS at 01:10

## 2023-02-19 RX ADMIN — ONDANSETRON 4 MG: 2 INJECTION INTRAMUSCULAR; INTRAVENOUS at 11:34

## 2023-02-19 RX ADMIN — BUPRENORPHINE HYDROCHLORIDE 8 MG: 8 TABLET SUBLINGUAL at 08:04

## 2023-02-19 RX ADMIN — HYDROXYZINE HYDROCHLORIDE 25 MG: 25 TABLET ORAL at 07:23

## 2023-02-19 RX ADMIN — PHENOBARBITAL SODIUM 65 MG: 65 INJECTION INTRAMUSCULAR; INTRAVENOUS at 11:34

## 2023-02-19 RX ADMIN — LOSARTAN POTASSIUM 25 MG: 25 TABLET, FILM COATED ORAL at 08:04

## 2023-02-19 RX ADMIN — BUPRENORPHINE HYDROCHLORIDE 8 MG: 8 TABLET SUBLINGUAL at 21:11

## 2023-02-19 RX ADMIN — SODIUM CHLORIDE 100 ML/HR: 0.9 INJECTION, SOLUTION INTRAVENOUS at 11:34

## 2023-02-19 RX ADMIN — DULOXETINE HYDROCHLORIDE 30 MG: 30 CAPSULE, DELAYED RELEASE ORAL at 08:04

## 2023-02-19 RX ADMIN — ENOXAPARIN SODIUM 40 MG: 40 INJECTION SUBCUTANEOUS at 08:04

## 2023-02-19 RX ADMIN — PANTOPRAZOLE SODIUM 40 MG: 40 TABLET, DELAYED RELEASE ORAL at 07:23

## 2023-02-19 NOTE — PLAN OF CARE
Problem: MOBILITY - ADULT  Goal: Maintain or return to baseline ADL function  Description: INTERVENTIONS:  -  Assess patient's ability to carry out ADLs; assess patient's baseline for ADL function and identify physical deficits which impact ability to perform ADLs (bathing, care of mouth/teeth, toileting, grooming, dressing, etc )  - Assess/evaluate cause of self-care deficits   - Assess range of motion  - Assess patient's mobility; develop plan if impaired  - Assess patient's need for assistive devices and provide as appropriate  - Encourage maximum independence but intervene and supervise when necessary  - Involve family in performance of ADLs  - Assess for home care needs following discharge   - Consider OT consult to assist with ADL evaluation and planning for discharge  - Provide patient education as appropriate  Outcome: Progressing  Goal: Maintains/Returns to pre admission functional level  Description: INTERVENTIONS:  - Perform BMAT or MOVE assessment daily    - Set and communicate daily mobility goal to care team and patient/family/caregiver  - Collaborate with rehabilitation services on mobility goals if consulted  - Perform Range of Motion 3 times a day  - Reposition patient every 3 hours    - Stand patient 3 times a day  - Ambulate patient 3 times a day  - Out of bed to chair 3 times a day   - Out of bed for meals 3 times a day  - Out of bed for toileting  - Record patient progress and toleration of activity level   Outcome: Progressing

## 2023-02-19 NOTE — PROGRESS NOTES
PROGRESS NOTE  DEPARTMENT OF MEDICAL TOXICOLOGY  LEVEL 4 MEDICAL DETOX UNIT  Clark Julian 74 y o  female MRN: [id number]  Unit/Bed#: 5T DETOX 778-15 Encounter: [id number]      Reason for Admission/Principal Problem: alcohol withdrawal / alcohol use disorder   Rounding Provider: GARY Anthony  Attending Provider: Bean Dupree DO   2/18/2023 10:44 AM     * Alcohol withdrawal syndrome with complication [facility])  Assessment & Plan  Presented to emergency department requesting assistance with alcohol detoxification  Tolerating SEWS protocol, received 455mg phenobarbital thus far  Has nt exhibited any adverse reactions to phenobarbital    We will continue to monitor clinically  Continuous pulse ox and telemetry monitoring    Alcohol use disorder, severe, dependence ([facility] [address] )  Assessment & Plan  Patient with longstanding history of alcohol use disorder without withdrawal related seizures  Has not been on naltrexone in the past due to use of TID Subutex for chronic pain  Continue IVFs, daily thiamine/folic acid supplementation, and MVI  Consult case management for assistance with rehab resources - pt interested in inpatient rehabilitation at this time      Opioid dependence on maintenance agonist therapy, no symptoms (HCC)  Assessment & Plan  · Opioid dependence on 8 mg TID Subutex for management of chronic pain  · Denies any history of fentanyl or heroin use  · PDMP reviewed and as expected  She is seeing a pain specialist regularly for management  · We will continue 3 times daily Subutex and encourage continued outpatient follow-up    Chronic bilateral low back pain without sciatica  Assessment & Plan  · See opioid dependence on maintenance therapy  Abnormal transaminases  Assessment & Plan  · Mildly elevated LFTs upon admission  · Denies any abdominal pain, nausea, vomiting    · Likely secondary to alcohol use  · We will continue to monitor CMP    Bipolar disorder [facility])  Assessment & Plan  · Endorses a history of MDD, bipolar disorder, PTSD  Currently managed with duloxetine 30 mg daily  · She is currently not established with a psychiatrist but is interested in engaging upon discharge  · Encourage outpatient psychiatry follow-up    Dyslipidemia  Assessment & Plan  · Managed with losartan and Crestor, will continue with formulary alternative  · Encourage outpatient PCP follow-up    Gastro-esophageal reflux disease with esophagitis  Assessment & Plan  · Continue PPI    Tobacco use  Assessment & Plan  · Daily tobacco dependence  · Offered NRT  · Encourage cessation            VTE Pharmacologic Prophylaxis:   Pharmacologic: Enoxaparin (Lovenox)  Mechanical VTE Prophylaxis in Place: yes    Code Status: Level 1 - Full Code    Patient Centered Rounds: I have performed bedside rounds with nursing staff today  Discussions with Specialists or Other Care Team Provider: case management      Education and Discussions with Family / Patient: patient     Time Spent for Care: 20 minutes  More than 50% of total time spent on counseling and coordination of care as described above  Current Length of Stay: 1 day(s)    Current Patient Status: Inpatient     Certification Statement: The patient will continue to require additional inpatient hospital stay due to pending adequate management of withdrawal  Discharge Plan: pending case management disposition         Subjective:   Patient resting comfortably in bed, denies any acute complaints with exception to baseline anxiety  Tolerating PO, ambulating independently       Objective:     Clinical Opiate Withdrawal Scale  Pulse: 62    SEWS Total Score: 6 (2/19/2023  3:14 PM)        Last 24 Hours Medication List:   Current Facility-Administered Medications   Medication Dose Route Frequency Provider Last Rate   • acetaminophen  650 mg Oral Q6H PRN GARY Browne     • buprenorphine  8 mg Sublingual TID GARY Browne     • DULoxetine  30 mg Oral Daily GARY Miller     • enoxaparin  40 mg Subcutaneous Daily Lala GARY Blake     • folic acid 1 mg, thiamine 100 mg in 0 9% sodium chloride 100 mL IVPB   Intravenous Daily Lala Blake CRNP     • hydrOXYzine HCL  25 mg Oral Q6H PRN Oh Everett PA-C     • losartan  25 mg Oral Daily Lala Blake CRNP     • nicotine  1 patch Transdermal Daily Lala Blake CRNP     • ondansetron  4 mg Intravenous Q6H PRN GARY Miller     • pantoprazole  40 mg Oral Early Morning Lala Blake CRNP     • pravastatin  40 mg Oral Every Other Day GARY Miller     • sodium chloride  100 mL/hr Intravenous Continuous Lala Blake, CRNP 100 mL/hr (23 1134)   • traZODone  50 mg Oral HS PRN Lala Blake CRNP           Vitals:   Temp (24hrs), Av 9 °F (36 6 °C), Min:97 7 °F (36 5 °C), Max:98 3 °F (36 8 °C)    Temp:  [97 7 °F (36 5 °C)-98 3 °F (36 8 °C)] 98 3 °F (36 8 °C)  HR:  [62-82] 62  Resp:  [16] 16  BP: (122-134)/(57-67) 127/57  SpO2:  [94 %-98 %] 96 %  Body mass index is 23 21 kg/m²  Input and Output Summary (last 24 hours):No intake or output data in the 24 hours ending 23 1706    Physical Exam:   Physical Exam  Vitals and nursing note reviewed  HENT:      Head: Normocephalic  Mouth/Throat:      Mouth: Mucous membranes are moist       Pharynx: Oropharynx is clear  Eyes:      Conjunctiva/sclera: Conjunctivae normal       Pupils: Pupils are equal, round, and reactive to light  Cardiovascular:      Rate and Rhythm: Normal rate  Pulses: Normal pulses  Heart sounds: Normal heart sounds  Pulmonary:      Effort: Pulmonary effort is normal  No respiratory distress  Abdominal:      Palpations: Abdomen is soft  Tenderness: There is no abdominal tenderness  Musculoskeletal:         General: Normal range of motion  Skin:     General: Skin is warm  Neurological:      Mental Status: She is alert and oriented to person, place, and time  Psychiatric:         Mood and Affect: Mood is anxious  Behavior: Behavior normal  Behavior is cooperative  Additional Data:     Labs:   Results from last 7 days   Lab Units 02/19/23  0543 02/18/23  1251   WBC Thousand/uL 7 21 8 95   HEMOGLOBIN g/dL 9 8* 12 3   HEMATOCRIT % 30 4* 37 8   PLATELETS Thousands/uL 447* 574*   NEUTROS PCT %  --  70   LYMPHS PCT %  --  25   MONOS PCT %  --  5   EOS PCT %  --  0      Results from last 7 days   Lab Units 02/19/23  0543   SODIUM mmol/L 138   POTASSIUM mmol/L 4 2   CHLORIDE mmol/L 104   CO2 mmol/L 32   BUN mg/dL 18   CREATININE mg/dL 0 71   ANION GAP mmol/L 2*   CALCIUM mg/dL 8 3*   ALBUMIN g/dL 3 0*   TOTAL BILIRUBIN mg/dL 0 38   ALK PHOS U/L 95   ALT U/L 21   AST U/L 31   GLUCOSE RANDOM mg/dL 84                              * I Have Reviewed All Lab Data Listed Above  * Additional Pertinent Lab Tests Reviewed: James 66 Admission Reviewed      Imaging Studies: I have personally reviewed pertinent reports  Recent Cultures (last 7 days): Today, Patient Was Seen By: GARY Varela    ** Please Note: Dictation voice to text software may have been used in the creation of this document   **
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
## 2023-02-19 NOTE — DISCHARGE INSTR - OTHER ORDERS
[facility] Drug and alcohol resources:            [address] health resources:    [address] [facility]  24-hour crisis hotline [phone]  The [address] (CIU/ES) program provides emergency mental health services to [facility] residents who are experiencing a mental health crisis, their families and caregivers  The primary goals of the CIU/ES program are to prevent suicide, homicide and other violent and self-destructive behaviors or dangerous situations from occurring as the result of an individual’s untreated mental illness or as a result of a family experiencing a mental health crisis      Anthony 57 [employer] of [employer] 901 [facility] Reji Gud[address] [phone] / 6 [facility] [address] [place] [address] [phone]      Integrated Case Management Services [facility] [address] [phone] [address] [phone]      [address] [facility] [phone]      [address] [facility] [phone]      Residential Services [address] [phone]      [facility] 129 [facility] Yale Emely Davidson, [address] [facility] [phone]         [facility] [address] [facility] 127 [facility], 8102 [facility] [phone] or [phone]      Homeless Services ([address]) [address] 0355 [facility] [address] [phone]      Intensive Family Support Services [facility] Alfred [address] 5 [phone]      Intensive Outpatient Treatment and Support Services IOP Program at [facility] [address] [phone]      [address] [facility] [address]00  1826 [facility], [facility] [address] [phone]      PRIMARY SCREENING CENTER for [facility] Morris Simmons 6 HOTLINE:  [phone]      Program of [address] [facility] (PACT) [address], [address] [phone] (PACT IV)      Self-Help Center [facility] [address] [facility] [phone] [address] [address] [facility] Alfred [address] 5 [phone] 500 [facility] 759 [facility] Arlington[facility]ji 108 [phone]      [address], [address], [address] [facility] [phone]      3[facility] [address] [place] [address], [address] [place] [address] [phone]

## 2023-02-20 LAB
ALBUMIN SERPL BCP-MCNC: 2.8 G/DL (ref 3.5–5)
ALP SERPL-CCNC: 82 U/L (ref 43–122)
ALT SERPL W P-5'-P-CCNC: 22 U/L
ANION GAP SERPL CALCULATED.3IONS-SCNC: 0 MMOL/L (ref 5–14)
AST SERPL W P-5'-P-CCNC: 41 U/L (ref 14–36)
BILIRUB SERPL-MCNC: 0.21 MG/DL (ref 0.2–1)
BUN SERPL-MCNC: 17 MG/DL (ref 5–25)
CALCIUM ALBUM COR SERPL-MCNC: 8.8 MG/DL (ref 8.3–10.1)
CALCIUM SERPL-MCNC: 7.8 MG/DL (ref 8.4–10.2)
CHLORIDE SERPL-SCNC: 106 MMOL/L (ref 96–108)
CO2 SERPL-SCNC: 29 MMOL/L (ref 21–32)
CREAT SERPL-MCNC: 0.66 MG/DL (ref 0.6–1.2)
ERYTHROCYTE [DISTWIDTH] IN BLOOD BY AUTOMATED COUNT: 14.2 % (ref 11.6–15.1)
FERRITIN SERPL-MCNC: 82 NG/ML (ref 8–388)
GFR SERPL CREATININE-BSD FRML MDRD: 92 ML/MIN/1.73SQ M
GLUCOSE SERPL-MCNC: 82 MG/DL (ref 70–99)
HCT VFR BLD AUTO: 28.1 % (ref 34.8–46.1)
HGB BLD-MCNC: 9.1 G/DL (ref 11.5–15.4)
IRON SATN MFR SERPL: 16 % (ref 15–50)
IRON SERPL-MCNC: 51 UG/DL (ref 50–170)
MAGNESIUM SERPL-MCNC: 2 MG/DL (ref 1.6–2.3)
MCH RBC QN AUTO: 29.9 PG (ref 26.8–34.3)
MCHC RBC AUTO-ENTMCNC: 32.4 G/DL (ref 31.4–37.4)
MCV RBC AUTO: 92 FL (ref 82–98)
PLATELET # BLD AUTO: 356 THOUSANDS/UL (ref 149–390)
PMV BLD AUTO: 9.3 FL (ref 8.9–12.7)
POTASSIUM SERPL-SCNC: 4.1 MMOL/L (ref 3.5–5.3)
PROT SERPL-MCNC: 5.2 G/DL (ref 6.4–8.4)
RBC # BLD AUTO: 3.04 MILLION/UL (ref 3.81–5.12)
SODIUM SERPL-SCNC: 135 MMOL/L (ref 135–147)
TIBC SERPL-MCNC: 310 UG/DL (ref 250–450)
WBC # BLD AUTO: 4.89 THOUSAND/UL (ref 4.31–10.16)

## 2023-02-20 RX ORDER — PHENOBARBITAL SODIUM 130 MG/ML
130 INJECTION INTRAMUSCULAR ONCE
Status: COMPLETED | OUTPATIENT
Start: 2023-02-20 | End: 2023-02-20

## 2023-02-20 RX ORDER — GABAPENTIN 300 MG/1
300 CAPSULE ORAL 3 TIMES DAILY
Status: DISCONTINUED | OUTPATIENT
Start: 2023-02-20 | End: 2023-02-21 | Stop reason: HOSPADM

## 2023-02-20 RX ADMIN — GABAPENTIN 300 MG: 300 CAPSULE ORAL at 20:12

## 2023-02-20 RX ADMIN — TRAZODONE HYDROCHLORIDE 50 MG: 50 TABLET ORAL at 01:31

## 2023-02-20 RX ADMIN — ENOXAPARIN SODIUM 40 MG: 40 INJECTION SUBCUTANEOUS at 08:45

## 2023-02-20 RX ADMIN — SODIUM CHLORIDE 100 ML/HR: 0.9 INJECTION, SOLUTION INTRAVENOUS at 07:34

## 2023-02-20 RX ADMIN — GABAPENTIN 300 MG: 300 CAPSULE ORAL at 15:54

## 2023-02-20 RX ADMIN — BUPRENORPHINE HYDROCHLORIDE 8 MG: 8 TABLET SUBLINGUAL at 20:12

## 2023-02-20 RX ADMIN — BUPRENORPHINE HYDROCHLORIDE 8 MG: 8 TABLET SUBLINGUAL at 15:31

## 2023-02-20 RX ADMIN — ONDANSETRON 4 MG: 2 INJECTION INTRAMUSCULAR; INTRAVENOUS at 11:58

## 2023-02-20 RX ADMIN — LOSARTAN POTASSIUM 25 MG: 25 TABLET, FILM COATED ORAL at 08:45

## 2023-02-20 RX ADMIN — THIAMINE HYDROCHLORIDE: 100 INJECTION, SOLUTION INTRAMUSCULAR; INTRAVENOUS at 08:44

## 2023-02-20 RX ADMIN — PRAVASTATIN SODIUM 40 MG: 40 TABLET ORAL at 08:45

## 2023-02-20 RX ADMIN — BUPRENORPHINE HYDROCHLORIDE 8 MG: 8 TABLET SUBLINGUAL at 08:44

## 2023-02-20 RX ADMIN — PHENOBARBITAL SODIUM 130 MG: 130 INJECTION INTRAMUSCULAR at 02:27

## 2023-02-20 RX ADMIN — DULOXETINE HYDROCHLORIDE 30 MG: 30 CAPSULE, DELAYED RELEASE ORAL at 08:45

## 2023-02-20 RX ADMIN — PANTOPRAZOLE SODIUM 40 MG: 40 TABLET, DELAYED RELEASE ORAL at 05:00

## 2023-02-20 NOTE — ASSESSMENT & PLAN NOTE
· H/o normocytic anemia  · Baseline hgb appears to be 10-11  · Had EGD and colonoscopy done in 2021- both benign   · No evidence of acute bleeding - currently denies hematochezia/melena  · In setting of chronic alcohol use  · Continue thiamine and folic acid supplementation  · Check Iron panel  · Monitor CBC  · Encourage alcohol cessation

## 2023-02-20 NOTE — CASE MANAGEMENT
Worker spoke with pt regarding discharge planning, pt expressed interest in inpatient rehab  Worker discussed with pt the struggles of getting pt in to an inpatient rehab program in [place]  Worker explained that pt is better off attending an outpatient program that they can assist her in getting to an inpatient rehab program   Pt was in agreement with referral to Center for [facility], gave verbal LUCAS  Pt was drowsy during discussion, worker will speak with pt again regarding plan once she is less tired  Worker left message for [employer] for [facility] regarding appointment

## 2023-02-20 NOTE — DISCHARGE SUMMARY
MEDICAL DETOX UNIT, LEVEL 4  Department of Medical Toxicology  Reason for Admission/Principal Problem: Alcohol withdrawal, alcohol use disorder  Admitting provider: CHESTER King MD  2/18/2023 10:44 AM       Discharging Physician / Practitioner: Mathew Spain PA-C  PCP: Meghna Becerra MD  Admission Date:   Admission Orders (From admission, onward)     Ordered        02/18/23 1358  1 [facility] [place],[address]  Once                      Discharge Date: 02/21/23    Medical Problems     Resolved Problems  Date Reviewed: 10/7/2022          Resolved    * (Principal) Alcohol withdrawal syndrome with complication ([facility] [address] ) 7/19/9138     Resolved by  Derick Panchal PA-C          * Alcohol withdrawal syndrome with complication (HCC)-resolved as of 2/21/2023  Assessment & Plan  H/o chronic alcohol use; denies h/o withdrawal seizures   SEWS protocol with symptom-triggered phenobarbital followed for medical management of alcohol withdrawal  · Received 650 mg phenobarbital total thus far; last dose administered 2/20/2023 0227  · Currently appears stable- VSS, no tremors   Acute withdrawal resolved- medically clear for discharge      Alcohol use disorder, severe, dependence ([facility] [address] )  Assessment & Plan  Patient with longstanding history of alcohol use disorder  Has not been on naltrexone in the past due to use of TID Subutex for chronic pain  · Declines acamprosate at this time  Withdrawal managed as above   Continue daily thiamine/folic acid supplementation, and MVI  Consult case management for assistance with rehab resources - pt discharged with outpatient resources     Opioid dependence on maintenance agonist therapy, no symptoms (HCC)  Assessment & Plan  · Opioid dependence on 8 mg TID Subutex for management of chronic pain  · Denies any history of fentanyl or heroin use  · She is seeing a pain specialist regularly for management    · PDMP reviewed: last script filled 2/9/2023 Buprenorphine 8 mg tabs (60 tabs, 20 days)  · Continue home Subutex  · encourage continued outpatient follow-up    Anemia  Assessment & Plan  · H/o normocytic anemia  · Baseline hgb appears to be 10-11  · Had EGD and colonoscopy done in 2021- both benign   · No evidence of acute bleeding - currently denies hematochezia/melena  · Iron panel WNL  · In setting of chronic alcohol use  · Continue thiamine and folic acid supplementation  · Routine outpatient monitoring   · Encourage alcohol cessation     Abnormal transaminases  Assessment & Plan  Recent Labs     02/18/23  1251 02/19/23  0543 02/20/23  0458   AST 43* 31 41*   ALT 29 21 22   ALKPHOS 128* 95 82      · Mildly elevated LFTs upon admission; improved and stable   · Denies any abdominal pain, nausea, vomiting  · Likely secondary to alcohol use  · No further indication for ongoing inpatient monitoring of LFTs- recommend outpatient monitoring  · Encourage alcohol cessation     Bipolar disorder ([facility] [address] )  Assessment & Plan  · Endorses a history of MDD, bipolar disorder, PTSD  · Currently managed with duloxetine   · Currently denies SI/thoughts of self-harm   · Continue home cymbalta   · She is currently not established with a psychiatrist but is interested in engaging upon discharge    · Encourage outpatient psychiatry follow-up    Dyslipidemia  Assessment & Plan  · Managed with losartan and Crestor, will continue with formulary alternative  · Encourage outpatient PCP follow-up    Chronic bilateral low back pain without sciatica  Assessment & Plan  · H/o chronic back pain  · Currently follows with pain management   · On Buprenorpine 8 mg TID- confirmed on PDMP  · Denies acute injuries; no red flag symptoms  · Continue home Buprenorphine   · Recommend outpatient f/u pain management     Gastro-esophageal reflux disease with esophagitis  Assessment & Plan  · Continue PPI  · Encourage alcohol cessation     Tobacco use  Assessment & Plan  · Daily tobacco dependence  · Offered NRT  · Encourage cessation      Consultations During Hospital Stay:  · Case management    Procedures Performed:   · None    Significant Findings / Test Results:   · Elevated transaminases- improved  · Anemia- stable  · XR elbow- no acute osseous abnormality  · CT head-no acute intracranial abnormality    Incidental Findings:   · None    Test Results Pending at Discharge (will require follow up): · None     Outpatient Tests/follow-ups requested:  · PCP follow-up  · Psychiatry follow-up    Complications: None    Reason for Admission: Alcohol withdrawal, alcohol use disorder    Hospital Course:     Haven Pratt is a 74 y o  female patient PMH AUD, opioid dependence on maintenance suboxone for pain management, GERD who originally presented to the hospital on 2/18/2023 due to alcohol withdrawal  Patient initially presented to the [address] [place] [address] ED 2/18/2023 requesting detoxification from alcohol  Patient was admitted to the [facility] medical detox unit under [facility] protocol for medically assisted alcohol withdrawal and received a total of 650 mg phenobarbital without complication  Patient's alcohol withdrawal symptoms subsequently resolved, and she has remained without objective evidence of alcohol withdrawal at this time  During this hospitalization, patient was found to have anemia and mildly elevated LFTs which have both remained stable  Case management was consulted for assistance with aftercare resources, and patient will be discharged with outpatient resources  Please see above list of diagnoses and related plan for additional information  Condition at Discharge: good     Discharge Day Visit / Exam:     Subjective:  Patient seen and examined bedside this morning  Reports she is feeling much better  Currently denies acute complaints    Currently denies headaches, lightheadedness/dizziness, coughing/sneezing/congestion/rhinorrhea, chest pain, SOB/dyspnea, abdominal pain, N/V/C, dysuria/hematuria, hallucinations  Declines acamprosate at this time  No longer wishes to pursue inpatient rehab at this time  Vitals: Blood Pressure: 115/59 (02/21/23 1000)  Pulse: 62 (02/21/23 1000)  Temperature: 98 3 °F (36 8 °C) (02/21/23 1000)  Temp Source: Temporal (02/21/23 1000)  Respirations: 18 (02/21/23 1000)  Height: 5' 3" (160 cm) (02/18/23 1517)  Weight - Scale: 59 4 kg (131 lb) (02/18/23 1517)  SpO2: 93 % (02/21/23 1000)  Exam:   Physical Exam  Vitals and nursing note reviewed  Constitutional:       General: She is not in acute distress  Appearance: Normal appearance  She is well-developed and normal weight  HENT:      Head: Normocephalic and atraumatic  Eyes:      General: No scleral icterus  Extraocular Movements: Extraocular movements intact  Conjunctiva/sclera: Conjunctivae normal       Pupils: Pupils are equal, round, and reactive to light  Cardiovascular:      Rate and Rhythm: Normal rate and regular rhythm  Pulses: Normal pulses  Heart sounds: Normal heart sounds  No murmur heard  No friction rub  No gallop  Pulmonary:      Effort: Pulmonary effort is normal  No respiratory distress  Breath sounds: Normal breath sounds  No wheezing, rhonchi or rales  Abdominal:      General: Abdomen is flat  Bowel sounds are normal  There is no distension  Palpations: Abdomen is soft  Tenderness: There is no abdominal tenderness  There is no guarding  Musculoskeletal:         General: No swelling  Normal range of motion  Cervical back: Normal range of motion  Right lower leg: No edema  Left lower leg: No edema  Skin:     General: Skin is warm and dry  Neurological:      General: No focal deficit present  Mental Status: She is alert and oriented to person, place, and time  Mental status is at baseline  Motor: No tremor        Gait: Gait normal    Psychiatric:         Attention and Perception: Attention normal          Mood and Affect: Mood normal  Affect is tearful  Speech: Speech normal          Behavior: Behavior is cooperative  Discussion with Family: I personally did not discuss patient with family at this time  Discussed current plan with patient, answered all questions to best of my ability  Discharge instructions/Information to patient and family:   See after visit summary for information provided to patient and family  Provisions for Follow-Up Care:  See after visit summary for information related to follow-up care and any pertinent home health orders  Disposition:     Home    For Discharges to [facility] SNF:   · Not Applicable to this Patient - Not Applicable to this Patient    Planned Readmission: None     Discharge Statement:  I spent 35 minutes discharging the patient  This time was spent on the day of discharge  I had direct contact with the patient on the day of discharge  Greater than 50% of the total time was spent examining patient, answering all patient questions, arranging and discussing plan of care with patient as well as directly providing post-discharge instructions  Additional time then spent on discharge activities  Discharge Medications:  See after visit summary for reconciled discharge medications provided to patient and family        ** Please Note: This note has been constructed using a voice recognition system **

## 2023-02-20 NOTE — PLAN OF CARE
Problem: SUBSTANCE USE/ABUSE  Goal: By discharge, will develop insight into their chemical dependency and sustain motivation to continue in recovery  Description: INTERVENTIONS:  - Attends all daily group sessions and scheduled AA groups  - Actively practices coping skills through participation in the therapeutic community and adherence to program rules  - Reviews and completes assignments from individual treatment plan  - Assist patient development of understanding of their personal cycle of addiction and relapse triggers  Outcome: Progressing  Goal: By discharge, patient will have ongoing treatment plan addressing chemical dependency  Description: INTERVENTIONS:  - Assist patient with resources and/or appointments for ongoing recovery based living  Outcome: Progressing     Problem: MOBILITY - ADULT  Goal: Maintain or return to baseline ADL function  Description: INTERVENTIONS:  -  Assess patient's ability to carry out ADLs; assess patient's baseline for ADL function and identify physical deficits which impact ability to perform ADLs (bathing, care of mouth/teeth, toileting, grooming, dressing, etc )  - Assess/evaluate cause of self-care deficits   - Assess range of motion  - Assess patient's mobility; develop plan if impaired  - Assess patient's need for assistive devices and provide as appropriate  - Encourage maximum independence but intervene and supervise when necessary  - Involve family in performance of ADLs  - Assess for home care needs following discharge   - Consider OT consult to assist with ADL evaluation and planning for discharge  - Provide patient education as appropriate  2/19/2023 2106 by Jamil Harvey RN  Outcome: Progressing  2/19/2023 2106 by Jamil Harvey RN  Outcome: Progressing  Goal: Maintains/Returns to pre admission functional level  Description: INTERVENTIONS:  - Perform BMAT or MOVE assessment daily    - Set and communicate daily mobility goal to care team and patient/family/caregiver     - Collaborate with rehabilitation services on mobility goals if consulted  - Out of bed for toileting  - Record patient progress and toleration of activity level   2/19/2023 2106 by Hank Hdez RN  Outcome: Progressing  2/19/2023 2106 by Hank Hdez RN  Outcome: Progressing

## 2023-02-20 NOTE — PROGRESS NOTES
[address] [facility]  Progress Note - Blake Garduno 1955, 74 y o  female MRN: [id number]  Unit/Bed#: 5T DETOX 814-10 Encounter: [id number]  Primary Care Provider: Mae Moore MD   Date and time admitted to hospital: 2/18/2023 10:44 AM    MEDICAL DETOX UNIT, LEVEL 4  Department of Medical Toxicology  Reason for Admission/Principal Problem: alcohol withdrawal   Rounding Provider: Jer Taylor PA-C, Terence Nelson MD     * Alcohol withdrawal syndrome with complication [facility])  Assessment & Plan  H/o chronic alcohol use; denies h/o withdrawal seizures   Follow SEWS protocol with symptom-triggered phenobarbital for medical management of alcohol withdrawal  · Received 650 mg phenobarbital total thus far; last dose administered 2/20/2023 0227  · Currently appears stable- VSS, no tremors   · Continue to monitor vitals and symptoms and administer additional phenobarbital as indicated  · Suspect can likely come off SEWS this afternoon   Continuous pulse ox and telemetry monitoring    Alcohol use disorder, severe, dependence ([facility] [address] )  Assessment & Plan  Patient with longstanding history of alcohol use disorder  Has not been on naltrexone in the past due to use of TID Subutex for chronic pain  Withdrawal managed as above   Continue daily thiamine/folic acid supplementation, and MVI  Consult case management for assistance with rehab resources - pt interested in inpatient rehabilitation at this time    Opioid dependence on maintenance agonist therapy, no symptoms (HCC)  Assessment & Plan  · Opioid dependence on 8 mg TID Subutex for management of chronic pain  · Denies any history of fentanyl or heroin use  · She is seeing a pain specialist regularly for management    · PDMP reviewed: last script filled 2/9/2023 Buprenorphine 8 mg tabs (60 tabs, 20 days)  · Continue home Subutex  · encourage continued outpatient follow-up    Anemia  Assessment & Plan  · H/o normocytic anemia  · Baseline hgb appears to be 10-11  · Had EGD and colonoscopy done in 2021- both benign   · No evidence of acute bleeding - currently denies hematochezia/melena  · In setting of chronic alcohol use  · Continue thiamine and folic acid supplementation  · Check Iron panel  · Monitor CBC  · Encourage alcohol cessation     Abnormal transaminases  Assessment & Plan  Recent Labs     02/18/23  1251 02/19/23  0543 02/20/23  0458   AST 43* 31 41*   ALT 29 21 22   ALKPHOS 128* 95 82      · Mildly elevated LFTs upon admission; improved and stable   · Denies any abdominal pain, nausea, vomiting  · Likely secondary to alcohol use  · No further indication for ongoing inpatient monitoring of LFTs- recommend outpatient monitoring  · Encourage alcohol cessation     Bipolar disorder ([facility] [address] )  Assessment & Plan  · Endorses a history of MDD, bipolar disorder, PTSD  · Currently managed with duloxetine 30 mg daily  · Currently denies SI/thoughts of self-harm   · Continue home cymbalta   · She is currently not established with a psychiatrist but is interested in engaging upon discharge    · Encourage outpatient psychiatry follow-up    Dyslipidemia  Assessment & Plan  · Managed with losartan and Crestor, will continue with formulary alternative  · Encourage outpatient PCP follow-up    Chronic bilateral low back pain without sciatica  Assessment & Plan  · H/o chronic back pain  · Currently follows with pain management   · On Buprenorpine 8 mg TID- confirmed on PDMP  · Denies acute injuries; no red flag symptoms  · Continue home Buprenorphine   · Recommend outpatient f/u pain management     Gastro-esophageal reflux disease with esophagitis  Assessment & Plan  · Continue PPI  · Encourage alcohol cessation     Tobacco use  Assessment & Plan  · Daily tobacco dependence  · Offered NRT  · Encourage cessation      VTE Pharmacologic Prophylaxis:   Pharmacologic: Pharmacologic VTE Prophylaxis contraindicated due to anemia   Mechanical VTE Prophylaxis in Place: yes    Code Status: Level 1 - Full Code    Patient Centered Rounds: I have performed bedside rounds with nursing staff today  Discussions with Specialists or Other Care Team Provider: Dr Jose Maria Elder, [address]    Education and Discussions with Family / Patient: I personally did not discuss patient with family at this time  Discussed current plan with patient, answered all questions to best of my ability  Time Spent for Care: 20 minutes  More than 50% of total time spent on counseling and coordination of care as described above  Current Length of Stay: 2 day(s)    Current Patient Status: Inpatient     Certification Statement: The patient will continue to require additional inpatient hospital stay due to alcohol withdrawal  Discharge Plan: final disposition pending medical stability, potentially to inpatient rehab       Total time spent today 20 minutes  Greater than 50% of total time was spent with the patient and / or family counseling and / or coordination of care  A description of the counseling / coordination of care: alcohol use     Subjective:   Patient seen and examined bedside this morning  Reports that she is feeling okay this morning, does feel tired and notes mild nausea, but otherwise denies acute complaints  Currently denies headaches, lightheadedness/dizziness, coughing/sneezing/congestion/rhinorrhea, chest pain, SOB/dyspnea, abdominal pain, vomiting/diarrhea/constipation, hematochezia/melena, dysuria/hematuria, hallucinations  Currently interested in inpatient rehab       Objective:     Clinical Opiate Withdrawal Scale  Pulse: 65    SEWS Total Score: 0 (2/20/2023  7:52 AM)    Last 24 Hours Medication List:   Current Facility-Administered Medications   Medication Dose Route Frequency Provider Last Rate   • acetaminophen  650 mg Oral Q6H PRN GARY Barron     • buprenorphine  8 mg Sublingual TID GARY Barron     • DULoxetine  30 mg Oral Daily GARY Elmore     • folic acid 1 mg, thiamine 100 mg in 0 9% sodium chloride 100 mL IVPB   Intravenous Daily Lala Blake, CRNP     • hydrOXYzine HCL  25 mg Oral Q6H PRN Su Masterson PA-C     • losartan  25 mg Oral Daily Lala Blake, CRNP     • nicotine  1 patch Transdermal Daily Lala Blake, CRNP     • ondansetron  4 mg Intravenous Q6H PRN Janessa Wendy, CRNP     • pantoprazole  40 mg Oral Early Morning Lala Blake, CRNP     • pravastatin  40 mg Oral Every Other Day Janessa Wendy, CRNP     • sodium chloride  100 mL/hr Intravenous Continuous Janessa Burt, CRNP 100 mL/hr (23 0734)   • traZODone  50 mg Oral HS PRN Lala Blake, CRNP           Vitals:   Temp (24hrs), Av 2 °F (36 8 °C), Min:97 9 °F (36 6 °C), Max:98 3 °F (36 8 °C)    Temp:  [97 9 °F (36 6 °C)-98 3 °F (36 8 °C)] 98 3 °F (36 8 °C)  HR:  [60-67] 65  Resp:  [16-18] 16  BP: (100-133)/(57-71) 133/63  SpO2:  [90 %-96 %] 90 %  Body mass index is 23 21 kg/m²  Input and Output Summary (last 24 hours): Intake/Output Summary (Last 24 hours) at 2023 1010  Last data filed at 2023 2130  Gross per 24 hour   Intake 950 ml   Output --   Net 950 ml       Physical Exam:   Physical Exam  Vitals and nursing note reviewed  Constitutional:       General: She is not in acute distress  Appearance: Normal appearance  She is well-developed and normal weight  She is not ill-appearing or diaphoretic  HENT:      Head: Normocephalic and atraumatic  Eyes:      General: No scleral icterus  Extraocular Movements: Extraocular movements intact  Conjunctiva/sclera: Conjunctivae normal       Pupils: Pupils are equal, round, and reactive to light  Cardiovascular:      Rate and Rhythm: Normal rate and regular rhythm  Pulses: Normal pulses  Dorsalis pedis pulses are 2+ on the right side and 2+ on the left side  Posterior tibial pulses are 2+ on the right side and 2+ on the left side  Heart sounds: Normal heart sounds   No murmur heard  No friction rub  No gallop  Pulmonary:      Effort: Pulmonary effort is normal  No respiratory distress  Breath sounds: Normal breath sounds  No wheezing, rhonchi or rales  Abdominal:      General: Abdomen is flat  Bowel sounds are normal  There is no distension  Palpations: Abdomen is soft  Tenderness: There is no abdominal tenderness  There is no guarding  Musculoskeletal:         General: No swelling  Normal range of motion  Cervical back: Normal range of motion  Right lower leg: No edema  Left lower leg: No edema  Skin:     General: Skin is warm and dry  Neurological:      General: No focal deficit present  Mental Status: She is alert and oriented to person, place, and time  Mental status is at baseline  Motor: No tremor  Comments: Appears sleepy, fell asleep once during encounter but easily awakens to verbal stimuli    Psychiatric:         Attention and Perception: Attention normal          Mood and Affect: Mood normal          Speech: Speech normal          Behavior: Behavior is cooperative  Thought Content: Thought content does not include suicidal ideation  Thought content does not include suicidal plan  Additional Data:     Labs: keep all most recent labs as listed on admission templates   Results from last 7 days   Lab Units 02/20/23  0458 02/19/23  0543 02/18/23  1251   WBC Thousand/uL 4 89   < > 8 95   HEMOGLOBIN g/dL 9 1*   < > 12 3   HEMATOCRIT % 28 1*   < > 37 8   PLATELETS Thousands/uL 356   < > 574*   NEUTROS PCT %  --   --  70   LYMPHS PCT %  --   --  25   MONOS PCT %  --   --  5   EOS PCT %  --   --  0    < > = values in this interval not displayed        Results from last 7 days   Lab Units 02/20/23  0458   SODIUM mmol/L 135   POTASSIUM mmol/L 4 1   CHLORIDE mmol/L 106   CO2 mmol/L 29   BUN mg/dL 17   CREATININE mg/dL 0 66   ANION GAP mmol/L 0*   CALCIUM mg/dL 7 8*   ALBUMIN g/dL 2 8*   TOTAL BILIRUBIN mg/dL 0 21 ALK PHOS U/L 82   ALT U/L 22   AST U/L 41*   GLUCOSE RANDOM mg/dL 82            * I Have Reviewed All Lab Data Listed Above  * Additional Pertinent Lab Tests Reviewed: All Labs Within Last 24 Hours Reviewed      Imaging Studies: I have personally reviewed pertinent reports  Recent Cultures (last 7 days): Today, Patient Was Seen By: Tom Ellis PA-C    ** Please Note: Dictation voice to text software may have been used in the creation of this document   **

## 2023-02-21 VITALS
SYSTOLIC BLOOD PRESSURE: 115 MMHG | WEIGHT: 131 LBS | DIASTOLIC BLOOD PRESSURE: 59 MMHG | BODY MASS INDEX: 23.21 KG/M2 | OXYGEN SATURATION: 93 % | HEIGHT: 63 IN | TEMPERATURE: 98.3 F | RESPIRATION RATE: 18 BRPM | HEART RATE: 62 BPM

## 2023-02-21 PROBLEM — F10.939 ALCOHOL WITHDRAWAL SYNDROME WITH COMPLICATION (HCC): Status: RESOLVED | Noted: 2023-02-18 | Resolved: 2023-02-21

## 2023-02-21 LAB
ERYTHROCYTE [DISTWIDTH] IN BLOOD BY AUTOMATED COUNT: 13.9 % (ref 11.6–15.1)
HCT VFR BLD AUTO: 29.8 % (ref 34.8–46.1)
HGB BLD-MCNC: 9.5 G/DL (ref 11.5–15.4)
MCH RBC QN AUTO: 29.5 PG (ref 26.8–34.3)
MCHC RBC AUTO-ENTMCNC: 31.9 G/DL (ref 31.4–37.4)
MCV RBC AUTO: 93 FL (ref 82–98)
PLATELET # BLD AUTO: 359 THOUSANDS/UL (ref 149–390)
PMV BLD AUTO: 9.3 FL (ref 8.9–12.7)
RBC # BLD AUTO: 3.22 MILLION/UL (ref 3.81–5.12)
WBC # BLD AUTO: 4.99 THOUSAND/UL (ref 4.31–10.16)

## 2023-02-21 RX ORDER — FOLIC ACID 1 MG/1
1 TABLET ORAL DAILY
Refills: 0
Start: 2023-02-22 | End: 2023-02-22 | Stop reason: SDUPTHER

## 2023-02-21 RX ORDER — LANOLIN ALCOHOL/MO/W.PET/CERES
100 CREAM (GRAM) TOPICAL DAILY
Status: DISCONTINUED | OUTPATIENT
Start: 2023-02-22 | End: 2023-02-21 | Stop reason: HOSPADM

## 2023-02-21 RX ORDER — LANOLIN ALCOHOL/MO/W.PET/CERES
100 CREAM (GRAM) TOPICAL DAILY
Refills: 0
Start: 2023-02-22 | End: 2023-02-22 | Stop reason: SDUPTHER

## 2023-02-21 RX ORDER — FOLIC ACID 1 MG/1
1 TABLET ORAL DAILY
Status: DISCONTINUED | OUTPATIENT
Start: 2023-02-22 | End: 2023-02-21 | Stop reason: HOSPADM

## 2023-02-21 RX ADMIN — GABAPENTIN 300 MG: 300 CAPSULE ORAL at 08:47

## 2023-02-21 RX ADMIN — PANTOPRAZOLE SODIUM 40 MG: 40 TABLET, DELAYED RELEASE ORAL at 05:46

## 2023-02-21 RX ADMIN — THIAMINE HYDROCHLORIDE: 100 INJECTION, SOLUTION INTRAMUSCULAR; INTRAVENOUS at 08:27

## 2023-02-21 RX ADMIN — BUPRENORPHINE HYDROCHLORIDE 8 MG: 8 TABLET SUBLINGUAL at 08:46

## 2023-02-21 RX ADMIN — LOSARTAN POTASSIUM 25 MG: 25 TABLET, FILM COATED ORAL at 08:47

## 2023-02-21 RX ADMIN — DULOXETINE HYDROCHLORIDE 30 MG: 30 CAPSULE, DELAYED RELEASE ORAL at 08:47

## 2023-02-21 NOTE — CASE MANAGEMENT
Worker spoke with pt regarding discharge planning  Pt reports she does want to go to an inpatient rehab program   Worker discussed [address] [place] [address] with pt as pt's sponsor Marquez Roberson called yesterday regarding information  Pt reports she spoke with her sponsor but had not yet called [address] [place] [address] regarding rehab at this time  Pt is uncertain if she wants to attend an inpatient rehab for 6 months but will consider it if it's her only option  Worker encouraged pt to contact them regarding such, pt signed LUCAS for [address] [place] [address] for worker to coordinate any assessments  Pt did report that she would also follow-up with [facility] on discharge to coordinate inpatient rehab if [address] [place] [address] does not accept pt or she decides not to go  Worker spoke with Jalyn Kendrick at [address] [place] [address] regarding pt, she reports she did speak with pt's sponsor yesterday  She informed worker that she would need clinical information to review and assessment is scheduled for 2:00pm with pt  Jalyn Kendrick informed worker that if they accept pt they would be able to pick her up tomorrow or Thursday  Pt signed LUCAS for her PCP, [facility]    Worker cancelled pt's appointment for today and rescheduled for tomorrow 2/22/2023 at 12:00pm

## 2023-02-21 NOTE — ASSESSMENT & PLAN NOTE
· Endorses a history of MDD, bipolar disorder, PTSD  · Currently managed with duloxetine   · Currently denies SI/thoughts of self-harm   · Continue home cymbalta   · She is currently not established with a psychiatrist but is interested in engaging upon discharge    · Encourage outpatient psychiatry follow-up

## 2023-02-21 NOTE — ASSESSMENT & PLAN NOTE
Patient with longstanding history of alcohol use disorder  Has not been on naltrexone in the past due to use of TID Subutex for chronic pain  · Declines acamprosate at this time  Withdrawal managed as above   Continue daily thiamine/folic acid supplementation, and MVI  Consult case management for assistance with rehab resources - pt discharged with outpatient resources

## 2023-02-21 NOTE — ASSESSMENT & PLAN NOTE
H/o chronic alcohol use; denies h/o withdrawal seizures   SEWS protocol with symptom-triggered phenobarbital followed for medical management of alcohol withdrawal  · Received 650 mg phenobarbital total thus far; last dose administered 2/20/2023 0227  · Currently appears stable- VSS, no tremors   Acute withdrawal resolved- medically clear for discharge

## 2023-02-21 NOTE — CASE MANAGEMENT
Case Management Discharge Planning Note    Patient name Wood Marcum 5T DETOX 514/5T DETOX 46* MRN [id number]  : 1955 Date 2023       Current Admission Date: 2023  Current Admission Diagnosis:Alcohol use disorder, severe, dependence [facility])   Patient Active Problem List    Diagnosis Date Noted   • Abnormal transaminases 2023   • Hypertension 2022   • Postmenopausal 2022   • Chest congestion 2022   • Fall 2022   • Gastroesophageal reflux disease 2022   • Sciatica of right side 2021   • Herpes zoster without complication    • BMI 25 0-25 9,adult 2021   • Preop examination 2021   • Stress incontinence in female 2021   • History of recurrent UTIs 2021   • Wheeze 2021   • Nasal sore 2021   • Sinus congestion 06/15/2021   • History of colon polyps 2021   • Hypotension 2021   • Anemia 2021   • Other circadian rhythm sleep disorder 2021   • Daytime sleepiness 2021   • BMI 24 0-24 9, adult 2021   • History of diarrhea 2021   • Pelvic pain in female 2020   • Cystocele, midline 2020   • Cervical cancer screening 2020   • Screening mammogram for breast cancer 2020   • Dysuria 2020   • Benign essential microscopic hematuria 2020   • Fungal dermatitis 2020   • Dyslipidemia 2020   • Exertional shortness of breath 2020   • Syncope 2020   • Smoking 2020   • History of alcohol dependence ([facility] ) 2019   • Opioid dependence on maintenance agonist therapy, no symptoms ([facility] ) 2018   • Alcohol use disorder, severe, dependence ([facility] ) 2018   • Asthma 2018   • Bipolar disorder ([facility] ) 2018   • COPD (chronic obstructive pulmonary disease) ([facility] ) 2018   • TANYA (generalized anxiety disorder) 2018   • PTSD (post-traumatic stress disorder) 2018   • Mild intermittent asthma without complication 76/44/7291   • SOB (shortness of breath) 04/24/2018   • Cervical spondylosis 04/04/2018   • Chronic bilateral low back pain without sciatica 04/04/2018   • Lumbar spondylosis 04/04/2018   • Bilateral primary osteoarthritis of knee 03/15/2018   • Tobacco use 03/15/2018   • Localized osteoarthritis of right knee 12/20/2017   • Abdominal pain 12/20/2017   • Nodule of right lung 12/11/2017   • Chronic pain syndrome 12/08/2017   • Neck pain 12/08/2017   • Cervical radiculopathy 12/08/2017   • Degeneration of intervertebral disc of mid-cervical region 12/08/2017   • Constipation, chronic 10/16/2017   • Tear of medial meniscus of right knee, current 05/26/2017   • Right knee pain 05/12/2017   • Depression with anxiety 02/01/2017   • Lumbar radiculopathy 01/27/2017   • Gastro-esophageal reflux disease with esophagitis 11/28/2016   • Fatigue 04/07/2016   • Vitamin D insufficiency 04/07/2016   • Degenerative arthritis of knee 05/13/2015   • Basal cell carcinoma of skin 07/10/2014   • Chronic obstructive pulmonary disease ([facility] [address] ) 01/06/2014   • Cervical spinal stenosis 11/06/2013   • Classic migraine with aura 09/27/2013      LOS (days): 3  Geometric Mean LOS (GMLOS) (days):   Days to GMLOS:     OBJECTIVE:  Risk of Unplanned Readmission Score: 27 09         Current admission status: Inpatient   Preferred Pharmacy:   [facility]/pharmacy [address] [place] [address]  [address] [address]  Phone: [phone] Fax: [phone]    Primary Care Provider: Laura Cartagena MD    Primary Insurance: MEDICARE  Secondary Insurance: [address] [facility]    DISCHARGE DETAILS: Pt is being discharged today, [place], [place]    Pt did not wish to stay to complete interview for [id number] as she is uncertain of the program   Worker spoke with Jalyn Ramsey at [employer] for [facility], she will send pt's information to the [facility] program and the director will reach out to the pt directly to schedule follow-up  Pt has PCP appointment for 2/22/2023 at 12:00pm   Pt's medications were sent to her pharmacy         Discharge planning discussed with[de-identified] Patient  Freedom of Choice: Yes                   Contacts  Patient Contacts: [place] [address]  Relationship to Patient[de-identified] Treatment Provider  Contact Method: Phone  Phone Number: [phone]  Reason/Outcome: Continuity of Care, Referral, Discharge Planning              Other Referral/Resources/Interventions Provided:  Financial Resources Provided: Indigent Transportation  Referrals Provided[de-identified] IOP                                             IMM Given (Date):: 02/21/23  IMM Given to[de-identified] Patient

## 2023-02-21 NOTE — ASSESSMENT & PLAN NOTE
· H/o normocytic anemia  · Baseline hgb appears to be 10-11  · Had EGD and colonoscopy done in 2021- both benign   · No evidence of acute bleeding - currently denies hematochezia/melena  · Iron panel WNL  · In setting of chronic alcohol use  · Continue thiamine and folic acid supplementation  · Routine outpatient monitoring   · Encourage alcohol cessation

## 2023-02-21 NOTE — CASE MANAGEMENT
Worker left message for Jeanne Brod at [address] for [employer] regarding intake for outpatient drug and alcohol

## 2023-02-21 NOTE — PLAN OF CARE
Problem: SUBSTANCE USE/ABUSE  Goal: By discharge, will develop insight into their chemical dependency and sustain motivation to continue in recovery  Description: INTERVENTIONS:  - Attends all daily group sessions and scheduled AA groups  - Actively practices coping skills through participation in the therapeutic community and adherence to program rules  - Reviews and completes assignments from individual treatment plan  - Assist patient development of understanding of their personal cycle of addiction and relapse triggers  2/20/2023 2006 by Gerard Campos RN  Outcome: Progressing  2/20/2023 2006 by Gerard Campos RN  Outcome: Progressing  Goal: By discharge, patient will have ongoing treatment plan addressing chemical dependency  Description: INTERVENTIONS:  - Assist patient with resources and/or appointments for ongoing recovery based living  2/20/2023 2006 by Gerard Campos RN  Outcome: Progressing  2/20/2023 2006 by Gerard Campos RN  Outcome: Progressing     Problem: MOBILITY - ADULT  Goal: Maintain or return to baseline ADL function  Description: INTERVENTIONS:  -  Assess patient's ability to carry out ADLs; assess patient's baseline for ADL function and identify physical deficits which impact ability to perform ADLs (bathing, care of mouth/teeth, toileting, grooming, dressing, etc )  - Assess/evaluate cause of self-care deficits   - Assess range of motion  - Assess patient's mobility; develop plan if impaired  - Assess patient's need for assistive devices and provide as appropriate  - Encourage maximum independence but intervene and supervise when necessary  - Involve family in performance of ADLs  - Assess for home care needs following discharge   - Consider OT consult to assist with ADL evaluation and planning for discharge  - Provide patient education as appropriate  2/20/2023 2006 by Gerard Campos RN  Outcome: Progressing  2/20/2023 2006 by Gerard Campos RN  Outcome: Progressing  Goal: Maintains/Returns to pre admission functional level  Description: INTERVENTIONS:  - Perform BMAT or MOVE assessment daily    - Set and communicate daily mobility goal to care team and patient/family/caregiver     - Collaborate with rehabilitation services on mobility goals if consulted  - Out of bed for toileting  - Record patient progress and toleration of activity level   2/20/2023 2006 by Hank Hdez RN  Outcome: Progressing  2/20/2023 2006 by Hank Hdez RN  Outcome: Progressing

## 2023-02-22 ENCOUNTER — OFFICE VISIT (OUTPATIENT)
Dept: FAMILY MEDICINE CLINIC | Facility: CLINIC | Age: 68
End: 2023-02-22

## 2023-02-22 VITALS
HEART RATE: 74 BPM | SYSTOLIC BLOOD PRESSURE: 134 MMHG | RESPIRATION RATE: 16 BRPM | BODY MASS INDEX: 23.5 KG/M2 | WEIGHT: 132.6 LBS | HEIGHT: 63 IN | TEMPERATURE: 97.3 F | DIASTOLIC BLOOD PRESSURE: 84 MMHG

## 2023-02-22 DIAGNOSIS — E78.5 DYSLIPIDEMIA: ICD-10-CM

## 2023-02-22 DIAGNOSIS — F10.20 ALCOHOL USE DISORDER, SEVERE, DEPENDENCE (HCC): ICD-10-CM

## 2023-02-22 DIAGNOSIS — Z87.01 HISTORY OF PNEUMONIA: ICD-10-CM

## 2023-02-22 DIAGNOSIS — R74.8 ABNORMAL TRANSAMINASES: ICD-10-CM

## 2023-02-22 DIAGNOSIS — R30.0 DYSURIA: ICD-10-CM

## 2023-02-22 DIAGNOSIS — Z78.9 NEED FOR FOLLOW-UP BY SOCIAL WORKER: Primary | ICD-10-CM

## 2023-02-22 DIAGNOSIS — D50.8 OTHER IRON DEFICIENCY ANEMIA: ICD-10-CM

## 2023-02-22 DIAGNOSIS — Z76.89 ENCOUNTER FOR SUPPORT AND COORDINATION OF TRANSITION OF CARE: Primary | ICD-10-CM

## 2023-02-22 DIAGNOSIS — I10 HYPERTENSION, UNSPECIFIED TYPE: ICD-10-CM

## 2023-02-22 LAB
SL AMB  POCT GLUCOSE, UA: ABNORMAL
SL AMB LEUKOCYTE ESTERASE,UA: 25
SL AMB POCT BILIRUBIN,UA: ABNORMAL
SL AMB POCT BLOOD,UA: ABNORMAL
SL AMB POCT CLARITY,UA: CLEAR
SL AMB POCT COLOR,UA: YELLOW
SL AMB POCT KETONES,UA: ABNORMAL
SL AMB POCT NITRITE,UA: ABNORMAL
SL AMB POCT PH,UA: 9
SL AMB POCT SPECIFIC GRAVITY,UA: 1.01
SL AMB POCT URINE PROTEIN: ABNORMAL
SL AMB POCT UROBILINOGEN: ABNORMAL

## 2023-02-22 RX ORDER — LANOLIN ALCOHOL/MO/W.PET/CERES
100 CREAM (GRAM) TOPICAL DAILY
Qty: 90 TABLET | Refills: 1 | Status: SHIPPED | OUTPATIENT
Start: 2023-02-22

## 2023-02-22 RX ORDER — B-COMPLEX WITH VITAMIN C
1 TABLET ORAL DAILY
Qty: 90 TABLET | Refills: 1 | Status: SHIPPED | OUTPATIENT
Start: 2023-02-22

## 2023-02-22 RX ORDER — FOLIC ACID 1 MG/1
1 TABLET ORAL DAILY
Qty: 90 TABLET | Refills: 1 | Status: SHIPPED | OUTPATIENT
Start: 2023-02-22
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
## 2023-02-24 ENCOUNTER — HOSPITAL ENCOUNTER (OUTPATIENT)
Dept: RADIOLOGY | Facility: HOSPITAL | Age: 68
Discharge: HOME | End: 2023-02-24
Attending: FAMILY MEDICINE

## 2023-02-24 VITALS — WEIGHT: 132 LBS | HEIGHT: 63 IN | BODY MASS INDEX: 23.39 KG/M2

## 2023-02-24 DIAGNOSIS — Z12.31 SCREENING MAMMOGRAM FOR BREAST CANCER: ICD-10-CM

## 2023-02-27 ENCOUNTER — PATIENT OUTREACH (OUTPATIENT)
Dept: FAMILY MEDICINE CLINIC | Facility: CLINIC | Age: 68
End: 2023-02-27

## 2023-03-08 PROBLEM — F17.200 NICOTINE DEPENDENCE: Status: ACTIVE | Noted: 2022-10-25

## 2023-03-08 PROBLEM — F10.230 ALCOHOL DEPENDENCE WITH UNCOMPLICATED WITHDRAWAL (HCC): Status: ACTIVE | Noted: 2022-10-25

## 2023-03-08 PROBLEM — I10 HTN (HYPERTENSION): Status: ACTIVE | Noted: 2022-10-25

## 2023-03-08 PROBLEM — I73.00 RAYNAUD'S PHENOMENON: Status: ACTIVE | Noted: 2023-01-05

## 2023-03-08 PROBLEM — F10.10 ALCOHOL ABUSE, EPISODIC: Status: ACTIVE | Noted: 2023-03-08

## 2023-03-08 PROBLEM — G43.909 MIGRAINE: Status: ACTIVE | Noted: 2022-10-25

## 2023-03-08 PROBLEM — F31.81 BIPOLAR II DISORDER (HCC): Status: ACTIVE | Noted: 2018-12-07

## 2023-03-08 PROBLEM — I34.0 MITRAL VALVE REGURGITATION: Status: ACTIVE | Noted: 2023-01-05

## 2023-03-08 NOTE — PROGRESS NOTES
Assessment/Plan:    1  Cough, unspecified type    2  Wheeze    3  Bipolar affective disorder, currently manic, mild ([facility] )    4  Depression with anxiety    5  Alcohol abuse, episodic    6  BMI 23 0-23 9, adult          Medications reconciled  rx Augmentin  Check cxr  Pt sig late for appt today-will treat acute c/o cough & review recent hospital /rehab records and rto for iza this week  Pt to also sched follow-up with psychiatrist and counselor   Cont to attend alcohol rehab programs/support services    Subjective:      Patient ID: Gricelda Cade is a 74 y o  female      Chief Complaint   Patient presents with   • Follow-up     Pneumonia pt requesting antbx rx lost       HPI  C/o ongoing cough--concerned as has had pneumonia in past  Denies fever, hemoptysis, cp or increased shortness of breath  [facility] tx at [facility] rehab for hx alcoholism  Was given rx for abx but lost it  Cont to attend OP support grps  Needs to schedule follow-up w counselor    The following portions of the patient's history were reviewed and updated as appropriate: allergies, current medications, past family history, past medical history, past social history, past surgical history and problem list     Review of Systems    Per hpi  Current Outpatient Medications   Medication Sig Dispense Refill   • albuterol (2 5 mg/3 mL) 0 083 % nebulizer solution Take 3 mL (2 5 mg total) by nebulization every 6 (six) hours as needed for wheezing or shortness of breath 180 mL 5   • albuterol (ProAir HFA) 90 mcg/act inhaler Inhale 2 puffs every 4 (four) hours as needed for wheezing 8 5 g 5   • buprenorphine (SUBUTEX) 8 mg 8 mg 3 (three) times a day SL      • Cholecalciferol (Vitamin D) 50 MCG (2000 UT) CAPS Take by mouth daily at bedtime     • DULoxetine (CYMBALTA) 30 mg delayed release capsule TAKE 1 CAPSULE BY MOUTH TWICE A  capsule 1   • fluticasone-salmeterol (ADVAIR, WIXELA) 250-50 mcg/dose inhaler Inhale 1 puff 2 (two) times a day 1 Inhaler 0   • gabapentin (NEURONTIN) 400 mg capsule TAKE 2 CAPSULES (800 MG TOTAL) BY MOUTH 2 (TWO) TIMES A  capsule 1   • losartan (COZAAR) 25 mg tablet TAKE 1 TABLET (25 MG TOTAL) BY MOUTH DAILY  90 tablet 1   • MELATONIN PO Take 3 mg by mouth daily at bedtime as needed       • multivitamin (THERAGRAN) TABS Take 1 tablet by mouth daily at bedtime Last dose 11/14/21      • naloxone (NARCAN) 4 mg/0 1 mL nasal spray 4 mg into each nostril as needed     • pantoprazole (PROTONIX) 40 mg tablet TAKE 1 TABLET BY MOUTH EVERY DAY IN THE MORNING 90 tablet 1   • rosuvastatin (CRESTOR) 5 mg tablet Take 1 tablet (5 mg total) by mouth every other day Take medication in the evening after food 45 tablet 1   • SUMAtriptan (IMITREX) 100 mg tablet Take 1 tablet (100 mg total) by mouth once as needed for migraine 9 tablet 0   • folic acid (FOLVITE) 1 mg tablet Take 1 tablet (1 mg total) by mouth daily 90 tablet 1   • Multiple Vitamins-Minerals (multivitamin) tablet Take 1 tablet by mouth daily 90 tablet 1   • thiamine 100 MG tablet Take 1 tablet (100 mg total) by mouth daily 90 tablet 1     No current facility-administered medications for this visit  Objective:    /72 (BP Location: Left arm, Patient Position: Sitting, Cuff Size: Standard)   Pulse 72   Temp (!) 97 3 °F (36 3 °C) (Temporal)   Resp 14   Ht 5' 3" (1 6 m)   Wt 59 4 kg (131 lb)   SpO2 94%   BMI 23 21 kg/m²        Physical Exam  Vitals and nursing note reviewed  Constitutional:       General: She is not in acute distress  HENT:      Nose: Congestion present  Mouth/Throat:      Pharynx: No oropharyngeal exudate  Cardiovascular:      Rate and Rhythm: Normal rate and regular rhythm  Pulmonary:      Effort: Pulmonary effort is normal  No respiratory distress  Breath sounds: Wheezing present  Abdominal:      General: Bowel sounds are normal       Palpations: Abdomen is soft  Tenderness: There is no abdominal tenderness  Skin:     General: Skin is warm and dry  Coloration: Skin is not jaundiced  Findings: No rash  Neurological:      Mental Status: She is alert and oriented to person, place, and time               Verónica Angel MD

## 2023-03-15 ENCOUNTER — RA CDI HCC (OUTPATIENT)
Dept: OTHER | Facility: HOSPITAL | Age: 68
End: 2023-03-15

## 2023-03-20 ENCOUNTER — APPOINTMENT (OUTPATIENT)
Dept: LAB | Facility: CLINIC | Age: 68
End: 2023-03-20

## 2023-03-20 DIAGNOSIS — R06.02 EXERTIONAL SHORTNESS OF BREATH: ICD-10-CM

## 2023-03-20 DIAGNOSIS — F10.20 ALCOHOL USE DISORDER, SEVERE, DEPENDENCE (HCC): ICD-10-CM

## 2023-03-20 DIAGNOSIS — D50.8 OTHER IRON DEFICIENCY ANEMIA: ICD-10-CM

## 2023-03-20 DIAGNOSIS — R74.8 ABNORMAL TRANSAMINASES: ICD-10-CM

## 2023-03-20 DIAGNOSIS — F41.8 DEPRESSION WITH ANXIETY: ICD-10-CM

## 2023-03-20 DIAGNOSIS — I10 HYPERTENSION, UNSPECIFIED TYPE: ICD-10-CM

## 2023-03-20 DIAGNOSIS — F10.21 HISTORY OF ALCOHOL DEPENDENCE (HCC): ICD-10-CM

## 2023-03-20 DIAGNOSIS — G89.4 CHRONIC PAIN SYNDROME: ICD-10-CM

## 2023-03-20 DIAGNOSIS — E78.5 DYSLIPIDEMIA: ICD-10-CM

## 2023-03-20 DIAGNOSIS — Z72.0 TOBACCO USE: ICD-10-CM

## 2023-03-20 LAB
ALBUMIN SERPL BCP-MCNC: 3.5 G/DL (ref 3.5–5)
ALP SERPL-CCNC: 78 U/L (ref 46–116)
ALT SERPL W P-5'-P-CCNC: 17 U/L (ref 12–78)
ANION GAP SERPL CALCULATED.3IONS-SCNC: 5 MMOL/L (ref 4–13)
AST SERPL W P-5'-P-CCNC: 16 U/L (ref 5–45)
BILIRUB DIRECT SERPL-MCNC: 0.16 MG/DL (ref 0–0.2)
BILIRUB SERPL-MCNC: 0.42 MG/DL (ref 0.2–1)
BUN SERPL-MCNC: 18 MG/DL (ref 5–25)
CALCIUM SERPL-MCNC: 9.6 MG/DL (ref 8.3–10.1)
CHLORIDE SERPL-SCNC: 100 MMOL/L (ref 96–108)
CHOLEST SERPL-MCNC: 133 MG/DL
CO2 SERPL-SCNC: 29 MMOL/L (ref 21–32)
CREAT SERPL-MCNC: 1.12 MG/DL (ref 0.6–1.3)
ERYTHROCYTE [DISTWIDTH] IN BLOOD BY AUTOMATED COUNT: 14.7 % (ref 11.6–15.1)
FERRITIN SERPL-MCNC: 127 NG/ML (ref 8–388)
FOLATE SERPL-MCNC: >20 NG/ML (ref 3.1–17.5)
GFR SERPL CREATININE-BSD FRML MDRD: 51 ML/MIN/1.73SQ M
GLUCOSE P FAST SERPL-MCNC: 85 MG/DL (ref 65–99)
HCT VFR BLD AUTO: 32.1 % (ref 34.8–46.1)
HDLC SERPL-MCNC: 65 MG/DL
HGB BLD-MCNC: 10.5 G/DL (ref 11.5–15.4)
IRON SATN MFR SERPL: 18 % (ref 15–50)
IRON SERPL-MCNC: 46 UG/DL (ref 50–170)
LDLC SERPL CALC-MCNC: 59 MG/DL (ref 0–100)
LIPASE SERPL-CCNC: 72 U/L (ref 73–393)
MAGNESIUM SERPL-MCNC: 2.5 MG/DL (ref 1.6–2.6)
MCH RBC QN AUTO: 29.9 PG (ref 26.8–34.3)
MCHC RBC AUTO-ENTMCNC: 32.7 G/DL (ref 31.4–37.4)
MCV RBC AUTO: 92 FL (ref 82–98)
NONHDLC SERPL-MCNC: 68 MG/DL
PLATELET # BLD AUTO: 406 THOUSANDS/UL (ref 149–390)
PMV BLD AUTO: 10.3 FL (ref 8.9–12.7)
POTASSIUM SERPL-SCNC: 4.5 MMOL/L (ref 3.5–5.3)
PROT SERPL-MCNC: 7.5 G/DL (ref 6.4–8.4)
RBC # BLD AUTO: 3.51 MILLION/UL (ref 3.81–5.12)
SODIUM SERPL-SCNC: 134 MMOL/L (ref 135–147)
TIBC SERPL-MCNC: 261 UG/DL (ref 250–450)
TRIGL SERPL-MCNC: 44 MG/DL
TSH SERPL DL<=0.05 MIU/L-ACNC: 0.86 UIU/ML (ref 0.45–4.5)
VIT B12 SERPL-MCNC: 499 PG/ML (ref 100–900)
WBC # BLD AUTO: 7.2 THOUSAND/UL (ref 4.31–10.16)

## 2023-03-22 ENCOUNTER — APPOINTMENT (OUTPATIENT)
Dept: RADIOLOGY | Facility: CLINIC | Age: 68
End: 2023-03-22

## 2023-03-22 ENCOUNTER — TELEPHONE (OUTPATIENT)
Dept: CARDIOLOGY CLINIC | Facility: CLINIC | Age: 68
End: 2023-03-22

## 2023-03-22 ENCOUNTER — OFFICE VISIT (OUTPATIENT)
Dept: FAMILY MEDICINE CLINIC | Facility: CLINIC | Age: 68
End: 2023-03-22

## 2023-03-22 VITALS
TEMPERATURE: 97.9 F | HEIGHT: 63 IN | DIASTOLIC BLOOD PRESSURE: 70 MMHG | HEART RATE: 80 BPM | RESPIRATION RATE: 16 BRPM | WEIGHT: 128.2 LBS | BODY MASS INDEX: 22.71 KG/M2 | SYSTOLIC BLOOD PRESSURE: 120 MMHG

## 2023-03-22 DIAGNOSIS — R06.2 WHEEZE: ICD-10-CM

## 2023-03-22 DIAGNOSIS — M54.6 THORACIC BACK PAIN, UNSPECIFIED BACK PAIN LATERALITY, UNSPECIFIED CHRONICITY: ICD-10-CM

## 2023-03-22 DIAGNOSIS — R05.9 COUGH, UNSPECIFIED TYPE: ICD-10-CM

## 2023-03-22 DIAGNOSIS — Z87.01 HISTORY OF PNEUMONIA: ICD-10-CM

## 2023-03-22 DIAGNOSIS — F10.10 ALCOHOL ABUSE, EPISODIC: ICD-10-CM

## 2023-03-22 DIAGNOSIS — J18.9 PNEUMONITIS: ICD-10-CM

## 2023-03-22 DIAGNOSIS — Z00.00 MEDICARE ANNUAL WELLNESS VISIT, SUBSEQUENT: ICD-10-CM

## 2023-03-22 DIAGNOSIS — R05.9 COUGH, UNSPECIFIED TYPE: Primary | ICD-10-CM

## 2023-03-22 DIAGNOSIS — F17.200 NICOTINE DEPENDENCE, UNCOMPLICATED, UNSPECIFIED NICOTINE PRODUCT TYPE: ICD-10-CM

## 2023-03-22 LAB
SARS-COV-2 AG UPPER RESP QL IA: NEGATIVE
VALID CONTROL: NORMAL

## 2023-03-22 RX ORDER — ATOMOXETINE 40 MG/1
40 CAPSULE ORAL DAILY
COMMUNITY
Start: 2023-03-15
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
## 2023-03-22 NOTE — TELEPHONE ENCOUNTER
----- Message from Lupe Burger MD sent at 3/21/2023  4:01 PM EDT -----  His cholesterol profile is acceptable  She has other labs done for which she should contact her medical doctor

## 2023-03-22 NOTE — PATIENT INSTRUCTIONS
Medicare Preventive Visit Patient Instructions  Thank you for completing your Welcome to Medicare Visit or Medicare Annual Wellness Visit today  Your next wellness visit will be due in one year (3/22/2024)  The screening/preventive services that you may require over the next 5-10 years are detailed below  Some tests may not apply to you based off risk factors and/or age  Screening tests ordered at today's visit but not completed yet may show as past due  Also, please note that scanned in results may not display below  Preventive Screenings:  Service Recommendations Previous Testing/Comments   Colorectal Cancer Screening  * Colonoscopy    * Fecal Occult Blood Test (FOBT)/Fecal Immunochemical Test (FIT)  * Fecal DNA/Cologuard Test  * Flexible Sigmoidoscopy Age: 39-70 years old   Colonoscopy: every 10 years (may be performed more frequently if at higher risk)  OR  FOBT/FIT: every 1 year  OR  Cologuard: every 3 years  OR  Sigmoidoscopy: every 5 years  Screening may be recommended earlier than age 39 if at higher risk for colorectal cancer  Also, an individualized decision between you and your healthcare provider will decide whether screening between the ages of 74-80 would be appropriate  Colonoscopy: 02/01/2022  FOBT/FIT: Not on file  Cologuard: Not on file  Sigmoidoscopy: Not on file    Screening Current     Breast Cancer Screening Age: 36 years old  Frequency: every 1-2 years  Not required if history of left and right mastectomy Mammogram: 02/24/2023    Screening Current   Cervical Cancer Screening Between the ages of 21-29, pap smear recommended once every 3 years  Between the ages of 33-67, can perform pap smear with HPV co-testing every 5 years     Recommendations may differ for women with a history of total hysterectomy, cervical cancer, or abnormal pap smears in past  Pap Smear: 07/24/2020    Screening Not Indicated   Hepatitis C Screening Once for adults born between 1945 and 1965  More frequently in patients at high risk for Hepatitis C Hep C Antibody: Not on file        Diabetes Screening 1-2 times per year if you're at risk for diabetes or have pre-diabetes Fasting glucose: 85 mg/dL (3/20/2023)  A1C: 5 5 (7/6/2020)  Screening Current   Cholesterol Screening Once every 5 years if you don't have a lipid disorder  May order more often based on risk factors  Lipid panel: 03/20/2023    Screening Current     Other Preventive Screenings Covered by Medicare:  1  Abdominal Aortic Aneurysm (AAA) Screening: covered once if your at risk  You're considered to be at risk if you have a family history of AAA  2  Lung Cancer Screening: covers low dose CT scan once per year if you meet all of the following conditions: (1) Age 50-69; (2) No signs or symptoms of lung cancer; (3) Current smoker or have quit smoking within the last 15 years; (4) You have a tobacco smoking history of at least 20 pack years (packs per day multiplied by number of years you smoked); (5) You get a written order from a healthcare provider  3  Glaucoma Screening: covered annually if you're considered high risk: (1) You have diabetes OR (2) Family history of glaucoma OR (3)  aged 48 and older OR (3)  American aged 72 and older  3  Osteoporosis Screening: covered every 2 years if you meet one of the following conditions: (1) You're estrogen deficient and at risk for osteoporosis based off medical history and other findings; (2) Have a vertebral abnormality; (3) On glucocorticoid therapy for more than 3 months; (4) Have primary hyperparathyroidism; (5) On osteoporosis medications and need to assess response to drug therapy  · Last bone density test (DXA Scan): 12/08/2017  5  HIV Screening: covered annually if you're between the age of 12-76  Also covered annually if you are younger than 13 and older than 72 with risk factors for HIV infection   For pregnant patients, it is covered up to 3 times per pregnancy  Immunizations:  Immunization Recommendations   Influenza Vaccine Annual influenza vaccination during flu season is recommended for all persons aged >= 6 months who do not have contraindications   Pneumococcal Vaccine   * Pneumococcal conjugate vaccine = PCV13 (Prevnar 13), PCV15 (Vaxneuvance), PCV20 (Prevnar 20)  * Pneumococcal polysaccharide vaccine = PPSV23 (Pneumovax) Adults 25-60 years old: 1-3 doses may be recommended based on certain risk factors  Adults 72 years old: 1-2 doses may be recommended based off what pneumonia vaccine you previously received   Hepatitis B Vaccine 3 dose series if at intermediate or high risk (ex: diabetes, end stage renal disease, liver disease)   Tetanus (Td) Vaccine - COST NOT COVERED BY MEDICARE PART B Following completion of primary series, a booster dose should be given every 10 years to maintain immunity against tetanus  Td may also be given as tetanus wound prophylaxis  Tdap Vaccine - COST NOT COVERED BY MEDICARE PART B Recommended at least once for all adults  For pregnant patients, recommended with each pregnancy  Shingles Vaccine (Shingrix) - COST NOT COVERED BY MEDICARE PART B  2 shot series recommended in those aged 48 and above     Health Maintenance Due:      Topic Date Due   • Hepatitis C Screening  Never done   • HIV Screening  Never done   • Cervical Cancer Screening  07/24/2021   • Colorectal Cancer Screening  02/01/2023   • Breast Cancer Screening: Mammogram  02/24/2024     Immunizations Due:      Topic Date Due   • Hepatitis A Vaccine (1 of 2 - Risk 2-dose series) Never done   • Hepatitis B Vaccine (1 of 3 - Risk 3-dose series) Never done   • COVID-19 Vaccine (5 - Booster for Moderna series) 03/02/2022   • Pneumococcal Vaccine: 65+ Years (3 - PPSV23 if available, else PCV20) 03/31/2022   • Influenza Vaccine (1) 09/01/2022     Advance Directives   What are advance directives?   Advance directives are legal documents that state your wishes and plans for medical care  These plans are made ahead of time in case you lose your ability to make decisions for yourself  Advance directives can apply to any medical decision, such as the treatments you want, and if you want to donate organs  What are the types of advance directives? There are many types of advance directives, and each state has rules about how to use them  You may choose a combination of any of the following:  · Living will: This is a written record of the treatment you want  You can also choose which treatments you do not want, which to limit, and which to stop at a certain time  This includes surgery, medicine, IV fluid, and tube feedings  · Durable power of  for healthcare [facility]): This is a written record that states who you want to make healthcare choices for you when you are unable to make them for yourself  This person, called a proxy, is usually a family member or a friend  You may choose more than 1 proxy  · Do not resuscitate (DNR) order:  A DNR order is used in case your heart stops beating or you stop breathing  It is a request not to have certain forms of treatment, such as CPR  A DNR order may be included in other types of advance directives  · Medical directive: This covers the care that you want if you are in a coma, near death, or unable to make decisions for yourself  You can list the treatments you want for each condition  Treatment may include pain medicine, surgery, blood transfusions, dialysis, IV or tube feedings, and a ventilator (breathing machine)  · Values history: This document has questions about your views, beliefs, and how you feel and think about life  This information can help others choose the care that you would choose  Why are advance directives important? An advance directive helps you control your care  Although spoken wishes may be used, it is better to have your wishes written down  Spoken wishes can be misunderstood, or not followed  Treatments may be given even if you do not want them  An advance directive may make it easier for your family to make difficult choices about your care  Fall Prevention    Fall prevention  includes ways to make your home and other areas safer  It also includes ways you can move more carefully to prevent a fall  Health conditions that cause changes in your blood pressure, vision, or muscle strength and coordination may increase your risk for falls  Medicines may also increase your risk for falls if they make you dizzy, weak, or sleepy  Fall prevention tips:   · Stand or sit up slowly  · Use assistive devices as directed  · Wear shoes that fit well and have soles that   · Wear a personal alarm  · Stay active  · Manage your medical conditions  Home Safety Tips:  · Add items to prevent falls in the bathroom  · Keep paths clear  · Install bright lights in your home  · Keep items you use often on shelves within reach  · Paint or place reflective tape on the edges of your stairs  Cigarette Smoking and Your Health   Risks to your health if you smoke:  Nicotine and other chemicals found in tobacco damage every cell in your body  Even if you are a light smoker, you have an increased risk for cancer, heart disease, and lung disease  If you are pregnant or have diabetes, smoking increases your risk for complications  Benefits to your health if you stop smoking:   · You decrease respiratory symptoms such as coughing, wheezing, and shortness of breath  · You reduce your risk for cancers of the lung, mouth, throat, kidney, bladder, pancreas, stomach, and cervix  If you already have cancer, you increase the benefits of chemotherapy  You also reduce your risk for cancer returning or a second cancer from developing  · You reduce your risk for heart disease, blood clots, heart attack, and stroke     · You reduce your risk for lung infections, and diseases such as pneumonia, asthma, chronic bronchitis, and emphysema  · Your circulation improves  More oxygen can be delivered to your body  If you have diabetes, you lower your risk for complications, such as kidney, artery, and eye diseases  You also lower your risk for nerve damage  Nerve damage can lead to amputations, poor vision, and blindness  · You improve your body's ability to heal and to fight infections  For more information and support to stop smoking:   · [employer]  Phone: 0- 739 - 136-3645  Web Address: [employer]  Narcotic (Opioid) Safety    Use narcotics safely:  · Take prescribed narcotics exactly as directed  · Do not give narcotics to others or take narcotics that belong to someone else  · Do not mix narcotics without medicines or alcohol  · Do not drive or operate heavy machinery after you take the narcotic  · Monitor for side effects and notify your healthcare provider if you experienced side effects such as nausea, sleepiness, itching, or trouble thinking clearly  Manage constipation:    Constipation is the most common side effect of narcotic medicine  Constipation is when you have hard, dry bowel movements, or you go longer than usual between bowel movements  Tell your healthcare provider about all changes in your bowel movements while you are taking narcotics  He or she may recommend laxative medicine to help you have a bowel movement  He or she may also change the kind of narcotic you are taking, or change when you take it  The following are more ways you can prevent or relieve constipation:    · Drink liquids as directed  You may need to drink extra liquids to help soften and move your bowels  Ask how much liquid to drink each day and which liquids are best for you  · Eat high-fiber foods  This may help decrease constipation by adding bulk to your bowel movements  High-fiber foods include fruits, vegetables, whole-grain breads and cereals, and beans   Your healthcare provider or dietitian can help you create a high-fiber meal plan  Your provider may also recommend a fiber supplement if you cannot get enough fiber from food  · Exercise regularly  Regular physical activity can help stimulate your intestines  Walking is a good exercise to prevent or relieve constipation  Ask which exercises are best for you  · Schedule a time each day to have a bowel movement  This may help train your body to have regular bowel movements  Bend forward while you are on the toilet to help move the bowel movement out  Sit on the toilet for at least 10 minutes, even if you do not have a bowel movement  Store narcotics safely:   · Store narcotics where others cannot easily get them  Keep them in a locked cabinet or secure area  Do not  keep them in a purse or other bag you carry with you  A person may be looking for something else and find the narcotics  · Make sure narcotics are stored out of the reach of children  A child can easily overdose on narcotics  Narcotics may look like candy to a small child  The best way to dispose of narcotics: The laws vary by country and area  In the [place], the best way is to return the narcotics through a take-back program  This program is offered by the [employer] ([employer])  The following are options for using the program:  · Take the narcotics to a MELLY collection site  The site is often a law enforcement center  Call your local law enforcement center for scheduled take-back days in your area  You will be given information on where to go if the collection site is in a different location  · Take the narcotics to an approved pharmacy or hospital   A pharmacy or hospital may be set up as a collection site  You will need to ask if it is a MELLY collection site if you were not directed there  A pharmacy or doctor's office may not be able to take back narcotics unless it is a MELLY site  · Use a mail-back system  This means you are given containers to put the narcotics into   You will then mail them in the containers  · Use a take-back drop box  This is a place to leave the narcotics at any time  People and animals will not be able to get into the box  Your local law enforcement agency can tell you where to find a drop box in your area  Other ways to manage pain:   · Ask your healthcare provider about non-narcotic medicines to control pain  Nonprescription medicines include NSAIDs (such as ibuprofen) and acetaminophen  Prescription medicines include muscle relaxers, antidepressants, and steroids  · Pain may be managed without any medicines  Some ways to relieve pain include massage, aromatherapy, or meditation  Physical or occupational therapy may also help  For more information:   · Drug Enforcement Administration  [facility] [address]  Phone: 1- 717 - 880-3135  Web Address: [facility]/drug_disposal/    · [address] and Drug Administration  [facility] , [address] [place] [address]  Phone: 4- 842 - 702-5065  Web Address: http://[employer]/     © Copyright [address] 2018 Information is for End User's use only and may not be sold, redistributed or otherwise used for commercial purposes   All illustrations and images included in CareNotes® are the copyrighted property of [employer]  or [address] [place], [place] [address]pe

## 2023-03-23 RX ORDER — PREDNISONE 20 MG/1
40 TABLET ORAL DAILY
Qty: 10 TABLET | Refills: 0 | Status: SHIPPED | OUTPATIENT
Start: 2023-03-23 | End: 2023-03-28

## 2023-03-23 RX ORDER — AZITHROMYCIN 250 MG/1
TABLET, FILM COATED ORAL
Qty: 6 TABLET | Refills: 0 | Status: SHIPPED | OUTPATIENT
Start: 2023-03-23 | End: 2023-03-28

## 2023-03-30 ENCOUNTER — TELEPHONE (OUTPATIENT)
Dept: PULMONOLOGY | Facility: MEDICAL CENTER | Age: 68
End: 2023-03-30

## 2023-03-30 ENCOUNTER — TELEPHONE (OUTPATIENT)
Dept: ADMINISTRATIVE | Facility: OTHER | Age: 68
End: 2023-03-30

## 2023-03-30 NOTE — TELEPHONE ENCOUNTER
----- Message from [place], [place] sent at 3/29/2023 12:17 PM EDT -----  Regarding: colonoscopy  03/29/23 12:17 PM    Hello, our patient attached above has had colonoscopy completed/performed  Please assist in updating the patient chart by 2/1/22 in chart The date of service is 2022       Thank you,  Ofelia Hardin  [facility]

## 2023-04-03 NOTE — TELEPHONE ENCOUNTER
Upon review of the In Basket request we were able to note that no further action is required  The patient chart is up to date as a result of a previous request       Any additional questions or concerns should be emailed to the Practice Liaisons via the appropriate education email address, please do not reply via In Basket      Thank you  Arlyn Dean

## 2023-04-04 ENCOUNTER — OFFICE VISIT (OUTPATIENT)
Dept: PULMONOLOGY | Facility: MEDICAL CENTER | Age: 68
End: 2023-04-04

## 2023-04-04 VITALS
RESPIRATION RATE: 16 BRPM | HEIGHT: 63 IN | SYSTOLIC BLOOD PRESSURE: 122 MMHG | WEIGHT: 133.4 LBS | OXYGEN SATURATION: 91 % | DIASTOLIC BLOOD PRESSURE: 82 MMHG | BODY MASS INDEX: 23.64 KG/M2 | HEART RATE: 75 BPM

## 2023-04-04 DIAGNOSIS — G47.19 EXCESSIVE DAYTIME SLEEPINESS: ICD-10-CM

## 2023-04-04 DIAGNOSIS — R06.00 DYSPNEA, UNSPECIFIED TYPE: ICD-10-CM

## 2023-04-04 DIAGNOSIS — Z87.01 HISTORY OF PNEUMONIA: ICD-10-CM

## 2023-04-04 DIAGNOSIS — J18.9 PNEUMONITIS: ICD-10-CM

## 2023-04-04 DIAGNOSIS — J44.9 CHRONIC OBSTRUCTIVE PULMONARY DISEASE, UNSPECIFIED COPD TYPE (HCC): Primary | ICD-10-CM

## 2023-04-04 DIAGNOSIS — J45.20 MILD INTERMITTENT ASTHMA WITHOUT COMPLICATION: ICD-10-CM

## 2023-04-04 NOTE — ASSESSMENT & PLAN NOTE
This has been stable  Patient is maintained on Wixela 250-50, 1 puff twice daily  She does rinse her mouth out after each use  She has an albuterol rescue inhaler that she rarely needs to use
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
## 2023-04-04 NOTE — ASSESSMENT & PLAN NOTE
Patient was to have a diagnostic sleep study completed in 2021 however she did not do this  Her [place] sleepiness scale is 6 at today's visit  She states that her daytime sleepiness has improved  I have reordered the diagnostic sleep study for the patient as she has also had hypercapnia in the past and may benefit from PAP therapy

## 2023-04-04 NOTE — ASSESSMENT & PLAN NOTE
This has been stable  She is maintained on Wixela 250-50, 1 puff twice daily  She rarely needs to use her rescue inhaler

## 2023-04-04 NOTE — ASSESSMENT & PLAN NOTE
Patient continues to smoke three quarters of a pack a day  She would like to stop smoking altogether  She is refusing the patch, gum or any medications to assist her at this time  She has about an 8-pack-year smoking history, she does not qualify for yearly lung cancer screenings

## 2023-04-07 ENCOUNTER — TELEPHONE (OUTPATIENT)
Dept: SLEEP CENTER | Facility: CLINIC | Age: 68
End: 2023-04-07

## 2023-04-07 NOTE — TELEPHONE ENCOUNTER
----- Message from Ana Rosa Bui DO sent at 4/5/2023  1:20 PM EDT -----  approved  ----- Message -----  From: Anderson De Santiago  Sent: 4/5/2023  11:24 AM EDT  To: Sleep Medicine [facility] Provider    This Diagnostic sleep study needs approval      If approved please sign and return to clerical pool  If denied please include reasons why  Also provide alternative testing if warranted  Please sign and return to clerical pool

## 2023-04-28 DIAGNOSIS — K21.00 GASTRO-ESOPHAGEAL REFLUX DISEASE WITH ESOPHAGITIS: ICD-10-CM

## 2023-04-28 DIAGNOSIS — I10 HYPERTENSION, UNSPECIFIED TYPE: ICD-10-CM

## 2023-04-28 RX ORDER — LOSARTAN POTASSIUM 25 MG/1
25 TABLET ORAL DAILY
Qty: 90 TABLET | Refills: 1 | Status: SHIPPED | OUTPATIENT
Start: 2023-04-28

## 2023-04-28 RX ORDER — PANTOPRAZOLE SODIUM 40 MG/1
TABLET, DELAYED RELEASE ORAL
Qty: 90 TABLET | Refills: 1 | Status: SHIPPED | OUTPATIENT
Start: 2023-04-28

## 2023-05-01 ENCOUNTER — OFFICE VISIT (OUTPATIENT)
Dept: FAMILY MEDICINE CLINIC | Facility: CLINIC | Age: 68
End: 2023-05-01

## 2023-05-01 ENCOUNTER — OFFICE VISIT (OUTPATIENT)
Dept: CARDIOLOGY CLINIC | Facility: CLINIC | Age: 68
End: 2023-05-01

## 2023-05-01 VITALS
HEART RATE: 80 BPM | BODY MASS INDEX: 23.99 KG/M2 | DIASTOLIC BLOOD PRESSURE: 80 MMHG | HEIGHT: 63 IN | WEIGHT: 135.4 LBS | RESPIRATION RATE: 16 BRPM | SYSTOLIC BLOOD PRESSURE: 128 MMHG | TEMPERATURE: 98 F

## 2023-05-01 VITALS
HEART RATE: 81 BPM | OXYGEN SATURATION: 99 % | BODY MASS INDEX: 23.92 KG/M2 | DIASTOLIC BLOOD PRESSURE: 82 MMHG | SYSTOLIC BLOOD PRESSURE: 120 MMHG | HEIGHT: 63 IN | WEIGHT: 135 LBS

## 2023-05-01 DIAGNOSIS — R06.00 DYSPNEA, UNSPECIFIED TYPE: ICD-10-CM

## 2023-05-01 DIAGNOSIS — F41.8 DEPRESSION WITH ANXIETY: ICD-10-CM

## 2023-05-01 DIAGNOSIS — K12.1 MOUTH ULCERS: Primary | ICD-10-CM

## 2023-05-01 DIAGNOSIS — F10.21 HISTORY OF ALCOHOL DEPENDENCE (HCC): ICD-10-CM

## 2023-05-01 DIAGNOSIS — F17.200 NICOTINE DEPENDENCE, UNCOMPLICATED, UNSPECIFIED NICOTINE PRODUCT TYPE: ICD-10-CM

## 2023-05-01 DIAGNOSIS — R05.9 COUGH, UNSPECIFIED TYPE: ICD-10-CM

## 2023-05-01 DIAGNOSIS — E78.5 DYSLIPIDEMIA: ICD-10-CM

## 2023-05-01 DIAGNOSIS — Z72.0 TOBACCO USE: ICD-10-CM

## 2023-05-01 DIAGNOSIS — J44.9 CHRONIC OBSTRUCTIVE PULMONARY DISEASE, UNSPECIFIED COPD TYPE (HCC): ICD-10-CM

## 2023-05-01 DIAGNOSIS — F10.21 HISTORY OF ALCOHOLISM (HCC): ICD-10-CM

## 2023-05-01 DIAGNOSIS — Z86.14 HISTORY OF MRSA INFECTION: ICD-10-CM

## 2023-05-01 DIAGNOSIS — I10 HYPERTENSION, UNSPECIFIED TYPE: ICD-10-CM

## 2023-05-01 RX ORDER — DOXYCYCLINE HYCLATE 100 MG
100 TABLET ORAL 2 TIMES DAILY
Qty: 20 TABLET | Refills: 0 | Status: SHIPPED | OUTPATIENT
Start: 2023-05-01 | End: 2023-05-11

## 2023-05-01 RX ORDER — ROSUVASTATIN CALCIUM 5 MG/1
5 TABLET, COATED ORAL DAILY
Qty: 90 TABLET | Refills: 1 | Status: SHIPPED | OUTPATIENT
Start: 2023-05-01

## 2023-05-01 RX ORDER — DULOXETIN HYDROCHLORIDE 60 MG/1
60 CAPSULE, DELAYED RELEASE ORAL 2 TIMES DAILY
COMMUNITY
Start: 2023-04-24

## 2023-05-01 NOTE — PROGRESS NOTES
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 32 8 mCi  Date 05/31/2018 05/31/2018    Radiopharmaceutical was administered 7 min, 27 sec into the stress protocol  There was 1 min of exercise after the injection  MYOCARDIAL PERFUSION IMAGING:  The image quality was good  Rotating projection images reveal mild subdiaphragmatic activity  PERFUSION DEFECTS:  -  There was a small, partially reversible myocardial perfusion defect of the anteroseptal wall likely due to attenuation from breast tissue  GATED SPECT:  The calculated left ventricular ejection fraction was 65 %  Left ventricular ejection fraction was within normal limits by visual estimate  There was no left ventricular regional abnormality  SUMMARY:  -  Stress results: Duration of exercise was 8 min and 35 sec  Maximal work rate was 10 1 METs  Maximal heart rate during stress was 142 bpm ( 89 % of maximal predicted heart rate)   Target heart rate was achieved  Maximal systolic blood  pressure during stress was 174 mmHg  There was no chest pain during stress  -  ECG conclusions: The stress ECG was negative for ischemia and normal   -  Perfusion imaging: There was a small, partially reversible myocardial perfusion defect of the anteroseptal wall likely due to attenuation from breast tissue   -  Gated SPECT: The calculated left ventricular ejection fraction was 65 %  Left ventricular ejection fraction was within normal limits by visual estimate  There was no left ventricular regional abnormality   -  Impressions and recommendations: Likely normal study after maximal exercise  IMPRESSIONS: Likely normal study after maximal exercise  Myocardial perfusion imaging was normal at rest and with stress      Prepared and signed by    Kellie Marcus MD  Signed 06/02/2018 18:16:10         Lab Review   Lab Results   Component Value Date    WBC 7 20 03/20/2023    HGB 10 5 (L) 03/20/2023    HCT 32 1 (L) 03/20/2023    MCV 92 03/20/2023    RDW 14 7 03/20/2023     (H) 03/20/2023     BMP:  Lab Results   Component Value Date    SODIUM 134 (L) 03/20/2023    K 4 5 03/20/2023     03/20/2023    CO2 29 03/20/2023    ANIONGAP 11 2 12/17/2015    BUN 18 03/20/2023    CREATININE 1 12 03/20/2023    GLUC 82 02/20/2023    GLUF 85 03/20/2023    CALCIUM 9 6 03/20/2023    CORRECTEDCA 8 8 02/20/2023    EGFR 51 03/20/2023    MG 2 5 03/20/2023     LFT:  Lab Results   Component Value Date    AST 16 03/20/2023    ALT 17 03/20/2023    ALKPHOS 78 03/20/2023    TP 7 5 03/20/2023    ALB 3 5 03/20/2023      Lab Results   Component Value Date    SSV5AGGTZVXM 0 863 03/20/2023     Lab Results   Component Value Date    HGBA1C 5 5 07/06/2020     Lipid Profile:   Lab Results   Component Value Date    CHOLESTEROL 133 03/20/2023    HDL 65 03/20/2023    LDLCALC 59 03/20/2023    TRIG 44 03/20/2023     Lab Results   Component Value Date    CHOLESTEROL 133 03/20/2023    CHOLESTEROL 166 03/17/2021     Lab "Results   Component Value Date    TROPONINI <0 02 07/23/2020       The 10-year ASCVD risk score (Johny ELY, et al , 2019) is: 10%    Values used to calculate the score:      Age: 79 years      Sex: Female      Is Non- : No      Diabetic: No      Tobacco smoker: Yes      Systolic Blood Pressure: 992 mmHg      Is BP treated: Yes      HDL Cholesterol: 65 mg/dL      Total Cholesterol: 133 mg/dL        Dr Rudy Casas MD [facility] [place]      \"This note has been constructed using a voice recognition system  Therefore there may be syntax, spelling, and/or grammatical errors  Please call if you have any questions   \"  "

## 2023-05-03 ENCOUNTER — HOSPITAL ENCOUNTER (OUTPATIENT)
Dept: SLEEP CENTER | Facility: CLINIC | Age: 68
Discharge: HOME | End: 2023-05-03

## 2023-05-03 DIAGNOSIS — G47.19 EXCESSIVE DAYTIME SLEEPINESS: ICD-10-CM

## 2023-05-04 DIAGNOSIS — J44.9 CHRONIC OBSTRUCTIVE PULMONARY DISEASE, UNSPECIFIED COPD TYPE (HCC): Primary | ICD-10-CM

## 2023-05-04 DIAGNOSIS — G47.33 OSA (OBSTRUCTIVE SLEEP APNEA): ICD-10-CM

## 2023-05-04 NOTE — PROGRESS NOTES
Sleep Study Documentation    Pre-Sleep Study       Sleep testing procedure explained to patient:YES    Patient napped prior to study:YES- more than 30 minutes  Napped after 2PM: no    Caffeine:Dayshift worker after 12PM   Caffeine use:YES- coffee  6 to 18 ounces    Alcohol:Dayshift workers after 5PM: Alcohol use:NO    Typical day for patient:YES       Study Documentation    Sleep Study Indications: EDS, Unrefreshing Sleep,     Sleep Study: Diagnostic   Snore:Mild  Supplemental O2: no    O2 flow rate (L/min) range   O2 flow rate (L/min) final   Minimum SaO2 84%  Baseline SaO2 94%        Mode of Therapy:    EKG abnormalities: no     EEG abnormalities: no    Sleep Study Recorded < 2 hours: N/A    Sleep Study Recorded > 2 hours but incomplete study: N/A    Sleep Study Recorded 6 hours but no sleep obtained: NO    Patient classification: unemployed       Post-Sleep Study    Medication used at bedtime or during sleep study:YES other prescription medications    Patient reports time it took to fall asleep:less than 20 minutes    Patient reports waking up during study:1 to 2 times  Patient reports returning to sleep without difficulty  Patient reports sleeping 6 to 8 hours without dreaming  Patient reports sleep during study:better than usual    Patient rated sleepiness: Not sleepy or tired    PAP treatment:no

## 2023-05-05 NOTE — PROGRESS NOTES
Assessment/Plan:    1  Mouth ulcers  Assessment & Plan:  Has upcoming dental check  Encouraged smoking cessation    Orders:  -     doxycycline hyclate (VIBRA-TABS) 100 mg tablet; Take 1 tablet (100 mg total) by mouth 2 (two) times a day for 10 days    2  Cough, unspecified type  -     doxycycline hyclate (VIBRA-TABS) 100 mg tablet; Take 1 tablet (100 mg total) by mouth 2 (two) times a day for 10 days    3  Nicotine dependence, uncomplicated, unspecified nicotine product type    4  History of alcoholism ([facility] [address] )    5  History of MRSA infection          Subjective:      Patient ID: Delfina Gutierrez is a 79 y o  female      Chief Complaint   Patient presents with    Mouth Lesions     Started saturday , she has  a a spot on her tongue , lips inside on her cheeks        HPI     C/o mouth lesions while at cardio visit today  Brought in for same day appt to evaluate  Still also with lingering cough  No fever or gi sxs  Has not yet gone into rehab disc at previous visit due to health concerns  States has continued to abstain from ETOH use but does cont to smoke    The following portions of the patient's history were reviewed and updated as appropriate: allergies, current medications, past family history, past medical history, past social history, past surgical history and problem list     Review of Systems    Per hpi    Current Outpatient Medications   Medication Sig Dispense Refill    doxycycline hyclate (VIBRA-TABS) 100 mg tablet Take 1 tablet (100 mg total) by mouth 2 (two) times a day for 10 days 20 tablet 0    albuterol (ProAir HFA) 90 mcg/act inhaler Inhale 2 puffs every 4 (four) hours as needed for wheezing 8 5 g 5    atoMOXetine (STRATTERA) 40 mg capsule Take 40 mg by mouth daily (Patient not taking: Reported on 5/1/2023)      buprenorphine (SUBUTEX) 8 mg 8 mg 3 (three) times a day SL       Cholecalciferol (Vitamin D) 50 MCG (2000 UT) CAPS Take by mouth daily at bedtime      DULoxetine (CYMBALTA) 30 mg "delayed release capsule TAKE 1 CAPSULE BY MOUTH TWICE A DAY (Patient not taking: Reported on 5/1/2023) 180 capsule 1    DULoxetine (CYMBALTA) 60 mg delayed release capsule Take 60 mg by mouth 2 (two) times a day      fluticasone-salmeterol (ADVAIR, WIXELA) 250-50 mcg/dose inhaler Inhale 1 puff 2 (two) times a day 1 Inhaler 0    folic acid (FOLVITE) 1 mg tablet Take 1 tablet (1 mg total) by mouth daily 90 tablet 1    gabapentin (NEURONTIN) 400 mg capsule TAKE 2 CAPSULES (800 MG TOTAL) BY MOUTH 2 (TWO) TIMES A DAY (Patient taking differently: Take 800 mg by mouth 2 (two) times a day One in the morning one tablet at night) 120 capsule 1    Lidocaine Viscous HCl (XYLOCAINE) 2 % mucosal solution SWISH AND SPIT 10 ML EVERY 6 (SIX) HOURS AS NEEDED FOR MOUTH PAIN OR DISCOMFORT OR MUCOSITIS 180 mL 2    losartan (COZAAR) 25 mg tablet TAKE 1 TABLET (25 MG TOTAL) BY MOUTH DAILY  90 tablet 1    MELATONIN PO Take 3 mg by mouth daily at bedtime as needed        Multiple Vitamins-Minerals (multivitamin) tablet Take 1 tablet by mouth daily 90 tablet 1    multivitamin (THERAGRAN) TABS Take 1 tablet by mouth daily at bedtime Last dose 11/14/21  (Patient not taking: Reported on 5/1/2023)      naloxone (NARCAN) 4 mg/0 1 mL nasal spray 4 mg into each nostril as needed (Patient not taking: Reported on 5/1/2023)      pantoprazole (PROTONIX) 40 mg tablet TAKE 1 TABLET BY MOUTH EVERY DAY IN THE MORNING 90 tablet 1    rosuvastatin (CRESTOR) 5 mg tablet Take 1 tablet (5 mg total) by mouth daily Take medication in the evening after food 90 tablet 1    SUMAtriptan (IMITREX) 100 mg tablet Take 1 tablet (100 mg total) by mouth once as needed for migraine 9 tablet 0     No current facility-administered medications for this visit         Objective:    /80 (BP Location: Left arm, Patient Position: Sitting, Cuff Size: Standard)   Pulse 80   Temp 98 °F (36 7 °C)   Resp 16   Ht 5' 3\" (1 6 m)   Wt 61 4 kg (135 lb 6 4 oz)   BMI " 23 99 kg/m²        Physical Exam  Vitals and nursing note reviewed  Constitutional:       General: She is not in acute distress  HENT:      Nose: No congestion  Mouth/Throat:      Pharynx: No oropharyngeal exudate or posterior oropharyngeal erythema  Comments: +Apthous ulcers lower lip, tongue and inner buccal mucosa  Eyes:      General: No scleral icterus  Conjunctiva/sclera: Conjunctivae normal    Cardiovascular:      Rate and Rhythm: Normal rate and regular rhythm  Pulmonary:      Effort: Pulmonary effort is normal  No respiratory distress  Comments: Scattered expiratory wheezes--no localization  Abdominal:      General: Bowel sounds are normal       Palpations: Abdomen is soft  Tenderness: There is no abdominal tenderness  Skin:     General: Skin is warm and dry  Coloration: Skin is not jaundiced  Neurological:      Mental Status: She is alert and oriented to person, place, and time             Opal Wylie MD

## 2023-05-07 PROBLEM — R05.9 COUGH: Status: RESOLVED | Noted: 2021-06-15 | Resolved: 2023-05-07

## 2023-05-17 ENCOUNTER — TELEPHONE (OUTPATIENT)
Dept: SLEEP CENTER | Facility: CLINIC | Age: 68
End: 2023-05-17

## 2023-05-17 NOTE — TELEPHONE ENCOUNTER
Patient of Dr Jonathon Eaton in the [facility] pulmonary office  Left message for patient to call office to review sleep study results  Sleep study resulted and shows mild KENDRA and hypoxia  CPAP study ordered and needs to be scheduled  Also needs to schedule sleep consult

## 2023-05-21 PROBLEM — Z00.00 MEDICARE ANNUAL WELLNESS VISIT, SUBSEQUENT: Status: RESOLVED | Noted: 2020-08-23 | Resolved: 2023-05-21

## 2023-06-09 ENCOUNTER — OFFICE VISIT (OUTPATIENT)
Dept: FAMILY MEDICINE CLINIC | Facility: CLINIC | Age: 68
End: 2023-06-09
Payer: MEDICARE

## 2023-06-09 VITALS
SYSTOLIC BLOOD PRESSURE: 130 MMHG | WEIGHT: 131.6 LBS | HEART RATE: 90 BPM | OXYGEN SATURATION: 96 % | BODY MASS INDEX: 23.32 KG/M2 | DIASTOLIC BLOOD PRESSURE: 66 MMHG | TEMPERATURE: 97.5 F | RESPIRATION RATE: 16 BRPM | HEIGHT: 63 IN

## 2023-06-09 DIAGNOSIS — K04.7 DENTAL INFECTION: Primary | ICD-10-CM

## 2023-06-09 DIAGNOSIS — I10 HYPERTENSION, UNSPECIFIED TYPE: ICD-10-CM

## 2023-06-09 DIAGNOSIS — F10.10 ALCOHOL ABUSE, EPISODIC: ICD-10-CM

## 2023-06-09 DIAGNOSIS — K12.1 MOUTH ULCERS: ICD-10-CM

## 2023-06-09 PROCEDURE — 99213 OFFICE O/P EST LOW 20 MIN: CPT | Performed by: FAMILY MEDICINE

## 2023-06-09 RX ORDER — LIDOCAINE HYDROCHLORIDE 20 MG/ML
10 SOLUTION OROPHARYNGEAL 2 TIMES DAILY PRN
Qty: 180 ML | Refills: 2 | Status: SHIPPED | OUTPATIENT
Start: 2023-06-09

## 2023-06-09 RX ORDER — AMOXICILLIN 875 MG/1
875 TABLET, COATED ORAL 2 TIMES DAILY
Qty: 30 TABLET | Refills: 0 | Status: SHIPPED | OUTPATIENT
Start: 2023-06-09 | End: 2023-06-24

## 2023-06-09 NOTE — PROGRESS NOTES
Assessment/Plan:    1  Dental infection  -     amoxicillin (AMOXIL) 875 mg tablet; Take 1 tablet (875 mg total) by mouth 2 (two) times a day for 15 days    2  Mouth ulcers  -     amoxicillin (AMOXIL) 875 mg tablet; Take 1 tablet (875 mg total) by mouth 2 (two) times a day for 15 days  -     Lidocaine Viscous HCl (XYLOCAINE) 2 % mucosal solution; Swish and spit 10 mL 2 (two) times a day as needed for mouth pain or discomfort or mucositis    3  Hypertension, unspecified type  Assessment & Plan:  BP in range-cont  same      4  Alcohol abuse, episodic          Subjective:      Patient ID: Tyrone Montes is a 79 y o  female  Chief Complaint   Patient presents with   • Dental Problem       Dental Pain   This is a recurrent problem  The current episode started in the past 7 days  The problem has been gradually worsening  The pain is moderate  Associated symptoms include facial pain, oral bleeding and sinus pressure  Pertinent negatives include no difficulty swallowing or fever  She has tried acetaminophen for the symptoms  +mouth ulcers    BP in range    Recent relapse and release from [address] [place] [address] Rd  Will be going into another facility in [facility] for alcohol rehab    The following portions of the patient's history were reviewed and updated as appropriate: allergies, current medications, past family history, past medical history, past social history, past surgical history and problem list     Review of Systems   Constitutional: Negative for fever  HENT: Positive for sinus pressure            Current Outpatient Medications   Medication Sig Dispense Refill   • amoxicillin (AMOXIL) 875 mg tablet Take 1 tablet (875 mg total) by mouth 2 (two) times a day for 15 days 30 tablet 0   • Lidocaine Viscous HCl (XYLOCAINE) 2 % mucosal solution Swish and spit 10 mL 2 (two) times a day as needed for mouth pain or discomfort or mucositis 180 mL 2   • albuterol (ProAir HFA) 90 mcg/act inhaler Inhale 2 puffs every 4 (four) hours as needed for wheezing 8 5 g 5   • atoMOXetine (STRATTERA) 40 mg capsule Take 40 mg by mouth daily (Patient not taking: Reported on 5/1/2023)     • buprenorphine (SUBUTEX) 8 mg 8 mg 3 (three) times a day SL      • Cholecalciferol (Vitamin D) 50 MCG (2000 UT) CAPS Take by mouth daily at bedtime     • DULoxetine (CYMBALTA) 30 mg delayed release capsule TAKE 1 CAPSULE BY MOUTH TWICE A DAY (Patient not taking: Reported on 5/1/2023) 180 capsule 1   • DULoxetine (CYMBALTA) 60 mg delayed release capsule Take 60 mg by mouth 2 (two) times a day     • fluticasone-salmeterol (ADVAIR, WIXELA) 250-50 mcg/dose inhaler Inhale 1 puff 2 (two) times a day 1 Inhaler 0   • folic acid (FOLVITE) 1 mg tablet Take 1 tablet (1 mg total) by mouth daily 90 tablet 1   • gabapentin (NEURONTIN) 400 mg capsule TAKE 2 CAPSULES (800 MG TOTAL) BY MOUTH 2 (TWO) TIMES A DAY (Patient taking differently: Take 800 mg by mouth 2 (two) times a day One in the morning one tablet at night) 120 capsule 1   • losartan (COZAAR) 25 mg tablet TAKE 1 TABLET (25 MG TOTAL) BY MOUTH DAILY  90 tablet 1   • MELATONIN PO Take 3 mg by mouth daily at bedtime as needed       • Multiple Vitamins-Minerals (multivitamin) tablet Take 1 tablet by mouth daily 90 tablet 1   • multivitamin (THERAGRAN) TABS Take 1 tablet by mouth daily at bedtime Last dose 11/14/21  (Patient not taking: Reported on 5/1/2023)     • naloxone (NARCAN) 4 mg/0 1 mL nasal spray 4 mg into each nostril as needed (Patient not taking: Reported on 5/1/2023)     • pantoprazole (PROTONIX) 40 mg tablet TAKE 1 TABLET BY MOUTH EVERY DAY IN THE MORNING 90 tablet 1   • rosuvastatin (CRESTOR) 5 mg tablet Take 1 tablet (5 mg total) by mouth daily Take medication in the evening after food 90 tablet 1   • SUMAtriptan (IMITREX) 100 mg tablet Take 1 tablet (100 mg total) by mouth once as needed for migraine 9 tablet 0     No current facility-administered medications for this visit         Objective:    /66 (BP "Location: Left arm, Patient Position: Sitting, Cuff Size: Standard)   Pulse 90   Temp 97 5 °F (36 4 °C)   Resp 16   Ht 5' 3\" (1 6 m)   Wt 59 7 kg (131 lb 9 6 oz)   SpO2 96%   BMI 23 31 kg/m²        Physical Exam  Vitals and nursing note reviewed  Constitutional:       General: She is not in acute distress  HENT:      Nose: Congestion present  Mouth/Throat:      Pharynx: Posterior oropharyngeal erythema present  No oropharyngeal exudate  Comments: +apthous ulcers  +gingivitis  +dental caries  Cardiovascular:      Rate and Rhythm: Normal rate and regular rhythm  Pulmonary:      Effort: Pulmonary effort is normal  No respiratory distress  Breath sounds: Normal breath sounds  Skin:     General: Skin is warm and dry  Neurological:      Mental Status: She is alert               Esdras Dan MD  "

## 2023-06-12 DIAGNOSIS — M54.16 LUMBAR RADICULOPATHY: ICD-10-CM

## 2023-06-12 RX ORDER — GABAPENTIN 400 MG/1
800 CAPSULE ORAL 2 TIMES DAILY
Qty: 120 CAPSULE | Refills: 1 | Status: SHIPPED | OUTPATIENT
Start: 2023-06-12

## 2023-07-24 ENCOUNTER — OFFICE VISIT (OUTPATIENT)
Dept: FAMILY MEDICINE CLINIC | Facility: CLINIC | Age: 68
End: 2023-07-24
Payer: MEDICARE

## 2023-07-24 VITALS
RESPIRATION RATE: 16 BRPM | HEIGHT: 63 IN | TEMPERATURE: 98 F | DIASTOLIC BLOOD PRESSURE: 82 MMHG | BODY MASS INDEX: 23.14 KG/M2 | WEIGHT: 130.6 LBS | SYSTOLIC BLOOD PRESSURE: 132 MMHG | HEART RATE: 76 BPM

## 2023-07-24 DIAGNOSIS — R11.2 NAUSEA AND VOMITING, UNSPECIFIED VOMITING TYPE: ICD-10-CM

## 2023-07-24 DIAGNOSIS — B02.9 HERPES ZOSTER WITHOUT COMPLICATION: Primary | ICD-10-CM

## 2023-07-24 DIAGNOSIS — Z12.12 ENCOUNTER FOR COLORECTAL CANCER SCREENING: ICD-10-CM

## 2023-07-24 DIAGNOSIS — Z12.11 ENCOUNTER FOR COLORECTAL CANCER SCREENING: ICD-10-CM

## 2023-07-24 PROCEDURE — 99213 OFFICE O/P EST LOW 20 MIN: CPT | Performed by: FAMILY MEDICINE

## 2023-07-24 RX ORDER — ONDANSETRON 4 MG/1
4 TABLET, FILM COATED ORAL EVERY 8 HOURS PRN
Qty: 15 TABLET | Refills: 0 | Status: SHIPPED | OUTPATIENT
Start: 2023-07-24

## 2023-07-24 RX ORDER — VALACYCLOVIR HYDROCHLORIDE 500 MG/1
500 TABLET, FILM COATED ORAL 3 TIMES DAILY
Qty: 21 TABLET | Refills: 0 | Status: SHIPPED | OUTPATIENT
Start: 2023-07-24 | End: 2023-07-31

## 2023-07-24 RX ORDER — METOPROLOL SUCCINATE 25 MG/1
25 TABLET, EXTENDED RELEASE ORAL EVERY OTHER DAY
COMMUNITY
Start: 2023-06-06

## 2023-07-24 NOTE — PROGRESS NOTES
Assessment/Plan:    1. Herpes zoster without complication  -     valACYclovir (VALTREX) 500 mg tablet; Take 1 tablet (500 mg total) by mouth 3 (three) times a day for 7 days    2. Nausea and vomiting, unspecified vomiting type  -     ondansetron (Zofran) 4 mg tablet; Take 1 tablet (4 mg total) by mouth every 8 (eight) hours as needed for nausea or vomiting    3. Encounter for colorectal cancer screening  -     Ambulatory Referral to Gastroenterology; Future    4. BMI 23.0-23.9, adult            Patient Instructions   Shingles   AMBULATORY CARE:   Shingles  is a viral infection that causes a painful rash. Shingles is caused by the varicella-zoster virus. This is the same virus that causes chickenpox. The virus stays in your body after you have chickenpox, without causing any symptoms. Shingles occurs when the virus becomes active again. The active virus travels along a nerve to your skin and causes a rash. The rash usually lasts 2 to 3 weeks. Most people have shingles one time, but it is possible to develop it again. Common signs and symptoms:  Shingles can appear anywhere on your body, but it is most common on your torso. A line of painful blisters develops on the left or right side of your torso. The rash starts as red dots that become blisters filled with fluid. The blisters usually grow bigger, become filled with pus, and then crust over after a few days. You may also have any of the following:  Severe tiredness and muscle weakness    Pain when your skin is lightly touched    Headache    Fever    Eye pain when exposed to light       Call your local emergency number (911 in the [address]) if:   You have trouble moving your arms, legs, or face. You become confused, or have trouble speaking. You have a seizure. Seek care immediately if:   You have weakness in an arm or leg. You have dizziness, a severe headache, or hearing or vision loss.     You have painful, red, warm skin around the blisters, or the blisters drain pus. Your neck is stiff or you have trouble moving it. Call your doctor if:   A painful rash appears near your eye. The rash spreads to more areas and your pain worsens. You feel weak or have a headache. You have a cough, chills, or a fever. You have abdominal pain or nausea, or you are vomiting. You have questions or concerns about your condition or care. Treatment:  Shingles cannot be cured. The following medicines can decrease your pain and help prevent complications:  Antiviral medicine  fights the virus causing your shingles. Start this medicine within 3 days after you notice the first symptoms. This may help prevent nerve pain. A shingles outbreak can cause nerve pain called post-herpetic neuralgia (PHN). PHN can last a long time after you heal from shingles. Topical anesthetics  are used to numb the skin and decrease pain. They can be a cream, gel, spray, or patch. Anticonvulsants and antidepressants  decrease nerve pain and may help you sleep at night. Antihistamines  may help decrease itching. Acetaminophen  decreases pain and fever. It is available without a doctor's order. Ask how much to take and how often to take it. Follow directions. Read the labels of all other medicines you are using to see if they also contain acetaminophen, or ask your doctor or pharmacist. Acetaminophen can cause liver damage if not taken correctly. NSAIDs , such as ibuprofen, help decrease swelling, pain, and fever. This medicine is available with or without a doctor's order. NSAIDs can cause stomach bleeding or kidney problems in certain people. If you take blood thinner medicine, always ask your healthcare provider if NSAIDs are safe for you. Always read the medicine label and follow directions. A steroid and numbing medicine injection  may decrease severe pain that does not get better with other medicines. Self-care:   Apply a cool, wet compress  or take a cool bath.  This may help decrease itching and pain. Keep your rash clean and dry. Cover your rash with a bandage. Do not use bandages with adhesive. Clothes may irritate your skin. Prevent the spread of the shingles virus:  The virus can be passed to a person who has never had chickenpox. This usually happens if the other person comes in contact with your open sores. This person may get chickenpox, but not shingles. You are contagious until your blisters scab over. Stay away from people who have not had chickenpox or the chickenpox vaccine. Avoid pregnant women, newborns, and people with weak immune systems. They have a higher risk of infection. Wash your hands often. Wash your hands several times each day. Wash after you use the bathroom, change a child's diaper, and before you prepare or eat food. Use soap and water every time. Rub your soapy hands together, lacing your fingers. Wash the front and back of your hands, and in between your fingers. Use the fingers of one hand to scrub under the fingernails of the other hand. Wash for at least 20 seconds. Rinse with warm, running water for several seconds. Then dry your hands with a clean towel or paper towel. Use hand  that contains alcohol if soap and water are not available. Do not touch your eyes, nose, or mouth without washing your hands first.         Cover a sneeze or cough. Use a tissue that covers your mouth and nose. Throw the tissue away in a trash can right away. Use the bend of your arm if a tissue is not available. Wash your hands well with soap and water or use a hand . Prevent shingles or another shingles outbreak:   A vaccine may be given to help prevent shingles. You can get the vaccine even if you already had shingles. The vaccine comes in 2 forms. A 2-dose vaccine is usually given to adults 48 years or older. A 1-dose vaccine may be given to adults 61 years or older. The vaccine can help prevent a future outbreak.  If you do get shingles again, the vaccine can keep it from becoming severe. Ask your healthcare provider about other vaccines you may need. Follow up with your doctor as directed:  Write down your questions so you remember to ask them during your visits. For more information:   [facility]  [address]  [place]  Phone: 7- [phone] - [phone]  Phone: 9- [phone]  Web Address: [employer]    © Copyright Selena Rizvimanny 2022 Information is for End User's use only and may not be sold, redistributed or otherwise used for commercial purposes. The above information is an  only. It is not intended as medical advice for individual conditions or treatments. Talk to your doctor, nurse or pharmacist before following any medical regimen to see if it is safe and effective for you. Subjective:      Patient ID: Deny Bartlett is a 79 y.o. female. Chief Complaint   Patient presents with   • Headache     And nausea for the pasted 2 days , dizziness    • Rash     Left side of back itchy and swollen  started about a week ago        HPI    C/o rash--painful, itchy and swollen left side of back -x approx a week  +Assoc.  H/a, nausea, dizziness  No fever  BP in range    The following portions of the patient's history were reviewed and updated as appropriate: allergies, current medications, past family history, past medical history, past social history, past surgical history and problem list.    Review of Systems    Per hpi    Current Outpatient Medications   Medication Sig Dispense Refill   • albuterol (ProAir HFA) 90 mcg/act inhaler Inhale 2 puffs every 4 (four) hours as needed for wheezing 8.5 g 5   • buprenorphine (SUBUTEX) 8 mg 8 mg 3 (three) times a day SL      • Cholecalciferol (Vitamin D) 50 MCG (2000 UT) CAPS Take by mouth daily at bedtime     • DULoxetine (CYMBALTA) 30 mg delayed release capsule TAKE 1 CAPSULE BY MOUTH TWICE A  capsule 1   • DULoxetine (CYMBALTA) 60 mg delayed release capsule Take 60 mg by mouth 2 (two) times a day     • fluticasone-salmeterol (ADVAIR, WIXELA) 250-50 mcg/dose inhaler Inhale 1 puff 2 (two) times a day 1 Inhaler 0   • folic acid (FOLVITE) 1 mg tablet Take 1 tablet (1 mg total) by mouth daily 90 tablet 1   • gabapentin (NEURONTIN) 400 mg capsule Take 2 capsules (800 mg total) by mouth 2 (two) times a day (Patient taking differently: Take 800 mg by mouth 2 (two) times a day One tablet 2x times) 120 capsule 1   • Lidocaine Viscous HCl (XYLOCAINE) 2 % mucosal solution Swish and spit 10 mL 2 (two) times a day as needed for mouth pain or discomfort or mucositis 180 mL 2   • losartan (COZAAR) 25 mg tablet TAKE 1 TABLET (25 MG TOTAL) BY MOUTH DAILY. 90 tablet 1   • MELATONIN PO Take 3 mg by mouth daily at bedtime as needed       • Multiple Vitamins-Minerals (multivitamin) tablet Take 1 tablet by mouth daily 90 tablet 1   • multivitamin (THERAGRAN) TABS Take 1 tablet by mouth daily at bedtime Last dose 11/14/21     • naloxone (NARCAN) 4 mg/0.1 mL nasal spray 4 mg into each nostril as needed     • ondansetron (Zofran) 4 mg tablet Take 1 tablet (4 mg total) by mouth every 8 (eight) hours as needed for nausea or vomiting 15 tablet 0   • pantoprazole (PROTONIX) 40 mg tablet TAKE 1 TABLET BY MOUTH EVERY DAY IN THE MORNING 90 tablet 1   • rosuvastatin (CRESTOR) 5 mg tablet Take 1 tablet (5 mg total) by mouth daily Take medication in the evening after food 90 tablet 1   • SUMAtriptan (IMITREX) 100 mg tablet Take 1 tablet (100 mg total) by mouth once as needed for migraine 9 tablet 0   • valACYclovir (VALTREX) 500 mg tablet Take 1 tablet (500 mg total) by mouth 3 (three) times a day for 7 days 21 tablet 0   • metoprolol succinate (TOPROL-XL) 25 mg 24 hr tablet Take 25 mg by mouth every other day       No current facility-administered medications for this visit.        Objective:    /82   Pulse 76   Temp 98 °F (36.7 °C)   Resp 16   Ht 5' 3" (1.6 m)   Wt 59.2 kg (130 lb 9.6 oz)   BMI 23.13 kg/m²        Physical Exam  Vitals and nursing note reviewed. Constitutional:       General: She is not in acute distress. Appearance: Normal appearance. Cardiovascular:      Rate and Rhythm: Normal rate and regular rhythm. Pulmonary:      Effort: Pulmonary effort is normal.   Abdominal:      General: Bowel sounds are normal.      Palpations: Abdomen is soft. Tenderness: There is no abdominal tenderness. Skin:     General: Skin is warm and dry. Findings: Rash (left mid back-unilateral clusterof vesicular blistering lesions) present. Neurological:      General: No focal deficit present. Mental Status: She is alert and oriented to person, place, and time. Cranial Nerves: No cranial nerve deficit.              Jessica Blair MD

## 2023-07-24 NOTE — PATIENT INSTRUCTIONS
Shingles   AMBULATORY CARE:   Shingles  is a viral infection that causes a painful rash. Shingles is caused by the varicella-zoster virus. This is the same virus that causes chickenpox. The virus stays in your body after you have chickenpox, without causing any symptoms. Shingles occurs when the virus becomes active again. The active virus travels along a nerve to your skin and causes a rash. The rash usually lasts 2 to 3 weeks. Most people have shingles one time, but it is possible to develop it again. Common signs and symptoms:  Shingles can appear anywhere on your body, but it is most common on your torso. A line of painful blisters develops on the left or right side of your torso. The rash starts as red dots that become blisters filled with fluid. The blisters usually grow bigger, become filled with pus, and then crust over after a few days. You may also have any of the following:  Severe tiredness and muscle weakness    Pain when your skin is lightly touched    Headache    Fever    Eye pain when exposed to light       Call your local emergency number (911 in the [address]) if:   You have trouble moving your arms, legs, or face. You become confused, or have trouble speaking. You have a seizure. Seek care immediately if:   You have weakness in an arm or leg. You have dizziness, a severe headache, or hearing or vision loss. You have painful, red, warm skin around the blisters, or the blisters drain pus. Your neck is stiff or you have trouble moving it. Call your doctor if:   A painful rash appears near your eye. The rash spreads to more areas and your pain worsens. You feel weak or have a headache. You have a cough, chills, or a fever. You have abdominal pain or nausea, or you are vomiting. You have questions or concerns about your condition or care. Treatment:  Shingles cannot be cured.  The following medicines can decrease your pain and help prevent complications:  Antiviral medicine  fights the virus causing your shingles. Start this medicine within 3 days after you notice the first symptoms. This may help prevent nerve pain. A shingles outbreak can cause nerve pain called post-herpetic neuralgia (PHN). PHN can last a long time after you heal from shingles. Topical anesthetics  are used to numb the skin and decrease pain. They can be a cream, gel, spray, or patch. Anticonvulsants and antidepressants  decrease nerve pain and may help you sleep at night. Antihistamines  may help decrease itching. Acetaminophen  decreases pain and fever. It is available without a doctor's order. Ask how much to take and how often to take it. Follow directions. Read the labels of all other medicines you are using to see if they also contain acetaminophen, or ask your doctor or pharmacist. Acetaminophen can cause liver damage if not taken correctly. NSAIDs , such as ibuprofen, help decrease swelling, pain, and fever. This medicine is available with or without a doctor's order. NSAIDs can cause stomach bleeding or kidney problems in certain people. If you take blood thinner medicine, always ask your healthcare provider if NSAIDs are safe for you. Always read the medicine label and follow directions. A steroid and numbing medicine injection  may decrease severe pain that does not get better with other medicines. Self-care:   Apply a cool, wet compress  or take a cool bath. This may help decrease itching and pain. Keep your rash clean and dry. Cover your rash with a bandage. Do not use bandages with adhesive. Clothes may irritate your skin. Prevent the spread of the shingles virus:  The virus can be passed to a person who has never had chickenpox. This usually happens if the other person comes in contact with your open sores. This person may get chickenpox, but not shingles. You are contagious until your blisters scab over.  Stay away from people who have not had chickenpox or the chickenpox vaccine. Avoid pregnant women, newborns, and people with weak immune systems. They have a higher risk of infection. Wash your hands often. Wash your hands several times each day. Wash after you use the bathroom, change a child's diaper, and before you prepare or eat food. Use soap and water every time. Rub your soapy hands together, lacing your fingers. Wash the front and back of your hands, and in between your fingers. Use the fingers of one hand to scrub under the fingernails of the other hand. Wash for at least 20 seconds. Rinse with warm, running water for several seconds. Then dry your hands with a clean towel or paper towel. Use hand  that contains alcohol if soap and water are not available. Do not touch your eyes, nose, or mouth without washing your hands first.         Cover a sneeze or cough. Use a tissue that covers your mouth and nose. Throw the tissue away in a trash can right away. Use the bend of your arm if a tissue is not available. Wash your hands well with soap and water or use a hand . Prevent shingles or another shingles outbreak:   A vaccine may be given to help prevent shingles. You can get the vaccine even if you already had shingles. The vaccine comes in 2 forms. A 2-dose vaccine is usually given to adults 48 years or older. A 1-dose vaccine may be given to adults 61 years or older. The vaccine can help prevent a future outbreak. If you do get shingles again, the vaccine can keep it from becoming severe. Ask your healthcare provider about other vaccines you may need. Follow up with your doctor as directed:  Write down your questions so you remember to ask them during your visits.   For more information:   [facility]  [address]  [facility]  Phone: 9- [phone] - [phone]  Phone: 3- 174 [phone]  Web Address: [employer]    © Copyright [employer] 2022 Information is for End User's use only and may not be sold, redistributed or otherwise used for commercial purposes. The above information is an  only. It is not intended as medical advice for individual conditions or treatments. Talk to your doctor, nurse or pharmacist before following any medical regimen to see if it is safe and effective for you.

## 2023-08-08 PROBLEM — R05.9 COUGH: Status: RESOLVED | Noted: 2021-06-15 | Resolved: 2023-08-08

## 2023-09-20 ENCOUNTER — TELEPHONE (OUTPATIENT)
Age: 68
End: 2023-09-20

## 2023-09-20 NOTE — TELEPHONE ENCOUNTER
Patient requesting a Pet Scan referral. She has a dermatology appointment on Friday 9/22 for a biopsy, and she says they will do the Pet Scan if she has a hard copy. Please f/u with patient when hard copy is ready for her to .     Thanks

## 2023-09-21 NOTE — TELEPHONE ENCOUNTER
Not clear who is requesting PET scan and why--would advise pt request from specialist or schedule appt to further address--thanks

## 2023-09-22 PROBLEM — Z12.11 ENCOUNTER FOR COLORECTAL CANCER SCREENING: Status: RESOLVED | Noted: 2020-08-23 | Resolved: 2023-09-22

## 2023-09-22 PROBLEM — Z12.12 ENCOUNTER FOR COLORECTAL CANCER SCREENING: Status: RESOLVED | Noted: 2020-08-23 | Resolved: 2023-09-22

## 2023-09-22 NOTE — TELEPHONE ENCOUNTER
I tried to phone Patient but there was no answer and the mailbox was full so I was unable to leave a vm.

## 2023-09-26 ENCOUNTER — TELEPHONE (OUTPATIENT)
Dept: FAMILY MEDICINE CLINIC | Facility: CLINIC | Age: 68
End: 2023-09-26

## 2023-09-26 NOTE — TELEPHONE ENCOUNTER
Called patient to reschedule tomorrow's appointment as Dr Low Dad not in- unable to leave message- voicemail full.

## 2023-09-27 NOTE — TELEPHONE ENCOUNTER
Tried calling again to reschedule today's appointment as Dr Adam Alejandro not in- patient's voicemail was full.

## 2023-10-02 DIAGNOSIS — M54.16 LUMBAR RADICULOPATHY: ICD-10-CM

## 2023-10-02 RX ORDER — GABAPENTIN 400 MG/1
800 CAPSULE ORAL 2 TIMES DAILY
Qty: 120 CAPSULE | Refills: 1 | Status: SHIPPED | OUTPATIENT
Start: 2023-10-02

## 2023-10-02 NOTE — TELEPHONE ENCOUNTER
Reason for call:   [x] Refill   [] Prior Auth  [] Other:     Office:   [] PCP/Provider -   [x] Speciality/Provider - pcp  Medication: gabapentin     Dose/Frequency:400mg bid    Quantity: 90    Pharmacy:[facility]    Does the patient have enough for 3 days?    [x] Yes   [] No - Send as HP to POD

## 2023-10-13 ENCOUNTER — TELEPHONE (OUTPATIENT)
Dept: PULMONOLOGY | Facility: MEDICAL CENTER | Age: 68
End: 2023-10-13

## 2023-10-13 NOTE — TELEPHONE ENCOUNTER
LM for patient to call office back to schedule 6m f/u appointment, schedule next available. Appointment reminder mailed.

## 2023-10-16 ENCOUNTER — TELEPHONE (OUTPATIENT)
Dept: GASTROENTEROLOGY | Facility: CLINIC | Age: 68
End: 2023-10-16

## 2023-10-16 NOTE — TELEPHONE ENCOUNTER
Last colon 02/01/22 with Dr Nico Tang, hx polyps, recommended 1 yr repeat  Scheduled date of colonoscopy (as of today):11.06.23  Physician performing colonoscopy:DR Nico Tang  Location of colonoscopy:[facility]  Bowel prep reviewed with patient:DULCOLAX/MRIALAX  Instructions reviewed with patient by:MAILED  Clearances: N/A    10/16/23  Screened by: Herminio Chinchilla    Referring Provider Daija Lira    Pre- Screening: Body mass index is 23.13 kg/m². Has patient been referred for a routine screening Colonoscopy? yes  Is the patient between 43-73 years old? yes      Previous Colonoscopy yes   If yes:    Date: 02.01.22    Facility: [facility]    Reason:       SCHEDULING STAFF: If the patient is between 45yrs-49yrs, please advise patient to confirm benefits/coverage with their insurance company for a routine screening colonoscopy, some insurance carriers will only cover at [address] [place] [address], [facility] or older. If the patient is over 66years old, please schedule an office visit. Does the patient want to see a Gastroenterologist prior to their procedure OR are they having any GI symptoms? no    Has the patient been hospitalized or had abdominal surgery in the past 6 months? no    Does the patient use supplemental oxygen? no    Does the patient take Coumadin, Lovenox, Plavix, Elliquis, Xarelto, or other blood thinning medication? no    Has the patient had a stroke, cardiac event, or stent placed in the past year? no    SCHEDULING STAFF: If patient answers NO to above questions, then schedule procedure. If patient answers YES to above questions, then schedule office appointment. If patient is between 45yrs - 49yrs, please advise patient that we will have to confirm benefits & coverage with their insurance company for a routine screening colonoscopy.

## 2023-10-27 ENCOUNTER — OFFICE VISIT (OUTPATIENT)
Dept: FAMILY MEDICINE CLINIC | Facility: CLINIC | Age: 68
End: 2023-10-27
Payer: MEDICARE

## 2023-10-27 ENCOUNTER — TELEPHONE (OUTPATIENT)
Age: 68
End: 2023-10-27

## 2023-10-27 VITALS
WEIGHT: 138 LBS | DIASTOLIC BLOOD PRESSURE: 82 MMHG | TEMPERATURE: 97.5 F | BODY MASS INDEX: 24.45 KG/M2 | SYSTOLIC BLOOD PRESSURE: 130 MMHG | HEART RATE: 84 BPM | HEIGHT: 63 IN | RESPIRATION RATE: 14 BRPM

## 2023-10-27 DIAGNOSIS — M54.9 BACK PAIN, UNSPECIFIED BACK LOCATION, UNSPECIFIED BACK PAIN LATERALITY, UNSPECIFIED CHRONICITY: Primary | ICD-10-CM

## 2023-10-27 DIAGNOSIS — M41.9 SCOLIOSIS OF THORACIC SPINE, UNSPECIFIED SCOLIOSIS TYPE: ICD-10-CM

## 2023-10-27 DIAGNOSIS — M47.816 LUMBAR SPONDYLOSIS: ICD-10-CM

## 2023-10-27 DIAGNOSIS — M54.12 CERVICAL RADICULOPATHY: ICD-10-CM

## 2023-10-27 DIAGNOSIS — M47.812 CERVICAL SPONDYLOSIS: ICD-10-CM

## 2023-10-27 DIAGNOSIS — M54.16 LUMBAR RADICULOPATHY: ICD-10-CM

## 2023-10-27 DIAGNOSIS — M48.02 CERVICAL SPINAL STENOSIS: ICD-10-CM

## 2023-10-27 LAB
SL AMB  POCT GLUCOSE, UA: ABNORMAL
SL AMB LEUKOCYTE ESTERASE,UA: ABNORMAL
SL AMB POCT BILIRUBIN,UA: ABNORMAL
SL AMB POCT BLOOD,UA: 50
SL AMB POCT CLARITY,UA: CLEAR
SL AMB POCT COLOR,UA: YELLOW
SL AMB POCT KETONES,UA: ABNORMAL
SL AMB POCT NITRITE,UA: ABNORMAL
SL AMB POCT PH,UA: 6.5
SL AMB POCT SPECIFIC GRAVITY,UA: 1.01
SL AMB POCT URINE PROTEIN: ABNORMAL
SL AMB POCT UROBILINOGEN: 1

## 2023-10-27 PROCEDURE — 81003 URINALYSIS AUTO W/O SCOPE: CPT | Performed by: FAMILY MEDICINE

## 2023-10-27 PROCEDURE — 99213 OFFICE O/P EST LOW 20 MIN: CPT | Performed by: FAMILY MEDICINE

## 2023-10-27 RX ORDER — LIDOCAINE 50 MG/G
1 PATCH TOPICAL DAILY
Qty: 30 PATCH | Refills: 1 | Status: SHIPPED | OUTPATIENT
Start: 2023-10-27

## 2023-10-27 NOTE — PROGRESS NOTES
Assessment/Plan:    1. Back pain, unspecified back location, unspecified back pain laterality, unspecified chronicity  -     POCT urine dip auto non-scope  -     Ambulatory referral to Spine & Pain Management; Future  -     lidocaine (Lidoderm) 5 %; Apply 1 patch topically over 12 hours daily Remove & Discard patch within 12 hours or as directed by MD (Patient not taking: Reported on 11/21/2023)    2. Cervical radiculopathy  -     Ambulatory referral to Spine & Pain Management; Future  -     lidocaine (Lidoderm) 5 %; Apply 1 patch topically over 12 hours daily Remove & Discard patch within 12 hours or as directed by MD (Patient not taking: Reported on 11/21/2023)    3. Cervical spinal stenosis  -     Ambulatory referral to Spine & Pain Management; Future  -     lidocaine (Lidoderm) 5 %; Apply 1 patch topically over 12 hours daily Remove & Discard patch within 12 hours or as directed by MD (Patient not taking: Reported on 11/21/2023)    4. Cervical spondylosis  -     Ambulatory referral to Spine & Pain Management; Future  -     lidocaine (Lidoderm) 5 %; Apply 1 patch topically over 12 hours daily Remove & Discard patch within 12 hours or as directed by MD (Patient not taking: Reported on 11/21/2023)    5. Lumbar radiculopathy  -     Ambulatory referral to Spine & Pain Management; Future  -     lidocaine (Lidoderm) 5 %; Apply 1 patch topically over 12 hours daily Remove & Discard patch within 12 hours or as directed by MD (Patient not taking: Reported on 11/21/2023)    6. Lumbar spondylosis  -     Ambulatory referral to Spine & Pain Management; Future  -     lidocaine (Lidoderm) 5 %; Apply 1 patch topically over 12 hours daily Remove & Discard patch within 12 hours or as directed by MD (Patient not taking: Reported on 11/21/2023)    7. Scoliosis of thoracic spine, unspecified scoliosis type  -     Ambulatory referral to Spine & Pain Management; Future  -     lidocaine (Lidoderm) 5 %;  Apply 1 patch topically over 12 hours daily Remove & Discard patch within 12 hours or as directed by MD (Patient not taking: Reported on 11/21/2023)    Cont Gabapentin  OTC tylenol, analgesic cream prn        Subjective:      Patient ID: Lynette Martinez is a 79 y.o. female. Chief Complaint   Patient presents with   • Foot Pain     Pt c/o right foot pain   • Back Pain       Back Pain  This is a chronic problem. The problem has been gradually worsening since onset. The pain is present in the lumbar spine and thoracic spine. The pain radiates to the right foot and right thigh. Associated symptoms include leg pain, numbness, tingling and weakness. Pertinent negatives include no abdominal pain, bladder incontinence, bowel incontinence, chest pain, dysuria or fever. Risk factors include history of steroid use and menopause. She has tried bed rest, analgesics, heat, home exercises, ice, NSAIDs, walking and muscle relaxant for the symptoms. Neck Pain   This is a chronic problem. The problem has been gradually worsening. The quality of the pain is described as aching and shooting. The symptoms are aggravated by stress and position. Associated symptoms include leg pain, numbness, tingling and weakness. Pertinent negatives include no chest pain, fever or trouble swallowing. She has tried acetaminophen, home exercises and ice for the symptoms. The following portions of the patient's history were reviewed and updated as appropriate: allergies, current medications, past family history, past medical history, past social history, past surgical history and problem list.    Review of Systems   Constitutional:  Negative for fever. HENT:  Negative for trouble swallowing. Cardiovascular:  Negative for chest pain. Gastrointestinal:  Negative for abdominal pain and bowel incontinence. Genitourinary:  Negative for bladder incontinence and dysuria. Musculoskeletal:  Positive for back pain and neck pain.    Neurological:  Positive for tingling, weakness and numbness. Current Outpatient Medications   Medication Sig Dispense Refill   • buprenorphine (SUBUTEX) 8 mg 8 mg 3 (three) times a day SL      • Cholecalciferol (Vitamin D) 50 MCG (2000 UT) CAPS Take by mouth daily at bedtime     • DULoxetine (CYMBALTA) 30 mg delayed release capsule TAKE 1 CAPSULE BY MOUTH TWICE A  capsule 1   • DULoxetine (CYMBALTA) 60 mg delayed release capsule Take 60 mg by mouth 2 (two) times a day     • fluticasone-salmeterol (ADVAIR, WIXELA) 250-50 mcg/dose inhaler Inhale 1 puff 2 (two) times a day 1 Inhaler 0   • gabapentin (NEURONTIN) 400 mg capsule Take 2 capsules (800 mg total) by mouth 2 (two) times a day 120 capsule 1   • lidocaine (Lidoderm) 5 % Apply 1 patch topically over 12 hours daily Remove & Discard patch within 12 hours or as directed by MD (Patient not taking: Reported on 11/21/2023) 30 patch 1   • Lidocaine Viscous HCl (XYLOCAINE) 2 % mucosal solution Swish and spit 10 mL 2 (two) times a day as needed for mouth pain or discomfort or mucositis (Patient not taking: Reported on 11/21/2023) 180 mL 2   • losartan (COZAAR) 25 mg tablet TAKE 1 TABLET (25 MG TOTAL) BY MOUTH DAILY.  90 tablet 1   • MELATONIN PO Take 3 mg by mouth daily at bedtime as needed       • Multiple Vitamins-Minerals (multivitamin) tablet Take 1 tablet by mouth daily (Patient not taking: Reported on 11/21/2023) 90 tablet 1   • multivitamin (THERAGRAN) TABS Take 1 tablet by mouth daily at bedtime Last dose 11/14/21     • naloxone (NARCAN) 4 mg/0.1 mL nasal spray 4 mg into each nostril as needed     • ondansetron (Zofran) 4 mg tablet Take 1 tablet (4 mg total) by mouth every 8 (eight) hours as needed for nausea or vomiting 15 tablet 0   • SUMAtriptan (IMITREX) 100 mg tablet Take 1 tablet (100 mg total) by mouth once as needed for migraine 9 tablet 0   • albuterol (ProAir HFA) 90 mcg/act inhaler Inhale 2 puffs every 4 (four) hours as needed for wheezing 8.5 g 5   • doxycycline hyclate (VIBRAMYCIN) 100 mg capsule Take 1 capsule (100 mg total) by mouth every 12 (twelve) hours for 7 days 14 capsule 0   • folic acid (FOLVITE) 1 mg tablet Take 1 tablet (1 mg total) by mouth daily (Patient not taking: Reported on 11/21/2023) 90 tablet 1   • methylPREDNISolone 4 MG tablet therapy pack Use as directed on package 21 each 0   • metoprolol succinate (TOPROL-XL) 25 mg 24 hr tablet Take 25 mg by mouth every other day (Patient not taking: Reported on 10/27/2023)     • pantoprazole (PROTONIX) 40 mg tablet TAKE 1 TABLET BY MOUTH EVERY DAY IN THE MORNING 90 tablet 1   • rosuvastatin (CRESTOR) 5 mg tablet TAKE 1 TABLET (5 MG TOTAL) BY MOUTH DAILY TAKE MEDICATION IN THE EVENING AFTER FOOD 90 tablet 1   • valACYclovir (VALTREX) 500 mg tablet Take 1 tablet (500 mg total) by mouth 3 (three) times a day for 7 days 21 tablet 0     No current facility-administered medications for this visit. Objective:    /82 (BP Location: Left arm, Patient Position: Sitting, Cuff Size: Standard)   Pulse 84   Temp 97.5 °F (36.4 °C) (Temporal)   Resp 14   Ht 5' 3" (1.6 m)   Wt 62.6 kg (138 lb)   BMI 24.45 kg/m²        Physical Exam  Vitals and nursing note reviewed. Constitutional:       Comments: Uncomfortable due to back pain   Eyes:      General: No scleral icterus. Cardiovascular:      Rate and Rhythm: Normal rate and regular rhythm. Pulmonary:      Effort: Pulmonary effort is normal. No respiratory distress. Abdominal:      General: Bowel sounds are normal.      Palpations: Abdomen is soft. Tenderness: There is no right CVA tenderness or left CVA tenderness. Musculoskeletal:         General: Tenderness (thoracic and LS paravertebral mm w mildly restricted ROM) present. Cervical back: Neck supple. Tenderness (b/l cervical paraspinal mm) present. Skin:     General: Skin is warm and dry. Coloration: Skin is not jaundiced.    Neurological:      Mental Status: She is alert and oriented to person, place, and time. Cranial Nerves: No cranial nerve deficit.          La Nena Chapin MD

## 2023-11-03 ENCOUNTER — TELEPHONE (OUTPATIENT)
Age: 68
End: 2023-11-03

## 2023-11-03 NOTE — TELEPHONE ENCOUNTER
Rescheduled    Scheduled date of colonoscopy (as of today): 11-   Physician performing colonoscopy: Dr. Rosy Greenberg   Location of colonoscopy: [address] [place] [address] [address]   Bowel prep reviewed with patient: 10-   Instructions reviewed with patient by: by MB  Clearances: N/A

## 2023-11-09 DIAGNOSIS — E78.5 DYSLIPIDEMIA: ICD-10-CM

## 2023-11-09 RX ORDER — ROSUVASTATIN CALCIUM 5 MG/1
5 TABLET, COATED ORAL DAILY
Qty: 90 TABLET | Refills: 1 | Status: SHIPPED | OUTPATIENT
Start: 2023-11-09

## 2023-11-14 ENCOUNTER — ANESTHESIA (OUTPATIENT)
Dept: ANESTHESIOLOGY | Facility: HOSPITAL | Age: 68
End: 2023-11-14

## 2023-11-14 ENCOUNTER — ANESTHESIA EVENT (OUTPATIENT)
Dept: ANESTHESIOLOGY | Facility: HOSPITAL | Age: 68
End: 2023-11-14

## 2023-11-21 ENCOUNTER — OFFICE VISIT (OUTPATIENT)
Dept: FAMILY MEDICINE CLINIC | Facility: CLINIC | Age: 68
End: 2023-11-21
Payer: MEDICARE

## 2023-11-21 VITALS
WEIGHT: 141.3 LBS | TEMPERATURE: 97.8 F | DIASTOLIC BLOOD PRESSURE: 53 MMHG | RESPIRATION RATE: 16 BRPM | OXYGEN SATURATION: 99 % | SYSTOLIC BLOOD PRESSURE: 108 MMHG | HEART RATE: 84 BPM | BODY MASS INDEX: 25.03 KG/M2

## 2023-11-21 DIAGNOSIS — J45.20 MILD INTERMITTENT ASTHMA WITHOUT COMPLICATION: ICD-10-CM

## 2023-11-21 DIAGNOSIS — N18.31 STAGE 3A CHRONIC KIDNEY DISEASE (HCC): ICD-10-CM

## 2023-11-21 DIAGNOSIS — R09.89 CHEST CONGESTION: ICD-10-CM

## 2023-11-21 DIAGNOSIS — J06.9 UPPER RESPIRATORY TRACT INFECTION, UNSPECIFIED TYPE: Primary | ICD-10-CM

## 2023-11-21 DIAGNOSIS — J44.9 CHRONIC OBSTRUCTIVE PULMONARY DISEASE, UNSPECIFIED COPD TYPE (HCC): ICD-10-CM

## 2023-11-21 PROCEDURE — 99214 OFFICE O/P EST MOD 30 MIN: CPT | Performed by: STUDENT IN AN ORGANIZED HEALTH CARE EDUCATION/TRAINING PROGRAM

## 2023-11-21 RX ORDER — DOXYCYCLINE HYCLATE 100 MG/1
100 CAPSULE ORAL EVERY 12 HOURS SCHEDULED
Qty: 14 CAPSULE | Refills: 0 | Status: SHIPPED | OUTPATIENT
Start: 2023-11-21 | End: 2023-11-28

## 2023-11-21 RX ORDER — METHYLPREDNISOLONE 4 MG/1
TABLET ORAL
Qty: 21 EACH | Refills: 0 | Status: SHIPPED | OUTPATIENT
Start: 2023-11-21

## 2023-11-21 RX ORDER — ALBUTEROL SULFATE 90 UG/1
2 AEROSOL, METERED RESPIRATORY (INHALATION) EVERY 4 HOURS PRN
Qty: 8.5 G | Refills: 5 | Status: SHIPPED | OUTPATIENT
Start: 2023-11-21
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
## 2023-11-27 ENCOUNTER — TELEPHONE (OUTPATIENT)
Age: 68
End: 2023-11-27

## 2023-11-27 NOTE — TELEPHONE ENCOUNTER
Patients GI provider:  Dr. Heather Earl    Number to return call: [phone]    Reason for call: Pt called to cancel colon as she had a death in the family. Spoke with Julius Harris at [address] [place] [address] and let her know.      Scheduled procedure/appointment date if applicable: N/A

## 2023-11-28 ENCOUNTER — APPOINTMENT (OUTPATIENT)
Dept: RADIOLOGY | Facility: CLINIC | Age: 68
End: 2023-11-28
Payer: MEDICARE

## 2023-11-28 ENCOUNTER — CONSULT (OUTPATIENT)
Dept: PAIN MEDICINE | Facility: CLINIC | Age: 68
End: 2023-11-28
Payer: MEDICARE

## 2023-11-28 VITALS
WEIGHT: 137 LBS | BODY MASS INDEX: 24.27 KG/M2 | DIASTOLIC BLOOD PRESSURE: 84 MMHG | HEART RATE: 94 BPM | SYSTOLIC BLOOD PRESSURE: 148 MMHG

## 2023-11-28 DIAGNOSIS — G25.89 SCAPULAR DYSKINESIS: ICD-10-CM

## 2023-11-28 DIAGNOSIS — M47.816 LUMBAR SPONDYLOSIS: ICD-10-CM

## 2023-11-28 DIAGNOSIS — M48.02 CERVICAL SPINAL STENOSIS: ICD-10-CM

## 2023-11-28 DIAGNOSIS — M54.9 BACK PAIN, UNSPECIFIED BACK LOCATION, UNSPECIFIED BACK PAIN LATERALITY, UNSPECIFIED CHRONICITY: ICD-10-CM

## 2023-11-28 DIAGNOSIS — M47.812 CERVICAL SPONDYLOSIS: ICD-10-CM

## 2023-11-28 DIAGNOSIS — G25.89 SCAPULAR DYSKINESIS: Primary | ICD-10-CM

## 2023-11-28 DIAGNOSIS — M41.9 SCOLIOSIS OF THORACIC SPINE, UNSPECIFIED SCOLIOSIS TYPE: ICD-10-CM

## 2023-11-28 DIAGNOSIS — M54.16 LUMBAR RADICULOPATHY: ICD-10-CM

## 2023-11-28 PROCEDURE — 72050 X-RAY EXAM NECK SPINE 4/5VWS: CPT

## 2023-11-28 PROCEDURE — 72110 X-RAY EXAM L-2 SPINE 4/>VWS: CPT

## 2023-11-28 PROCEDURE — 99204 OFFICE O/P NEW MOD 45 MIN: CPT | Performed by: PHYSICAL MEDICINE & REHABILITATION
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
## 2023-12-11 ENCOUNTER — TELEPHONE (OUTPATIENT)
Age: 68
End: 2023-12-11

## 2023-12-11 NOTE — TELEPHONE ENCOUNTER
Caller: Minesh Miles     Doctor: Dr Rosas     Reason for call: Patient calling stating needs to reschedule procedure patient states has bronchitis please advise      Call back#: [phone]

## 2023-12-13 ENCOUNTER — TELEPHONE (OUTPATIENT)
Age: 68
End: 2023-12-13

## 2023-12-13 ENCOUNTER — OFFICE VISIT (OUTPATIENT)
Dept: URGENT CARE | Facility: CLINIC | Age: 68
End: 2023-12-13
Payer: MEDICARE

## 2023-12-13 VITALS
WEIGHT: 136.6 LBS | BODY MASS INDEX: 24.2 KG/M2 | RESPIRATION RATE: 22 BRPM | OXYGEN SATURATION: 99 % | TEMPERATURE: 97.3 F | DIASTOLIC BLOOD PRESSURE: 80 MMHG | HEART RATE: 89 BPM | SYSTOLIC BLOOD PRESSURE: 146 MMHG

## 2023-12-13 DIAGNOSIS — R05.9 COUGH, UNSPECIFIED TYPE: Primary | ICD-10-CM

## 2023-12-13 PROCEDURE — 99203 OFFICE O/P NEW LOW 30 MIN: CPT | Performed by: PHYSICIAN ASSISTANT

## 2023-12-13 RX ORDER — AZITHROMYCIN 250 MG/1
TABLET, FILM COATED ORAL
Qty: 6 TABLET | Refills: 0 | Status: SHIPPED | OUTPATIENT
Start: 2023-12-13 | End: 2023-12-17

## 2023-12-13 RX ORDER — PREDNISONE 20 MG/1
20 TABLET ORAL DAILY
Qty: 5 TABLET | Refills: 0 | Status: SHIPPED | OUTPATIENT
Start: 2023-12-13 | End: 2023-12-18
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
## 2023-12-13 NOTE — TELEPHONE ENCOUNTER
Pt called to schedule for uri symptoms. No appts and no answer when I tried to call the office. I scheduled the patient at the u/c instead.

## 2024-01-02 DIAGNOSIS — J45.20 MILD INTERMITTENT ASTHMA WITHOUT COMPLICATION: ICD-10-CM

## 2024-01-03 ENCOUNTER — TELEPHONE (OUTPATIENT)
Age: 69
End: 2024-01-03

## 2024-01-03 DIAGNOSIS — I10 HYPERTENSION, UNSPECIFIED TYPE: ICD-10-CM

## 2024-01-03 DIAGNOSIS — K04.7 DENTAL INFECTION: ICD-10-CM

## 2024-01-03 DIAGNOSIS — K12.1 MOUTH ULCERS: ICD-10-CM

## 2024-01-03 RX ORDER — ALBUTEROL SULFATE 90 UG/1
2 AEROSOL, METERED RESPIRATORY (INHALATION) EVERY 4 HOURS PRN
Qty: 8.5 G | Refills: 5 | Status: SHIPPED | OUTPATIENT
Start: 2024-01-03

## 2024-01-03 NOTE — TELEPHONE ENCOUNTER
Patient still has the same symptoms from her [facility] visit on 12-. She has a cough, green phlegm, chest congestion, no fever. She is requesting an antibiotic and steroids to be called in to her pharmacy. Please call patient to advise.

## 2024-01-04 DIAGNOSIS — M54.16 LUMBAR RADICULOPATHY: ICD-10-CM

## 2024-01-04 RX ORDER — GABAPENTIN 400 MG/1
800 CAPSULE ORAL 2 TIMES DAILY
Qty: 120 CAPSULE | Refills: 1 | Status: SHIPPED | OUTPATIENT
Start: 2024-01-04

## 2024-01-04 RX ORDER — AMOXICILLIN 875 MG/1
875 TABLET, COATED ORAL 2 TIMES DAILY
Qty: 30 TABLET | Refills: 0 | Status: SHIPPED | OUTPATIENT
Start: 2024-01-04 | End: 2024-01-19

## 2024-01-04 RX ORDER — LOSARTAN POTASSIUM 25 MG/1
25 TABLET ORAL DAILY
Qty: 90 TABLET | Refills: 0 | Status: SHIPPED | OUTPATIENT
Start: 2024-01-04

## 2024-01-05 ENCOUNTER — TELEPHONE (OUTPATIENT)
Age: 69
End: 2024-01-05

## 2024-01-05 DIAGNOSIS — J45.20 INTERMITTENT ASTHMA WITHOUT COMPLICATION, UNSPECIFIED ASTHMA SEVERITY: Primary | ICD-10-CM

## 2024-01-05 RX ORDER — ALBUTEROL SULFATE 2.5 MG/3ML
2.5 SOLUTION RESPIRATORY (INHALATION) EVERY 6 HOURS PRN
Qty: 90 ML | Refills: 1 | Status: SHIPPED | OUTPATIENT
Start: 2024-01-05

## 2024-01-05 NOTE — TELEPHONE ENCOUNTER
Patient called looking for Nebulizer drop refills however, I do not see this on her medication list. Patient was not sure what the mediation is called.   Called [facility] clinical line for assistance to see if I was missing it. Call as transferred to [facility] for assistance.

## 2024-01-05 NOTE — TELEPHONE ENCOUNTER
"Patient is looking for medication for her nebulizer, she stated she only received it once \"a while back\", she does not remember name.  (Not in current meds)  She is asking if provider will refill the medication to [facility].  "

## 2024-01-08 ENCOUNTER — TELEPHONE (OUTPATIENT)
Age: 69
End: 2024-01-08

## 2024-01-08 DIAGNOSIS — J45.20 INTERMITTENT ASTHMA WITHOUT COMPLICATION, UNSPECIFIED ASTHMA SEVERITY: Primary | ICD-10-CM

## 2024-01-08 NOTE — TELEPHONE ENCOUNTER
PA for albuterol (2.5 mg/3 mL) 0.083 % nebulizer solution     Submitted via  []CMM-KEY   [x][employer]-Case ID # [id number]   []Faxed to plan   []Other website   []Phone call Case ID #     Office notes sent, clinical questions answered. Awaiting determination

## 2024-01-08 NOTE — TELEPHONE ENCOUNTER
Please let pt know albuterol neb denied --will try submitting duoneb in place of but may not be covered either-  Does she have inhaler form she can use?

## 2024-01-08 NOTE — TELEPHONE ENCOUNTER
PA for albuterol (2.5 mg/3 mL) 0.083 % nebulizer solution  Denied    Reason:          Message sent to provider pool Yes    Denial letter scanned into Media Yesl    Appeal started No

## 2024-02-15 ENCOUNTER — NURSE TRIAGE (OUTPATIENT)
Age: 69
End: 2024-02-15

## 2024-02-15 ENCOUNTER — OFFICE VISIT (OUTPATIENT)
Dept: URGENT CARE | Facility: CLINIC | Age: 69
End: 2024-02-15
Payer: MEDICARE

## 2024-02-15 VITALS
WEIGHT: 137 LBS | TEMPERATURE: 97 F | HEART RATE: 82 BPM | OXYGEN SATURATION: 98 % | BODY MASS INDEX: 23.39 KG/M2 | HEIGHT: 64 IN | DIASTOLIC BLOOD PRESSURE: 86 MMHG | RESPIRATION RATE: 18 BRPM | SYSTOLIC BLOOD PRESSURE: 173 MMHG

## 2024-02-15 DIAGNOSIS — R05.1 ACUTE COUGH: ICD-10-CM

## 2024-02-15 DIAGNOSIS — J40 BRONCHITIS: Primary | ICD-10-CM

## 2024-02-15 PROCEDURE — 99213 OFFICE O/P EST LOW 20 MIN: CPT | Performed by: PHYSICIAN ASSISTANT

## 2024-02-15 RX ORDER — PREDNISONE 10 MG/1
40 TABLET ORAL DAILY
Qty: 16 TABLET | Refills: 0 | Status: SHIPPED | OUTPATIENT
Start: 2024-02-15 | End: 2024-02-19

## 2024-02-15 RX ORDER — AZITHROMYCIN 250 MG/1
TABLET, FILM COATED ORAL
Qty: 6 TABLET | Refills: 0 | Status: SHIPPED | OUTPATIENT
Start: 2024-02-15 | End: 2024-02-19
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
## 2024-02-15 NOTE — TELEPHONE ENCOUNTER
"Reason for Disposition   MILD difficulty breathing (e.g., minimal/no SOB at rest, SOB with walking, pulse <100) and still present when not coughing    Answer Assessment - Initial Assessment Questions  1. ONSET: \"When did the cough begin?\"       Five days ago  2. SEVERITY: \"How bad is the cough today?\"       Moderate   3. SPUTUM: \"Describe the color of your sputum\" (none, dry cough; clear, white, yellow, green)      Yes ,dark green   4. HEMOPTYSIS: \"Are you coughing up any blood?\" If so ask: \"How much?\" (flecks, streaks, tablespoons, etc.)      no  5. DIFFICULTY BREATHING: \"Are you having difficulty breathing?\" If Yes, ask: \"How bad is it?\" (e.g., mild, moderate, severe)     - MILD: No SOB at rest, mild SOB with walking, speaks normally in sentences, can lay down, no retractions, pulse < 100.     - MODERATE: SOB at rest, SOB with minimal exertion and prefers to sit, cannot lie down flat, speaks in phrases, mild retractions, audible wheezing, pulse 100-120.     - SEVERE: Very SOB at rest, speaks in single words, struggling to breathe, sitting hunched forward, retractions, pulse > 120       Sob with exertion   6. FEVER: \"Do you have a fever?\" If Yes, ask: \"What is your temperature, how was it measured, and when did it start?\"      No   7. CARDIAC HISTORY: \"Do you have any history of heart disease?\" (e.g., heart attack, congestive heart failure)       No   8. LUNG HISTORY: \"Do you have any history of lung disease?\"  (e.g., pulmonary embolus, asthma, emphysema)      Asthma   9. PE RISK FACTORS: \"Do you have a history of blood clots?\" (or: recent major surgery, recent prolonged travel, bedridden)      No   10. OTHER SYMPTOMS: \"Do you have any other symptoms?\" (e.g., runny nose, wheezing, chest pain)        Headache,runny nose Patient denies chest pain      12. TRAVEL: \"Have you traveled out of the country in the last month?\" (e.g., travel history, exposures)        No  No appts available at the office,patient advised to " go to     Protocols used: Cough-ADULT-[place]

## 2024-02-21 PROBLEM — Z12.4 CERVICAL CANCER SCREENING: Status: RESOLVED | Noted: 2020-08-23 | Resolved: 2024-02-21

## 2024-03-06 ENCOUNTER — TELEPHONE (OUTPATIENT)
Dept: PAIN MEDICINE | Facility: CLINIC | Age: 69
End: 2024-03-06

## 2024-03-25 DIAGNOSIS — M54.16 LUMBAR RADICULOPATHY: ICD-10-CM

## 2024-03-26 ENCOUNTER — OFFICE VISIT (OUTPATIENT)
Dept: FAMILY MEDICINE CLINIC | Facility: CLINIC | Age: 69
End: 2024-03-26
Payer: COMMERCIAL

## 2024-03-26 VITALS
TEMPERATURE: 97.2 F | RESPIRATION RATE: 16 BRPM | WEIGHT: 138.6 LBS | HEIGHT: 64 IN | BODY MASS INDEX: 23.66 KG/M2 | HEART RATE: 94 BPM | OXYGEN SATURATION: 96 % | SYSTOLIC BLOOD PRESSURE: 116 MMHG | DIASTOLIC BLOOD PRESSURE: 70 MMHG

## 2024-03-26 DIAGNOSIS — M25.474 BILATERAL SWELLING OF FEET AND ANKLES: ICD-10-CM

## 2024-03-26 DIAGNOSIS — M41.9 SCOLIOSIS OF THORACOLUMBAR SPINE, UNSPECIFIED SCOLIOSIS TYPE: ICD-10-CM

## 2024-03-26 DIAGNOSIS — R26.2 AMBULATORY DYSFUNCTION: Primary | ICD-10-CM

## 2024-03-26 DIAGNOSIS — M25.471 BILATERAL SWELLING OF FEET AND ANKLES: ICD-10-CM

## 2024-03-26 DIAGNOSIS — M25.475 BILATERAL SWELLING OF FEET AND ANKLES: ICD-10-CM

## 2024-03-26 DIAGNOSIS — F10.230 ALCOHOL DEPENDENCE WITH UNCOMPLICATED WITHDRAWAL (HCC): ICD-10-CM

## 2024-03-26 DIAGNOSIS — J44.9 CHRONIC OBSTRUCTIVE PULMONARY DISEASE, UNSPECIFIED COPD TYPE (HCC): ICD-10-CM

## 2024-03-26 DIAGNOSIS — F11.20 OPIOID DEPENDENCE ON MAINTENANCE AGONIST THERAPY, NO SYMPTOMS (HCC): ICD-10-CM

## 2024-03-26 DIAGNOSIS — M25.472 BILATERAL SWELLING OF FEET AND ANKLES: ICD-10-CM

## 2024-03-26 DIAGNOSIS — N18.31 STAGE 3A CHRONIC KIDNEY DISEASE (HCC): ICD-10-CM

## 2024-03-26 DIAGNOSIS — F31.81 BIPOLAR II DISORDER (HCC): ICD-10-CM

## 2024-03-26 PROCEDURE — 99214 OFFICE O/P EST MOD 30 MIN: CPT | Performed by: STUDENT IN AN ORGANIZED HEALTH CARE EDUCATION/TRAINING PROGRAM

## 2024-03-26 PROCEDURE — G2211 COMPLEX E/M VISIT ADD ON: HCPCS | Performed by: STUDENT IN AN ORGANIZED HEALTH CARE EDUCATION/TRAINING PROGRAM

## 2024-03-26 RX ORDER — GABAPENTIN 400 MG/1
800 CAPSULE ORAL 2 TIMES DAILY
Qty: 120 CAPSULE | Refills: 1 | Status: SHIPPED | OUTPATIENT
Start: 2024-03-26

## 2024-03-26 RX ORDER — DISULFIRAM 250 MG/1
TABLET ORAL
COMMUNITY
Start: 2024-02-05
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
## 2024-04-01 ENCOUNTER — TELEPHONE (OUTPATIENT)
Age: 69
End: 2024-04-01

## 2024-04-01 NOTE — TELEPHONE ENCOUNTER
Pt. Called in and stated she needs a Pre Auth fro  Her Insurance for the Physical Therapy  and also the the Spine Xray's.  Please inform patient when the Auth is approved.

## 2024-04-03 NOTE — TELEPHONE ENCOUNTER
Patient is requesting an insurance referral for the following specialty:      Test Name / Order Name: Physical Therapy     DX Code: Ambulatory dysfunction (R26.2 [ICD-10-CM])   Scoliosis of thoracolumbar spine, unspecified scoliosis type (M41.9 [ICD-10-CM])   Date Of Service: Not yet scheduled    Location/Facility Name/Address/Phone #:   [facility] [address] [place] [address]    Location / Facility NPI: [id number]    Best Phone # To Reach The Patient: [phone]          Patient is requesting an insurance referral for the following specialty:      Test Name / Order Name: XR spine lumbar minimum 4 views non injury     DX Code: Scoliosis of thoracolumbar spine, unspecified scoliosis type (M41.9 [ICD-10-CM])     Date Of Service: not yet scheduled    Location/Facility Name/Address/Phone #:   [facility] [address] [place] [address] [phone]    Location / Facility NPI:     Best Phone # To Reach The Patient: [phone]          Patient is requesting an insurance referral for the following specialty:      Test Name / Order Name: XR spine thoracic 3 vw     DX Code: Scoliosis of thoracolumbar spine, unspecified scoliosis type (M41.9 [ICD-10-CM])     Date Of Service: Not yet scheduled    Location/Facility Name/Address/Phone #:  [facility] [address] [place] [address]  [phone]    Location / Facility NPI:     Best Phone # To Reach The Patient: [phone]

## 2024-04-08 ENCOUNTER — APPOINTMENT (OUTPATIENT)
Dept: RADIOLOGY | Facility: CLINIC | Age: 69
End: 2024-04-08
Payer: COMMERCIAL

## 2024-04-08 PROCEDURE — 72110 X-RAY EXAM L-2 SPINE 4/>VWS: CPT

## 2024-04-08 PROCEDURE — 72072 X-RAY EXAM THORAC SPINE 3VWS: CPT

## 2024-04-08 NOTE — TELEPHONE ENCOUNTER
Pt called in following up with prior auth. No status updated. Pt is does not have appt date. Facility advised her she needs pre approval first.  Please advice Thank you.

## 2024-04-12 ENCOUNTER — NURSE TRIAGE (OUTPATIENT)
Age: 69
End: 2024-04-12

## 2024-04-12 NOTE — TELEPHONE ENCOUNTER
"Reason for Disposition   MODERATE swelling of both ankles (e.g., swelling extends up to the knees) AND new-onset or worsening    Answer Assessment - Initial Assessment Questions  1. ONSET: \"When did the swelling start?\" (e.g., minutes, hours, days)      Week and a half ago  2. LOCATION: \"What part of the leg is swollen?\"  \"Are both legs swollen or just one leg?\"      Both legs- feet, ankles, calves  3. SEVERITY: \"How bad is the swelling?\" (e.g., localized; mild, moderate, severe)   - Localized - small area of swelling localized to one leg   - MILD pedal edema - swelling limited to foot and ankle, pitting edema < 1/4 inch (6 mm) deep, rest and elevation eliminate most or all swelling   - MODERATE edema - swelling of lower leg to knee, pitting edema > 1/4 inch (6 mm) deep, rest and elevation only partially reduce swelling   - SEVERE edema - swelling extends above knee, facial or hand swelling present       Moderate   4. REDNESS: \"Does the swelling look red or infected?\"      Denies   5. PAIN: \"Is the swelling painful to touch?\" If Yes, ask: \"How painful is it?\"   (Scale 1-10; mild, moderate or severe)      Yes, 5/10  6. FEVER: \"Do you have a fever?\" If Yes, ask: \"What is it, how was it measured, and when did it start?\"       Denies   7. CAUSE: \"What do you think is causing the leg swelling?\"      Unsure. Denies eating salty foods  8. MEDICAL HISTORY: \"Do you have a history of heart failure, kidney disease, liver failure, or cancer?\"      Fibromyalgia    9. RECURRENT SYMPTOM: \"Have you had leg swelling before?\" If Yes, ask: \"When was the last time?\" \"What happened that time?\"      Yes, a few years ago- states it was from BP medication  10. OTHER SYMPTOMS: \"Do you have any other symptoms?\" (e.g., chest pain, difficulty breathing)        Denies   11. PREGNANCY: \"Is there any chance you are pregnant?\" \"When was your last menstrual period?\"        Denies    Protocols used: Leg Swelling and Edema-ADULT-[place]    "

## 2024-04-12 NOTE — TELEPHONE ENCOUNTER
Patient has a pre-op appointment with Dr. Abreu 4/30 for her knee replacement surgery 5/22.    Patient calls to see if that appointment can be moved up as she has had b/l pitting edema to lower legs that has been going on for a week and a half.    Could not find earlier pre-op visit.    Please advise.

## 2024-04-16 ENCOUNTER — OFFICE VISIT (OUTPATIENT)
Dept: CARDIOLOGY CLINIC | Facility: CLINIC | Age: 69
End: 2024-04-16
Payer: COMMERCIAL

## 2024-04-16 VITALS
DIASTOLIC BLOOD PRESSURE: 80 MMHG | SYSTOLIC BLOOD PRESSURE: 140 MMHG | WEIGHT: 144 LBS | HEART RATE: 86 BPM | TEMPERATURE: 84 F | OXYGEN SATURATION: 96 % | HEIGHT: 64 IN | BODY MASS INDEX: 24.59 KG/M2

## 2024-04-16 DIAGNOSIS — J44.9 CHRONIC OBSTRUCTIVE PULMONARY DISEASE, UNSPECIFIED COPD TYPE (HCC): ICD-10-CM

## 2024-04-16 DIAGNOSIS — F41.8 DEPRESSION WITH ANXIETY: ICD-10-CM

## 2024-04-16 DIAGNOSIS — Z72.0 TOBACCO ABUSE: ICD-10-CM

## 2024-04-16 DIAGNOSIS — R06.09 DOE (DYSPNEA ON EXERTION): ICD-10-CM

## 2024-04-16 DIAGNOSIS — F10.21 HISTORY OF ALCOHOL DEPENDENCE (HCC): ICD-10-CM

## 2024-04-16 DIAGNOSIS — Z01.810 PREOP CARDIOVASCULAR EXAM: ICD-10-CM

## 2024-04-16 DIAGNOSIS — E78.5 DYSLIPIDEMIA: ICD-10-CM

## 2024-04-16 DIAGNOSIS — I10 HYPERTENSION, UNSPECIFIED TYPE: ICD-10-CM

## 2024-04-16 PROCEDURE — 99214 OFFICE O/P EST MOD 30 MIN: CPT | Performed by: INTERNAL MEDICINE

## 2024-04-16 PROCEDURE — 93000 ELECTROCARDIOGRAM COMPLETE: CPT | Performed by: INTERNAL MEDICINE

## 2024-04-16 RX ORDER — FUROSEMIDE 20 MG/1
10 TABLET ORAL EVERY OTHER DAY
Qty: 15 TABLET | Refills: 1 | Status: SHIPPED | OUTPATIENT
Start: 2024-04-16

## 2024-04-16 RX ORDER — LOSARTAN POTASSIUM 50 MG/1
50 TABLET ORAL DAILY
Qty: 90 TABLET | Refills: 1 | Status: SHIPPED | OUTPATIENT
Start: 2024-04-16

## 2024-04-16 RX ORDER — POTASSIUM CHLORIDE 750 MG/1
10 TABLET, EXTENDED RELEASE ORAL EVERY OTHER DAY
Qty: 15 TABLET | Refills: 1 | Status: SHIPPED | OUTPATIENT
Start: 2024-04-16

## 2024-04-16 RX ORDER — METHYLPHENIDATE HYDROCHLORIDE 27 MG/1
TABLET ORAL
COMMUNITY
Start: 2024-04-12

## 2024-04-16 NOTE — PROGRESS NOTES
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 reveal mild subdiaphragmatic activity.    PERFUSION DEFECTS:  -  There was a small, partially reversible myocardial perfusion defect of the anteroseptal wall likely due to attenuation from breast tissue.    GATED SPECT:  The calculated left ventricular ejection fraction was 65 %. Left ventricular ejection fraction was within normal limits by visual estimate. There was no left ventricular regional abnormality.    SUMMARY:  -  Stress results: Duration of exercise was 8 min and 35 sec. Maximal work rate was 10.1 METs. Maximal heart rate during stress was 142 bpm ( 89 % of maximal predicted heart rate). Target heart rate was achieved. Maximal systolic blood  pressure during stress was 174 mmHg. There was no chest pain during stress.  -  ECG conclusions: The stress ECG was negative for ischemia and normal.  -  Perfusion imaging: There was a small, partially reversible myocardial perfusion defect of the anteroseptal wall likely due to attenuation from breast tissue.  -  Gated SPECT:  The calculated left ventricular ejection fraction was 65 %. Left ventricular ejection fraction was within normal limits by visual estimate. There was no left ventricular regional abnormality.  -  Impressions and recommendations: Likely normal study after maximal exercise.    IMPRESSIONS: Likely normal study after maximal exercise. Myocardial perfusion imaging was normal at rest and with stress.    Prepared and signed by    Manan Allan MD  Signed 06/02/2018 18:16:10         Lab Review   Lab Results   Component Value Date    WBC 7.20 03/20/2023    HGB 10.5 (L) 03/20/2023    HCT 32.1 (L) 03/20/2023    MCV 92 03/20/2023    RDW 14.7 03/20/2023     (H) 03/20/2023     BMP:  Lab Results   Component Value Date    SODIUM 134 (L) 03/20/2023    K 4.5 03/20/2023     03/20/2023    CO2 29 03/20/2023    ANIONGAP 11.2 12/17/2015    BUN 18 03/20/2023    CREATININE 1.12 03/20/2023    GLUC 82 02/20/2023    GLUF 85 03/20/2023    CALCIUM 9.6 03/20/2023    CORRECTEDCA 8.8 02/20/2023    EGFR 51 03/20/2023    MG 2.5 03/20/2023     LFT:  Lab Results   Component Value Date    AST 16 03/20/2023    ALT 17 03/20/2023    ALKPHOS 78 03/20/2023    TP 7.5 03/20/2023    ALB 3.5 03/20/2023      Lab Results   Component Value Date    [id number] 0.863 03/20/2023     Lab Results   Component Value Date    HGBA1C 5.5 07/06/2020     Lipid Profile:   Lab Results   Component Value Date    CHOLESTEROL 133 03/20/2023    HDL 65 03/20/2023    LDLCALC 59 03/20/2023    TRIG 44 03/20/2023     Lab Results   Component Value Date    CHOLESTEROL 133 03/20/2023    CHOLESTEROL 166 03/17/2021     Lab Results   Component Value Date    TROPONINI <0.02 07/23/2020       The 10-year ASCVD risk score (Johny DK, et al., 2019) is: 14.9%    Values used to calculate the score:      Age: 69 years      Sex: Female      Is Non- : No      Diabetic: No      Tobacco smoker: Yes      Systolic Blood Pressure: 140 mmHg      Is BP treated: Yes      HDL  "Cholesterol: 65 mg/dL      Total Cholesterol: 133 mg/dL        Dr. Jeremiah Abreu MD [facility]      \"This note has been constructed using a voice recognition system.Therefore there may be syntax, spelling, and/or grammatical errors. Please call if you have any questions. \"  "

## 2024-04-24 ENCOUNTER — HOSPITAL ENCOUNTER (OUTPATIENT)
Dept: RADIOLOGY | Facility: HOSPITAL | Age: 69
Discharge: HOME | End: 2024-04-24
Attending: INTERNAL MEDICINE
Payer: COMMERCIAL

## 2024-04-24 ENCOUNTER — HOSPITAL ENCOUNTER (OUTPATIENT)
Dept: NON INVASIVE DIAGNOSTICS | Facility: HOSPITAL | Age: 69
Discharge: HOME | End: 2024-04-24
Attending: INTERNAL MEDICINE
Payer: COMMERCIAL

## 2024-04-24 VITALS
WEIGHT: 144 LBS | OXYGEN SATURATION: 97 % | HEART RATE: 82 BPM | HEIGHT: 63 IN | DIASTOLIC BLOOD PRESSURE: 84 MMHG | SYSTOLIC BLOOD PRESSURE: 152 MMHG | BODY MASS INDEX: 25.52 KG/M2

## 2024-04-24 VITALS
HEART RATE: 86 BPM | DIASTOLIC BLOOD PRESSURE: 80 MMHG | HEIGHT: 63 IN | WEIGHT: 144 LBS | SYSTOLIC BLOOD PRESSURE: 140 MMHG | BODY MASS INDEX: 25.52 KG/M2

## 2024-04-24 DIAGNOSIS — I10 HYPERTENSION, UNSPECIFIED TYPE: ICD-10-CM

## 2024-04-24 DIAGNOSIS — F10.21 HISTORY OF ALCOHOL DEPENDENCE (HCC): ICD-10-CM

## 2024-04-24 DIAGNOSIS — J44.9 CHRONIC OBSTRUCTIVE PULMONARY DISEASE, UNSPECIFIED COPD TYPE (HCC): ICD-10-CM

## 2024-04-24 DIAGNOSIS — R06.09 DOE (DYSPNEA ON EXERTION): ICD-10-CM

## 2024-04-24 DIAGNOSIS — Z72.0 TOBACCO ABUSE: ICD-10-CM

## 2024-04-24 DIAGNOSIS — Z01.810 PREOP CARDIOVASCULAR EXAM: ICD-10-CM

## 2024-04-24 DIAGNOSIS — E78.5 DYSLIPIDEMIA: ICD-10-CM

## 2024-04-24 DIAGNOSIS — F41.8 DEPRESSION WITH ANXIETY: ICD-10-CM

## 2024-04-24 PROCEDURE — 93016 CV STRESS TEST SUPVJ ONLY: CPT | Performed by: INTERNAL MEDICINE

## 2024-04-24 PROCEDURE — 93306 TTE W/DOPPLER COMPLETE: CPT | Performed by: INTERNAL MEDICINE

## 2024-04-24 PROCEDURE — 78452 HT MUSCLE IMAGE SPECT MULT: CPT

## 2024-04-24 PROCEDURE — 78452 HT MUSCLE IMAGE SPECT MULT: CPT | Performed by: INTERNAL MEDICINE

## 2024-04-24 PROCEDURE — 93018 CV STRESS TEST I&R ONLY: CPT | Performed by: INTERNAL MEDICINE

## 2024-04-24 PROCEDURE — A9502 TC99M TETROFOSMIN: HCPCS

## 2024-04-24 PROCEDURE — 93017 CV STRESS TEST TRACING ONLY: CPT

## 2024-04-24 PROCEDURE — 93306 TTE W/DOPPLER COMPLETE: CPT

## 2024-04-24 RX ORDER — REGADENOSON 0.08 MG/ML
0.4 INJECTION, SOLUTION INTRAVENOUS ONCE
Status: DISCONTINUED | OUTPATIENT
Start: 2024-04-24 | End: 2024-04-25 | Stop reason: HOSPADM

## 2024-04-25 ENCOUNTER — TELEPHONE (OUTPATIENT)
Dept: CARDIOLOGY CLINIC | Facility: CLINIC | Age: 69
End: 2024-04-25

## 2024-04-25 LAB
AORTIC ROOT: 2.7 CM
APICAL FOUR CHAMBER EJECTION FRACTION: 62 %
AV LVOT PEAK GRADIENT: 7 MMHG
AV PEAK GRADIENT: 13 MMHG
AV REGURGITATION PRESSURE HALF TIME: 438 MS
E WAVE DECELERATION TIME: 176 MS
E/A RATIO: 1
FRACTIONAL SHORTENING: 34 (ref 28–44)
INTERVENTRICULAR SEPTUM IN DIASTOLE (PARASTERNAL SHORT AXIS VIEW): 0.7 CM
INTERVENTRICULAR SEPTUM: 0.7 CM (ref 0.6–1.1)
LAAS-AP2: 23.1 CM2
LAAS-AP4: 17.3 CM2
LEFT ATRIUM SIZE: 3.3 CM
LEFT ATRIUM VOLUME (MOD BIPLANE): 75 ML
LEFT ATRIUM VOLUME INDEX (MOD BIPLANE): 44.4 ML/M2
LEFT INTERNAL DIMENSION IN SYSTOLE: 3.5 CM (ref 2.1–4)
LEFT VENTRICULAR INTERNAL DIMENSION IN DIASTOLE: 5.3 CM (ref 3.5–6)
LEFT VENTRICULAR POSTERIOR WALL IN END DIASTOLE: 0.6 CM
LEFT VENTRICULAR STROKE VOLUME: 87 ML
LVSV (TEICH): 87 ML
MV E'TISSUE VEL-SEP: 8 CM/S
MV PEAK A VEL: 0.77 M/S
MV PEAK E VEL: 77 CM/S
MV STENOSIS PRESSURE HALF TIME: 51 MS
MV VALVE AREA P 1/2 METHOD: 4.31
NUC STRESS EJECTION FRACTION: 66 %
RA PRESSURE ESTIMATED: 8 MMHG
RATE PRESSURE PRODUCT: NORMAL
RIGHT ATRIUM AREA SYSTOLE A4C: 12.4 CM2
RIGHT VENTRICLE ID DIMENSION: 3 CM
RV PSP: 40 MMHG
SL CV AV DECELERATION TIME RETROGRADE: 1511 MS
SL CV AV PEAK GRADIENT RETROGRADE: 71 MMHG
SL CV LEFT ATRIUM LENGTH A2C: 5.4 CM
SL CV LV EF: 60
SL CV PED ECHO LEFT VENTRICLE DIASTOLIC VOLUME (MOD BIPLANE) 2D: 138 ML
SL CV PED ECHO LEFT VENTRICLE SYSTOLIC VOLUME (MOD BIPLANE) 2D: 51 ML
SL CV REST NUCLEAR ISOTOPE DOSE: 10.45 MCI
SL CV STRESS NUCLEAR ISOTOPE DOSE: 31.94 MCI
SL CV STRESS RECOVERY BP: NORMAL MMHG
SL CV STRESS RECOVERY HR: 90 BPM
SL CV STRESS RECOVERY O2 SAT: 97 %
STRESS ANGINA INDEX: 0
STRESS BASELINE BP: NORMAL MMHG
STRESS BASELINE HR: 82 BPM
STRESS O2 SAT REST: 97 %
STRESS PEAK HR: 102 BPM
STRESS POST O2 SAT PEAK: 100 %
STRESS POST PEAK BP: 136 MMHG
TR MAX PG: 32 MMHG
TR PEAK VELOCITY: 2.9 M/S
TRICUSPID ANNULAR PLANE SYSTOLIC EXCURSION: 2.1 CM
TRICUSPID VALVE PEAK REGURGITATION VELOCITY: 2.85 M/S

## 2024-04-25 NOTE — TELEPHONE ENCOUNTER
Pre. Op. Clearance note- Cardiology    Minesh Miles   69 y.o.  female  1955    [facility],   Fax [phone]    Minesh Miles :     Patient's chart was reviewed for preop clearance.   Patient was seen in our office on 4/16/2024.  Patient has past medical history significant for HTN,COPD,DYSLIPIDEMIA.  Patient is now scheduled for Knee Surgery 5/22/2024.      Patient has no clinical evidence of  active heart failure or  active ischemia or active arrhythmia.  Patient's last cardiac workup including echo and nuclear stress test done in April 2024 reports were reviewed and it shows no ischemia normal LV systolic function with mild valvular disease.      In my opinion patient is in optimum condition for the procedure as planned.  Patient is low risk for the surgery as planned from cardiac point of view.  Continue current cardiac medications.      Patient can hold  Aspirin for  5-7 days as required for surgery. Patient can hold Plavix for 5 days.  Patient can hold Eliquis/Xarelto/Pradaxa for 3 days before the procedure.  Please restart after the procedure  immediately or next day if no contraindication form surgical point of view and advise patient to contact our office.    If you have any question please do not hesitate to call us at our office of [facility].  Phone # [phone]        Lab Results   Component Value Date    WBC 7.20 03/20/2023    HGB 10.5 (L) 03/20/2023    HCT 32.1 (L) 03/20/2023    MCV 92 03/20/2023     (H) 03/20/2023     Lab Results   Component Value Date    CREATININE 1.12 03/20/2023     Lab Results   Component Value Date    GLUF 85 03/20/2023       Cardiac testing:   Results for orders placed during the hospital encounter of 03/19/21    Echo complete with contrast if indicated    Narrative  [address] [address] [phone]    Transthoracic Echocardiogram  2D, M-mode, Doppler, and Color  Doppler    Study date:  19-Mar-2021    Patient: LUCY PICKETT  MR number: [id number]  Account number: [id number]  : 1955  Age: 65 years  Gender: Female  Status: Outpatient  Location: Echo lab  Height: 64 in  Weight: 139.7 lb  BP: 124/ 68 mmHg    Indications: Pre-Op Exam    Diagnoses: V72.81 - PREOP CARDIOVSCLR EXAM    Sonographer:  MATT Gipson  Primary Physician:  Mago Palmer MD  Referring Physician:  Jeremiah Abreu MD  Group:  [facility]  Interpreting Physician:  Jeremiah Abreu MD    SUMMARY    LEFT VENTRICLE:  Systolic function was normal. Ejection fraction was estimated in the range of 55 % to 60 % to be 55 %.  There were no regional wall motion abnormalities.    MITRAL VALVE:  There was trace regurgitation.    AORTIC VALVE:  There was trace to mild regurgitation.    TRICUSPID VALVE:  There was mild regurgitation.    HISTORY: PRIOR HISTORY: COPD, Alcohol Abuse, Tobacco Abuse, Depression, Anxiety, Spondylosis    PROCEDURE: The procedure was performed in the echo lab. This was a routine study. The transthoracic approach was used. The study included complete 2D imaging, M-mode, complete spectral Doppler, and color Doppler. The heart rate was 74 bpm,  at the start of the study. Image quality was good.    LEFT VENTRICLE: Size was normal. Systolic function was normal. Ejection fraction was estimated in the range of 55 % to 60 % to be 55 %. There were no regional wall motion abnormalities. Wall thickness was normal. DOPPLER: Left ventricular  diastolic function parameters were normal for the patient's age.    RIGHT VENTRICLE: The size was normal. Systolic function was normal.    LEFT ATRIUM: Size was normal.    RIGHT ATRIUM: Size was normal. APPENDAGE: There was no right atrial appendage thrombus identified.    MITRAL VALVE: There was normal leaflet separation. DOPPLER: The transmitral velocity was within the normal range. There was no evidence for stenosis.  There was trace regurgitation.    AORTIC VALVE: The valve was trileaflet. Leaflets exhibited mildly increased thickness and normal cuspal separation. DOPPLER: Transaortic velocity was within the normal range. There was no evidence for stenosis. There was trace to mild  regurgitation.    TRICUSPID VALVE: DOPPLER: There was mild regurgitation. Pulmonary artery systolic pressure was within the normal range.    PULMONIC VALVE: DOPPLER: There was no significant regurgitation.    PERICARDIUM: There was no thickening or calcification. There was no pericardial effusion.    AORTA: The root exhibited normal size.    SYSTEMIC VEINS: IVC: The inferior vena cava was not well visualized.    SYSTEM MEASUREMENT TABLES    2D  EF (Teich): 55.42 %  %FS: 28.8 %  AA PSSL Full: -12.44 %  AAS PSSL Full: -20.51 %  AI PSSL Full: -23.18 %  AL PSSL Full: -14.19 %  AP PSSL Full: -21.49 %  AS PSSL Full: -20.91 %  AVC: 398.26 ms  Ao Diam: 2.82 cm  BA PSSL Full: -15.78 %  BAS PSSL Full: -18.72 %  BI PSSL Full: -17.75 %  BL PSSL Full: -22.18 %  BP PSSL Full: -18.73 %  BS PSSL Full: -22.24 %  EDV(Teich): 101.4 ml  ESV(Teich): 45.21 ml  G peak SL Full(A2C): -16.68 %  G peak SL Full(A4C): -19.44 %  G peak SL Full(APLAX): -19.06 %  G peak SL Full(Avg): -18.4 %  HR_2Ch_Q: 71.43 bpm  HR_4Ch_Q: 71.43 bpm  IVSd: 0.77 cm  LA Area: 12.48 cm2  LA Diam: 2.99 cm  LVCO_2Ch_Q: 3.79 L/min  LVCO_4Ch_Q: 3.13 L/min  LVCO_BiP_Q: 3.46 L/min  LVEF_2Ch_Q: 55.37 %  LVEF_4Ch_Q: 55.02 %  LVEF_BiP_Q: 54.65 %  LVIDd: 4.68 cm  LVIDs: 3.33 cm  LVLd_2Ch_Q: 7.48 cm  LVLd_4Ch_Q: 7.45 cm  LVLs_2Ch_Q: 6.1 cm  LVLs_4Ch_Q: 6.04 cm  LVPWd: 0.74 cm  LVSV_2Ch_Q: 53.04 ml  LVSV_4Ch_Q: 43.77 ml  LVSV_BiP_Q: 47.51 ml  LVVED_2Ch_Q: 95.8 ml  LVVED_4Ch_Q: 79.54 ml  LVVED_BiP_Q: 86.93 ml  LVVES_2Ch_Q: 42.76 ml  LVVES_4Ch_Q: 35.78 ml  LVVES_BiP_Q: 39.42 ml  MA PSSL Full: -15.56 %  MAS PSSL Full: -21.07 %  MI PSSL Full: -17.87 %  ML PSSL Full: -21.08 %  MP PSSL Full: -20.83 %  MS PSSL Full:  "-18.49 %  Peak SL Dispersion Full: 45.56 ms  RA Area: 10.68 cm2  RVIDd: 2.63 cm  SV (Teich): 56.2 ml    CW  TR Vmax: 2.23 m/s  TR maxP.83 mmHg    MM  TAPSE: 2.45 cm    PW  MV E/A Ratio: 1.24  E' Sept: 0.08 m/s  E/E' Sept: 8.34  MV A Johnson: 0.56 m/s  MV Dec [place]: 2.94 m/s2  MV DecT: 236.2 ms  MV E Johnson: 0.69 m/s    [facility]    Prepared and electronically signed by    Jeremiah Abreu MD  Signed 19-Mar-2021 09:57:30    No results found for this or any previous visit.    No results found for this or any previous visit.    No results found for this or any previous visit.    No results found for this or any previous visit.    No results found for this or any previous visit.    Results for orders placed during the hospital encounter of 24    NM myocardial perfusion spect (rx stress and/or rest)    Interpretation Summary    Stress ECG: No ST deviation is noted. The ECG was not diagnostic due to pharmacological (vasodilator) stress.    Perfusion: There are no perfusion defects.    Stress Function: Left ventricular function post-stress is normal. Stress ejection fraction is 66%.    Stress Combined Conclusion: The ECG and SPECT imaging portions of the stress study are concordant with no evidence of stress induced myocardial ischemia.      DR.Narpinder Brady MD [facility]  2024  4:54 PM      \"This note was completed in part utilizing [employer] direct voice recognition software.   Grammatical errors, random word insertion, spelling mistakes, and incomplete sentences may be an occasional consequence of the system secondary to software limitations, ambient noise and hardware issues.    Please read the chart carefully and recognize, using context, where substitutions have occurred.  If you have any questions or concerns about the context, text or information contained within the body of this dictation, please contact myself, the provider, for further " "clarification.\"  "

## 2024-04-25 NOTE — TELEPHONE ENCOUNTER
----- Message from Jeremiah Abreu MD sent at 4/25/2024  4:02 PM EDT -----  Pt's Patient's stress test is normal.   Patient can keep regular appointment.    Please call patient with the result.    Patient's echo shows normal LV systolic function.  No significant, to mild valvular disease.  Patient can keep an appointment.     Please call patient about echo report.

## 2024-04-26 NOTE — TELEPHONE ENCOUNTER
Clearance has been sent via [employer] to [phone]   Patient is not on any blood thinners, no advice required.

## 2024-04-28 LAB
CHEST PAIN STATEMENT: NORMAL
MAX DIASTOLIC BP: 84 MMHG
MAX PREDICTED HEART RATE: 153 BPM
PROTOCOL NAME: NORMAL
REASON FOR TERMINATION: NORMAL
STRESS POST EXERCISE DUR MIN: 3 MIN
STRESS POST EXERCISE DUR SEC: 0 SEC
STRESS POST PEAK HR: 102 BPM
STRESS POST PEAK SYSTOLIC BP: 152 MMHG
TARGET HR FORMULA: NORMAL
TEST INDICATION: NORMAL

## 2024-05-01 DIAGNOSIS — E78.5 DYSLIPIDEMIA: ICD-10-CM

## 2024-05-03 RX ORDER — ROSUVASTATIN CALCIUM 5 MG/1
5 TABLET, COATED ORAL DAILY
Qty: 45 TABLET | Refills: 3 | Status: SHIPPED | OUTPATIENT
Start: 2024-05-03

## 2024-05-08 ENCOUNTER — APPOINTMENT (OUTPATIENT)
Dept: LAB | Facility: CLINIC | Age: 69
End: 2024-05-08
Payer: COMMERCIAL

## 2024-05-08 DIAGNOSIS — F10.21 HISTORY OF ALCOHOL DEPENDENCE (HCC): ICD-10-CM

## 2024-05-08 DIAGNOSIS — Z01.810 PREOP CARDIOVASCULAR EXAM: ICD-10-CM

## 2024-05-08 DIAGNOSIS — F41.8 DEPRESSION WITH ANXIETY: ICD-10-CM

## 2024-05-08 DIAGNOSIS — I10 HYPERTENSION, UNSPECIFIED TYPE: ICD-10-CM

## 2024-05-08 DIAGNOSIS — R06.09 DOE (DYSPNEA ON EXERTION): ICD-10-CM

## 2024-05-08 DIAGNOSIS — E78.5 DYSLIPIDEMIA: ICD-10-CM

## 2024-05-08 DIAGNOSIS — J44.9 CHRONIC OBSTRUCTIVE PULMONARY DISEASE, UNSPECIFIED COPD TYPE (HCC): ICD-10-CM

## 2024-05-08 DIAGNOSIS — Z72.0 TOBACCO ABUSE: ICD-10-CM

## 2024-05-08 LAB
ALBUMIN SERPL BCP-MCNC: 4.2 G/DL (ref 3.5–5)
ALP SERPL-CCNC: 68 U/L (ref 34–104)
ALT SERPL W P-5'-P-CCNC: 24 U/L (ref 7–52)
ANION GAP SERPL CALCULATED.3IONS-SCNC: 3 MMOL/L (ref 4–13)
AST SERPL W P-5'-P-CCNC: 31 U/L (ref 13–39)
BASOPHILS # BLD AUTO: 0.07 THOUSANDS/ÂΜL (ref 0–0.1)
BASOPHILS NFR BLD AUTO: 1 % (ref 0–1)
BILIRUB SERPL-MCNC: 0.31 MG/DL (ref 0.2–1)
BUN SERPL-MCNC: 18 MG/DL (ref 5–25)
CALCIUM SERPL-MCNC: 9.2 MG/DL (ref 8.4–10.2)
CHLORIDE SERPL-SCNC: 104 MMOL/L (ref 96–108)
CHOLEST SERPL-MCNC: 164 MG/DL
CO2 SERPL-SCNC: 32 MMOL/L (ref 21–32)
CREAT SERPL-MCNC: 0.92 MG/DL (ref 0.6–1.3)
EOSINOPHIL # BLD AUTO: 0.39 THOUSAND/ÂΜL (ref 0–0.61)
EOSINOPHIL NFR BLD AUTO: 5 % (ref 0–6)
ERYTHROCYTE [DISTWIDTH] IN BLOOD BY AUTOMATED COUNT: 13.7 % (ref 11.6–15.1)
GFR SERPL CREATININE-BSD FRML MDRD: 64 ML/MIN/1.73SQ M
GLUCOSE P FAST SERPL-MCNC: 89 MG/DL (ref 65–99)
HCT VFR BLD AUTO: 35.8 % (ref 34.8–46.1)
HDLC SERPL-MCNC: 93 MG/DL
HGB BLD-MCNC: 11.2 G/DL (ref 11.5–15.4)
IMM GRANULOCYTES # BLD AUTO: 0.01 THOUSAND/UL (ref 0–0.2)
IMM GRANULOCYTES NFR BLD AUTO: 0 % (ref 0–2)
LDLC SERPL CALC-MCNC: 62 MG/DL (ref 0–100)
LYMPHOCYTES # BLD AUTO: 2.98 THOUSANDS/ÂΜL (ref 0.6–4.47)
LYMPHOCYTES NFR BLD AUTO: 39 % (ref 14–44)
MCH RBC QN AUTO: 29.9 PG (ref 26.8–34.3)
MCHC RBC AUTO-ENTMCNC: 31.3 G/DL (ref 31.4–37.4)
MCV RBC AUTO: 96 FL (ref 82–98)
MONOCYTES # BLD AUTO: 0.73 THOUSAND/ÂΜL (ref 0.17–1.22)
MONOCYTES NFR BLD AUTO: 10 % (ref 4–12)
NEUTROPHILS # BLD AUTO: 3.52 THOUSANDS/ÂΜL (ref 1.85–7.62)
NEUTS SEG NFR BLD AUTO: 45 % (ref 43–75)
NONHDLC SERPL-MCNC: 71 MG/DL
NRBC BLD AUTO-RTO: 0 /100 WBCS
PLATELET # BLD AUTO: 368 THOUSANDS/UL (ref 149–390)
PMV BLD AUTO: 10.2 FL (ref 8.9–12.7)
POTASSIUM SERPL-SCNC: 5.4 MMOL/L (ref 3.5–5.3)
PROT SERPL-MCNC: 6.7 G/DL (ref 6.4–8.4)
RBC # BLD AUTO: 3.75 MILLION/UL (ref 3.81–5.12)
SODIUM SERPL-SCNC: 139 MMOL/L (ref 135–147)
TRIGL SERPL-MCNC: 43 MG/DL
TSH SERPL DL<=0.05 MIU/L-ACNC: 2.87 UIU/ML (ref 0.45–4.5)
WBC # BLD AUTO: 7.7 THOUSAND/UL (ref 4.31–10.16)

## 2024-05-08 PROCEDURE — 80061 LIPID PANEL: CPT

## 2024-05-08 PROCEDURE — 36415 COLL VENOUS BLD VENIPUNCTURE: CPT

## 2024-05-08 PROCEDURE — 85025 COMPLETE CBC W/AUTO DIFF WBC: CPT

## 2024-05-08 PROCEDURE — 80053 COMPREHEN METABOLIC PANEL: CPT

## 2024-05-08 PROCEDURE — 84443 ASSAY THYROID STIM HORMONE: CPT

## 2024-05-09 DIAGNOSIS — I10 HYPERTENSION, UNSPECIFIED TYPE: ICD-10-CM

## 2024-05-09 RX ORDER — POTASSIUM CHLORIDE 750 MG/1
TABLET, EXTENDED RELEASE ORAL
Qty: 45 TABLET | Refills: 1 | Status: SHIPPED | OUTPATIENT
Start: 2024-05-09

## 2024-05-09 RX ORDER — FUROSEMIDE 20 MG/1
10 TABLET ORAL EVERY OTHER DAY
Qty: 45 TABLET | Refills: 1 | Status: SHIPPED | OUTPATIENT
Start: 2024-05-09

## 2024-05-10 ENCOUNTER — TELEPHONE (OUTPATIENT)
Dept: CARDIOLOGY CLINIC | Facility: CLINIC | Age: 69
End: 2024-05-10

## 2024-05-10 NOTE — TELEPHONE ENCOUNTER
----- Message from Jeremiah Abreu MD sent at 5/9/2024  9:31 PM EDT -----  Patient blood tests reviewed.  Her potassium is 5.4.  She should stop taking potassium supplementation and continue taking diuretics we will repeat BMP in about 2 weeks.

## 2024-05-16 ENCOUNTER — NURSE TRIAGE (OUTPATIENT)
Age: 69
End: 2024-05-16

## 2024-05-16 NOTE — TELEPHONE ENCOUNTER
Regarding: BP meds/Edema questions  ----- Message from Shireen KLINE sent at 5/16/2024 11:11 AM EDT -----  Pt called after getting Ultrasound for legs due to edema & no clots were found.  She has not seen a decrease or increase in edema & wonders if it could be due to the increase dosage of BP meds Dr. Abreu prescribed?    She also notes a rash on her legs beginning & wonders if it is part of the edema.    Please call to go over questions.  Thank you.

## 2024-05-16 NOTE — TELEPHONE ENCOUNTER
"Reason for Disposition  • MODERATE swelling of both ankles (e.g., swelling extends up to the knees) AND new-onset or worsening    Answer Assessment - Initial Assessment Questions  1. ONSET: \"When did the swelling start?\" (e.g., minutes, hours, days)      5 weeks ago, bliaterally Lower legs, mostly Left leg rash(raised red itchy) also noted   Right ankle  2. LOCATION: \"What part of the leg is swollen?\"  \"Are both legs swollen or just one leg?\"      Right ankle, left leg up to calf uncomfortable   3. SEVERITY: \"How bad is the swelling?\" (e.g., localized; mild, moderate, severe)   - Localized - small area of swelling localized to one leg     - MILD pedal edema - swelling limited to foot and ankle, pitting edema < 1/4 inch (6 mm) deep, rest and elevation eliminate most or all swelling   Bilaterally, and having pitting when elevates it does not help    - MODERATE edema - swelling of lower leg to knee, pitting edema > 1/4 inch (6 mm) deep, rest and elevation only partially reduce swelling   - SEVERE edema - swelling extends above knee, facial or hand swelling present         4. REDNESS: \"Does the swelling look red or infected?\"      Reddness noted, does not look infected,   Feels weak, more fatiqued not 100 perct  5. PAIN: \"Is the swelling painful to touch?\" If Yes, ask: \"How painful is it?\"   (Scale 1-10; mild, moderate or severe)      Uncomfortable, calf is painful pain scale 5 not painful if not touched.  6. FEVER: \"Do you have a fever?\" If Yes, ask: \"What is it, how was it measured, and when did it start?\"       No fever  7. CAUSE: \"What do you think is causing the leg swelling?\"      unsure  8. MEDICAL HISTORY: \"Do you have a history of heart failure, kidney disease, liver failure, or cancer?\"        9. RECURRENT SYMPTOM: \"Have you had leg swelling before?\" If Yes, ask: \"When was the last time?\" \"What happened that time?\"      denies  10. OTHER SYMPTOMS: \"Do you have any other symptoms?\" (e.g., chest pain, difficulty " "breathing)        Feels weak, more fatigue, swelling in legs(pitting)  11. PREGNANCY: \"Is there any chance you are pregnant?\" \"When was your last menstrual period?\"        N/a    Protocols used: Leg Swelling and Edema-ADULT-[place]    "

## 2024-07-09 DIAGNOSIS — K21.00 GASTRO-ESOPHAGEAL REFLUX DISEASE WITH ESOPHAGITIS: ICD-10-CM

## 2024-07-10 RX ORDER — PANTOPRAZOLE SODIUM 40 MG/1
TABLET, DELAYED RELEASE ORAL
Qty: 100 TABLET | Refills: 1 | Status: SHIPPED | OUTPATIENT
Start: 2024-07-10

## 2024-10-18 DIAGNOSIS — I10 HYPERTENSION, UNSPECIFIED TYPE: ICD-10-CM

## 2024-10-18 RX ORDER — LOSARTAN POTASSIUM 50 MG/1
50 TABLET ORAL DAILY
Qty: 90 TABLET | Refills: 0 | Status: SHIPPED | OUTPATIENT
Start: 2024-10-18

## 2024-11-03 DIAGNOSIS — I10 HYPERTENSION, UNSPECIFIED TYPE: ICD-10-CM

## 2024-11-04 RX ORDER — FUROSEMIDE 20 MG/1
10 TABLET ORAL EVERY OTHER DAY
Qty: 45 TABLET | Refills: 0 | Status: SHIPPED | OUTPATIENT
Start: 2024-11-04

## 2024-12-31 DIAGNOSIS — K21.00 GASTRO-ESOPHAGEAL REFLUX DISEASE WITH ESOPHAGITIS: ICD-10-CM

## 2024-12-31 RX ORDER — PANTOPRAZOLE SODIUM 40 MG/1
40 TABLET, DELAYED RELEASE ORAL EVERY MORNING
Qty: 90 TABLET | Refills: 1 | Status: SHIPPED | OUTPATIENT
Start: 2024-12-31

## 2025-01-22 DIAGNOSIS — I10 HYPERTENSION, UNSPECIFIED TYPE: ICD-10-CM

## 2025-01-29 RX ORDER — LOSARTAN POTASSIUM 50 MG/1
50 TABLET ORAL DAILY
Qty: 90 TABLET | Refills: 0 | Status: SHIPPED | OUTPATIENT
Start: 2025-01-29

## 2025-04-25 DIAGNOSIS — I10 HYPERTENSION, UNSPECIFIED TYPE: ICD-10-CM

## 2025-04-25 RX ORDER — LOSARTAN POTASSIUM 50 MG/1
50 TABLET ORAL DAILY
Qty: 90 TABLET | Refills: 0 | Status: SHIPPED | OUTPATIENT
Start: 2025-04-25

## 2025-05-11 DIAGNOSIS — E78.5 DYSLIPIDEMIA: ICD-10-CM

## 2025-05-14 RX ORDER — ROSUVASTATIN CALCIUM 5 MG/1
5 TABLET, COATED ORAL DAILY
Qty: 45 TABLET | Refills: 3 | Status: SHIPPED | OUTPATIENT
Start: 2025-05-14

## 2025-07-03 DIAGNOSIS — I10 HYPERTENSION, UNSPECIFIED TYPE: ICD-10-CM

## 2025-07-14 NOTE — TELEPHONE ENCOUNTER
Pt is placed on a HIGH priority waitlist w/ DR ESPINOZA. We will reach out when an appt with him or LUCRECIA becomes available.

## 2025-07-15 RX ORDER — LOSARTAN POTASSIUM 50 MG/1
50 TABLET ORAL DAILY
Qty: 90 TABLET | Refills: 0 | Status: SHIPPED | OUTPATIENT
Start: 2025-07-15

## (undated) DEVICE — PLASTIC ADHESIVE BANDAGE: Brand: CURITY

## (undated) DEVICE — TIBURON SPLIT SHEET: Brand: CONVERTORS

## (undated) DEVICE — TUBING ARTHROSCOPY REDUCE PATIENT

## (undated) DEVICE — NEEDLE BLUNT 18 G X 1 1/2 W FILTER

## (undated) DEVICE — CHLORAPREP APPLICATOR TINTED 10.5ML ONE-STEP

## (undated) DEVICE — DISPOSABLE BIOPSY VALVE MAJ-1555: Brand: SINGLE USE BIOPSY VALVE (STERILE)

## (undated) DEVICE — "MAJ-901 WATER CONTAINER SET CV-160/140": Brand: WATER CONTAINER

## (undated) DEVICE — GAUZE SPONGES,16 PLY: Brand: CURITY

## (undated) DEVICE — TUBING ARTHROSCOPY REDUCE PUMP

## (undated) DEVICE — AIRLIFE™  ADULT CUSHION NASAL CANNULA WITH 7 FOOT (2.1 M) CRUSH-RESISTANT OXYGEN TUBING, AND U/CONNECT-IT ADAPTER: Brand: AIRLIFE™

## (undated) DEVICE — ABDOMINAL PAD: Brand: DERMACEA

## (undated) DEVICE — GLOVE EXAM NON-STRL NTRL PLUS LRG PF

## (undated) DEVICE — GLOVE SRG BIOGEL 7.5

## (undated) DEVICE — BITE BLOCK MAXI 60FR LF STRAP

## (undated) DEVICE — SPONGE GAUZE 4 X 8 12 PLY STRL LF

## (undated) DEVICE — GLOVE SRG BIOGEL 6.5

## (undated) DEVICE — "MH-438 A/W VLVE F/140 EVIS-140": Brand: AIR/WATER VALVE

## (undated) DEVICE — TRAY EPIDURAL PERIFIX 20GA X 3.5IN TUOHY 8ML

## (undated) DEVICE — SMALL NEEDLE COUNTER NEST

## (undated) DEVICE — BRUSH ENDO CLEANING DBL-HEADER

## (undated) DEVICE — "MH-443 SUCTION VALVE F/EVIS140 EVIS160": Brand: SUCTION VALVE

## (undated) DEVICE — OCCLUSIVE GAUZE STRIP,3% BISMUTH TRIBROMOPHENATE IN PETROLATUM BLEND: Brand: XEROFORM

## (undated) DEVICE — TRAVELKIT CONTAINS FIRST STEP KIT (200ML EP-4 KIT) AND SOILED SCOPE BAG - 1 KIT: Brand: TRAVELKIT CONTAINS FIRST STEP KIT AND SOILED SCOPE BAG

## (undated) DEVICE — VAPR PREMIERE50 ELECTRODE WITH HAND CONTROLS 3.0MM, 50 DEGREES SUCTION WITH INTEGRATED HANDPIECE: Brand: VAPR

## (undated) DEVICE — RADIOLOGY STERILE LABELS: Brand: CENTURION

## (undated) DEVICE — "MB-142 MOUTHPIECE": Brand: MOUTHPIECE

## (undated) DEVICE — TIBURON EXTREMITY SHEET: Brand: CONVERTORS

## (undated) DEVICE — LUBRICANT SURGILUBE TUBE 4 OZ  FLIP TOP

## (undated) DEVICE — 60 ML SYRINGE,REGULAR TIP: Brand: MONOJECT

## (undated) DEVICE — PACK ARTHROSCOPY

## (undated) DEVICE — SYRINGE 5ML LL

## (undated) DEVICE — 1200CC GUARDIAN II: Brand: GUARDIAN

## (undated) DEVICE — FABRIC REINFORCED, SURGICAL GOWN, XL: Brand: CONVERTORS

## (undated) DEVICE — TOWEL SET X-RAY

## (undated) DEVICE — SOLIDIFIER FLUID WASTE CONTROL 1500ML

## (undated) DEVICE — TUBING BUBBLE CLEAR 5MM X 100 FT NS

## (undated) DEVICE — TUBING AUX CHANNEL

## (undated) DEVICE — BLADE SHAVER EXCALIBUR 4MM 13CM COOLCUT

## (undated) DEVICE — SINGLE-USE BIOPSY FORCEPS: Brand: RADIAL JAW 4

## (undated) DEVICE — MEDI-VAC YANKAUER SUCTION HANDLE: Brand: CARDINAL HEALTH

## (undated) DEVICE — ACE WRAP 6 IN STERILE

## (undated) DEVICE — BRACHIAL PLEXUS SET

## (undated) DEVICE — GLOVE INDICATOR PI UNDERGLOVE SZ 6.5 BLUE

## (undated) DEVICE — GLOVE INDICATOR PI UNDERGLOVE SZ 7.5 BLUE